# Patient Record
Sex: MALE | Race: BLACK OR AFRICAN AMERICAN | NOT HISPANIC OR LATINO | URBAN - METROPOLITAN AREA
[De-identification: names, ages, dates, MRNs, and addresses within clinical notes are randomized per-mention and may not be internally consistent; named-entity substitution may affect disease eponyms.]

---

## 2020-09-10 ENCOUNTER — INPATIENT (INPATIENT)
Facility: HOSPITAL | Age: 75
LOS: 4 days | Discharge: ANOTHER HOSPITAL | End: 2020-09-15
Attending: HOSPITALIST | Admitting: HOSPITALIST
Payer: MEDICARE

## 2020-09-10 VITALS
HEART RATE: 105 BPM | RESPIRATION RATE: 26 BRPM | OXYGEN SATURATION: 96 % | SYSTOLIC BLOOD PRESSURE: 81 MMHG | DIASTOLIC BLOOD PRESSURE: 56 MMHG

## 2020-09-10 DIAGNOSIS — Z95.5 PRESENCE OF CORONARY ANGIOPLASTY IMPLANT AND GRAFT: Chronic | ICD-10-CM

## 2020-09-10 DIAGNOSIS — I48.91 UNSPECIFIED ATRIAL FIBRILLATION: ICD-10-CM

## 2020-09-10 DIAGNOSIS — E11.9 TYPE 2 DIABETES MELLITUS WITHOUT COMPLICATIONS: ICD-10-CM

## 2020-09-10 DIAGNOSIS — I25.10 ATHEROSCLEROTIC HEART DISEASE OF NATIVE CORONARY ARTERY WITHOUT ANGINA PECTORIS: ICD-10-CM

## 2020-09-10 DIAGNOSIS — D64.9 ANEMIA, UNSPECIFIED: ICD-10-CM

## 2020-09-10 DIAGNOSIS — I35.0 NONRHEUMATIC AORTIC (VALVE) STENOSIS: ICD-10-CM

## 2020-09-10 DIAGNOSIS — E78.5 HYPERLIPIDEMIA, UNSPECIFIED: ICD-10-CM

## 2020-09-10 DIAGNOSIS — K92.1 MELENA: ICD-10-CM

## 2020-09-10 DIAGNOSIS — R89.9 UNSPECIFIED ABNORMAL FINDING IN SPECIMENS FROM OTHER ORGANS, SYSTEMS AND TISSUES: ICD-10-CM

## 2020-09-10 DIAGNOSIS — Z29.9 ENCOUNTER FOR PROPHYLACTIC MEASURES, UNSPECIFIED: ICD-10-CM

## 2020-09-10 DIAGNOSIS — R07.9 CHEST PAIN, UNSPECIFIED: ICD-10-CM

## 2020-09-10 DIAGNOSIS — I10 ESSENTIAL (PRIMARY) HYPERTENSION: ICD-10-CM

## 2020-09-10 DIAGNOSIS — Z98.890 OTHER SPECIFIED POSTPROCEDURAL STATES: Chronic | ICD-10-CM

## 2020-09-10 LAB
ALBUMIN SERPL ELPH-MCNC: 3.7 G/DL — SIGNIFICANT CHANGE UP (ref 3.3–5)
ALBUMIN SERPL ELPH-MCNC: 4.1 G/DL — SIGNIFICANT CHANGE UP (ref 3.3–5)
ALP SERPL-CCNC: 35 U/L — LOW (ref 40–120)
ALP SERPL-CCNC: 39 U/L — LOW (ref 40–120)
ALT FLD-CCNC: 16 U/L — SIGNIFICANT CHANGE UP (ref 4–41)
ALT FLD-CCNC: 17 U/L — SIGNIFICANT CHANGE UP (ref 4–41)
ANION GAP SERPL CALC-SCNC: 14 MMO/L — SIGNIFICANT CHANGE UP (ref 7–14)
ANION GAP SERPL CALC-SCNC: 18 MMO/L — HIGH (ref 7–14)
APPEARANCE UR: CLEAR — SIGNIFICANT CHANGE UP
APTT BLD: 43.4 SEC — HIGH (ref 27–36.3)
APTT BLD: 44.2 SEC — HIGH (ref 27–36.3)
AST SERPL-CCNC: 18 U/L — SIGNIFICANT CHANGE UP (ref 4–40)
AST SERPL-CCNC: 19 U/L — SIGNIFICANT CHANGE UP (ref 4–40)
BASOPHILS # BLD AUTO: 0.04 K/UL — SIGNIFICANT CHANGE UP (ref 0–0.2)
BASOPHILS # BLD AUTO: 0.05 K/UL — SIGNIFICANT CHANGE UP (ref 0–0.2)
BASOPHILS NFR BLD AUTO: 0.3 % — SIGNIFICANT CHANGE UP (ref 0–2)
BASOPHILS NFR BLD AUTO: 0.4 % — SIGNIFICANT CHANGE UP (ref 0–2)
BILIRUB SERPL-MCNC: 0.2 MG/DL — SIGNIFICANT CHANGE UP (ref 0.2–1.2)
BILIRUB SERPL-MCNC: 0.5 MG/DL — SIGNIFICANT CHANGE UP (ref 0.2–1.2)
BILIRUB UR-MCNC: NEGATIVE — SIGNIFICANT CHANGE UP
BLD GP AB SCN SERPL QL: NEGATIVE — SIGNIFICANT CHANGE UP
BLOOD UR QL VISUAL: NEGATIVE — SIGNIFICANT CHANGE UP
BUN SERPL-MCNC: 39 MG/DL — HIGH (ref 7–23)
BUN SERPL-MCNC: 41 MG/DL — HIGH (ref 7–23)
CALCIUM SERPL-MCNC: 9.1 MG/DL — SIGNIFICANT CHANGE UP (ref 8.4–10.5)
CALCIUM SERPL-MCNC: 9.1 MG/DL — SIGNIFICANT CHANGE UP (ref 8.4–10.5)
CHLORIDE SERPL-SCNC: 101 MMOL/L — SIGNIFICANT CHANGE UP (ref 98–107)
CHLORIDE SERPL-SCNC: 99 MMOL/L — SIGNIFICANT CHANGE UP (ref 98–107)
CHOLEST SERPL-MCNC: 100 MG/DL — LOW (ref 120–199)
CO2 SERPL-SCNC: 20 MMOL/L — LOW (ref 22–31)
CO2 SERPL-SCNC: 21 MMOL/L — LOW (ref 22–31)
COLOR SPEC: SIGNIFICANT CHANGE UP
CREAT SERPL-MCNC: 0.99 MG/DL — SIGNIFICANT CHANGE UP (ref 0.5–1.3)
CREAT SERPL-MCNC: 1.03 MG/DL — SIGNIFICANT CHANGE UP (ref 0.5–1.3)
EOSINOPHIL # BLD AUTO: 0.02 K/UL — SIGNIFICANT CHANGE UP (ref 0–0.5)
EOSINOPHIL # BLD AUTO: 0.04 K/UL — SIGNIFICANT CHANGE UP (ref 0–0.5)
EOSINOPHIL NFR BLD AUTO: 0.2 % — SIGNIFICANT CHANGE UP (ref 0–6)
EOSINOPHIL NFR BLD AUTO: 0.3 % — SIGNIFICANT CHANGE UP (ref 0–6)
FERRITIN SERPL-MCNC: 25.4 NG/ML — LOW (ref 30–400)
FOLATE SERPL-MCNC: 13.5 NG/ML — SIGNIFICANT CHANGE UP (ref 4.7–20)
GLUCOSE SERPL-MCNC: 182 MG/DL — HIGH (ref 70–99)
GLUCOSE SERPL-MCNC: 93 MG/DL — SIGNIFICANT CHANGE UP (ref 70–99)
GLUCOSE UR-MCNC: NEGATIVE — SIGNIFICANT CHANGE UP
HBA1C BLD-MCNC: 5.4 % — SIGNIFICANT CHANGE UP (ref 4–5.6)
HCT VFR BLD CALC: 20.7 % — CRITICAL LOW (ref 39–50)
HCT VFR BLD CALC: 28.3 % — LOW (ref 39–50)
HDLC SERPL-MCNC: 36 MG/DL — SIGNIFICANT CHANGE UP (ref 35–55)
HGB BLD-MCNC: 6.4 G/DL — CRITICAL LOW (ref 13–17)
HGB BLD-MCNC: 9.3 G/DL — LOW (ref 13–17)
IMM GRANULOCYTES NFR BLD AUTO: 0.3 % — SIGNIFICANT CHANGE UP (ref 0–1.5)
IMM GRANULOCYTES NFR BLD AUTO: 0.6 % — SIGNIFICANT CHANGE UP (ref 0–1.5)
INR BLD: 2.71 — HIGH (ref 0.88–1.16)
INR BLD: 2.87 — HIGH (ref 0.88–1.16)
IRON SATN MFR SERPL: 303 UG/DL — SIGNIFICANT CHANGE UP (ref 155–535)
IRON SATN MFR SERPL: 64 UG/DL — SIGNIFICANT CHANGE UP (ref 45–165)
KETONES UR-MCNC: NEGATIVE — SIGNIFICANT CHANGE UP
LEUKOCYTE ESTERASE UR-ACNC: NEGATIVE — SIGNIFICANT CHANGE UP
LIPID PNL WITH DIRECT LDL SERPL: 58 MG/DL — SIGNIFICANT CHANGE UP
LYMPHOCYTES # BLD AUTO: 1.24 K/UL — SIGNIFICANT CHANGE UP (ref 1–3.3)
LYMPHOCYTES # BLD AUTO: 2.5 K/UL — SIGNIFICANT CHANGE UP (ref 1–3.3)
LYMPHOCYTES # BLD AUTO: 21.7 % — SIGNIFICANT CHANGE UP (ref 13–44)
LYMPHOCYTES # BLD AUTO: 9.9 % — LOW (ref 13–44)
MAGNESIUM SERPL-MCNC: 1.6 MG/DL — SIGNIFICANT CHANGE UP (ref 1.6–2.6)
MCHC RBC-ENTMCNC: 30.9 % — LOW (ref 32–36)
MCHC RBC-ENTMCNC: 31.5 PG — SIGNIFICANT CHANGE UP (ref 27–34)
MCHC RBC-ENTMCNC: 31.8 PG — SIGNIFICANT CHANGE UP (ref 27–34)
MCHC RBC-ENTMCNC: 32.2 % — SIGNIFICANT CHANGE UP (ref 32–36)
MCV RBC AUTO: 103 FL — HIGH (ref 80–100)
MCV RBC AUTO: 94.6 FL — SIGNIFICANT CHANGE UP (ref 80–100)
MONOCYTES # BLD AUTO: 0.68 K/UL — SIGNIFICANT CHANGE UP (ref 0–0.9)
MONOCYTES # BLD AUTO: 0.9 K/UL — SIGNIFICANT CHANGE UP (ref 0–0.9)
MONOCYTES NFR BLD AUTO: 5.4 % — SIGNIFICANT CHANGE UP (ref 2–14)
MONOCYTES NFR BLD AUTO: 7.8 % — SIGNIFICANT CHANGE UP (ref 2–14)
NEUTROPHILS # BLD AUTO: 10.47 K/UL — HIGH (ref 1.8–7.4)
NEUTROPHILS # BLD AUTO: 8 K/UL — HIGH (ref 1.8–7.4)
NEUTROPHILS NFR BLD AUTO: 69.5 % — SIGNIFICANT CHANGE UP (ref 43–77)
NEUTROPHILS NFR BLD AUTO: 83.6 % — HIGH (ref 43–77)
NITRITE UR-MCNC: NEGATIVE — SIGNIFICANT CHANGE UP
NRBC # FLD: 0 K/UL — SIGNIFICANT CHANGE UP (ref 0–0)
NRBC # FLD: 0 K/UL — SIGNIFICANT CHANGE UP (ref 0–0)
NT-PROBNP SERPL-SCNC: 664.3 PG/ML — SIGNIFICANT CHANGE UP
OB PNL STL: POSITIVE — SIGNIFICANT CHANGE UP
PH UR: 5.5 — SIGNIFICANT CHANGE UP (ref 5–8)
PLATELET # BLD AUTO: 195 K/UL — SIGNIFICANT CHANGE UP (ref 150–400)
PLATELET # BLD AUTO: 210 K/UL — SIGNIFICANT CHANGE UP (ref 150–400)
PMV BLD: 10.3 FL — SIGNIFICANT CHANGE UP (ref 7–13)
PMV BLD: 10.9 FL — SIGNIFICANT CHANGE UP (ref 7–13)
POTASSIUM SERPL-MCNC: 4.7 MMOL/L — SIGNIFICANT CHANGE UP (ref 3.5–5.3)
POTASSIUM SERPL-MCNC: 5.1 MMOL/L — SIGNIFICANT CHANGE UP (ref 3.5–5.3)
POTASSIUM SERPL-SCNC: 4.7 MMOL/L — SIGNIFICANT CHANGE UP (ref 3.5–5.3)
POTASSIUM SERPL-SCNC: 5.1 MMOL/L — SIGNIFICANT CHANGE UP (ref 3.5–5.3)
PROT SERPL-MCNC: 6.6 G/DL — SIGNIFICANT CHANGE UP (ref 6–8.3)
PROT SERPL-MCNC: 7.2 G/DL — SIGNIFICANT CHANGE UP (ref 6–8.3)
PROT UR-MCNC: NEGATIVE — SIGNIFICANT CHANGE UP
PROTHROM AB SERPL-ACNC: 29.7 SEC — HIGH (ref 10.6–13.6)
PROTHROM AB SERPL-ACNC: 31.4 SEC — HIGH (ref 10.6–13.6)
RBC # BLD: 2.01 M/UL — LOW (ref 4.2–5.8)
RBC # BLD: 2.95 M/UL — LOW (ref 4.2–5.8)
RBC # FLD: 13.4 % — SIGNIFICANT CHANGE UP (ref 10.3–14.5)
RBC # FLD: 17.5 % — HIGH (ref 10.3–14.5)
RH IG SCN BLD-IMP: POSITIVE — SIGNIFICANT CHANGE UP
RH IG SCN BLD-IMP: POSITIVE — SIGNIFICANT CHANGE UP
SARS-COV-2 RNA SPEC QL NAA+PROBE: SIGNIFICANT CHANGE UP
SODIUM SERPL-SCNC: 136 MMOL/L — SIGNIFICANT CHANGE UP (ref 135–145)
SODIUM SERPL-SCNC: 137 MMOL/L — SIGNIFICANT CHANGE UP (ref 135–145)
SP GR SPEC: 1.02 — SIGNIFICANT CHANGE UP (ref 1–1.04)
TRIGL SERPL-MCNC: 119 MG/DL — SIGNIFICANT CHANGE UP (ref 10–149)
TROPONIN T, HIGH SENSITIVITY: 13 NG/L — SIGNIFICANT CHANGE UP (ref ?–14)
TROPONIN T, HIGH SENSITIVITY: 13 NG/L — SIGNIFICANT CHANGE UP (ref ?–14)
TSH SERPL-MCNC: 2.07 UIU/ML — SIGNIFICANT CHANGE UP (ref 0.27–4.2)
UIBC SERPL-MCNC: 238.6 UG/DL — SIGNIFICANT CHANGE UP (ref 110–370)
UROBILINOGEN FLD QL: NORMAL — SIGNIFICANT CHANGE UP
VIT B12 SERPL-MCNC: 410 PG/ML — SIGNIFICANT CHANGE UP (ref 200–900)
WBC # BLD: 11.53 K/UL — HIGH (ref 3.8–10.5)
WBC # BLD: 12.52 K/UL — HIGH (ref 3.8–10.5)
WBC # FLD AUTO: 11.53 K/UL — HIGH (ref 3.8–10.5)
WBC # FLD AUTO: 12.52 K/UL — HIGH (ref 3.8–10.5)

## 2020-09-10 PROCEDURE — 99284 EMERGENCY DEPT VISIT MOD MDM: CPT

## 2020-09-10 PROCEDURE — 99223 1ST HOSP IP/OBS HIGH 75: CPT | Mod: GC

## 2020-09-10 PROCEDURE — 71045 X-RAY EXAM CHEST 1 VIEW: CPT | Mod: 26

## 2020-09-10 PROCEDURE — 99223 1ST HOSP IP/OBS HIGH 75: CPT

## 2020-09-10 RX ORDER — DEXTROSE 50 % IN WATER 50 %
12.5 SYRINGE (ML) INTRAVENOUS ONCE
Refills: 0 | Status: DISCONTINUED | OUTPATIENT
Start: 2020-09-10 | End: 2020-09-15

## 2020-09-10 RX ORDER — INFLUENZA VIRUS VACCINE 15; 15; 15; 15 UG/.5ML; UG/.5ML; UG/.5ML; UG/.5ML
0.5 SUSPENSION INTRAMUSCULAR ONCE
Refills: 0 | Status: DISCONTINUED | OUTPATIENT
Start: 2020-09-10 | End: 2020-09-15

## 2020-09-10 RX ORDER — SIMVASTATIN 20 MG/1
10 TABLET, FILM COATED ORAL AT BEDTIME
Refills: 0 | Status: DISCONTINUED | OUTPATIENT
Start: 2020-09-10 | End: 2020-09-11

## 2020-09-10 RX ORDER — PANTOPRAZOLE SODIUM 20 MG/1
80 TABLET, DELAYED RELEASE ORAL ONCE
Refills: 0 | Status: COMPLETED | OUTPATIENT
Start: 2020-09-10 | End: 2020-09-10

## 2020-09-10 RX ORDER — DEXTROSE 50 % IN WATER 50 %
25 SYRINGE (ML) INTRAVENOUS ONCE
Refills: 0 | Status: DISCONTINUED | OUTPATIENT
Start: 2020-09-10 | End: 2020-09-15

## 2020-09-10 RX ORDER — ACETAMINOPHEN 500 MG
650 TABLET ORAL EVERY 6 HOURS
Refills: 0 | Status: DISCONTINUED | OUTPATIENT
Start: 2020-09-10 | End: 2020-09-15

## 2020-09-10 RX ORDER — PANTOPRAZOLE SODIUM 20 MG/1
8 TABLET, DELAYED RELEASE ORAL
Qty: 80 | Refills: 0 | Status: DISCONTINUED | OUTPATIENT
Start: 2020-09-10 | End: 2020-09-14

## 2020-09-10 RX ORDER — INSULIN LISPRO 100/ML
VIAL (ML) SUBCUTANEOUS AT BEDTIME
Refills: 0 | Status: DISCONTINUED | OUTPATIENT
Start: 2020-09-10 | End: 2020-09-15

## 2020-09-10 RX ORDER — SODIUM CHLORIDE 9 MG/ML
1000 INJECTION, SOLUTION INTRAVENOUS
Refills: 0 | Status: DISCONTINUED | OUTPATIENT
Start: 2020-09-10 | End: 2020-09-11

## 2020-09-10 RX ORDER — SODIUM CHLORIDE 9 MG/ML
1000 INJECTION INTRAMUSCULAR; INTRAVENOUS; SUBCUTANEOUS ONCE
Refills: 0 | Status: COMPLETED | OUTPATIENT
Start: 2020-09-10 | End: 2020-09-10

## 2020-09-10 RX ORDER — NICOTINE POLACRILEX 2 MG
1 GUM BUCCAL DAILY
Refills: 0 | Status: DISCONTINUED | OUTPATIENT
Start: 2020-09-10 | End: 2020-09-15

## 2020-09-10 RX ORDER — INSULIN LISPRO 100/ML
VIAL (ML) SUBCUTANEOUS
Refills: 0 | Status: DISCONTINUED | OUTPATIENT
Start: 2020-09-10 | End: 2020-09-15

## 2020-09-10 RX ORDER — DEXTROSE 50 % IN WATER 50 %
15 SYRINGE (ML) INTRAVENOUS ONCE
Refills: 0 | Status: DISCONTINUED | OUTPATIENT
Start: 2020-09-10 | End: 2020-09-15

## 2020-09-10 RX ORDER — GLUCAGON INJECTION, SOLUTION 0.5 MG/.1ML
1 INJECTION, SOLUTION SUBCUTANEOUS ONCE
Refills: 0 | Status: DISCONTINUED | OUTPATIENT
Start: 2020-09-10 | End: 2020-09-15

## 2020-09-10 RX ADMIN — SODIUM CHLORIDE 1000 MILLILITER(S): 9 INJECTION INTRAMUSCULAR; INTRAVENOUS; SUBCUTANEOUS at 01:50

## 2020-09-10 RX ADMIN — Medication 1 PATCH: at 18:05

## 2020-09-10 RX ADMIN — PANTOPRAZOLE SODIUM 10 MG/HR: 20 TABLET, DELAYED RELEASE ORAL at 17:49

## 2020-09-10 RX ADMIN — PANTOPRAZOLE SODIUM 10 MG/HR: 20 TABLET, DELAYED RELEASE ORAL at 21:33

## 2020-09-10 RX ADMIN — SIMVASTATIN 10 MILLIGRAM(S): 20 TABLET, FILM COATED ORAL at 21:34

## 2020-09-10 RX ADMIN — Medication 1 PATCH: at 12:37

## 2020-09-10 RX ADMIN — SODIUM CHLORIDE 1000 MILLILITER(S): 9 INJECTION INTRAMUSCULAR; INTRAVENOUS; SUBCUTANEOUS at 02:45

## 2020-09-10 RX ADMIN — PANTOPRAZOLE SODIUM 80 MILLIGRAM(S): 20 TABLET, DELAYED RELEASE ORAL at 02:58

## 2020-09-10 RX ADMIN — PANTOPRAZOLE SODIUM 10 MG/HR: 20 TABLET, DELAYED RELEASE ORAL at 08:15

## 2020-09-10 NOTE — H&P ADULT - GASTROINTESTINAL DETAILS
no masses palpable/soft/no guarding/nontender/no rebound tenderness/no rigidity/no distention no distention/no masses palpable/bowel sounds normal/no rebound tenderness/no rigidity/nontender/no guarding/soft

## 2020-09-10 NOTE — H&P ADULT - NSICDXPASTSURGICALHX_GEN_ALL_CORE_FT
PAST SURGICAL HISTORY:  History of coronary artery stent placement two coronary stents    Status post angiography of extremity 1 stent in each leg

## 2020-09-10 NOTE — H&P ADULT - NSICDXFAMILYHX_GEN_ALL_CORE_FT
FAMILY HISTORY:  FH: CAD (coronary artery disease), son  FH: diabetes mellitus, mother  FH: seizures, son  FH: type 1 diabetes, son

## 2020-09-10 NOTE — ED ADULT NURSE NOTE - CHIEF COMPLAINT QUOTE
Pt awake, alert arrives by SIM for R/O STEMI, brought directly to room 20. ERICNY paramedic states pt comes in for chest pain, nausea, vomiting, dizziness - 20G L arm placed by paramedic

## 2020-09-10 NOTE — CONSULT NOTE ADULT - SUBJECTIVE AND OBJECTIVE BOX
Patient seen and evaluated at bedside    Chief Complaint: chest pain    HPI:  75 y/o M with h/o HTN, Afib on coumadin, hyperlipidemia, CAD s/p stents x 2, PAD with LLE stent, BIB EMS for chest pain.  Pt reports a few days of worsening lightheadedness and CP and dyspnea on exertion.  He was seen by outpatient cardiologist Dr Armijo yesterday and started on metoprolol.  Pt took first dose of metoprolol tonight, also with worsening of left sided chest pain, non radiating.  Pt received 2 baby asa and nitro x 3 by EMS prior to arrival, became nauseated and have one episode of nbnb vomiting.      In the ED, he was found to have a Hb of 6.4, stool occult blood was positive, INR 2.8, Trop 13, Cr 1.03/ BUN 41.  Received pRBC in the ED with plan for EGD/C-scope    PMHx:   Atrial fibrillation  Hypertension  Diabetes  Hyperlipidemia  Diabetes  Hypertension  Coronary artery disease  Hyperlipemia  Hypertension  Diabetes      PSHx:   Status post angiography of extremity  H/O right coronary artery stent placement  History of coronary artery stent placement  History of coronary artery stent placement      Allergies:  No Known Allergies      Home Meds:    Current Medications:   acetaminophen   Tablet .. 650 milliGRAM(s) Oral every 6 hours PRN  dextrose 40% Gel 15 Gram(s) Oral once PRN  dextrose 5%. 1000 milliLiter(s) IV Continuous <Continuous>  dextrose 50% Injectable 12.5 Gram(s) IV Push once  dextrose 50% Injectable 25 Gram(s) IV Push once  dextrose 50% Injectable 25 Gram(s) IV Push once  glucagon  Injectable 1 milliGRAM(s) IntraMuscular once PRN  insulin lispro (HumaLOG) corrective regimen sliding scale   SubCutaneous three times a day before meals  insulin lispro (HumaLOG) corrective regimen sliding scale   SubCutaneous at bedtime  pantoprazole Infusion 8 mG/Hr IV Continuous <Continuous>  simvastatin 10 milliGRAM(s) Oral at bedtime      FAMILY HISTORY:  FH: CAD (coronary artery disease): son  FH: seizures: son  FH: type 1 diabetes: son  FH: diabetes mellitus: mother      Social History:  Smoking History:  Alcohol Use:  Drug Use:    REVIEW OF SYSTEMS:  Constitutional:     [ ] negative [ ] fevers [ ] chills [ ] weight loss [ ] weight gain  HEENT:                  [ ] negative [ ] dry eyes [ ] eye irritation [ ] postnasal drip [ ] nasal congestion  CV:                         [ ] negative  [ ] chest pain [ ] orthopnea [ ] palpitations [ ] murmur  Resp:                     [ ] negative [ ] cough [ ] shortness of breath [ ] dyspnea [ ] wheezing [ ] sputum [ ]hemoptysis  GI:                          [ ] negative [ ] nausea [ ] vomiting [ ] diarrhea [ ] constipation [ ] abd pain [ ] dysphagia   :                        [ ] negative [ ] dysuria [ ] nocturia [ ] hematuria [ ] increased urinary frequency  Musculoskeletal: [ ] negative [ ] back pain [ ] myalgias [ ] arthralgias [ ] fracture  Skin:                       [ ] negative [ ] rash [ ] itch  Neurological:        [ ] negative [ ] headache [ ] dizziness [ ] syncope [ ] weakness [ ] numbness  Psychiatric:           [ ] negative [ ] anxiety [ ] depression  Endocrine:            [ ] negative [ ] diabetes [ ] thyroid problem  Heme/Lymph:      [ ] negative [ ] anemia [ ] bleeding problem  Allergic/Immune: [ ] negative [ ] itchy eyes [ ] nasal discharge [ ] hives [ ] angioedema    [ ] All other systems negative  [ ] Unable to assess ROS due to      Physical Exam:  T(F): 98.2 (09-10), Max: 98.4 (09-10)  HR: 83 (09-10) (81 - 105)  BP: 92/63 (09-10) (81/56 - 121/64)  RR: 18 (09-10)  SpO2: 100% (09-10)  GENERAL: No acute distress, well-developed  HEAD:  Atraumatic, Normocephalic  ENT: EOMI, PERRLA, conjunctiva and sclera clear, Neck supple, No JVD, moist mucosa  CHEST/LUNG: Clear to auscultation bilaterally; No wheeze, equal breath sounds bilaterally   BACK: No spinal tenderness  HEART: Regular rate and rhythm; No murmurs, rubs, or gallops  ABDOMEN: Soft, Nontender, Nondistended; Bowel sounds present  EXTREMITIES:  No clubbing, cyanosis, or edema  PSYCH: Nl behavior, nl affect  NEUROLOGY: AAOx3, non-focal, cranial nerves intact  SKIN: Normal color, No rashes or lesions  LINES:    Cardiovascular Diagnostic Testing:    ECG: Personally reviewed:    Echo: Personally reviewed:    Stress Testing:    Cath:    Imaging:    CXR: Personally reviewed    Labs: Personally reviewed                        6.4    12.52 )-----------( 210      ( 10 Sep 2020 01:45 )             20.7     09-10    137  |  99  |  41<H>  ----------------------------<  182<H>  4.7   |  20<L>  |  1.03    Ca    9.1      10 Sep 2020 01:45    TPro  6.6  /  Alb  3.7  /  TBili  0.2  /  DBili  x   /  AST  18  /  ALT  16  /  AlkPhos  35<L>  09-10    PT/INR - ( 10 Sep 2020 01:45 )   PT: 31.4 SEC;   INR: 2.87          PTT - ( 10 Sep 2020 01:45 )  PTT:44.2 SEC  Serum Pro-Brain Natriuretic Peptide: 664.3 pg/mL (09-10 @ 01:45) Patient seen and evaluated at bedside    Chief Complaint: chest pain    HPI:  73 y/o M with h/o HTN, Afib on coumadin, severe AS, hyperlipidemia, CAD s/p stents x 2 (last pCI 7 years ago), PAD with LLE stent, BIB EMS for chest pain.  Pt reports a few days of worsening lightheadedness and CP and dyspnea on exertion.  He was seen by outpatient cardiologist Dr Armijo yesterday and started on metoprolol.  Pt took first dose of metoprolol tonight, also with worsening of left sided chest pain, non radiating.  Pt received 2 baby asa and nitro x 3 by EMS prior to arrival, became nauseated and have one episode of nbnb vomiting.      States about 1- 2 weeks ago started noticing worsening SOB on exertion and intermittent chest pain. Previously was able to walk 6 blocks without stopping but now was able to only walk 1/2 block limited by pain in legs and SOB. Around the same time he noticed having black tarry stools which he attributed to a new omega 3 supplement he was taking. He continued to have black stools for the following 4 days and noted worsening chest pain/palpitations and SOB so presented to the ED. At this time, feels well without SOB and chest pain and symptoms resolved after he arrived to the ED. No prior hx of GIB. States his last TTE and stress test was about a year ago but he does not know the results. Yesterday went to his cardiologist Dr Armijo and was started on metoprolol and given a referral to CT surgery for SAVR eval.     In the ED, he was found to have a Hb of 6.4, stool occult blood was positive, INR 2.8, Trop 13, Cr 1.03/ BUN 41.  Received pRBC in the ED with plan for EGD/C-scope      Home Meds  Metformin 500 BID  HCTZ-Lisinopril 25-20  Simvastatin 10mg  Diltiazem 240mg  Warfarin 6mg  Plavix 75      PMHx:   Atrial fibrillation  Hypertension  Diabetes  Hyperlipidemia  Diabetes  Hypertension  Coronary artery disease  Hyperlipemia  Hypertension  Diabetes      PSHx:   Status post angiography of extremity  H/O right coronary artery stent placement  History of coronary artery stent placement  History of coronary artery stent placement      Allergies:  No Known Allergies      Home Meds:    Current Medications:   acetaminophen   Tablet .. 650 milliGRAM(s) Oral every 6 hours PRN  dextrose 40% Gel 15 Gram(s) Oral once PRN  dextrose 5%. 1000 milliLiter(s) IV Continuous <Continuous>  dextrose 50% Injectable 12.5 Gram(s) IV Push once  dextrose 50% Injectable 25 Gram(s) IV Push once  dextrose 50% Injectable 25 Gram(s) IV Push once  glucagon  Injectable 1 milliGRAM(s) IntraMuscular once PRN  insulin lispro (HumaLOG) corrective regimen sliding scale   SubCutaneous three times a day before meals  insulin lispro (HumaLOG) corrective regimen sliding scale   SubCutaneous at bedtime  pantoprazole Infusion 8 mG/Hr IV Continuous <Continuous>  simvastatin 10 milliGRAM(s) Oral at bedtime      FAMILY HISTORY:  FH: CAD (coronary artery disease): son  FH: seizures: son  FH: type 1 diabetes: son  FH: diabetes mellitus: mother  Physical Exam:  T(F): 98.2 (09-10), Max: 98.4 (09-10)  HR: 83 (09-10) (81 - 105)  BP: 92/63 (09-10) (81/56 - 121/64)  RR: 18 (09-10)  SpO2: 100% (09-10)  GENERAL: No acute distress, well-developed, pale conjunctiva  CHEST/LUNG: CTAB  HEART:  3/6 holosystolic murmur radiating to carotids  ABDOMEN: Soft, Nontender, Nondistended; Bowel sounds present  EXTREMITIES:  No clubbing, cyanosis, or edema          ECG: Personally reviewed: Afib with ST depressions in V4-V5    Echo: Personally reviewed:      Imaging:    CXR: Personally reviewed    Labs: Personally reviewed                        6.4    12.52 )-----------( 210      ( 10 Sep 2020 01:45 )             20.7     09-10    137  |  99  |  41<H>  ----------------------------<  182<H>  4.7   |  20<L>  |  1.03    Ca    9.1      10 Sep 2020 01:45    TPro  6.6  /  Alb  3.7  /  TBili  0.2  /  DBili  x   /  AST  18  /  ALT  16  /  AlkPhos  35<L>  09-10    PT/INR - ( 10 Sep 2020 01:45 )   PT: 31.4 SEC;   INR: 2.87          PTT - ( 10 Sep 2020 01:45 )  PTT:44.2 SEC  Serum Pro-Brain Natriuretic Peptide: 664.3 pg/mL (09-10 @ 01:45)

## 2020-09-10 NOTE — H&P ADULT - PROBLEM SELECTOR PLAN 7
Continue simvistatin  as planned holding plavix for now due to the acute bleed, house cardio following

## 2020-09-10 NOTE — H&P ADULT - PROBLEM SELECTOR PLAN 5
Switch from Warfarin to heparin   recheck PTT  continue metoprolol fasting lipid profile, cont statin

## 2020-09-10 NOTE — ED PROVIDER NOTE - OBJECTIVE STATEMENT
74M w/ CAD on ASA/Plavix, chronic afib on warfarin, DM, HTN, HLD, started on metoprolol today presents w/ substernal sharp pain for several days worsened today associated w/ lightheadedness. + vomiting today. Received 162 mg ASA and 3 x .4mg sublingual nitro today w/ improvement of pain. Denies radiation, fevers, sob, cough, abdominal pain, lower extremity edema. Wife states patient had an echo today but has not had a stress test in years. Cardiac stents placed at OSH.

## 2020-09-10 NOTE — ED PROVIDER NOTE - CLINICAL SUMMARY MEDICAL DECISION MAKING FREE TEXT BOX
74M w/ h/o cad, afib, htn, hld, dm presents w/ chest pain, no stemi on ekg, afib w/o RVR, no infectious symptoms, no abdominal pain or back pain, has equal bilateral blood pressures, low suspicion for dissection, no infectious symptoms, tba for cardiac workup

## 2020-09-10 NOTE — H&P ADULT - ASSESSMENT
74 year old male with PMH of DM, HTN, hyperlipidemia, atrial fibrillation and 55 pack year history present to the ED with chest pain for 2-3 days, melena and leg weakness.  Admitted for assessment of anemia. 75 y/o male, with a PmHx of DM, HTN, hyperlipidemia, atrial fibrillation on coumadin, CAD s/p 2 cardiac stents and 2 LE stents (one to each leg), PVD and 55 pack year smoking history, present to the [facility] ED with chest pain for 2-3 days. Admitted to telemetry for symptomatic anemia with a possible GI bleed and r/o acs.

## 2020-09-10 NOTE — H&P ADULT - HISTORY OF PRESENT ILLNESS
74 year old male with PMH of DM, HTN, hyperlipidemia, atrial fibrillation, CAD, and 55 pack year history present to the ED with chest pain for 2-3 days. Pain was 10/10 and constant but worsens with breathing. Pain is currently 1/10.  Chest pain is localized to left side of chest. He has never experienced anything like this before. Patients wife called an ambulance who brought him to the ED.  Patient began to sweat due to pain, patient reports that this is unusual for him as he does not sweat. He vomited in the ambulance after drinking orange juice. Vomit was yellow. Patient also reports dark stool for several weeks. Patient also reports bilateral leg weakness for the last 2-3 weeks; patient is unable to use the bathroom without assistance.  Patient reports dizziness for several weeks. Patient also reports a weight loss of about 20lbs over the past 1-1.5 years due to a loss of appetite. Patient saw his PCP on Tuesday for his symptoms who referred him to Dr. Chavira, cardiologist. Patient started on metoprolol 25mg PO yesterday.  Denies SOB, abdominal pain, fever. No sick contacts, no recent travel. Last colonoscopy 7 years ago. Coumadin checked 3 months ago by PCP. 75 y/o male, with a PmHx of DM, HTN, hyperlipidemia, atrial fibrillation on coumadin, CAD s/p 2 cardiac stents and 2 LE stents (one to each leg), PVD and 55 pack year smoking history, present to the [facility] ED with chest pain for 2-3 days. Pt states over the past 2-3 weeks he has been having intermittent chest pain but over the past 2-3 days it started to get worse. He states he had gone to see his Primary Care Doctor on [place] and was referred to a Cardiologist whom he saw yesterday for the first time. While in the Cardiologist office he was found to have a new Severe As and was given a prescription for a referral for a Cardio Thoracic Surgeon (Dr. Meade (whom he hasn't gone to see yet). He was also started on Metoprolol XL 25mg. Pt states last night was the first time taking the Metoprolol and overnight he was woken up from sleep with a 10/10, left sided, non-radiating, pressure like sensation in his chest that was exacerbated with breathing. He stated he has never experienced anything like this before (even when he had his stents put in so his wife called an ambulance who brought him to the ED.  Pt states during this event last night he began to sweat due to pain (reports that this is unusual for him as he does not sweat). He also had associated symptoms of vomiting in the ambulance after drinking orange juice. Patient also reports dark stool for several weeks and bilateral leg weakness for the last 2-3 weeks (unable to use the bathroom without assistance).  Patient reports dizziness for several weeks and has had a weight loss of about 20lbs over the past 1-1.5 years due to a loss of appetite. Denies SOB, abdominal pain, fever. No sick contacts, no recent travel. Last colonoscopy 7 years ago. Coumadin checked 3 months ago by PCP. Currently, he states the chest pain is about a 1/10. In the [facility] ED, he was found to be anemic with a Hgb of 6.4/20.7 and was given 2 units of PRBC's. He was also found to be occult positive. He appears comfortable at this time and is now being admitted to telemetry for symptomatic anemia with a possible GI bleed and r/o acs.

## 2020-09-10 NOTE — ED PROVIDER NOTE - PROGRESS NOTE DETAILS
Stuart: hypotension noted, patient started on metoprolol today and patient is s/p 3 nitros, will monitor and administer IVF Fito PEACE: Labs revealing anemia with Hb 6.  Will obtain fecal occult, plan to transfuse.  BP improved after small fluid bolus of 250ccs.

## 2020-09-10 NOTE — ED PROVIDER NOTE - PMH
Coronary artery disease    Diabetes    Diabetes  onset age 49  Hyperlipemia    Hyperlipidemia    Hypertension    Hypertension

## 2020-09-10 NOTE — ED ADULT NURSE NOTE - OBJECTIVE STATEMENT
Pt arrives to ED room 20 as STEMI notification from [facility].  Attending and Resident at bedside with RN, ED Tech and PCA.  Pt called EMS c/o N/V and left sided chest pain with dizziness and weakness. Pt arrives with 20g iv placed by EMS to left hand.  Pt received 4mg Zofran, 162mg ASA and 3 nitro sprays from EMS.  Pt reports CP began at 6pm today but he also felt it yesterday.  Pt has hx of 2 stents and was seen at PMD today and had an EKG.  Pt has hx of afib and is on Coumadin and Plavix.  Pt states he took a dose of Metoprolol today for the first time ever at 10pm as per PMD prescription today.  Pt states pain was 10/10 but then reduced to 4/10 following the medication received from EMS.  20g iv placed to RT AC by RN, labs drawn and sent.  Pt receiving NS bolus as per EMAR for hypotension.  XR at bedside completed.  Bedside EKG completed.  Pt spouse at bedside.  Pt normally walks without assistance as per Spouse but today due to weakness he is unable to ambulate.

## 2020-09-10 NOTE — H&P ADULT - PROBLEM SELECTOR PLAN 6
Discontinue HTZ as patient was hypotensive  reassess at later time monitor fs, insulin ssc, [id number]  hold oral meds for now

## 2020-09-10 NOTE — H&P ADULT - PROBLEM SELECTOR PLAN 1
Repeat CBC, determine source of bleeding  Most likely upper GI bleed  prepare for transfusion monitor H & H, s/p 2 units of PRBC's  Occult positive  House GI c/s following - may need a colonoscopy/EGD  On a protonix gtt

## 2020-09-10 NOTE — ED ADULT NURSE REASSESSMENT NOTE - NS ED NURSE REASSESS COMMENT FT1
Pre transfusion vitals. Pt reeducated on signs and symptoms of transfusion reactions. Respirations even and unlabored. Comfort measures provided. Will continue to monitor.

## 2020-09-10 NOTE — H&P ADULT - PROBLEM SELECTOR PLAN 3
Most likely due to upper GI bleed  KEVIN, endoscopy, colonoscopy  Consult GI CHADSVASC 4  hold coumadin for now due to bleeding and will need an EGD/Colonoscopy  when INR < 2 start heparin gtt  monitor INR  - rate control  - holding metoprolol for now due to hypotension

## 2020-09-10 NOTE — ED ADULT TRIAGE NOTE - CHIEF COMPLAINT QUOTE
Pt awake, alert arrives by SIM for R/O STEMI Pt awake, alert arrives by SIM for R/O STEMI, brought directly to room 20 Pt awake, alert arrives by SIM for R/O STEMI, brought directly to room 20. ERICNY paramedic states pt comes in for chest pain, nausea, vomiting, dizziness - 20G L arm placed by paramedic

## 2020-09-10 NOTE — CONSULT NOTE ADULT - ASSESSMENT
73 yo M with h/o Afib on coumadin, severe AS, hyperlipidemia, CAD s/p stents x 2 (last pCI 7 years ago), PAD with LLE stent, BIB EMS for chest pain found to have symptomatic anemia and cardiology c/s for clearance for EGD/C-scope.       #Preop: METS 3 in the acute setting likely related to symptomatic anemia. Prior to 2 weeks ago patient had METS >4 and was able to walk multiple blocks.  No recent stress testing or TTE in our system but patient with known severe AS undergoing valve replacement work up. EKG with AF and non specific ST/T wave abnormalities  Patient remains at high risk for cardiac complications given severe AS however, given urgency of EGD/C-scope in the setting of symptomatic anemia, no further cardiac work up is needed at this time.  -Hold warfarin in setting of recent active GIB  -can continue diltiazem for rate control in setting of AFib  -continue plavix for LLE stent    These recommendations are not final without attending attestation 75 yo M with h/o Afib on coumadin, severe AS, hyperlipidemia, CAD s/p stents x 2 (last pCI 7 years ago), PAD with LLE stent, BIB EMS for chest pain found to have symptomatic anemia and cardiology c/s for clearance for EGD/C-scope.       #Preop: METS 3 in the acute setting likely related to symptomatic anemia. Prior to 2 weeks ago patient had METS >4 and was able to walk multiple blocks.  No recent stress testing or TTE in our system but patient with known severe AS undergoing valve replacement work up. EKG with AF and non specific ST/T wave abnormalities  Patient remains at high risk for cardiac complications given severe AS however, given urgency of EGD/C-scope in the setting of symptomatic anemia, no further cardiac work up is needed at this time.  -Hold warfarin in setting of recent active GIB  -can continue diltiazem for rate control in setting of AFib  -continue plavix for LLE stent

## 2020-09-10 NOTE — H&P ADULT - NSICDXPASTMEDICALHX_GEN_ALL_CORE_FT
PAST MEDICAL HISTORY:  Atrial fibrillation     Coronary artery disease     Diabetes     Hyperlipidemia     Hypertension     Hypertension PAST MEDICAL HISTORY:  Atrial fibrillation     Coronary artery disease     Diabetes     Hyperlipidemia     Hypertension     Peripheral vascular disease

## 2020-09-10 NOTE — CONSULT NOTE ADULT - ASSESSMENT
74M w/ CAD s/p PCI and PAD s/p b/l stents (7 and 5 years ago, respectively) on Plavix, and AFib on coumadin, presenting w/ SOB/chest pain, found to have anemia in setting of black stools for 3 weeks.    Impression:  #Anemia - w/ hx of melena, c/f UGIB, though dark stools could be related to iron; d/dx most c/f UGI source like PUD, erosive gastritis/esophagitis, angioectasia, gastric caner, but could be from lower source like colorectal cancer  #Chest pain  #CAD s/p PCI  #PAD s/p stent on Plavix  #AFib on coumadin    Recommendations:  - trend Hb, transfuse to Hb > 7  - appreciate cardiology recommendations, awaiting attending attestation from note today  - c/w PPI gtt for now  - please hold A/C if no absolute contraindication  - keep NPO  - would ideally perform EGD today if cleared by cardiology, may need products to reverse INR  - GI will follow    Augustin Mckee  Gastroenterology Fellow  NON-URGENT CONSULTS:  Please email [email or web address]  [email or web address]  AT NIGHT AND ON WEEKENDS:  Contact on-call GI fellow via answering service ([phone]) from 5pm-8am and on weekends/holidays  MONDAY-FRIDAY 8AM-5PM:  Available via [employer] Teams  Pager# [phone] ([facility]) or [phone] ([facility])  GI Phone# [phone] ([facility]) 74M w/ CAD s/p PCI and PAD s/p b/l stents (7 and 5 years ago, respectively) on Plavix, and AFib on coumadin, presenting w/ SOB/chest pain, found to have anemia in setting of black stools for 3 weeks.    Impression:  #Anemia - w/ hx of melena, c/f UGIB, though dark stools could be related to iron; d/dx most c/f UGI source like PUD, erosive gastritis/esophagitis, angioectasia, gastric caner, but could be from lower source like colorectal cancer  #Chest pain  #CAD s/p PCI  #PAD s/p stent on Plavix  #AFib on coumadin    Recommendations:  - trend Hb, transfuse to Hb > 7  - please trend CBC and coags  - appreciate cardiology recommendations, awaiting attending attestation from note today  - c/w PPI gtt for now  - please hold A/C if no absolute contraindication  - keep NPO  - would ideally perform EGD today if cleared by cardiology, may need products to reverse INR  - GI will follow    Augustin Mckee  Gastroenterology Fellow  NON-URGENT CONSULTS:  Please email [email or web address]  AT NIGHT AND ON WEEKENDS:  Contact on-call GI fellow via answering service ([phone]) from 5pm-8am and on weekends/holidays  MONDAY-FRIDAY 8AM-5PM:  Available via [employer] Teams  Pager# [phone] ([facility]) or [phone] ([facility])  GI Phone# [phone] ([facility])

## 2020-09-10 NOTE — H&P ADULT - NEGATIVE OPHTHALMOLOGIC SYMPTOMS
no photophobia/no blurred vision R/no loss of vision L/no loss of vision R/no diplopia/no blurred vision L

## 2020-09-10 NOTE — PATIENT PROFILE ADULT - OVER THE PAST TWO WEEKS HAVE YOU FELT DOWN, DEPRESSED OR HOPELESS?
Tacrolimus level 11.8 at 8 hours, on 6/27/19  Goal 8-10.   Current dose 3 mg in AM, 4mg in afternoon, 4 mg in PM     Dose changed to  4 mg in AM, 4mg mg in afternoon, and 3 mg in PM   Recheck level in 14 days.    Left message on home phone to call and confirm.      
no

## 2020-09-10 NOTE — H&P ADULT - NSHPPHYSICALEXAM_GEN_ALL_CORE
Vital Signs Last 24 Hrs  T(C): 36.6 (10 Sep 2020 10:02), Max: 36.9 (10 Sep 2020 04:49)  T(F): 97.8 (10 Sep 2020 10:02), Max: 98.4 (10 Sep 2020 04:49)  HR: 68 (10 Sep 2020 10:02) (68 - 105)  BP: 112/67 (10 Sep 2020 10:02) (81/56 - 121/64)  BP(mean): --  RR: 18 (10 Sep 2020 10:02) (14 - 26)  SpO2: 100% (10 Sep 2020 10:02) (96% - 100%)    EKG: Afib @ 100

## 2020-09-10 NOTE — PROVIDER CONTACT NOTE (OTHER) - ASSESSMENT
Patient A&Ox4. Asymptomatic. Patient denies chest pain, palpitations, SOB, headache. No acute distress noted.

## 2020-09-10 NOTE — CONSULT NOTE ADULT - SUBJECTIVE AND OBJECTIVE BOX
Chief Complaint:  Patient is a 74y old  Male who presents with a chief complaint of Symptomatic Anemia (10 Sep 2020 09:16)      HPI:PAIGE BYRNES is a 74y Male w/ hx of Afib on coumadin, CAD s/p PCI 7 years ago, PAD s/p b/l stent 5 years ago on Plavix, presenting w/ chest pain. Pt his bowel movements were normal 1 month ago, and then he started taking OTC iron supplementation. His stools became black and went from daily to either multiple times per day or once every 2-3 days. He stopped taking iron and noted that his stools remained black for 3-4 days afterwards. He has been having dizziness worsening over this time period. He went to cardiology clinic yesterday, and was given beta blocker for rapid AFib, and immediately felt much worse in terms of pain and dizziness so came to ED.    GI ROS negative for weight loss, f/c, dysphagia, odynophagia, early satiety, poor PO intake, abd pain, nausea, vomiting, diarrhea, hematochezia, change in stool caliber, family history of GI cancers.    No prior EGD, colonoscopy wnl 7 years ago. Denies NSAIDs.    PMHX/PSHX:  Peripheral vascular disease  Atrial fibrillation  Hypertension  Diabetes  Hyperlipidemia  Diabetes  Hypertension  Coronary artery disease  Hyperlipemia  Hypertension  Diabetes  Status post angiography of extremity  H/O right coronary artery stent placement  History of coronary artery stent placement  History of coronary artery stent placement    Allergies:  No Known Allergies      Home Medications: reviewed  Hospital Medications:  acetaminophen   Tablet .. 650 milliGRAM(s) Oral every 6 hours PRN  dextrose 40% Gel 15 Gram(s) Oral once PRN  dextrose 5%. 1000 milliLiter(s) IV Continuous <Continuous>  dextrose 50% Injectable 12.5 Gram(s) IV Push once  dextrose 50% Injectable 25 Gram(s) IV Push once  dextrose 50% Injectable 25 Gram(s) IV Push once  glucagon  Injectable 1 milliGRAM(s) IntraMuscular once PRN  insulin lispro (HumaLOG) corrective regimen sliding scale   SubCutaneous three times a day before meals  insulin lispro (HumaLOG) corrective regimen sliding scale   SubCutaneous at bedtime  nicotine -  14 mG/24Hr(s) Patch 1 patch Transdermal daily  pantoprazole Infusion 8 mG/Hr IV Continuous <Continuous>  simvastatin 10 milliGRAM(s) Oral at bedtime      Social History:   Tob: Denies  EtOH: Denies  Illicit Drugs: Denies    Family history:  FH: CAD (coronary artery disease)  FH: seizures  FH: type 1 diabetes  FH: diabetes mellitus    Denies family history of colon cancer/polyps, stomach cancer/polyps, pancreatic cancer/masses, liver cancer/disease, ovarian cancer and endometrial cancer.    ROS:   General:  No  fevers, chills, night sweats, fatigue  Eyes:  Good vision, no reported pain  ENT:  No sore throat, pain, runny nose  CV:  + pain, palpitations  Pulm:  + dyspnea, no cough  GI:  See HPI, otherwise negative  :  No  incontinence, nocturia  Muscle:  No pain, weakness  Neuro:  No memory problems  Psych:  No insomnia, mood problems, depression  Endocrine:  No polyuria, polydipsia, cold/heat intolerance  Heme:  No petechiae, ecchymosis, easy bruisability  Skin:  No rash    PHYSICAL EXAM:   Vital Signs:  Vital Signs Last 24 Hrs  T(C): 36.6 (10 Sep 2020 10:02), Max: 36.9 (10 Sep 2020 04:49)  T(F): 97.8 (10 Sep 2020 10:02), Max: 98.4 (10 Sep 2020 04:49)  HR: 68 (10 Sep 2020 10:02) (68 - 105)  BP: 112/67 (10 Sep 2020 10:02) (81/56 - 121/64)  BP(mean): --  RR: 18 (10 Sep 2020 10:02) (14 - 26)  SpO2: 100% (10 Sep 2020 10:02) (96% - 100%)  Daily Height in cm: 180.34 (10 Sep 2020 09:16)    Daily     GENERAL: no acute distress  NEURO: alert, no asterixis  HEENT: anicteric sclera, no conjunctival pallor appreciated  CHEST: no respiratory distress, no accessory muscle use  CARDIAC: regular rate, rhythm  ABDOMEN: soft, non-tender, non-distended, no rebound or guarding  EXTREMITIES: warm, well perfused, no edema  SKIN: no lesions noted    LABS: reviewed                        9.3    11.53 )-----------( 195      ( 10 Sep 2020 09:45 )             28.3     09-10    136  |  101  |  39<H>  ----------------------------<  93  5.1   |  21<L>  |  0.99    Ca    9.1      10 Sep 2020 09:45  Mg     1.6     09-10    TPro  7.2  /  Alb  4.1  /  TBili  0.5  /  DBili  x   /  AST  19  /  ALT  17  /  AlkPhos  39<L>  09-10    LIVER FUNCTIONS - ( 10 Sep 2020 09:45 )  Alb: 4.1 g/dL / Pro: 7.2 g/dL / ALK PHOS: 39 u/L / ALT: 17 u/L / AST: 19 u/L / GGT: x               Diagnostic Studies: see [facility] for full report

## 2020-09-10 NOTE — ED ADULT NURSE NOTE - INTERVENTIONS DEFINITIONS
Review medications for side effects contributing to fall risk/Call bell, personal items and telephone within reach/Room bathroom lighting operational/Stretcher in lowest position, wheels locked, appropriate side rails in place/Instruct patient to call for assistance/Monitor for mental status changes and reorient to person, place, and time/Reinforce activity limits and safety measures with patient and family/[place] to call system/Physically safe environment: no spills, clutter or unnecessary equipment/Provide visual cue, wrist band, yellow gown, etc.

## 2020-09-10 NOTE — CHART NOTE - NSCHARTNOTEFT_GEN_A_CORE
Pt seen again. VSS, no bowel movements since this AM.     Cardiology has not yet cleared patient at time of this writing.    Recs:  - clear liquid diet today  - NPO @ MN  - repeat CBC, CMP, INR at 4am  - c/w IV PPI gtt  - EGD tomorrow if cleared by cardiology    Augustin Mckee  Gastroenterology Fellow  NON-URGENT CONSULTS:  Please email [email or web address]  AT NIGHT AND ON WEEKENDS:  Contact on-call GI fellow via answering service ([phone]) from 5pm-8am and on weekends/holidays  MONDAY-FRIDAY 8AM-5PM:  Available via [employer] Teams  Pager# [phone] ([facility]) or [phone] ([facility])  GI Phone# [phone] ([facility])

## 2020-09-10 NOTE — H&P ADULT - ATTENDING COMMENTS
Patient is 74M with PMH CAD and PVD, afib, on Plavix and Coumadin who presented with sudden onset of left sided chest pain that woke him from sleep. associated with diaphoresis. His pain was resolved with aspirin and nitro. reported recently started on metoprolol. In ED, found to have severe macrocytic anemia with +FOBT. his EKG was in afib w/o significant ST elevating or depression. His HsT was negative x2. He also endorsed generalized weakness and dizziness over the past few weeks. Also noted recently discovered severe AS awaiting CTS evaluation.  #Acute blood loss anemia/symptomatic anemia - patient endorsed melena, FOBT+. likely UGIB. placed on PPI, s/p 2U PRBC, clinically improved. GI consult for EGD/colon.   #chest pain - symptoms likely due to his anemia, severe AS and starting BB use. will likely cardiac assessment prior to procedure given severe AS and chest pain. monitor on tele and repeat echo to assess AS.  #Chronic Afib - HR in range now, given recent hypotension, would hold home BP agnets. given active bleed, would hold warfarin. monitor h/h q6hrs, discuss with GI for need to reverse warfarin give patient's high CHADSVASC. if patient continue to have drop in h/h, will give Vit-K for reversal.  #CAD/PVD - on plavix, will hold for now. if bleeding continues, might need platelet transfusion. monitor h/h. c/w statin.

## 2020-09-10 NOTE — ED PROVIDER NOTE - NS ED ROS FT
General: denies fever, chills  HENT: denies nasal congestion, rhinorrhea  CV: + chest pain  Resp: denies difficulty breathing, cough  Abdominal: + vomiting, denies diarrhea, abdominal pain  MSK: denies muscle aches, leg swelling  Neuro: denies headaches, numbness, tingling  Skin: denies rashes, bruises

## 2020-09-10 NOTE — H&P ADULT - RS GEN PE MLT RESP DETAILS PC
respirations non-labored airway patent/respirations non-labored/breath sounds equal/good air movement/clear to auscultation bilaterally/no chest wall tenderness

## 2020-09-10 NOTE — H&P ADULT - PROBLEM SELECTOR PLAN 4
BUN high, Cr normal, BUN:Cr>20 likely prerenal azotemia related to anemia   repeat BMP monitor bp, holding meds for now due to hypotension  restart as needed

## 2020-09-10 NOTE — H&P ADULT - VASCULAR DETAILS
known history of severe PVD  cool bilateral LE's below the ankles and warm above the ankles  - has marked hyperpigmentation skin changes on bilaterally LE's

## 2020-09-10 NOTE — H&P ADULT - NSHPSOCIALHISTORY_GEN_ALL_CORE
for 25 years. Patient has 6 sons. Worked as an .  Born in [place].  Smoker with 55 pack year history. Marital Status:  for 25 years; Pt has 6 children    Occupation: Works as an     Tobacco Use: Current smoker; smokes about 1 pack/day x 55 years    ETOH Use: denies    Flu Vaccine:  ?                                Pneumonia Vaccine: ?

## 2020-09-10 NOTE — H&P ADULT - NSHPOUTPATIENTPROVIDERS_GEN_ALL_CORE
Dr. Tristian Wasserman (PCP) [phone], Dr. Gallardo (cardiologist) Dr. Tristian Wasserman (PCP) [phone]  Dr. Gallardo (cardiologist)

## 2020-09-10 NOTE — ED ADULT NURSE REASSESSMENT NOTE - NS ED NURSE REASSESS COMMENT FT1
Pt blood transfusion started. Pt educated on the signs and symptoms of transfusion reactions. Comfort measures provided. Will continue to monitor.

## 2020-09-10 NOTE — ED PROVIDER NOTE - ATTENDING CONTRIBUTION TO CARE
75 y/o M with h/o HTN, hyperlipidemia, CAD s/p stents x 2, PAD with LLE stent? BIB EMS for chest pain.  Pt reports a few days of worsening lightheadedness and CP and dyspnea on exertion.  He was seen by outpatient cardiologist Dr Farnsworth yesterday and started on metoprolol.  Pt took first dose of metoprolol tonight, also with worsening of left sided chest pain, non radiating.  Pt received 2 baby asa and nitro x 3 by EMS prior to arrival, became nauseated and have one episode of nbnb vomiting.  EKG by EMS concerning for STEMI and pt arrived in ED as notification.  Well appearing, sitting comfortably in stretcher, awake and alert, nontoxic.  Tachy hypotensive, VSS.  Lungs cta bl.  Cards nl S1/S2, irregularly irregular, no MRG.  Abd soft ntnd.  No pedal edema or calf tenderness.  No focal neuro deficits.  Plan for ekg, labs incl trop and coags, xr.  No e/o STEMI on initial EKG in ED, will eval for ACS vs metabolic disturbance vs infectious vs anemia, likely admit.

## 2020-09-10 NOTE — H&P ADULT - PROBLEM SELECTOR PLAN 2
Likely related to anemia  Patient's pain is controlled   monitor for change ekg/telemetry  ce x 2  House Cardio following

## 2020-09-11 DIAGNOSIS — I65.29 OCCLUSION AND STENOSIS OF UNSPECIFIED CAROTID ARTERY: ICD-10-CM

## 2020-09-11 DIAGNOSIS — I63.9 CEREBRAL INFARCTION, UNSPECIFIED: ICD-10-CM

## 2020-09-11 LAB
ALBUMIN SERPL ELPH-MCNC: 3.6 G/DL — SIGNIFICANT CHANGE UP (ref 3.3–5)
ALP SERPL-CCNC: 32 U/L — LOW (ref 40–120)
ALT FLD-CCNC: 16 U/L — SIGNIFICANT CHANGE UP (ref 4–41)
ANION GAP SERPL CALC-SCNC: 10 MMO/L — SIGNIFICANT CHANGE UP (ref 7–14)
APTT BLD: 38.9 SEC — HIGH (ref 27–36.3)
AST SERPL-CCNC: 18 U/L — SIGNIFICANT CHANGE UP (ref 4–40)
BASOPHILS # BLD AUTO: 0.04 K/UL — SIGNIFICANT CHANGE UP (ref 0–0.2)
BASOPHILS # BLD AUTO: 0.07 K/UL — SIGNIFICANT CHANGE UP (ref 0–0.2)
BASOPHILS NFR BLD AUTO: 0.4 % — SIGNIFICANT CHANGE UP (ref 0–2)
BASOPHILS NFR BLD AUTO: 0.6 % — SIGNIFICANT CHANGE UP (ref 0–2)
BILIRUB SERPL-MCNC: 0.6 MG/DL — SIGNIFICANT CHANGE UP (ref 0.2–1.2)
BUN SERPL-MCNC: 32 MG/DL — HIGH (ref 7–23)
CALCIUM SERPL-MCNC: 9 MG/DL — SIGNIFICANT CHANGE UP (ref 8.4–10.5)
CHLORIDE SERPL-SCNC: 104 MMOL/L — SIGNIFICANT CHANGE UP (ref 98–107)
CO2 SERPL-SCNC: 23 MMOL/L — SIGNIFICANT CHANGE UP (ref 22–31)
CREAT SERPL-MCNC: 1.01 MG/DL — SIGNIFICANT CHANGE UP (ref 0.5–1.3)
EOSINOPHIL # BLD AUTO: 0.1 K/UL — SIGNIFICANT CHANGE UP (ref 0–0.5)
EOSINOPHIL # BLD AUTO: 0.15 K/UL — SIGNIFICANT CHANGE UP (ref 0–0.5)
EOSINOPHIL NFR BLD AUTO: 1 % — SIGNIFICANT CHANGE UP (ref 0–6)
EOSINOPHIL NFR BLD AUTO: 1.2 % — SIGNIFICANT CHANGE UP (ref 0–6)
GLUCOSE SERPL-MCNC: 111 MG/DL — HIGH (ref 70–99)
HCT VFR BLD CALC: 24.1 % — LOW (ref 39–50)
HCT VFR BLD CALC: 25.2 % — LOW (ref 39–50)
HGB BLD-MCNC: 7.9 G/DL — LOW (ref 13–17)
HGB BLD-MCNC: 8.1 G/DL — LOW (ref 13–17)
IMM GRANULOCYTES NFR BLD AUTO: 0.4 % — SIGNIFICANT CHANGE UP (ref 0–1.5)
IMM GRANULOCYTES NFR BLD AUTO: 0.4 % — SIGNIFICANT CHANGE UP (ref 0–1.5)
INR BLD: 2.51 — HIGH (ref 0.88–1.16)
LYMPHOCYTES # BLD AUTO: 1.98 K/UL — SIGNIFICANT CHANGE UP (ref 1–3.3)
LYMPHOCYTES # BLD AUTO: 19.2 % — SIGNIFICANT CHANGE UP (ref 13–44)
LYMPHOCYTES # BLD AUTO: 26.9 % — SIGNIFICANT CHANGE UP (ref 13–44)
LYMPHOCYTES # BLD AUTO: 3.28 K/UL — SIGNIFICANT CHANGE UP (ref 1–3.3)
MCHC RBC-ENTMCNC: 30.2 PG — SIGNIFICANT CHANGE UP (ref 27–34)
MCHC RBC-ENTMCNC: 31.6 PG — SIGNIFICANT CHANGE UP (ref 27–34)
MCHC RBC-ENTMCNC: 32.1 % — SIGNIFICANT CHANGE UP (ref 32–36)
MCHC RBC-ENTMCNC: 32.8 % — SIGNIFICANT CHANGE UP (ref 32–36)
MCV RBC AUTO: 94 FL — SIGNIFICANT CHANGE UP (ref 80–100)
MCV RBC AUTO: 96.4 FL — SIGNIFICANT CHANGE UP (ref 80–100)
MONOCYTES # BLD AUTO: 0.84 K/UL — SIGNIFICANT CHANGE UP (ref 0–0.9)
MONOCYTES # BLD AUTO: 1.12 K/UL — HIGH (ref 0–0.9)
MONOCYTES NFR BLD AUTO: 8.2 % — SIGNIFICANT CHANGE UP (ref 2–14)
MONOCYTES NFR BLD AUTO: 9.2 % — SIGNIFICANT CHANGE UP (ref 2–14)
NEUTROPHILS # BLD AUTO: 7.29 K/UL — SIGNIFICANT CHANGE UP (ref 1.8–7.4)
NEUTROPHILS # BLD AUTO: 7.54 K/UL — HIGH (ref 1.8–7.4)
NEUTROPHILS NFR BLD AUTO: 61.7 % — SIGNIFICANT CHANGE UP (ref 43–77)
NEUTROPHILS NFR BLD AUTO: 70.8 % — SIGNIFICANT CHANGE UP (ref 43–77)
NRBC # FLD: 0 K/UL — SIGNIFICANT CHANGE UP (ref 0–0)
NRBC # FLD: 0 K/UL — SIGNIFICANT CHANGE UP (ref 0–0)
PLATELET # BLD AUTO: 193 K/UL — SIGNIFICANT CHANGE UP (ref 150–400)
PLATELET # BLD AUTO: 198 K/UL — SIGNIFICANT CHANGE UP (ref 150–400)
PMV BLD: 10.4 FL — SIGNIFICANT CHANGE UP (ref 7–13)
PMV BLD: 10.6 FL — SIGNIFICANT CHANGE UP (ref 7–13)
POTASSIUM SERPL-MCNC: 4.3 MMOL/L — SIGNIFICANT CHANGE UP (ref 3.5–5.3)
POTASSIUM SERPL-SCNC: 4.3 MMOL/L — SIGNIFICANT CHANGE UP (ref 3.5–5.3)
PROT SERPL-MCNC: 6.6 G/DL — SIGNIFICANT CHANGE UP (ref 6–8.3)
PROTHROM AB SERPL-ACNC: 27.4 SEC — HIGH (ref 10.6–13.6)
RBC # BLD: 2.5 M/UL — LOW (ref 4.2–5.8)
RBC # BLD: 2.68 M/UL — LOW (ref 4.2–5.8)
RBC # FLD: 17 % — HIGH (ref 10.3–14.5)
RBC # FLD: 17.7 % — HIGH (ref 10.3–14.5)
SODIUM SERPL-SCNC: 137 MMOL/L — SIGNIFICANT CHANGE UP (ref 135–145)
WBC # BLD: 10.29 K/UL — SIGNIFICANT CHANGE UP (ref 3.8–10.5)
WBC # BLD: 12.21 K/UL — HIGH (ref 3.8–10.5)
WBC # FLD AUTO: 10.29 K/UL — SIGNIFICANT CHANGE UP (ref 3.8–10.5)
WBC # FLD AUTO: 12.21 K/UL — HIGH (ref 3.8–10.5)

## 2020-09-11 PROCEDURE — 43235 EGD DIAGNOSTIC BRUSH WASH: CPT | Mod: GC

## 2020-09-11 PROCEDURE — 99233 SBSQ HOSP IP/OBS HIGH 50: CPT

## 2020-09-11 PROCEDURE — 70450 CT HEAD/BRAIN W/O DYE: CPT | Mod: 26,59

## 2020-09-11 PROCEDURE — 70498 CT ANGIOGRAPHY NECK: CPT | Mod: 26

## 2020-09-11 PROCEDURE — 99223 1ST HOSP IP/OBS HIGH 75: CPT

## 2020-09-11 PROCEDURE — 93306 TTE W/DOPPLER COMPLETE: CPT | Mod: 26

## 2020-09-11 PROCEDURE — 70496 CT ANGIOGRAPHY HEAD: CPT | Mod: 26

## 2020-09-11 PROCEDURE — 93010 ELECTROCARDIOGRAM REPORT: CPT

## 2020-09-11 RX ORDER — ATORVASTATIN CALCIUM 80 MG/1
80 TABLET, FILM COATED ORAL AT BEDTIME
Refills: 0 | Status: DISCONTINUED | OUTPATIENT
Start: 2020-09-11 | End: 2020-09-15

## 2020-09-11 RX ORDER — FERROUS SULFATE 325(65) MG
325 TABLET ORAL DAILY
Refills: 0 | Status: DISCONTINUED | OUTPATIENT
Start: 2020-09-11 | End: 2020-09-15

## 2020-09-11 RX ORDER — IRON SUCROSE 20 MG/ML
200 INJECTION, SOLUTION INTRAVENOUS ONCE
Refills: 0 | Status: COMPLETED | OUTPATIENT
Start: 2020-09-11 | End: 2020-09-11

## 2020-09-11 RX ORDER — CLOPIDOGREL BISULFATE 75 MG/1
75 TABLET, FILM COATED ORAL DAILY
Refills: 0 | Status: DISCONTINUED | OUTPATIENT
Start: 2020-09-11 | End: 2020-09-12

## 2020-09-11 RX ORDER — SODIUM CHLORIDE 9 MG/ML
1000 INJECTION INTRAMUSCULAR; INTRAVENOUS; SUBCUTANEOUS
Refills: 0 | Status: DISCONTINUED | OUTPATIENT
Start: 2020-09-11 | End: 2020-09-12

## 2020-09-11 RX ORDER — ASPIRIN/CALCIUM CARB/MAGNESIUM 324 MG
81 TABLET ORAL DAILY
Refills: 0 | Status: DISCONTINUED | OUTPATIENT
Start: 2020-09-11 | End: 2020-09-15

## 2020-09-11 RX ADMIN — SODIUM CHLORIDE 50 MILLILITER(S): 9 INJECTION INTRAMUSCULAR; INTRAVENOUS; SUBCUTANEOUS at 17:54

## 2020-09-11 RX ADMIN — SODIUM CHLORIDE 50 MILLILITER(S): 9 INJECTION INTRAMUSCULAR; INTRAVENOUS; SUBCUTANEOUS at 20:03

## 2020-09-11 RX ADMIN — PANTOPRAZOLE SODIUM 10 MG/HR: 20 TABLET, DELAYED RELEASE ORAL at 11:41

## 2020-09-11 RX ADMIN — Medication 325 MILLIGRAM(S): at 13:17

## 2020-09-11 RX ADMIN — SODIUM CHLORIDE 50 MILLILITER(S): 9 INJECTION INTRAMUSCULAR; INTRAVENOUS; SUBCUTANEOUS at 08:31

## 2020-09-11 RX ADMIN — IRON SUCROSE 110 MILLIGRAM(S): 20 INJECTION, SOLUTION INTRAVENOUS at 13:17

## 2020-09-11 RX ADMIN — PANTOPRAZOLE SODIUM 10 MG/HR: 20 TABLET, DELAYED RELEASE ORAL at 20:03

## 2020-09-11 RX ADMIN — Medication 1 PATCH: at 11:35

## 2020-09-11 RX ADMIN — Medication 1 PATCH: at 19:52

## 2020-09-11 RX ADMIN — CLOPIDOGREL BISULFATE 75 MILLIGRAM(S): 75 TABLET, FILM COATED ORAL at 11:42

## 2020-09-11 RX ADMIN — ATORVASTATIN CALCIUM 80 MILLIGRAM(S): 80 TABLET, FILM COATED ORAL at 20:03

## 2020-09-11 RX ADMIN — Medication 81 MILLIGRAM(S): at 11:42

## 2020-09-11 RX ADMIN — PANTOPRAZOLE SODIUM 10 MG/HR: 20 TABLET, DELAYED RELEASE ORAL at 17:54

## 2020-09-11 RX ADMIN — SODIUM CHLORIDE 50 MILLILITER(S): 9 INJECTION INTRAMUSCULAR; INTRAVENOUS; SUBCUTANEOUS at 11:42

## 2020-09-11 RX ADMIN — Medication 1 PATCH: at 11:41

## 2020-09-11 RX ADMIN — Medication 1 PATCH: at 07:35

## 2020-09-11 NOTE — CONSULT NOTE ADULT - ATTENDING COMMENTS
GI consulted for melena. On Plavix and coumadin.   Labs notable for anemia (Hgb 6.4) with INR 2.71.  Seen by cardiology- recommending TTE prior to anesthesia.   Please continue IV PPI and closely monitor CBC and coags.   Hold coumadin, but continue Plavix for now per cardiology.  Once optimized from cardiac standpoint (TTE obtained and reviewed), will plan for EGD.
Personally saw and examined patient  labs and vitals reviewed  agree with above assessment and plan  74M with Afib on coumadin, severe AS, hld, CAD s/p stents x 2 (last pCI 7 years ago), PAD with LLE stent, found to have acute blood loss anemia 2/2 GIB  -pt is not volume overloaded on exam, denies hx of VT or sudden cardiac death, and states prior to this past week when he started having black stools he was able to ambulate without CP or sob.  pt has known hx of severe AS and a murmur consistent with severe AS, states he is currently being w/u for a valve replacement.  There is no report of a TTE in [facility] or [facility], would check tte to establish severity for pre-procedural planning for anesthesia.   -Hold warfarin in setting of recent active GIB  -can continue diltiazem for rate control in setting of AFib
74-year-old right-handed gentleman first evaluated at [facility] on 9/11/2020 with speech difficulty.  History and exam as above.  ROS otherwise negative.  CT head (9/10/2020) to my eye was unremarkable.  CTA neck (9/10/2020) to my eye showed severe right carotid stenosis, officially measured at 80%; moderate or moderate-severe left carotid stenosis, officially measured at 66%.  CTA head (9/10/2020) to my eye was unremarkable.  TTE (9/11/2020) showed severe aortic stenosis.  Impression.  He has a history of atrial fibrillation as well as coronary stents.  He had been on Coumadin and Plavix.  He has had melena for several weeks prior to admission.  He was admitted on 9/10/2020 with chest pain.  Due to the melena, Coumadin and Plavix were stopped.  On 9/11/2020, in the morning, he developed mildly disfluent speech consistent with a possible, mild motor aphasia.  His presentation is consistent with left hemispheric dysfunction, perhaps a minor stroke.  Mechanism is uncertain because there are competing possibilities, including cardioembolism related to atrial fibrillation, or perhaps large artery atherosclerosis related to extracranial left ICA stenosis of about "66%".  He also has severe asymptomatic right carotid stenosis.  The carotid stenoses should be confirmed.  Suggest.  MRI brain; permissive hypertension for at least the first 24 hours after onset of neurologic symptoms; carotid Doppler; if left carotid stenosis is greater than 50% by Doppler, would strongly consider left carotid endarterectomy (versus stenting) within the next 2 weeks, and within the next week if possible, if medically stable); he may benefit from elective, prophylactic right carotid endarterectomy and this can be further considered as an outpatient; when safe from the GI standpoint, resume Coumadin (or perhaps consider changing to a DOAC) and Plavix (but perhaps Plavix can be stopped if his coronary stents are more than 1-year-old); atorvastatin 80 mg daily, target LDL less than 70; from the neurovascular standpoint, cleared for proceeding with upper GI endoscopy but please try to avoid hypotension if possible (keep blood pressure greater than 130/80); PT/OT/speech therapy.

## 2020-09-11 NOTE — CHART NOTE - NSCHARTNOTEFT_GEN_A_CORE
Patient with stroke code earlier this AM. Evaluated by neurology team and thought to have "L hemispheric ischemic infarct vs TIA possibly secondary to symptomatic L ICA stenosis vs cardioembolic from atrial fibrillation... vs less likely seizure... vs metabolic." Given recent melena requiring transfusions, OK per neurology to pursue EGD with permissive hypertension. Also seen by cardiology - OK to proceed with EGD as well with understanding that patient is high risk given his severe AS.   The above information was discussed with anesthesia attending, Dr. Gallardo.   The patient was also informed of increased anesthesia risks and is amenable.   Will plan for EGD this afternoon.

## 2020-09-11 NOTE — CONSULT NOTE ADULT - SUBJECTIVE AND OBJECTIVE BOX
[facility] Vascular Surgical Consultant Evaluation    HPI:  73 y/o male, with a PmHx of DM, HTN, hyperlipidemia, atrial fibrillation on coumadin, CAD s/p 2 cardiac stents and 2 LE stents (one to each leg), PVD and 55 pack year smoking history, present to the [facility] ED with chest pain for 2-3 days. Pt states over the past 2-3 weeks he has been having intermittent chest pain but over the past 2-3 days it started to get worse. He states he had gone to see his Primary Care Doctor on  and was referred to a Cardiologist whom he saw yesterday for the first time. While in the Cardiologist office he was found to have a new Severe As and was given a prescription for a referral for a Cardio Thoracic Surgeon (Dr. Meade (whom he hasn't gone to see yet). He was also started on Metoprolol XL 25mg. Pt states last night was the first time taking the Metoprolol and overnight he was woken up from sleep with a 10/10, left sided, non-radiating, pressure like sensation in his chest that was exacerbated with breathing. He stated he has never experienced anything like this before (even when he had his stents put in so his wife called an ambulance who brought him to the ED.  Pt states during this event last night he began to sweat due to pain (reports that this is unusual for him as he does not sweat). He also had associated symptoms of vomiting in the ambulance after drinking orange juice. Patient also reports dark stool for several weeks and bilateral leg weakness for the last 2-3 weeks (unable to use the bathroom without assistance).  Patient reports dizziness for several weeks and has had a weight loss of about 20lbs over the past 1-1.5 years due to a loss of appetite. Denies SOB, abdominal pain, fever. No sick contacts, no recent travel. Last colonoscopy 7 years ago. Coumadin checked 3 months ago by PCP. Currently, he states the chest pain is about a 1/10. In the [facility] ED, he was found to be anemic with a Hgb of 6.4/20.7 and was given 2 units of PRBC's. He was also found to be occult positive. He appears comfortable at this time and is now being admitted to telemetry for symptomatic anemia with a possible GI bleed and r/o acs. (10 Sep 2020 09:16)    Vascular Surgery consulted today for findings of bilateral carotid artery stenosis on CT Angio of the neck during code stroke.   Code stroke called for acute dysarthria, which had begun resolving on arrival by Stroke Team, and at the time of this writer's exam had completely resolved.   Vascular history notable for bilateral lower extremity stents placed, and finding on exam of fem-fem bypass for which the patient states he does not know where on when the surgery was performed.       PAST MEDICAL & SURGICAL HISTORY:  Peripheral vascular disease  Atrial fibrillation  Hypertension  Diabetes  Hyperlipidemia  Coronary artery disease  Status post angiography of extremity: 1 stent in each leg  History of coronary artery stent placement: two coronary stents      MEDICATIONS  (STANDING):  aspirin  chewable 81 milliGRAM(s) Oral daily  atorvastatin 80 milliGRAM(s) Oral at bedtime  clopidogrel Tablet 75 milliGRAM(s) Oral daily  dextrose 50% Injectable 12.5 Gram(s) IV Push once  dextrose 50% Injectable 25 Gram(s) IV Push once  dextrose 50% Injectable 25 Gram(s) IV Push once  ferrous    sulfate 325 milliGRAM(s) Oral daily  influenza   Vaccine 0.5 milliLiter(s) IntraMuscular once  insulin lispro (HumaLOG) corrective regimen sliding scale   SubCutaneous three times a day before meals  insulin lispro (HumaLOG) corrective regimen sliding scale   SubCutaneous at bedtime  nicotine -  14 mG/24Hr(s) Patch 1 patch Transdermal daily  pantoprazole Infusion 8 mG/Hr (10 mL/Hr) IV Continuous <Continuous>  sodium chloride 0.9%. 1000 milliLiter(s) (50 mL/Hr) IV Continuous <Continuous>      ___________________________________________  REVIEW OF SYSTEMS:  As stated in History of Present Illness, otherwise non-contributory.   ___________________________________________  PHYSICAL EXAM:  Vital Signs Last 24 Hrs  T(C): 36.8 (11 Sep 2020 12:31), Max: 36.8 (11 Sep 2020 05:22)  T(F): 98.2 (11 Sep 2020 12:31), Max: 98.2 (11 Sep 2020 05:22)  HR: 95 (11 Sep 2020 12:31) (67 - 98)  BP: 145/79 (11 Sep 2020 12:31) (108/53 - 145/79)  BP(mean): --  RR: 17 (11 Sep 2020 12:31) (17 - 18)  SpO2: 97% (11 Sep 2020 12:31) (97% - 99%)CAPILLARY BLOOD GLUCOSE      POCT Blood Glucose.: 100 mg/dL (11 Sep 2020 11:30)    I&O's Detail    Neuro: CN II-XII grossly intact, no dysarthria, slurred speech, facial droop, Strength and sensation intact.   General: Alert and Oriented x3, No acute distress.  Respiratory: Breathing non-labored.  CV: Murmur present.   Abdomen: Soft, nontender, nondistended. No rebound, no guarding, No palpable organomegaly or masses.  Extremities: Moves all four.   Vascular: Fem-fem graft patent with dopplerable signal. B/l DP/PT signals present.   ____________________________________________  LABS:  CBC Full  -  ( 11 Sep 2020 06:07 )  WBC Count : 12.21 K/uL  RBC Count : 2.68 M/uL  Hemoglobin : 8.1 g/dL  Hematocrit : 25.2 %  Platelet Count - Automated : 193 K/uL  Mean Cell Volume : 94.0 fL  Mean Cell Hemoglobin : 30.2 pg  Mean Cell Hemoglobin Concentration : 32.1 %  Auto Neutrophil # : 7.54 K/uL  Auto Lymphocyte # : 3.28 K/uL  Auto Monocyte # : 1.12 K/uL  Auto Eosinophil # : 0.15 K/uL  Auto Basophil # : 0.07 K/uL  Auto Neutrophil % : 61.7 %  Auto Lymphocyte % : 26.9 %  Auto Monocyte % : 9.2 %  Auto Eosinophil % : 1.2 %  Auto Basophil % : 0.6 %    -11    137  |  104  |  32<H>  ----------------------------<  111<H>  4.3   |  23  |  1.01    Ca    9.0      11 Sep 2020 06:07  Mg     1.6     09-10    TPro  6.6  /  Alb  3.6  /  TBili  0.6  /  DBili  x   /  AST  18  /  ALT  16  /  AlkPhos  32<L>      LIVER FUNCTIONS - ( 11 Sep 2020 06:07 )  Alb: 3.6 g/dL / Pro: 6.6 g/dL / ALK PHOS: 32 u/L / ALT: 16 u/L / AST: 18 u/L / GGT: x           PT/INR - ( 11 Sep 2020 06:07 )   PT: 27.4 SEC;   INR: 2.51          PTT - ( 11 Sep 2020 06:07 )  PTT:38.9 SEC  Urinalysis Basic - ( 10 Sep 2020 09:30 )    Color: LIGHT YELLOW / Appearance: CLEAR / S.020 / pH: 5.5  Gluc: NEGATIVE / Ketone: NEGATIVE  / Bili: NEGATIVE / Urobili: NORMAL   Blood: NEGATIVE / Protein: NEGATIVE / Nitrite: NEGATIVE   Leuk Esterase: NEGATIVE / RBC: x / WBC x   Sq Epi: x / Non Sq Epi: x / Bacteria: x          ____________________________________________  RADIOLOGY:  < from: CT Angio Neck w/ IV Cont (20 @ 08:08) >  FINDINGS:    There is a bovine aortic arch. There is atherosclerotic calcification of the aortic arch and involving the proximal great vessels with mild stenosis of the left subclavian artery. The common carotid arteries are patent from the origins to the bifurcations. There is moderate atherosclerotic calcification involving the proximal internal carotid arteries with intramural plaque. On the right, there is high-grade stenosis of up to 80% of the proximal right internal carotid artery based on NASCET criteria with reconstitution distal to the carotid bulb. The mid to distal cervical right internal carotid artery isdiminutive in comparison to the left. On the left, there is up to 66% stenosis of the proximal internal carotid artery based on NASCET criteria. The cervical vertebral arteries are patent from the origins to the skull base, however, the origin of theright vertebral artery is poorly evaluated due to artifact from contrast bolus. The right vertebral artery is dominant.    The skull base and intracranial carotid arteries are patent without high-grade stenosis, however, there is mild to moderate luminal narrowing secondary to atherosclerotic calcification of the cavernous and supraclinoid segments (right greater than left). The proximal MCAs and ACAs are patent without significant stenosis. The anterior communicating artery is visualized. DistalACA and MCA branches are grossly symmetric.    The skull base and intradural vertebral arteries and basilar artery are patent without significant stenosis. The proximal PCAs are patent without significant stenosis. The posterior containing indicatingarteries are visualized. The superior cerebellar artery origins, a left AICA/PICA, and a right PICA are identified.    There is no evidence of an intracranial arterial aneurysm or arteriovenous malformation on CTA.    Intracranial superficial and deep venous sinus system are grossly unremarkable.    The regional soft tissues of the neck are otherwise unremarkable. The lung apices demonstrate emphysema. There are multilevel degenerative changes of the spine.        IMPRESSION:    CTA NECK: Moderate atherosclerotic calcification and intramural plaque involving the proximal internal carotid arteries with high-grade stenosis of the proximal right internal carotid artery of up to 80% and moderate stenosis of the proximal left internal carotid artery of up to 66% based on NASCET criteria. Patent cervical vertebral arteries, however, origin of the right vertebral artery is poorly evaluated due to artifact from contrast bolus.    CTA HEAD:  No high-grade stenosis or occlusion of the major proximal branches of the [place].      < end of copied text >

## 2020-09-11 NOTE — PROGRESS NOTE ADULT - SUBJECTIVE AND OBJECTIVE BOX
Patient seen and evaluated at bedside    Interval events:  Patient denies chest pain, SOB, or other complaints. Endorses fatigue but no other complaints. States his speech has been improving since the stroke code called last night          Physical Exam:  T(F): 97.4 (), Max: 98.2 ()  HR: 98 () (67 - 98)  BP: 120/73 () (108/53 - 124/60)  RR: 17 ()  SpO2: 98% ()  Gen - well appearing in no acute distress  CV: 3/6 systolic murmur radiating to carotids  Resp: CTAB  Ext: no edema bilaterally                          8.1    12.21 )-----------( 193      ( 11 Sep 2020 06:07 )             25.2         137  |  104  |  32<H>  ----------------------------<  111<H>  4.3   |  23  |  1.01    Ca    9.0      11 Sep 2020 06:07  Mg     1.6     09-10    TPro  6.6  /  Alb  3.6  /  TBili  0.6  /  DBili  x   /  AST  18  /  ALT  16  /  AlkPhos  32<L>  11    PT/INR - ( 11 Sep 2020 06:07 )   PT: 27.4 SEC;   INR: 2.51          PTT - ( 11 Sep 2020 06:07 )  PTT:38.9 SEC      Urinalysis Basic - ( 10 Sep 2020 09:30 )    Color: LIGHT YELLOW / Appearance: CLEAR / S.020 / pH: 5.5  Gluc: NEGATIVE / Ketone: NEGATIVE  / Bili: NEGATIVE / Urobili: NORMAL   Blood: NEGATIVE / Protein: NEGATIVE / Nitrite: NEGATIVE   Leuk Esterase: NEGATIVE / RBC: x / WBC x   Sq Epi: x / Non Sq Epi: x / Bacteria: x

## 2020-09-11 NOTE — PROGRESS NOTE ADULT - PROBLEM SELECTOR PLAN 1
pt had code stroke for dysarthria this morning, CT head no acute CVA  but CT angio showed b/l ICA stenosis, right up to 80%, left 66%  -will get carotid doppler, consult vascular surgery to evaluate CEA  -add asa, resume plavix, change statin to lipitor 80 mg hs  -MRI brain w/o contrast  -neuro checks  -f/u neurology

## 2020-09-11 NOTE — CONSULT NOTE ADULT - ASSESSMENT
Mr. Crockett is a 74 Year-Old Gentleman with a PMH of CAD status post stents on Plavix, Afib on coumadin, HTN, HLD,  PVD with fem-fem bypass and bilateral LE stents, 55 pack year smoking history, present to the [facility] ED with chest pain, found to have L ICA 66% stenosis and R ICA 80% stenosis in setting of acute dysarthria. .    - Patient requires MRI as ordered to follow up clinical findings of stroke, if L sided stroke found would favor cardiac etiology, however if R sided stroke then would likely benefit from CEA.   - However would need consideration of concurrent AVR repair in setting severe AS.   - F/u Carotid Duplex as ordered.   - Discussed with Attending Surgeon.     C Team Vascular Surgery Pager #[phone]

## 2020-09-11 NOTE — CHART NOTE - NSCHARTNOTEFT_GEN_A_CORE
PA medicine note    Informed by RN approximately in AM that pt had slurred speech.   Pt. denied any fevers, chills, headache, dizziness, blurry vision, chest pain, shortness of breath, abdominal pain.    Examined pt at bedside   General - Pt. in NAD. A&O to Person place and time.   Neuro - EOMI, PEERLA. Pt. able to follow commands grossly. Sensation intact to light touch bilaterally. Full ROM in UE and LE bilaterally. Patient able to smile with symmetry. Mild dysarthria and expressive aphasia present.   Heart - RRR w/o MRG   Lungs - CTA w/o WRR  Abdomen - flat, symmetrical. BS active and NL in all 4 quadrants. Soft, non tender.   MSK - LE symmetrical without redness, swelling, tenderness.    Vital Signs Last 24 Hrs  T(C): 36.5 (11 Sep 2020 18:00), Max: 36.8 (11 Sep 2020 05:22)  T(F): 97.7 (11 Sep 2020 18:00), Max: 98.2 (11 Sep 2020 05:22)  HR: 98 (11 Sep 2020 18:00) (67 - 98)  BP: 150/67 (11 Sep 2020 18:00) (108/53 - 150/67)  BP(mean): --  RR: 17 (11 Sep 2020 18:00) (17 - 22)  SpO2: 96% (11 Sep 2020 18:00) (95% - 99%)    A/P  Slurred Speech  Code stroke called. Examined pt at bedsite with neuro. CTH showed no acute abnormalities. CTA head/neck showed high grade stenosis (up to 80%) of R ICA and moderate stenosis (up to 66%) of the L ICA. MRI ordered and pending (safety sheet filed out, MRI aware), carotid dopplers pending. Lipitor, Plavix and ASA started. Holding coumadin until cleared by GI, will consider 9/12 hgb and touch base with GI. INR therapeutic 9/11. Vascular surgery called and pt may benefit from endarterectomy pending further imaging. Pt's dysarthria has improved throughout the day. Will sign out to night provider to monitor pt clinically and consider stat CTH if change in mental status. Attending aware and in agreement with plan.    Reagan Henriquez PA-C  Pager [phone]

## 2020-09-11 NOTE — PROGRESS NOTE ADULT - ASSESSMENT
75 y/o male, with a PmHx of DM, HTN, hyperlipidemia, atrial fibrillation on coumadin, CAD s/p 2 cardiac stents and 2 LE stents (one to each leg), PVD and 55 pack year smoking history, present to the [facility] ED with chest pain for 2-3 days. Admitted to telemetry for symptomatic anemia with a possible GI bleed and r/o acs.

## 2020-09-11 NOTE — CONSULT NOTE ADULT - SUBJECTIVE AND OBJECTIVE BOX
HPI:    Patient is a 74M with hx of DM, HTN, HLD, Afib on home Coumadin, CAD and PVD s/p stents on Plavix, who presented on 9/10 due to chest pain. Patient also reported that he had been experiencing dark stool for several weeks and lower extremity weakness and dizziness. He has had some weight loss over the past year and has not had a colonscopy in 7 years. In the ED, he was found to have initial hemoglobin of 6.5, requiring 2 U PRBC tranfusion and stool FOBT was found to be positive so he was admitted to medicine for further work-up of possible GIB as well as ACS r/o. He was seen by GI and tentative plan is for EGD today. This morning, patient was speaking normally with his nurse around 6 am, and then when she came back in the room around         MEDICATIONS  (STANDING):  dextrose 5%. 1000 milliLiter(s) (50 mL/Hr) IV Continuous <Continuous>  dextrose 50% Injectable 12.5 Gram(s) IV Push once  dextrose 50% Injectable 25 Gram(s) IV Push once  dextrose 50% Injectable 25 Gram(s) IV Push once  influenza   Vaccine 0.5 milliLiter(s) IntraMuscular once  insulin lispro (HumaLOG) corrective regimen sliding scale   SubCutaneous three times a day before meals  insulin lispro (HumaLOG) corrective regimen sliding scale   SubCutaneous at bedtime  nicotine -  14 mG/24Hr(s) Patch 1 patch Transdermal daily  pantoprazole Infusion 8 mG/Hr (10 mL/Hr) IV Continuous <Continuous>  simvastatin 10 milliGRAM(s) Oral at bedtime    MEDICATIONS  (PRN):  acetaminophen   Tablet .. 650 milliGRAM(s) Oral every 6 hours PRN Temp greater or equal to 38C (100.4F), Mild Pain (1 - 3), Moderate Pain (4 - 6)  dextrose 40% Gel 15 Gram(s) Oral once PRN Blood Glucose LESS THAN 70 milliGRAM(s)/deciliter  glucagon  Injectable 1 milliGRAM(s) IntraMuscular once PRN Glucose LESS THAN 70 milligrams/deciliter    PAST MEDICAL & SURGICAL HISTORY:  Peripheral vascular disease  Atrial fibrillation  Hypertension  Diabetes  Hyperlipidemia  Coronary artery disease  Status post angiography of extremity: 1 stent in each leg  History of coronary artery stent placement: two coronary stents    FAMILY HISTORY:  FH: CAD (coronary artery disease): son  FH: seizures: son  FH: type 1 diabetes: son  FH: diabetes mellitus: mother    Allergies    No Known Allergies      Vital Signs Last 24 Hrs  T(C): 36.3 (11 Sep 2020 07:00), Max: 36.8 (10 Sep 2020 07:52)  T(F): 97.4 (11 Sep 2020 07:00), Max: 98.2 (10 Sep 2020 07:52)  HR: 98 (11 Sep 2020 07:00) (67 - 98)  BP: 120/73 (11 Sep 2020 07:00) (92/63 - 124/60)  BP(mean): --  RR: 17 (11 Sep 2020 07:00) (17 - 19)  SpO2: 98% (11 Sep 2020 07:00) (97% - 100%)    General Exam:   General appearance: No acute distress, occasional upper body twitching           Neurological Exam:  Mental Status: Orientated to self, date and place. Mild-moderate dysarthria. Some loss of speech fluency. Repetition mildly impaired. Naming intact. Comprehension intact.   Cranial Nerves:   PERRL, EOMI, VFF, no nystagmus.    CN V1-3 intact to light touch .  No facial asymmetry.  Motor:   Tone: normal.                  Strength: moves all extremities antigravity and without drift   Pronator drift: none                 Dysmetria: None to finger-nose-finger  Sensation: intact to light touch      09-10    136  |  101  |  39<H>  ----------------------------<  93  5.1   |  21<L>  |  0.99    Ca    9.1      10 Sep 2020 09:45  Mg     1.6     09-10    TPro  7.2  /  Alb  4.1  /  TBili  0.5  /  DBili  x   /  AST  19  /  ALT  17  /  AlkPhos  39<L>  09-10                            8.1    12.21 )-----------( 193      ( 11 Sep 2020 06:07 )             25.2       Radiology HPI:    Patient is a 74 year old right handed male with hx of DM, HTN, HLD, Afib on home Coumadin, CAD and PVD s/p stents on Plavix, who presented on 9/10 due to chest pain. Patient also reported that he had been experiencing dark stool for several weeks and lower extremity weakness and dizziness. He has had some weight loss over the past year and has not had a colonscopy in 7 years. In the ED, he was found to have initial hemoglobin of 6.5, requiring 2 U PRBC tranfusion and stool FOBT was found to be positive so he was admitted to medicine for further work-up of possible GIB as well as ACS r/o. He was seen by GI and tentative plan is for EGD today. His coumadin and plavix have been held due to GID and most recent INR this morning is 2.51. This morning, patient was speaking normally with his nurse around 6 am, and then when she came back in the room around 6:57 AM, she noted him to have slurred speech when she tried speaking to him again, so code stroke was called. Patient was also noted to have some twitching of the upper body and upper extremities bilaterally during this time that he had not had before. On evaluation, patient has mild-moderate dysarthria and mild loss of speech fluency that is already improving upon repeat evaluation. NIHSS 2, MRS 0. LWK around 6 am. He is not a candidate for consideration of tPA due to active GI bleed and therapeutic INR (2.51). CTH showed no acute abnormalities. CTA head/neck showed high grade stenosis (up to 80%) of R ICA and moderate stenosis (up to 66%) of the L ICA.     MEDICATIONS  (STANDING):  dextrose 5%. 1000 milliLiter(s) (50 mL/Hr) IV Continuous <Continuous>  dextrose 50% Injectable 12.5 Gram(s) IV Push once  dextrose 50% Injectable 25 Gram(s) IV Push once  dextrose 50% Injectable 25 Gram(s) IV Push once  influenza   Vaccine 0.5 milliLiter(s) IntraMuscular once  insulin lispro (HumaLOG) corrective regimen sliding scale   SubCutaneous three times a day before meals  insulin lispro (HumaLOG) corrective regimen sliding scale   SubCutaneous at bedtime  nicotine -  14 mG/24Hr(s) Patch 1 patch Transdermal daily  pantoprazole Infusion 8 mG/Hr (10 mL/Hr) IV Continuous <Continuous>  simvastatin 10 milliGRAM(s) Oral at bedtime    MEDICATIONS  (PRN):  acetaminophen   Tablet .. 650 milliGRAM(s) Oral every 6 hours PRN Temp greater or equal to 38C (100.4F), Mild Pain (1 - 3), Moderate Pain (4 - 6)  dextrose 40% Gel 15 Gram(s) Oral once PRN Blood Glucose LESS THAN 70 milliGRAM(s)/deciliter  glucagon  Injectable 1 milliGRAM(s) IntraMuscular once PRN Glucose LESS THAN 70 milligrams/deciliter    PAST MEDICAL & SURGICAL HISTORY:  Peripheral vascular disease  Atrial fibrillation  Hypertension  Diabetes  Hyperlipidemia  Coronary artery disease  Status post angiography of extremity: 1 stent in each leg  History of coronary artery stent placement: two coronary stents    FAMILY HISTORY:  FH: CAD (coronary artery disease): son  FH: seizures: son  FH: type 1 diabetes: son  FH: diabetes mellitus: mother    Allergies    No Known Allergies      Vital Signs Last 24 Hrs  T(C): 36.3 (11 Sep 2020 07:00), Max: 36.8 (10 Sep 2020 07:52)  T(F): 97.4 (11 Sep 2020 07:00), Max: 98.2 (10 Sep 2020 07:52)  HR: 98 (11 Sep 2020 07:00) (67 - 98)  BP: 120/73 (11 Sep 2020 07:00) (92/63 - 124/60)  BP(mean): --  RR: 17 (11 Sep 2020 07:00) (17 - 19)  SpO2: 98% (11 Sep 2020 07:00) (97% - 100%)    General Exam:   General appearance: No acute distress, occasional upper body twitching           Neurological Exam:  Mental Status: Orientated to self, date and place. Mild-moderate dysarthria. Some loss of speech fluency. Repetition mildly impaired. Naming intact. Comprehension intact.   Cranial Nerves:   PERRL, EOMI, VFF, no nystagmus.    CN V1-3 intact to light touch .  No facial asymmetry.  Motor:   Tone: normal.                  Strength: moves all extremities antigravity and without drift   Pronator drift: none                 Dysmetria: None to finger-nose-finger  Sensation: intact to light touch      09-10    136  |  101  |  39<H>  ----------------------------<  93  5.1   |  21<L>  |  0.99    Ca    9.1      10 Sep 2020 09:45  Mg     1.6     09-10    TPro  7.2  /  Alb  4.1  /  TBili  0.5  /  DBili  x   /  AST  19  /  ALT  17  /  AlkPhos  39<L>  09-10                            8.1    12.21 )-----------( 193      ( 11 Sep 2020 06:07 )             25.2       Radiology    < from: CT Angio Neck w/ IV Cont (09.11.20 @ 08:08) >    IMPRESSION:    CTA NECK: Moderate atherosclerotic calcification and intramural plaque involving the proximal internal carotid arteries with high-grade stenosis of the proximal right internal carotid artery of up to 80% and moderate stenosis of the proximal left internal carotid artery of up to 66% based on NASCET criteria. Patent cervical vertebral arteries, however, origin of the right vertebral artery is poorly evaluated due to artifact from contrast bolus.    CTA HEAD:  No high-grade stenosis or occlusion of the major proximal branches of the [place].    < end of copied text > HPI:    Patient is a 74 year old right handed male with hx of DM, HTN, HLD, Afib on home Coumadin, CAD and PVD s/p stents on Plavix, who presented on 9/10 due to chest pain. Patient also reported that he had been experiencing dark stool for several weeks and lower extremity weakness and dizziness. He has had some weight loss over the past year and has not had a colonscopy in 7 years. In the ED, he was found to have initial hemoglobin of 6.5, requiring 2 U PRBC tranfusion and stool FOBT was found to be positive so he was admitted to medicine for further work-up of possible GIB as well as ACS r/o. He was seen by GI and tentative plan is for EGD today. His coumadin and plavix have been held due to GIB and most recent INR this morning is 2.51. This morning, patient was speaking normally with his nurse around 6 am, and then when she came back in the room around 6:57 AM, she noted him to have slurred speech when she tried speaking to him again, so code stroke was called. Patient was also noted to have some twitching of the upper body and upper extremities bilaterally during this time that he had not had before. On evaluation, patient has mild-moderate dysarthria and mild loss of speech fluency that is already improving upon repeat evaluation. NIHSS 2, MRS 0. LWK around 6 am. He is not a candidate for consideration of tPA due to active GI bleed and therapeutic INR (2.51). CTH showed no acute abnormalities. CTA head/neck showed high grade stenosis (up to 80%) of R ICA and moderate stenosis (up to 66%) of the L ICA.     MEDICATIONS  (STANDING):  dextrose 5%. 1000 milliLiter(s) (50 mL/Hr) IV Continuous <Continuous>  dextrose 50% Injectable 12.5 Gram(s) IV Push once  dextrose 50% Injectable 25 Gram(s) IV Push once  dextrose 50% Injectable 25 Gram(s) IV Push once  influenza   Vaccine 0.5 milliLiter(s) IntraMuscular once  insulin lispro (HumaLOG) corrective regimen sliding scale   SubCutaneous three times a day before meals  insulin lispro (HumaLOG) corrective regimen sliding scale   SubCutaneous at bedtime  nicotine -  14 mG/24Hr(s) Patch 1 patch Transdermal daily  pantoprazole Infusion 8 mG/Hr (10 mL/Hr) IV Continuous <Continuous>  simvastatin 10 milliGRAM(s) Oral at bedtime    MEDICATIONS  (PRN):  acetaminophen   Tablet .. 650 milliGRAM(s) Oral every 6 hours PRN Temp greater or equal to 38C (100.4F), Mild Pain (1 - 3), Moderate Pain (4 - 6)  dextrose 40% Gel 15 Gram(s) Oral once PRN Blood Glucose LESS THAN 70 milliGRAM(s)/deciliter  glucagon  Injectable 1 milliGRAM(s) IntraMuscular once PRN Glucose LESS THAN 70 milligrams/deciliter    PAST MEDICAL & SURGICAL HISTORY:  Peripheral vascular disease  Atrial fibrillation  Hypertension  Diabetes  Hyperlipidemia  Coronary artery disease  Status post angiography of extremity: 1 stent in each leg  History of coronary artery stent placement: two coronary stents    FAMILY HISTORY:  FH: CAD (coronary artery disease): son  FH: seizures: son  FH: type 1 diabetes: son  FH: diabetes mellitus: mother    Allergies    No Known Allergies      Vital Signs Last 24 Hrs  T(C): 36.3 (11 Sep 2020 07:00), Max: 36.8 (10 Sep 2020 07:52)  T(F): 97.4 (11 Sep 2020 07:00), Max: 98.2 (10 Sep 2020 07:52)  HR: 98 (11 Sep 2020 07:00) (67 - 98)  BP: 120/73 (11 Sep 2020 07:00) (92/63 - 124/60)  BP(mean): --  RR: 17 (11 Sep 2020 07:00) (17 - 19)  SpO2: 98% (11 Sep 2020 07:00) (97% - 100%)    General Exam:   General appearance: No acute distress, occasional upper body twitching           Neurological Exam:  Mental Status: Orientated to self, date and place. Mild-moderate dysarthria. Some loss of speech fluency. Repetition mildly impaired. Naming intact. Comprehension intact.   Cranial Nerves:   PERRL, EOMI, VFF, no nystagmus.    CN V1-3 intact to light touch .  No facial asymmetry.  Motor:   Tone: normal.                  Strength: moves all extremities antigravity and without drift   Pronator drift: none                 Dysmetria: None to finger-nose-finger  Sensation: intact to light touch      09-10    136  |  101  |  39<H>  ----------------------------<  93  5.1   |  21<L>  |  0.99    Ca    9.1      10 Sep 2020 09:45  Mg     1.6     09-10    TPro  7.2  /  Alb  4.1  /  TBili  0.5  /  DBili  x   /  AST  19  /  ALT  17  /  AlkPhos  39<L>  09-10                            8.1    12.21 )-----------( 193      ( 11 Sep 2020 06:07 )             25.2       Radiology    < from: CT Angio Neck w/ IV Cont (09.11.20 @ 08:08) >    IMPRESSION:    CTA NECK: Moderate atherosclerotic calcification and intramural plaque involving the proximal internal carotid arteries with high-grade stenosis of the proximal right internal carotid artery of up to 80% and moderate stenosis of the proximal left internal carotid artery of up to 66% based on NASCET criteria. Patent cervical vertebral arteries, however, origin of the right vertebral artery is poorly evaluated due to artifact from contrast bolus.    CTA HEAD:  No high-grade stenosis or occlusion of the major proximal branches of the [place].    < end of copied text >

## 2020-09-11 NOTE — CONSULT NOTE ADULT - ASSESSMENT
74 year old R handed male with hx of DM, HTN, HLD, Afib on home Coumadin, CAD and PVD s/p stents on Plavix, who presented on 9/10 due to chest pain and was found to be anemic, requiring transfusion. His coumadin and plavix have been held given GID, pending EGD possibly later today by GI. Code stroke called today for mild-mod dysarthria and mild loss of speech fluency that is rapidly improving. NIHSS 2, MRS 0. LWK 6 am. CTH showed no acute abnormalities. CTA head/neck showed high grade stenosis (up to 80%) of R ICA and moderate stenosis (up to 66%) of the L ICA.     Impression: rapidly resolving mild dysarthria and expressive aphasia possibly secondary to L hemispheric ischemic infarct vs TIA possibly secondary to symptomatic L ICA stenosis vs cardioembolic from atrial fibrillation (though less likely given he is still therapeutic on coumadin with INR of 2.51) vs seizure given twitching noted on evaluation vs metabolic    Recommendations:  -permissive HTN x 24-48 hrs up to 220/110, augment with IVF hydration as needed  -no contraindication to proceeding with EGD or any other needed GI procedures  -avoid hypotension during procedure as much as possible  -MRI brain w/o contrast  -carotid dopplers to better evaluation degree of carotid stenosis  -further stroke work-up including MRI and carotid dopplers should not delay GIB work-up/management but should be done during this admission to determine whether patient has a symptomatic carotid stenosis that would benefit from surgical intervention 74 year old R handed male with hx of DM, HTN, HLD, Afib on home Coumadin, CAD and PVD s/p stents on Plavix, who presented on 9/10 due to chest pain and was found to be anemic, requiring transfusion. His coumadin and plavix have been held given GID, pending EGD possibly later today by GI. Code stroke called today for mild-mod dysarthria and mild loss of speech fluency that is rapidly improving. NIHSS 2, MRS 0. LWK 6 am. CTH showed no acute abnormalities. CTA head/neck showed high grade stenosis (up to 80%) of R ICA and moderate stenosis (up to 66%) of the L ICA.     Impression: rapidly resolving mild dysarthria and expressive aphasia possibly secondary to L hemispheric ischemic infarct vs TIA possibly secondary to symptomatic L ICA stenosis vs cardioembolic from atrial fibrillation (though less likely given he is still therapeutic on coumadin with INR of 2.51) vs seizure given twitching noted on evaluation vs metabolic    Recommendations:  -permissive HTN x 24-48 hrs up to 220/110, augment with IVF hydration as needed  -no contraindication to proceeding with EGD or any other needed GI procedures today   -avoid hypotension during procedure as much as possible  -MRI brain w/o contrast  -carotid dopplers to better evaluation degree of carotid stenosis  -further stroke work-up including MRI and carotid dopplers should not delay GIB work-up/management but should be done during this admission to determine whether patient has a symptomatic carotid stenosis that would benefit from surgical intervention 74 year old R handed male with hx of DM, HTN, HLD, Afib on home Coumadin, CAD and PVD s/p stents on Plavix, who presented on 9/10 due to chest pain and was found to be anemic, requiring transfusion. His coumadin and plavix have been held given GID, pending EGD possibly later today by GI. Code stroke called today for mild-mod dysarthria and mild loss of speech fluency that is rapidly improving. NIHSS 2, MRS 0. LWK 6 am. CTH showed no acute abnormalities. CTA head/neck showed high grade stenosis (up to 80%) of R ICA and moderate stenosis (up to 66%) of the L ICA.     Impression: rapidly resolving mild dysarthria and expressive aphasia possibly secondary to L hemispheric ischemic infarct vs TIA possibly secondary to symptomatic L ICA stenosis vs cardioembolic from atrial fibrillation (though less likely given he is still therapeutic on coumadin with INR of 2.51) vs less likely seizure given twitching noted on evaluation    Recommendations:  -permissive HTN x 24-48 hrs up to 220/110, augment with IVF hydration as needed  -no neurological contraindication to proceeding with EGD or any other needed GI procedures today   -avoid hypotension during procedure as much as possible  -MRI brain w/o contrast  -carotid dopplers to better evaluation degree of carotid stenosis  -further stroke work-up including MRI and carotid dopplers should not delay GIB work-up/management but should be done during this admission to determine whether patient has a symptomatic carotid stenosis that would benefit from surgical intervention  -vascular surgery evaluation 74 year old R handed male with hx of DM, HTN, HLD, Afib on home Coumadin, CAD and PVD s/p stents on Plavix, who presented on 9/10 due to chest pain and was found to be anemic, requiring transfusion. His coumadin and plavix have been held given GID, pending EGD possibly later today by GI. Code stroke called today for mild-mod dysarthria and mild loss of speech fluency that is rapidly improving. NIHSS 2, MRS 0. LWK 6 am. CTH showed no acute abnormalities. CTA head/neck showed high grade stenosis (up to 80%) of R ICA and moderate stenosis (up to 66%) of the L ICA.     Impression: rapidly resolving mild dysarthria and expressive aphasia possibly secondary to L hemispheric ischemic infarct vs TIA possibly secondary to symptomatic L ICA stenosis vs cardioembolic from atrial fibrillation (though less likely given he is still therapeutic on coumadin with INR of 2.51) vs less likely seizure given twitching noted on evaluation vs metabolic    Recommendations:  -permissive HTN x 24-48 hrs up to 220/110, augment with IVF hydration as needed  -no neurological contraindication to proceeding with EGD or any other needed GI procedures today   -avoid hypotension during procedure as much as possible  -MRI brain w/o contrast  -carotid dopplers to better evaluation degree of carotid stenosis  -further stroke work-up including MRI and carotid dopplers should not delay GIB work-up/management but should be done during this admission to determine whether patient has a symptomatic carotid stenosis that would benefit from surgical intervention  -vascular surgery evaluation 74 year old R handed male with hx of DM, HTN, HLD, Afib on home Coumadin, CAD and PVD s/p stents on Plavix, who presented on 9/10 due to chest pain and was found to be anemic, requiring transfusion. His coumadin and plavix have been held given GID, pending EGD possibly later today by GI. Code stroke called today for mild-mod dysarthria and mild loss of speech fluency that is rapidly improving. NIHSS 2, MRS 0. LWK 6 am. CTH showed no acute abnormalities. CTA head/neck showed high grade stenosis (up to 80%) of R ICA and moderate stenosis (up to 66%) of the L ICA.     Impression: rapidly resolving mild dysarthria and expressive aphasia possibly secondary to L hemispheric ischemic infarct vs TIA possibly secondary to symptomatic L ICA stenosis vs cardioembolic from atrial fibrillation (though less likely given he is still therapeutic on coumadin with INR of 2.51) vs less likely seizure given twitching noted on evaluation vs metabolic    Recommendations:  -permissive HTN x 24-48 hrs up to 220/110, augment with IVF hydration as needed  -no neurological contraindication to proceeding with EGD or any other needed GI procedures today   -avoid hypotension during procedure as much as possible  -MRI brain w/o contrast  -carotid dopplers to better evaluation degree of carotid stenosis  -lipid panel, hemoglobin a1c  -lipitor 80 mg qhs, titrate based on LDL for goal < 70  -continue to hold AC until cleared by GI/medicine to resume  -further stroke work-up including MRI and carotid dopplers should not delay GIB work-up/management but should be done during this admission to determine whether patient has a symptomatic carotid stenosis that would benefit from surgical intervention  -vascular surgery evaluation 74 year old R handed male with hx of DM, HTN, HLD, Afib on home Coumadin, CAD and PVD s/p stents on Plavix, who presented on 9/10 due to chest pain and was found to be anemic, requiring transfusion. His coumadin and plavix have been held given GID, pending EGD possibly later today by GI. Code stroke called today for mild-mod dysarthria and mild loss of speech fluency that is rapidly improving. NIHSS 2, MRS 0. LWK 6 am. CTH showed no acute abnormalities. CTA head/neck showed high grade stenosis (up to 80%) of R ICA and moderate stenosis (up to 66%) of the L ICA.     Impression: rapidly resolving mild dysarthria and expressive aphasia possibly secondary to L hemispheric ischemic infarct vs TIA possibly secondary to symptomatic L ICA stenosis vs cardioembolic from atrial fibrillation (though less likely given he is still therapeutic on coumadin with INR of 2.51) vs less likely seizure given twitching noted on evaluation vs metabolic    Recommendations:  -permissive HTN x 24-48 hrs up to 220/110, augment with IVF hydration as needed  -no neurological contraindication to proceeding with EGD or any other needed GI procedures today   -avoid hypotension during procedure as much as possible  -MRI brain w/o contrast  -carotid dopplers to better evaluation degree of carotid stenosis  -lipid panel, hemoglobin a1c  -lipitor 80 mg qhs, titrate based on LDL for goal < 70  -continue to hold coumadin until cleared by GI/medicine to resume  -resume atleast single antiplatelet agent if able  -further stroke work-up including MRI and carotid dopplers should not delay GIB work-up/management but should be done during this admission to determine whether patient has a symptomatic carotid stenosis that would benefit from surgical intervention  -vascular surgery evaluation

## 2020-09-11 NOTE — PROGRESS NOTE ADULT - SUBJECTIVE AND OBJECTIVE BOX
Dr. Arlet Banegas  Pager [phone]    PROGRESS NOTE:     Patient is a 74y old  Male who presents with a chief complaint of Symptomatic Anemia (11 Sep 2020 10:01)      SUBJECTIVE / OVERNIGHT EVENTS: pt had code stroke this morning, developed dysarthria, CT head no acute pathology  ADDITIONAL REVIEW OF SYSTEMS: denies chest pain, also plan for endoscopy today if cleared by neuro/cardiology    MEDICATIONS  (STANDING):  aspirin  chewable 81 milliGRAM(s) Oral daily  atorvastatin 80 milliGRAM(s) Oral at bedtime  clopidogrel Tablet 75 milliGRAM(s) Oral daily  dextrose 50% Injectable 12.5 Gram(s) IV Push once  dextrose 50% Injectable 25 Gram(s) IV Push once  dextrose 50% Injectable 25 Gram(s) IV Push once  influenza   Vaccine 0.5 milliLiter(s) IntraMuscular once  insulin lispro (HumaLOG) corrective regimen sliding scale   SubCutaneous three times a day before meals  insulin lispro (HumaLOG) corrective regimen sliding scale   SubCutaneous at bedtime  nicotine -  14 mG/24Hr(s) Patch 1 patch Transdermal daily  pantoprazole Infusion 8 mG/Hr (10 mL/Hr) IV Continuous <Continuous>  sodium chloride 0.9%. 1000 milliLiter(s) (50 mL/Hr) IV Continuous <Continuous>    MEDICATIONS  (PRN):  acetaminophen   Tablet .. 650 milliGRAM(s) Oral every 6 hours PRN Temp greater or equal to 38C (100.4F), Mild Pain (1 - 3), Moderate Pain (4 - 6)  dextrose 40% Gel 15 Gram(s) Oral once PRN Blood Glucose LESS THAN 70 milliGRAM(s)/deciliter  glucagon  Injectable 1 milliGRAM(s) IntraMuscular once PRN Glucose LESS THAN 70 milligrams/deciliter      CAPILLARY BLOOD GLUCOSE      POCT Blood Glucose.: 100 mg/dL (11 Sep 2020 11:30)  POCT Blood Glucose.: 117 mg/dL (11 Sep 2020 07:12)  POCT Blood Glucose.: 121 mg/dL (11 Sep 2020 06:06)  POCT Blood Glucose.: 130 mg/dL (10 Sep 2020 21:13)  POCT Blood Glucose.: 85 mg/dL (10 Sep 2020 17:14)  POCT Blood Glucose.: 90 mg/dL (10 Sep 2020 12:54)    I&O's Summary      PHYSICAL EXAM:  Vital Signs Last 24 Hrs  T(C): 36.3 (11 Sep 2020 07:00), Max: 36.8 (11 Sep 2020 05:22)  T(F): 97.4 (11 Sep 2020 07:00), Max: 98.2 (11 Sep 2020 05:22)  HR: 98 (11 Sep 2020 07:00) (67 - 98)  BP: 120/73 (11 Sep 2020 07:00) (108/53 - 124/60)  BP(mean): --  RR: 17 (11 Sep 2020 07:00) (17 - 19)  SpO2: 98% (11 Sep 2020 07:00) (97% - 100%)  CONSTITUTIONAL: NAD, well-developed  RESPIRATORY: Normal respiratory effort; mainly clear except for basilar crackle   CARDIOVASCULAR: Regular rate and rhythm, normal S1 and S2, loud 4/6 DOREEN at RSB; No lower extremity edema; Peripheral pulses are 2+ bilaterally  ABDOMEN: Nontender to palpation, normoactive bowel sounds, no rebound/guarding; No hepatosplenomegaly  MUSCULOSKELETAL: no clubbing or cyanosis of digits; no joint swelling or tenderness to palpation  PSYCH: A+O to person, place, and time; affect appropriate    LABS:                        8.1    12.21 )-----------( 193      ( 11 Sep 2020 06:07 )             25.2     -11    137  |  104  |  32<H>  ----------------------------<  111<H>  4.3   |  23  |  1.01    Ca    9.0      11 Sep 2020 06:07  Mg     1.6     09-10    TPro  6.6  /  Alb  3.6  /  TBili  0.6  /  DBili  x   /  AST  18  /  ALT  16  /  AlkPhos  32<L>  -11    PT/INR - ( 11 Sep 2020 06:07 )   PT: 27.4 SEC;   INR: 2.51          PTT - ( 11 Sep 2020 06:07 )  PTT:38.9 SEC      Urinalysis Basic - ( 10 Sep 2020 09:30 )    Color: LIGHT YELLOW / Appearance: CLEAR / S.020 / pH: 5.5  Gluc: NEGATIVE / Ketone: NEGATIVE  / Bili: NEGATIVE / Urobili: NORMAL   Blood: NEGATIVE / Protein: NEGATIVE / Nitrite: NEGATIVE   Leuk Esterase: NEGATIVE / RBC: x / WBC x   Sq Epi: x / Non Sq Epi: x / Bacteria: x    RADIOLOGY & ADDITIONAL TESTS:  Results Reviewed:   Imaging Personally Reviewed:  < from: CT Angio Neck w/ IV Cont (20 @ 08:08) >    IMPRESSION:    CTA NECK: Moderate atherosclerotic calcification and intramural plaque involving the proximal internal carotid arteries with high-grade stenosis of the proximal right internal carotid artery of up to 80% and moderate stenosis of the proximal left internal carotid artery of up to 66% based on NASCET criteria. Patent cervical vertebral arteries, however, origin of the right vertebral artery is poorly evaluated due to artifact from contrast bolus.    CTA HEAD:  No high-grade stenosis or occlusion of the major proximal branches of the [place].      < from: Transthoracic Echocardiogram (20 @ 10:13) >  LVEF 60%  1. Mitral annular calcification, otherwise normal mitral  valve. Mild-moderate mitral regurgitation.  2. Calcified trileaflet aortic valve with decreased opening  Peak transaortic valve gradient equals 98 mm Hg, mean  transaortic valve gradient equals 59 mm Hg, estimated  aortic valve area equals 0.5 sqcm (by continuity equation),  consistent with severe aortic stenosis. Mild-moderate  aortic regurgitation.  3. Moderately dilated left atrium.  LA volume index = 43  cc/m2.  4. Increased relative wall thickness with normal left  ventricular mass index, consistent with concentric left  ventricular remodeling.  5. Normal left ventricular systolic function. No segmental  wall motion abnormalities.  6. Normal right ventricular size and function.  7. Estimated right ventricular systolic pressure equals 54  mm Hg, assuming right atrial pressure equals 10 mm Hg,  consistent with moderate pulmonary hypertension8.  8. A bubble study was performed with the intravenous  injection of agitated saline.  Following contrast  injection, no obvious bubbles were seen in the left heart.    Electrocardiogram Personally Reviewed:    COORDINATION OF CARE:  Care Discussed with Consultants/Other Providers [Y/N]: Dr. Pepper of cardiology, TTE to evaluate AS prior to endoscopy, el Kirk, vascular sx consult for ICA, MRI brain, carotid doppler  Prior or Outpatient Records Reviewed [Y/N]:

## 2020-09-11 NOTE — PROGRESS NOTE ADULT - ATTENDING COMMENTS
Personally saw and examined patient  Labs and vitals reviewed  Agree with above assessment and plan  pt with known hx of severe AS, tte today done for further risk stratification showing severe AS with PILO .5, MG 59  given urgent need for endoscopy in setting of acute blood loss anemia to hgb 6, pt may proceed, remains high risk for any procedure including low risk procedure. given AS, pt is at risk for decompensation, and therefore would consider anesthesia eval prior to procedure for intra-procedural planning.   will cont to follow with you.

## 2020-09-11 NOTE — PROGRESS NOTE ADULT - ASSESSMENT
75 yo M with h/o Afib on coumadin, severe AS, hyperlipidemia, CAD s/p stents x 2 (last pCI 7 years ago), PAD with LLE stent, BIB EMS for chest pain found to have symptomatic anemia and cardiology c/s for clearance for EGD/C-scope.       #Preop: METS 3 in the acute setting likely related to symptomatic anemia. Prior to 2 weeks ago patient had METS >4 and was able to walk multiple blocks.  No recent stress testing or TTE in our system but patient with known severe AS undergoing valve replacement work up. EKG with AF and non specific ST/T wave abnormalities  Patient remains at high risk for cardiac complications given severe AS however, given urgency of EGD/C-scope in the setting of symptomatic anemia, no further cardiac work up is needed at this time.  -Hold warfarin in setting of recent active GIB  -can continue diltiazem for rate control in setting of AFib  -continue plavix for LLE stent 75 yo M with h/o Afib on coumadin, severe AS, hyperlipidemia, CAD s/p stents x 2 (last pCI 7 years ago), PAD with LLE stent, BIB EMS for chest pain found to have symptomatic anemia and cardiology c/s for clearance for EGD/C-scope.     -Given severe AS, would recommend an TTE to evaluate valve (no recent TTE in our system)  -stroke likely in the setting of carotid disease as INR is still therapeutic. Agree with vascular c/s.  -Hold warfarin in setting of recent active GIB  -can continue diltiazem for rate control in setting of AFib

## 2020-09-12 LAB
ANION GAP SERPL CALC-SCNC: 10 MMO/L — SIGNIFICANT CHANGE UP (ref 7–14)
APTT BLD: 35 SEC — SIGNIFICANT CHANGE UP (ref 27–36.3)
BASOPHILS # BLD AUTO: 0.04 K/UL — SIGNIFICANT CHANGE UP (ref 0–0.2)
BASOPHILS # BLD AUTO: 0.06 K/UL — SIGNIFICANT CHANGE UP (ref 0–0.2)
BASOPHILS NFR BLD AUTO: 0.4 % — SIGNIFICANT CHANGE UP (ref 0–2)
BASOPHILS NFR BLD AUTO: 0.6 % — SIGNIFICANT CHANGE UP (ref 0–2)
BUN SERPL-MCNC: 24 MG/DL — HIGH (ref 7–23)
CALCIUM SERPL-MCNC: 8.5 MG/DL — SIGNIFICANT CHANGE UP (ref 8.4–10.5)
CHLORIDE SERPL-SCNC: 105 MMOL/L — SIGNIFICANT CHANGE UP (ref 98–107)
CO2 SERPL-SCNC: 22 MMOL/L — SIGNIFICANT CHANGE UP (ref 22–31)
CREAT SERPL-MCNC: 0.92 MG/DL — SIGNIFICANT CHANGE UP (ref 0.5–1.3)
EOSINOPHIL # BLD AUTO: 0.14 K/UL — SIGNIFICANT CHANGE UP (ref 0–0.5)
EOSINOPHIL # BLD AUTO: 0.15 K/UL — SIGNIFICANT CHANGE UP (ref 0–0.5)
EOSINOPHIL NFR BLD AUTO: 1.3 % — SIGNIFICANT CHANGE UP (ref 0–6)
EOSINOPHIL NFR BLD AUTO: 1.5 % — SIGNIFICANT CHANGE UP (ref 0–6)
GLUCOSE SERPL-MCNC: 132 MG/DL — HIGH (ref 70–99)
HCT VFR BLD CALC: 22.9 % — LOW (ref 39–50)
HCT VFR BLD CALC: 25.3 % — LOW (ref 39–50)
HGB BLD-MCNC: 7.2 G/DL — LOW (ref 13–17)
HGB BLD-MCNC: 8.2 G/DL — LOW (ref 13–17)
IMM GRANULOCYTES NFR BLD AUTO: 0.4 % — SIGNIFICANT CHANGE UP (ref 0–1.5)
IMM GRANULOCYTES NFR BLD AUTO: 0.5 % — SIGNIFICANT CHANGE UP (ref 0–1.5)
INR BLD: 1.89 — HIGH (ref 0.88–1.16)
LYMPHOCYTES # BLD AUTO: 19.8 % — SIGNIFICANT CHANGE UP (ref 13–44)
LYMPHOCYTES # BLD AUTO: 2.05 K/UL — SIGNIFICANT CHANGE UP (ref 1–3.3)
LYMPHOCYTES # BLD AUTO: 2.12 K/UL — SIGNIFICANT CHANGE UP (ref 1–3.3)
LYMPHOCYTES # BLD AUTO: 20 % — SIGNIFICANT CHANGE UP (ref 13–44)
MAGNESIUM SERPL-MCNC: 1.5 MG/DL — LOW (ref 1.6–2.6)
MCHC RBC-ENTMCNC: 30.6 PG — SIGNIFICANT CHANGE UP (ref 27–34)
MCHC RBC-ENTMCNC: 31.2 PG — SIGNIFICANT CHANGE UP (ref 27–34)
MCHC RBC-ENTMCNC: 31.4 % — LOW (ref 32–36)
MCHC RBC-ENTMCNC: 32.4 % — SIGNIFICANT CHANGE UP (ref 32–36)
MCV RBC AUTO: 96.2 FL — SIGNIFICANT CHANGE UP (ref 80–100)
MCV RBC AUTO: 97.4 FL — SIGNIFICANT CHANGE UP (ref 80–100)
MONOCYTES # BLD AUTO: 0.94 K/UL — HIGH (ref 0–0.9)
MONOCYTES # BLD AUTO: 1.13 K/UL — HIGH (ref 0–0.9)
MONOCYTES NFR BLD AUTO: 10.6 % — SIGNIFICANT CHANGE UP (ref 2–14)
MONOCYTES NFR BLD AUTO: 9.2 % — SIGNIFICANT CHANGE UP (ref 2–14)
NEUTROPHILS # BLD AUTO: 7.05 K/UL — SIGNIFICANT CHANGE UP (ref 1.8–7.4)
NEUTROPHILS # BLD AUTO: 7.2 K/UL — SIGNIFICANT CHANGE UP (ref 1.8–7.4)
NEUTROPHILS NFR BLD AUTO: 67.2 % — SIGNIFICANT CHANGE UP (ref 43–77)
NEUTROPHILS NFR BLD AUTO: 68.5 % — SIGNIFICANT CHANGE UP (ref 43–77)
NRBC # FLD: 0.02 K/UL — SIGNIFICANT CHANGE UP (ref 0–0)
NRBC # FLD: 0.06 K/UL — SIGNIFICANT CHANGE UP (ref 0–0)
PHOSPHATE SERPL-MCNC: 2.6 MG/DL — SIGNIFICANT CHANGE UP (ref 2.5–4.5)
PLATELET # BLD AUTO: 172 K/UL — SIGNIFICANT CHANGE UP (ref 150–400)
PLATELET # BLD AUTO: 195 K/UL — SIGNIFICANT CHANGE UP (ref 150–400)
PMV BLD: 10.5 FL — SIGNIFICANT CHANGE UP (ref 7–13)
PMV BLD: 10.6 FL — SIGNIFICANT CHANGE UP (ref 7–13)
POTASSIUM SERPL-MCNC: 4.2 MMOL/L — SIGNIFICANT CHANGE UP (ref 3.5–5.3)
POTASSIUM SERPL-SCNC: 4.2 MMOL/L — SIGNIFICANT CHANGE UP (ref 3.5–5.3)
PROTHROM AB SERPL-ACNC: 21.1 SEC — HIGH (ref 10.6–13.6)
RBC # BLD: 2.35 M/UL — LOW (ref 4.2–5.8)
RBC # BLD: 2.63 M/UL — LOW (ref 4.2–5.8)
RBC # FLD: 16.3 % — HIGH (ref 10.3–14.5)
RBC # FLD: 16.6 % — HIGH (ref 10.3–14.5)
SODIUM SERPL-SCNC: 137 MMOL/L — SIGNIFICANT CHANGE UP (ref 135–145)
WBC # BLD: 10.27 K/UL — SIGNIFICANT CHANGE UP (ref 3.8–10.5)
WBC # BLD: 10.7 K/UL — HIGH (ref 3.8–10.5)
WBC # FLD AUTO: 10.27 K/UL — SIGNIFICANT CHANGE UP (ref 3.8–10.5)
WBC # FLD AUTO: 10.7 K/UL — HIGH (ref 3.8–10.5)

## 2020-09-12 PROCEDURE — 99232 SBSQ HOSP IP/OBS MODERATE 35: CPT | Mod: GC

## 2020-09-12 PROCEDURE — 93880 EXTRACRANIAL BILAT STUDY: CPT | Mod: 26

## 2020-09-12 PROCEDURE — 99233 SBSQ HOSP IP/OBS HIGH 50: CPT

## 2020-09-12 RX ORDER — MAGNESIUM OXIDE 400 MG ORAL TABLET 241.3 MG
400 TABLET ORAL
Refills: 0 | Status: DISCONTINUED | OUTPATIENT
Start: 2020-09-12 | End: 2020-09-15

## 2020-09-12 RX ORDER — MAGNESIUM OXIDE 400 MG ORAL TABLET 241.3 MG
400 TABLET ORAL ONCE
Refills: 0 | Status: COMPLETED | OUTPATIENT
Start: 2020-09-12 | End: 2020-09-12

## 2020-09-12 RX ADMIN — Medication 1 PATCH: at 12:15

## 2020-09-12 RX ADMIN — Medication 1: at 12:16

## 2020-09-12 RX ADMIN — Medication 1 PATCH: at 18:04

## 2020-09-12 RX ADMIN — PANTOPRAZOLE SODIUM 10 MG/HR: 20 TABLET, DELAYED RELEASE ORAL at 21:36

## 2020-09-12 RX ADMIN — Medication 325 MILLIGRAM(S): at 12:17

## 2020-09-12 RX ADMIN — PANTOPRAZOLE SODIUM 10 MG/HR: 20 TABLET, DELAYED RELEASE ORAL at 14:44

## 2020-09-12 RX ADMIN — PANTOPRAZOLE SODIUM 10 MG/HR: 20 TABLET, DELAYED RELEASE ORAL at 10:59

## 2020-09-12 RX ADMIN — Medication 81 MILLIGRAM(S): at 12:16

## 2020-09-12 RX ADMIN — Medication 1 PATCH: at 12:17

## 2020-09-12 RX ADMIN — MAGNESIUM OXIDE 400 MG ORAL TABLET 400 MILLIGRAM(S): 241.3 TABLET ORAL at 18:02

## 2020-09-12 RX ADMIN — Medication 1: at 18:02

## 2020-09-12 RX ADMIN — Medication 1 PATCH: at 07:00

## 2020-09-12 RX ADMIN — ATORVASTATIN CALCIUM 80 MILLIGRAM(S): 80 TABLET, FILM COATED ORAL at 21:36

## 2020-09-12 RX ADMIN — MAGNESIUM OXIDE 400 MG ORAL TABLET 400 MILLIGRAM(S): 241.3 TABLET ORAL at 11:01

## 2020-09-12 NOTE — PROGRESS NOTE ADULT - ASSESSMENT
74M with a PMH of CAD status post stents on Plavix, Afib on coumadin, HTN, HLD,  PVD with fem-fem bypass and bilateral LE stents, 55 pack year smoking history, presented to the [facility] ED with chest pain, found to have L ICA 66% stenosis and R ICA 80% stenosis in setting of acute dysarthria, stable on medical floor.    PLAN:  - f/u MRI  - f/u carotid duplex  - patient also with need for AVR repair in setting severe AS.   - Remainder of care per primary team    d/w vascular surgery team and fellows    Vascular Surgery  [phone]

## 2020-09-12 NOTE — PROGRESS NOTE ADULT - ATTENDING COMMENTS
Probable upper GI bleed-definite source not detected on EGD. For now, continue PPI drip x 72 hours then pantoprazole 40 mg p.o. b.i.d. Monitor serial hemoglobin/hematocrit.

## 2020-09-12 NOTE — PROGRESS NOTE ADULT - PROBLEM SELECTOR PLAN 1
pt had code stroke for dysarthria this morning, CT head no acute CVA  but CT angio showed b/l ICA stenosis, right up to 80%, left 66%  -carotid doppler  -vascular surgery consulted, if right sided stroke on MRI will consider CEA with ?concomitant TAVR  -d/c plavix, c/w ASA, lipitor  -expedite MRI brain w/o contrast  -neuro checks  -f/u neurology

## 2020-09-12 NOTE — PROGRESS NOTE ADULT - SUBJECTIVE AND OBJECTIVE BOX
Chief Complaint:  Patient is a 74y old  Male who presents with a chief complaint of Symptomatic Anemia (11 Sep 2020 12:56)      Interval Events:   Patient had one small hard dark BM last night.  EGD yesterday with blood but no active source.  patient states he feels much better.    Allergies:  No Known Allergies      Hospital Medications:  acetaminophen   Tablet .. 650 milliGRAM(s) Oral every 6 hours PRN  aspirin  chewable 81 milliGRAM(s) Oral daily  atorvastatin 80 milliGRAM(s) Oral at bedtime  dextrose 40% Gel 15 Gram(s) Oral once PRN  dextrose 50% Injectable 12.5 Gram(s) IV Push once  dextrose 50% Injectable 25 Gram(s) IV Push once  dextrose 50% Injectable 25 Gram(s) IV Push once  ferrous    sulfate 325 milliGRAM(s) Oral daily  glucagon  Injectable 1 milliGRAM(s) IntraMuscular once PRN  influenza   Vaccine 0.5 milliLiter(s) IntraMuscular once  insulin lispro (HumaLOG) corrective regimen sliding scale   SubCutaneous three times a day before meals  insulin lispro (HumaLOG) corrective regimen sliding scale   SubCutaneous at bedtime  magnesium oxide 400 milliGRAM(s) Oral two times a day with meals  magnesium oxide 400 milliGRAM(s) Oral once  nicotine -  14 mG/24Hr(s) Patch 1 patch Transdermal daily  pantoprazole Infusion 8 mG/Hr IV Continuous <Continuous>      PMHX/PSHX:  Peripheral vascular disease    Atrial fibrillation    Hypertension    Diabetes    Hyperlipidemia    Diabetes    Hypertension    Coronary artery disease    Hyperlipemia    Hypertension    Diabetes    Status post angiography of extremity    H/O right coronary artery stent placement    History of coronary artery stent placement    History of coronary artery stent placement        Family history:  FH: CAD (coronary artery disease)    FH: seizures    FH: type 1 diabetes    FH: diabetes mellitus        ROS:  General: no night sweats, wt loss  CV: no pain, palpitation  Resp: no cough wheezing  Muscles: no weakness or pain  Endocrine: no polyuria, polydipsia, cold/heat intolerance  Heme: No petechia, ecchymosis    PHYSICAL EXAM:   GENERAL:  NAD  HEENT:  NC/AT,  conjunctivae clear, sclera -anicteric  CHEST:  Full & symmetric excursion, no increased  HEART:  Regular rhythm  ABDOMEN:  Soft, non-tender, non-distended, normoactive bowel sounds,  no masses ,no hepato-splenomegaly,   EXTREMITIES:  no cyanosis,clubbing or edema  SKIN:  No rash/erythema/ecchymoses/petechiae/wounds/abscess/warm/dry  NEURO:  Alert, oriented    Vital Signs:  Vital Signs Last 24 Hrs  T(C): 36.2 (12 Sep 2020 10:44), Max: 36.8 (11 Sep 2020 12:31)  T(F): 97.2 (12 Sep 2020 10:44), Max: 98.2 (11 Sep 2020 12:31)  HR: 81 (12 Sep 2020 10:44) (81 - 98)  BP: 101/59 (12 Sep 2020 10:44) (101/59 - 150/67)  BP(mean): --  RR: 16 (12 Sep 2020 10:44) (16 - 22)  SpO2: 99% (12 Sep 2020 10:44) (95% - 99%)  Daily     Daily     LABS:                        7.2    10.27 )-----------( 195      ( 12 Sep 2020 05:05 )             22.9     09-12    137  |  105  |  24<H>  ----------------------------<  132<H>  4.2   |  22  |  0.92    Ca    8.5      12 Sep 2020 05:05  Phos  2.6     09-12  Mg     1.5     09-12    TPro  6.6  /  Alb  3.6  /  TBili  0.6  /  DBili  x   /  AST  18  /  ALT  16  /  AlkPhos  32<L>  09-11    LIVER FUNCTIONS - ( 11 Sep 2020 06:07 )  Alb: 3.6 g/dL / Pro: 6.6 g/dL / ALK PHOS: 32 u/L / ALT: 16 u/L / AST: 18 u/L / GGT: x           PT/INR - ( 12 Sep 2020 05:05 )   PT: 21.1 SEC;   INR: 1.89          PTT - ( 12 Sep 2020 05:05 )  PTT:35.0 SEC        Imaging:

## 2020-09-12 NOTE — PROGRESS NOTE ADULT - ASSESSMENT
74M w/ CAD s/p PCI and PAD s/p b/l stents (7 and 5 years ago, respectively) on Plavix, and AFib on coumadin, presenting w/ SOB/chest pain, found to have anemia in setting of black stools for 3 weeks.    Impression:  #Anemia - w/ hx of melena, c/f UGIB, though dark stools could be related to iron; d/dx most c/f UGI source like PUD, erosive gastritis/esophagitis, angioectasia, gastric caner, but could be from lower source like colorectal cancer s/p EGD with small amoutn of blood but no source  #Chest pain  #CAD s/p PCI  #PAD s/p stent on Plavix  #AFib on coumadin    Recommendations:  - trend Hb, transfuse to Hb > 7  - serial CBCs  - c/w PPI gtt for now  - GI will follow

## 2020-09-12 NOTE — PROGRESS NOTE ADULT - ASSESSMENT
75 y/o male, with a PmHx of DM, HTN, hyperlipidemia, atrial fibrillation on coumadin, CAD s/p 2 cardiac stents and 2 LE stents (one to each leg), PVD and 55 pack year smoking history, present to the [facility] ED with chest pain for 2-3 days. Admitted to telemetry for symptomatic anemia with a possible GI bleed and r/o acs.

## 2020-09-12 NOTE — PROGRESS NOTE ADULT - SUBJECTIVE AND OBJECTIVE BOX
Dr. Arlet Banegas  Pager [phone]    PROGRESS NOTE:     Patient is a 74y old  Male who presents with a chief complaint of Symptomatic Anemia (12 Sep 2020 10:47)      SUBJECTIVE / OVERNIGHT EVENTS: pt denies chest pain or sob  ADDITIONAL REVIEW OF SYSTEMS: afebrile , reports he had a dark BM last night     MEDICATIONS  (STANDING):  aspirin  chewable 81 milliGRAM(s) Oral daily  atorvastatin 80 milliGRAM(s) Oral at bedtime  dextrose 50% Injectable 12.5 Gram(s) IV Push once  dextrose 50% Injectable 25 Gram(s) IV Push once  dextrose 50% Injectable 25 Gram(s) IV Push once  ferrous    sulfate 325 milliGRAM(s) Oral daily  influenza   Vaccine 0.5 milliLiter(s) IntraMuscular once  insulin lispro (HumaLOG) corrective regimen sliding scale   SubCutaneous three times a day before meals  insulin lispro (HumaLOG) corrective regimen sliding scale   SubCutaneous at bedtime  magnesium oxide 400 milliGRAM(s) Oral two times a day with meals  nicotine -  14 mG/24Hr(s) Patch 1 patch Transdermal daily  pantoprazole Infusion 8 mG/Hr (10 mL/Hr) IV Continuous <Continuous>    MEDICATIONS  (PRN):  acetaminophen   Tablet .. 650 milliGRAM(s) Oral every 6 hours PRN Temp greater or equal to 38C (100.4F), Mild Pain (1 - 3), Moderate Pain (4 - 6)  dextrose 40% Gel 15 Gram(s) Oral once PRN Blood Glucose LESS THAN 70 milliGRAM(s)/deciliter  glucagon  Injectable 1 milliGRAM(s) IntraMuscular once PRN Glucose LESS THAN 70 milligrams/deciliter      CAPILLARY BLOOD GLUCOSE      POCT Blood Glucose.: 184 mg/dL (12 Sep 2020 12:08)  POCT Blood Glucose.: 134 mg/dL (12 Sep 2020 07:34)  POCT Blood Glucose.: 155 mg/dL (11 Sep 2020 20:02)  POCT Blood Glucose.: 117 mg/dL (11 Sep 2020 17:48)    I&O's Summary      PHYSICAL EXAM:  Vital Signs Last 24 Hrs  T(C): 36.2 (12 Sep 2020 10:44), Max: 36.8 (11 Sep 2020 21:50)  T(F): 97.2 (12 Sep 2020 10:44), Max: 98.2 (11 Sep 2020 21:50)  HR: 81 (12 Sep 2020 10:44) (81 - 98)  BP: 101/59 (12 Sep 2020 10:44) (101/59 - 150/67)  BP(mean): --  RR: 16 (12 Sep 2020 10:44) (16 - 22)  SpO2: 99% (12 Sep 2020 10:44) (95% - 99%)  CONSTITUTIONAL: NAD, well-developed  RESPIRATORY: Normal respiratory effort; mainly clear except for basilar crackle   CARDIOVASCULAR: Regular rate and rhythm, normal S1 and S2, loud 4/6 DOREEN at RSB; No lower extremity edema; Peripheral pulses are 2+ bilaterally  ABDOMEN: Nontender to palpation, normoactive bowel sounds, no rebound/guarding; No hepatosplenomegaly  MUSCULOSKELETAL: no clubbing or cyanosis of digits; no joint swelling or tenderness to palpation  PSYCH: A+O to person, place, and time; affect appropriate  LABS:                        7.2    10.27 )-----------( 195      ( 12 Sep 2020 05:05 )             22.9     09-12    137  |  105  |  24<H>  ----------------------------<  132<H>  4.2   |  22  |  0.92    Ca    8.5      12 Sep 2020 05:05  Phos  2.6     09-12  Mg     1.5     09-12    TPro  6.6  /  Alb  3.6  /  TBili  0.6  /  DBili  x   /  AST  18  /  ALT  16  /  AlkPhos  32<L>  09-11    PT/INR - ( 12 Sep 2020 05:05 )   PT: 21.1 SEC;   INR: 1.89          PTT - ( 12 Sep 2020 05:05 )  PTT:35.0 SEC      RADIOLOGY & ADDITIONAL TESTS:  Results Reviewed:   Imaging Personally Reviewed:  d< from: Upper Endoscopy (09.11.20 @ 14:04) >  Impression:          - Diffuse non-bleeding gastritis noted.                       - Small amount of fresh blood in the first part of the                        duodenum. No bleeding lesion was identified. There was                        no active bleeding noted during the examination.                       - No specimens collected.    Electrocardiogram Personally Reviewed:    COORDINATION OF CARE:  Care Discussed with Consultants/Other Providers [Y/N]: el Kirk, transfuse 1 unit prbc, d/c plavix  Prior or Outpatient Records Reviewed [Y/N]:

## 2020-09-12 NOTE — PROGRESS NOTE ADULT - SUBJECTIVE AND OBJECTIVE BOX
Vascular Surgery AM Progress Note      Subjective:  Pt seen and examined at bedside this AM.  No acute events overnight. Patients symptoms have improved since yesterday.   Denies chest pain, shortness of breath, dizziness, nausea, vomiting.    Vital Signs Last 24 Hrs  T(C): 36.2 (12 Sep 2020 10:44), Max: 36.8 (11 Sep 2020 21:50)  T(F): 97.2 (12 Sep 2020 10:44), Max: 98.2 (11 Sep 2020 21:50)  HR: 81 (12 Sep 2020 10:44) (81 - 98)  BP: 101/59 (12 Sep 2020 10:44) (101/59 - 150/67)  BP(mean): --  RR: 16 (12 Sep 2020 10:44) (16 - 22)  SpO2: 99% (12 Sep 2020 10:44) (95% - 99%)    Physical Exam:  General Appearance:  Appears well, NAD, laying comfortably in bed, CN II-XII grossly intact  Chest: no increased work of breathing   Abdomen: Soft, nondistended, nontender.  Extremities: grossly symmetric    I&O's Summary       LABS:                        7.2    10.27 )-----------( 195      ( 12 Sep 2020 05:05 )             22.9     09-12    137  |  105  |  24<H>  ----------------------------<  132<H>  4.2   |  22  |  0.92    Ca    8.5      12 Sep 2020 05:05  Phos  2.6     09-12  Mg     1.5     09-12    TPro  6.6  /  Alb  3.6  /  TBili  0.6  /  DBili  x   /  AST  18  /  ALT  16  /  AlkPhos  32<L>  09-11    PT/INR - ( 12 Sep 2020 05:05 )   PT: 21.1 SEC;   INR: 1.89          PTT - ( 12 Sep 2020 05:05 )  PTT:35.0 SEC      RADIOLOGY & ADDITIONAL STUDIES:

## 2020-09-12 NOTE — CHART NOTE - NSCHARTNOTEFT_GEN_A_CORE
Discussed carotid doppler findings (80-99% stenosis bilateral ICA) with vascular. Pt. currently asymptomatic without dysarthria. Vascular aware and is awaiting MRI (expedited 9/12). Further recs pending MR results.

## 2020-09-13 LAB
ANION GAP SERPL CALC-SCNC: 11 MMO/L — SIGNIFICANT CHANGE UP (ref 7–14)
APTT BLD: 32.3 SEC — SIGNIFICANT CHANGE UP (ref 27–36.3)
BASOPHILS # BLD AUTO: 0.06 K/UL — SIGNIFICANT CHANGE UP (ref 0–0.2)
BASOPHILS # BLD AUTO: 0.06 K/UL — SIGNIFICANT CHANGE UP (ref 0–0.2)
BASOPHILS NFR BLD AUTO: 0.5 % — SIGNIFICANT CHANGE UP (ref 0–2)
BASOPHILS NFR BLD AUTO: 0.6 % — SIGNIFICANT CHANGE UP (ref 0–2)
BUN SERPL-MCNC: 21 MG/DL — SIGNIFICANT CHANGE UP (ref 7–23)
CALCIUM SERPL-MCNC: 8.7 MG/DL — SIGNIFICANT CHANGE UP (ref 8.4–10.5)
CHLORIDE SERPL-SCNC: 106 MMOL/L — SIGNIFICANT CHANGE UP (ref 98–107)
CO2 SERPL-SCNC: 23 MMOL/L — SIGNIFICANT CHANGE UP (ref 22–31)
CREAT SERPL-MCNC: 0.85 MG/DL — SIGNIFICANT CHANGE UP (ref 0.5–1.3)
EOSINOPHIL # BLD AUTO: 0.16 K/UL — SIGNIFICANT CHANGE UP (ref 0–0.5)
EOSINOPHIL # BLD AUTO: 0.16 K/UL — SIGNIFICANT CHANGE UP (ref 0–0.5)
EOSINOPHIL NFR BLD AUTO: 1.4 % — SIGNIFICANT CHANGE UP (ref 0–6)
EOSINOPHIL NFR BLD AUTO: 1.5 % — SIGNIFICANT CHANGE UP (ref 0–6)
GLUCOSE SERPL-MCNC: 126 MG/DL — HIGH (ref 70–99)
HCT VFR BLD CALC: 25.6 % — LOW (ref 39–50)
HCT VFR BLD CALC: 27.5 % — LOW (ref 39–50)
HGB BLD-MCNC: 8.2 G/DL — LOW (ref 13–17)
HGB BLD-MCNC: 8.7 G/DL — LOW (ref 13–17)
IMM GRANULOCYTES NFR BLD AUTO: 0.5 % — SIGNIFICANT CHANGE UP (ref 0–1.5)
IMM GRANULOCYTES NFR BLD AUTO: 0.6 % — SIGNIFICANT CHANGE UP (ref 0–1.5)
INR BLD: 1.51 — HIGH (ref 0.88–1.16)
LYMPHOCYTES # BLD AUTO: 19.4 % — SIGNIFICANT CHANGE UP (ref 13–44)
LYMPHOCYTES # BLD AUTO: 2.11 K/UL — SIGNIFICANT CHANGE UP (ref 1–3.3)
LYMPHOCYTES # BLD AUTO: 2.43 K/UL — SIGNIFICANT CHANGE UP (ref 1–3.3)
LYMPHOCYTES # BLD AUTO: 21.3 % — SIGNIFICANT CHANGE UP (ref 13–44)
MAGNESIUM SERPL-MCNC: 1.5 MG/DL — LOW (ref 1.6–2.6)
MCHC RBC-ENTMCNC: 30.4 PG — SIGNIFICANT CHANGE UP (ref 27–34)
MCHC RBC-ENTMCNC: 31.2 PG — SIGNIFICANT CHANGE UP (ref 27–34)
MCHC RBC-ENTMCNC: 31.6 % — LOW (ref 32–36)
MCHC RBC-ENTMCNC: 32 % — SIGNIFICANT CHANGE UP (ref 32–36)
MCV RBC AUTO: 94.8 FL — SIGNIFICANT CHANGE UP (ref 80–100)
MCV RBC AUTO: 98.6 FL — SIGNIFICANT CHANGE UP (ref 80–100)
MONOCYTES # BLD AUTO: 1.08 K/UL — HIGH (ref 0–0.9)
MONOCYTES # BLD AUTO: 1.15 K/UL — HIGH (ref 0–0.9)
MONOCYTES NFR BLD AUTO: 10.6 % — SIGNIFICANT CHANGE UP (ref 2–14)
MONOCYTES NFR BLD AUTO: 9.5 % — SIGNIFICANT CHANGE UP (ref 2–14)
NEUTROPHILS # BLD AUTO: 7.34 K/UL — SIGNIFICANT CHANGE UP (ref 1.8–7.4)
NEUTROPHILS # BLD AUTO: 7.61 K/UL — HIGH (ref 1.8–7.4)
NEUTROPHILS NFR BLD AUTO: 66.8 % — SIGNIFICANT CHANGE UP (ref 43–77)
NEUTROPHILS NFR BLD AUTO: 67.3 % — SIGNIFICANT CHANGE UP (ref 43–77)
NRBC # FLD: 0.03 K/UL — SIGNIFICANT CHANGE UP (ref 0–0)
NRBC # FLD: 0.06 K/UL — SIGNIFICANT CHANGE UP (ref 0–0)
PHOSPHATE SERPL-MCNC: 2.9 MG/DL — SIGNIFICANT CHANGE UP (ref 2.5–4.5)
PLATELET # BLD AUTO: 174 K/UL — SIGNIFICANT CHANGE UP (ref 150–400)
PLATELET # BLD AUTO: 183 K/UL — SIGNIFICANT CHANGE UP (ref 150–400)
PMV BLD: 10.5 FL — SIGNIFICANT CHANGE UP (ref 7–13)
PMV BLD: 10.5 FL — SIGNIFICANT CHANGE UP (ref 7–13)
POTASSIUM SERPL-MCNC: 3.7 MMOL/L — SIGNIFICANT CHANGE UP (ref 3.5–5.3)
POTASSIUM SERPL-SCNC: 3.7 MMOL/L — SIGNIFICANT CHANGE UP (ref 3.5–5.3)
PROTHROM AB SERPL-ACNC: 16.9 SEC — HIGH (ref 10.6–13.6)
RBC # BLD: 2.7 M/UL — LOW (ref 4.2–5.8)
RBC # BLD: 2.79 M/UL — LOW (ref 4.2–5.8)
RBC # FLD: 16.7 % — HIGH (ref 10.3–14.5)
RBC # FLD: 16.9 % — HIGH (ref 10.3–14.5)
SODIUM SERPL-SCNC: 140 MMOL/L — SIGNIFICANT CHANGE UP (ref 135–145)
WBC # BLD: 10.88 K/UL — HIGH (ref 3.8–10.5)
WBC # BLD: 11.4 K/UL — HIGH (ref 3.8–10.5)
WBC # FLD AUTO: 10.88 K/UL — HIGH (ref 3.8–10.5)
WBC # FLD AUTO: 11.4 K/UL — HIGH (ref 3.8–10.5)

## 2020-09-13 PROCEDURE — 99233 SBSQ HOSP IP/OBS HIGH 50: CPT

## 2020-09-13 PROCEDURE — 70551 MRI BRAIN STEM W/O DYE: CPT | Mod: 26

## 2020-09-13 RX ORDER — MAGNESIUM SULFATE 500 MG/ML
1 VIAL (ML) INJECTION ONCE
Refills: 0 | Status: DISCONTINUED | OUTPATIENT
Start: 2020-09-13 | End: 2020-09-13

## 2020-09-13 RX ORDER — MAGNESIUM SULFATE 500 MG/ML
2 VIAL (ML) INJECTION ONCE
Refills: 0 | Status: COMPLETED | OUTPATIENT
Start: 2020-09-13 | End: 2020-09-13

## 2020-09-13 RX ADMIN — PANTOPRAZOLE SODIUM 10 MG/HR: 20 TABLET, DELAYED RELEASE ORAL at 21:25

## 2020-09-13 RX ADMIN — PANTOPRAZOLE SODIUM 10 MG/HR: 20 TABLET, DELAYED RELEASE ORAL at 08:59

## 2020-09-13 RX ADMIN — MAGNESIUM OXIDE 400 MG ORAL TABLET 400 MILLIGRAM(S): 241.3 TABLET ORAL at 17:34

## 2020-09-13 RX ADMIN — Medication 1 PATCH: at 20:10

## 2020-09-13 RX ADMIN — Medication 325 MILLIGRAM(S): at 11:52

## 2020-09-13 RX ADMIN — Medication 2: at 17:34

## 2020-09-13 RX ADMIN — MAGNESIUM OXIDE 400 MG ORAL TABLET 400 MILLIGRAM(S): 241.3 TABLET ORAL at 08:30

## 2020-09-13 RX ADMIN — Medication 1: at 12:33

## 2020-09-13 RX ADMIN — ATORVASTATIN CALCIUM 80 MILLIGRAM(S): 80 TABLET, FILM COATED ORAL at 20:49

## 2020-09-13 RX ADMIN — Medication 81 MILLIGRAM(S): at 11:52

## 2020-09-13 RX ADMIN — Medication 1 PATCH: at 11:52

## 2020-09-13 RX ADMIN — Medication 1 PATCH: at 08:00

## 2020-09-13 RX ADMIN — Medication 50 GRAM(S): at 08:58

## 2020-09-13 NOTE — PROGRESS NOTE ADULT - PROBLEM SELECTOR PLAN 6
monitor bp, holding meds for now due to hypotension  restart as needed
monitor bp, holding meds for now due to hypotension
monitor bp, holding meds for now due to hypotension  restart as needed

## 2020-09-13 NOTE — PROGRESS NOTE ADULT - PROBLEM SELECTOR PLAN 1
pt had code stroke for dysarthria on 9/11, now resolved  -likely TIA., CT head no acute CVA, MRI brain 9/12 no acute infarct  -however, CT angio showed b/l ICA stenosis, right up to 80%, left 66%  -carotid doppler 9/12 severe b/l carotid stenosis, Severe heterogenous plaque noted within the proximal right and left internal carotid arteries, c/w 80-99%  stenoses in both proximal vessels.  -case d/w vascular surgery resident, f/u recs  -pt may need to be transferred to [facility] for CEA and ?concomitant TAVR  -d/c'd plavix, c/w ASA, lipitor  -neuro checks, F/u neurology pt had code stroke for dysarthria on 9/11, now resolved  -likely TIA., CT head no acute CVA, MRI brain 9/12 no acute infarct  -however, CT angio showed b/l ICA stenosis, right up to 80%, left 66%  -carotid doppler 9/12 severe b/l carotid stenosis, Severe heterogenous plaque noted within the proximal right and left internal carotid arteries, c/w 80-99%  stenoses in both proximal vessels.  -case d/w vascular surgery resident, they would like pt to be transferred to [facility] for TAVR w/ CT surgery backup, followed by carotid endarectomy within the next 7-10 days.   -I notified cardiology fellow about vascular surgery's desire to transfer pt to [facility], f/u rec  -d/c'd plavix, c/w ASA, lipitor  -neuro checks, F/u neurology

## 2020-09-13 NOTE — PROGRESS NOTE ADULT - PROBLEM SELECTOR PLAN 8
resume plavix in setting of possible acute CVA and h/o CAD/stent
d/c plavix as stents are old, c/w ASA in setting of possible acute CVA and h/o CAD/stent
d/c plavix as stents are old, c/w ASA in setting of possible acute CVA and h/o CAD/stent

## 2020-09-13 NOTE — PROGRESS NOTE ADULT - PROBLEM SELECTOR PLAN 7
monitor fs, insulin ssc, hgba1-c 5.4  hold oral meds for now

## 2020-09-13 NOTE — PROGRESS NOTE ADULT - PROBLEM SELECTOR PLAN 4
telemetry  troponin negative x2 (13)  chest pain likely d/t severe AS as echo 9/11 showed severe AS with PILO 0.5 cm2 and mean pressure gradient 59  case d/w cardiology, pt is cleared for urgent endoscopy
telemetry  troponin negative x2 (13)  chest pain likely d/t severe AS as echo 9/11 showed severe AS with PILO 0.5 cm2 and mean pressure gradient 59  Pt may need to be transferred to [facility] if he needs CEA and ?TAVR  F/U cardiology
telemetry  troponin negative x2 (13)  chest pain likely d/t severe AS as echo 9/11 showed severe AS with PILO 0.5 cm2 and mean pressure gradient 59  case d/w cardiology, pt is cleared for urgent endoscopy

## 2020-09-13 NOTE — PROGRESS NOTE ADULT - PROBLEM SELECTOR PROBLEM 1
Acute CVA (cerebrovascular accident)

## 2020-09-13 NOTE — PROGRESS NOTE ADULT - PROBLEM SELECTOR PLAN 5
CHADSVASC 4  hold coumadin for now due to bleeding and will need an EGD/Colonoscopy  when INR < 2 start heparin gtt  monitor INR  - rate control  - holding metoprolol for now due to hypotension
CHADSVASC 4  hold coumadin for now in setting of GIB  resume when hgb stable and cleared by GI  Trend INR  - rate control  - holding metoprolol
CHADSVASC 4  hold coumadin for now in setting of GIB  resume when hgb stable and cleared by GI  Trend INR  - rate control  - holding metoprolol
73 yo M with HLD, prostate ca s/p prostatectomy, basal cell skin ca (resected from leg), recent diagnosis of glioblastoma admitted to evaluate for seizures Plan: monitor on tele, seizure precuations, apprec neuro recs, for sob now resolved if returns conisde cta to r/o pe, neuro checks, monitor clinical course, aspiration and fall precautions

## 2020-09-13 NOTE — PROGRESS NOTE ADULT - PROBLEM SELECTOR PLAN 9
echo showed severe AS with normal LVEF 60%, PILO 0.5 cm2  case d/w cardiology attending Dr. Pepper, needs urgent endoscopy first and then will have him f/u interventional cardiology dr. Elinor Lozoya for TAVR evaluation as outpt.
echo showed severe AS with normal LVEF 60%, PILO 0.5 cm2  case d/w cardiology attending Dr. Pepper, needs urgent endoscopy first and then will have him f/u interventional cardiology dr. Elinor Lozoya for TAVR evaluation as outpt.
echo showed severe AS with normal LVEF 60%, PILO 0.5 cm2  case d/w cardiology attending Dr. Pepper, needs urgent endoscopy first and then will have him f/u interventional cardiology dr. Elinor Lozoya for TAVR evaluation as outpt.  May need to be transferred to [facility] for CEA/?TAVR  F/U Cardiology

## 2020-09-13 NOTE — PROGRESS NOTE ADULT - ASSESSMENT
73 y/o male, with a PmHx of DM, HTN, hyperlipidemia, atrial fibrillation on coumadin, CAD s/p 2 cardiac stents and 2 LE stents (one to each leg), PVD and 55 pack year smoking history, present to the [facility] ED with chest pain for 2-3 days, symptomatic anemia with dark stools, transfused 3 units, EGD no bleeding source, found to have severe AS and b/l carotid stenosis

## 2020-09-13 NOTE — PROGRESS NOTE ADULT - PROBLEM SELECTOR PLAN 2
-b/l ICA, right ICA high grade stenosis 80%, carotid doppler  -vascular surgery consult as above  -asa/plavix/statin
-b/l ICA Stenosis as above  -f/u vascular surgery re: possible CEA  -asa, d/c'd plavix in setting of GIB, Statin
-b/l ICA, right ICA high grade stenosis 80%, carotid doppler  -vascular surgery consult as above  -asa, d/c plavix in setting of GIB, Statin

## 2020-09-13 NOTE — PROGRESS NOTE ADULT - SUBJECTIVE AND OBJECTIVE BOX
Dr. Arlet Banegas  Pager [phone]    PROGRESS NOTE:     Patient is a 74y old  Male who presents with a chief complaint of Symptomatic Anemia (12 Sep 2020 12:55)      SUBJECTIVE / OVERNIGHT EVENTS: denies chest pain or sob  ADDITIONAL REVIEW OF SYSTEMS: denies dark stool overnight    MEDICATIONS  (STANDING):  aspirin  chewable 81 milliGRAM(s) Oral daily  atorvastatin 80 milliGRAM(s) Oral at bedtime  dextrose 50% Injectable 12.5 Gram(s) IV Push once  dextrose 50% Injectable 25 Gram(s) IV Push once  dextrose 50% Injectable 25 Gram(s) IV Push once  ferrous    sulfate 325 milliGRAM(s) Oral daily  influenza   Vaccine 0.5 milliLiter(s) IntraMuscular once  insulin lispro (HumaLOG) corrective regimen sliding scale   SubCutaneous three times a day before meals  insulin lispro (HumaLOG) corrective regimen sliding scale   SubCutaneous at bedtime  magnesium oxide 400 milliGRAM(s) Oral two times a day with meals  nicotine -  14 mG/24Hr(s) Patch 1 patch Transdermal daily  pantoprazole Infusion 8 mG/Hr (10 mL/Hr) IV Continuous <Continuous>    MEDICATIONS  (PRN):  acetaminophen   Tablet .. 650 milliGRAM(s) Oral every 6 hours PRN Temp greater or equal to 38C (100.4F), Mild Pain (1 - 3), Moderate Pain (4 - 6)  dextrose 40% Gel 15 Gram(s) Oral once PRN Blood Glucose LESS THAN 70 milliGRAM(s)/deciliter  glucagon  Injectable 1 milliGRAM(s) IntraMuscular once PRN Glucose LESS THAN 70 milligrams/deciliter      CAPILLARY BLOOD GLUCOSE      POCT Blood Glucose.: 133 mg/dL (13 Sep 2020 08:07)  POCT Blood Glucose.: 140 mg/dL (12 Sep 2020 20:45)  POCT Blood Glucose.: 188 mg/dL (12 Sep 2020 17:58)    I&O's Summary      PHYSICAL EXAM:  Vital Signs Last 24 Hrs  T(C): 36.4 (13 Sep 2020 11:50), Max: 37.1 (12 Sep 2020 14:45)  T(F): 97.5 (13 Sep 2020 11:50), Max: 98.8 (12 Sep 2020 14:45)  HR: 78 (13 Sep 2020 11:50) (61 - 99)  BP: 105/60 (13 Sep 2020 11:50) (105/60 - 134/66)  BP(mean): --  RR: 16 (13 Sep 2020 11:50) (16 - 18)  SpO2: 98% (13 Sep 2020 11:50) (98% - 100%)  CONSTITUTIONAL: NAD, well-developed  RESPIRATORY: Normal respiratory effort; mainly clear except for basilar crackle   CARDIOVASCULAR: Regular rate and rhythm, normal S1 and S2, loud 4/6 DOREEN at RSB; No lower extremity edema; Peripheral pulses are 2+ bilaterally  ABDOMEN: Nontender to palpation, normoactive bowel sounds, no rebound/guarding; No hepatosplenomegaly  MUSCULOSKELETAL: no clubbing or cyanosis of digits; no joint swelling or tenderness to palpation  PSYCH: A+O to person, place, and time; affect appropriate    LABS:                        8.2    10.88 )-----------( 174      ( 13 Sep 2020 07:00 )             25.6     09-13    140  |  106  |  21  ----------------------------<  126<H>  3.7   |  23  |  0.85    Ca    8.7      13 Sep 2020 07:00  Phos  2.9     09-13  Mg     1.5     09-13      PT/INR - ( 13 Sep 2020 07:00 )   PT: 16.9 SEC;   INR: 1.51          PTT - ( 13 Sep 2020 07:00 )  PTT:32.3 SEC      RADIOLOGY & ADDITIONAL TESTS:  Results Reviewed:   Imaging Personally Reviewed:  < from: MR Head No Cont (09.13.20 @ 09:59) >  FINDINGS:No acute hemorrhage or infarct is present. Age-appropriate involutional changes and moderate microvascular ischemic changes are present. A chronic right corona radiata white matter infarct.    No hydrocephalus, midline shift or extra-axial collections.    Major flow voids at the skull base are within normal limits.    The orbits are not remarkable in appearance.    There is a right maxillary sinus polyp versus retention cysts.    The tympanomastoid cavities are free of acute disease.    Impression:    No acute infarct. Age-appropriate involutional changes and moderate microvascular ischemic change.    < from: VA Duplex Carotid Arteries, Bilateral. (09.12.20 @ 17:50) >  Summary/Impressions:  Severe heterogenous plaque noted within the proximal right  and left internal carotid arteries, consistent with 80-99%  stenoses in both proximal vessels.  These likely represent  hemodynamically significant stenoses.          Electrocardiogram Personally Reviewed:    COORDINATION OF CARE:  Care Discussed with Consultants/Other Providers [Y/N]:  vascular surgery resident, tight severe AS, MRI brain no acute infarct, consider transfer to [facility] for further procedure  Prior or Outpatient Records Reviewed [Y/N]:

## 2020-09-13 NOTE — PROGRESS NOTE ADULT - PROBLEM SELECTOR PLAN 10
Was on coumadin, being held due to acute bleeding

## 2020-09-13 NOTE — PROGRESS NOTE ADULT - PROBLEM SELECTOR PLAN 3
monitor H & H, s/p 2 units of PRBC's, pt reports colonoscopy about 7 years ago that was unremarkable  Occult positive, had dark stool c/f UGI bleed  d/w GI fellow, plan for endoscopy when cleared by both neurology and cardiology attending   c/w protonix gtt  Trend CBC, transfuse prn to keep hgb>8  ferritin low 25.4, start po feso4, venofer 200 mg x1
FOBT +, dark stool, s/p 2 units of PRBC's, pt reports colonoscopy about 7 years ago that was unremarkable  Hgb drop to 7.2, transfuse 1 unit pRBC today  transfuse prn to keep hgb>8 with CAD/AS  EGD 9/11 no bleeding source found, Diffuse non-bleeding gastritis noted. Small amount of fresh blood in the first part of the duodenum. No bleeding lesion was identified.   pt could have AVM which can be associated with severe aortic stenosis  c/w protonix gtt  Trend CBC, transfuse prn to keep hgb>8  ferritin low 25.4, start po feso4, given venofer 200 mg x1
FOBT +, dark stool, transfused 3 units prbc thus far  pt reports colonoscopy about 7 years ago was unremarkable  trend CBC, transfuse prn to keep hgb>8 with CAD/AS  EGD 9/11 no bleeding source found, diffuse non-bleeding gastritis noted. Small amount of fresh blood in the first part of the duodenum. No bleeding lesion was identified.   pt could have AVM which can be associated with severe aortic stenosis  c/w protonix gtt x72 h and then protonix 40 mg bid  ferritin low 25.4, start po feso4, given venofer 200 mg x1

## 2020-09-14 ENCOUNTER — TRANSCRIPTION ENCOUNTER (OUTPATIENT)
Age: 75
End: 2020-09-14

## 2020-09-14 LAB
ANION GAP SERPL CALC-SCNC: 11 MMO/L — SIGNIFICANT CHANGE UP (ref 7–14)
APTT BLD: 33.3 SEC — SIGNIFICANT CHANGE UP (ref 27–36.3)
APTT BLD: 79.8 SEC — HIGH (ref 27–36.3)
BASOPHILS # BLD AUTO: 0.06 K/UL — SIGNIFICANT CHANGE UP (ref 0–0.2)
BASOPHILS NFR BLD AUTO: 0.5 % — SIGNIFICANT CHANGE UP (ref 0–2)
BUN SERPL-MCNC: 15 MG/DL — SIGNIFICANT CHANGE UP (ref 7–23)
CALCIUM SERPL-MCNC: 8.9 MG/DL — SIGNIFICANT CHANGE UP (ref 8.4–10.5)
CHLORIDE SERPL-SCNC: 103 MMOL/L — SIGNIFICANT CHANGE UP (ref 98–107)
CO2 SERPL-SCNC: 23 MMOL/L — SIGNIFICANT CHANGE UP (ref 22–31)
CREAT SERPL-MCNC: 0.91 MG/DL — SIGNIFICANT CHANGE UP (ref 0.5–1.3)
EOSINOPHIL # BLD AUTO: 0.15 K/UL — SIGNIFICANT CHANGE UP (ref 0–0.5)
EOSINOPHIL NFR BLD AUTO: 1.1 % — SIGNIFICANT CHANGE UP (ref 0–6)
GLUCOSE SERPL-MCNC: 109 MG/DL — HIGH (ref 70–99)
HCT VFR BLD CALC: 24.3 % — LOW (ref 39–50)
HCT VFR BLD CALC: 25.5 % — LOW (ref 39–50)
HGB BLD-MCNC: 7.8 G/DL — LOW (ref 13–17)
HGB BLD-MCNC: 8.2 G/DL — LOW (ref 13–17)
IMM GRANULOCYTES NFR BLD AUTO: 0.5 % — SIGNIFICANT CHANGE UP (ref 0–1.5)
INR BLD: 1.28 — HIGH (ref 0.88–1.16)
LYMPHOCYTES # BLD AUTO: 16.1 % — SIGNIFICANT CHANGE UP (ref 13–44)
LYMPHOCYTES # BLD AUTO: 2.12 K/UL — SIGNIFICANT CHANGE UP (ref 1–3.3)
MAGNESIUM SERPL-MCNC: 1.7 MG/DL — SIGNIFICANT CHANGE UP (ref 1.6–2.6)
MCHC RBC-ENTMCNC: 31.6 PG — SIGNIFICANT CHANGE UP (ref 27–34)
MCHC RBC-ENTMCNC: 32.1 % — SIGNIFICANT CHANGE UP (ref 32–36)
MCHC RBC-ENTMCNC: 32.2 % — SIGNIFICANT CHANGE UP (ref 32–36)
MCHC RBC-ENTMCNC: 32.2 PG — SIGNIFICANT CHANGE UP (ref 27–34)
MCV RBC AUTO: 100 FL — SIGNIFICANT CHANGE UP (ref 80–100)
MCV RBC AUTO: 98.4 FL — SIGNIFICANT CHANGE UP (ref 80–100)
MONOCYTES # BLD AUTO: 1.41 K/UL — HIGH (ref 0–0.9)
MONOCYTES NFR BLD AUTO: 10.7 % — SIGNIFICANT CHANGE UP (ref 2–14)
NEUTROPHILS # BLD AUTO: 9.37 K/UL — HIGH (ref 1.8–7.4)
NEUTROPHILS NFR BLD AUTO: 71.1 % — SIGNIFICANT CHANGE UP (ref 43–77)
NRBC # FLD: 0.04 K/UL — SIGNIFICANT CHANGE UP (ref 0–0)
NRBC # FLD: 0.04 K/UL — SIGNIFICANT CHANGE UP (ref 0–0)
PHOSPHATE SERPL-MCNC: 3.1 MG/DL — SIGNIFICANT CHANGE UP (ref 2.5–4.5)
PLATELET # BLD AUTO: 190 K/UL — SIGNIFICANT CHANGE UP (ref 150–400)
PLATELET # BLD AUTO: 199 K/UL — SIGNIFICANT CHANGE UP (ref 150–400)
PMV BLD: 10.4 FL — SIGNIFICANT CHANGE UP (ref 7–13)
PMV BLD: 10.7 FL — SIGNIFICANT CHANGE UP (ref 7–13)
POTASSIUM SERPL-MCNC: 4.2 MMOL/L — SIGNIFICANT CHANGE UP (ref 3.5–5.3)
POTASSIUM SERPL-SCNC: 4.2 MMOL/L — SIGNIFICANT CHANGE UP (ref 3.5–5.3)
PROTHROM AB SERPL-ACNC: 14.6 SEC — HIGH (ref 10.6–13.6)
RBC # BLD: 2.47 M/UL — LOW (ref 4.2–5.8)
RBC # BLD: 2.55 M/UL — LOW (ref 4.2–5.8)
RBC # FLD: 17.6 % — HIGH (ref 10.3–14.5)
RBC # FLD: 18.1 % — HIGH (ref 10.3–14.5)
SODIUM SERPL-SCNC: 137 MMOL/L — SIGNIFICANT CHANGE UP (ref 135–145)
WBC # BLD: 12.74 K/UL — HIGH (ref 3.8–10.5)
WBC # BLD: 13.18 K/UL — HIGH (ref 3.8–10.5)
WBC # FLD AUTO: 12.74 K/UL — HIGH (ref 3.8–10.5)
WBC # FLD AUTO: 13.18 K/UL — HIGH (ref 3.8–10.5)

## 2020-09-14 PROCEDURE — 99232 SBSQ HOSP IP/OBS MODERATE 35: CPT | Mod: GC

## 2020-09-14 PROCEDURE — 99233 SBSQ HOSP IP/OBS HIGH 50: CPT

## 2020-09-14 RX ORDER — METOPROLOL TARTRATE 50 MG
1 TABLET ORAL
Qty: 0 | Refills: 0 | DISCHARGE

## 2020-09-14 RX ORDER — METFORMIN HYDROCHLORIDE 850 MG/1
1 TABLET ORAL
Qty: 0 | Refills: 0 | DISCHARGE

## 2020-09-14 RX ORDER — LIDOCAINE 4 G/100G
1 CREAM TOPICAL DAILY
Refills: 0 | Status: DISCONTINUED | OUTPATIENT
Start: 2020-09-14 | End: 2020-09-15

## 2020-09-14 RX ORDER — NICOTINE POLACRILEX 2 MG
0 GUM BUCCAL
Qty: 0 | Refills: 0 | DISCHARGE
Start: 2020-09-14

## 2020-09-14 RX ORDER — MAGNESIUM OXIDE 400 MG ORAL TABLET 241.3 MG
1 TABLET ORAL
Qty: 0 | Refills: 0 | DISCHARGE
Start: 2020-09-14

## 2020-09-14 RX ORDER — LISINOPRIL/HYDROCHLOROTHIAZIDE 10-12.5 MG
1 TABLET ORAL
Qty: 0 | Refills: 0 | DISCHARGE

## 2020-09-14 RX ORDER — FERROUS SULFATE 325(65) MG
1 TABLET ORAL
Qty: 0 | Refills: 0 | DISCHARGE
Start: 2020-09-14

## 2020-09-14 RX ORDER — HEPARIN SODIUM 5000 [USP'U]/ML
5500 INJECTION INTRAVENOUS; SUBCUTANEOUS EVERY 6 HOURS
Refills: 0 | Status: DISCONTINUED | OUTPATIENT
Start: 2020-09-14 | End: 2020-09-15

## 2020-09-14 RX ORDER — HEPARIN SODIUM 5000 [USP'U]/ML
2500 INJECTION INTRAVENOUS; SUBCUTANEOUS EVERY 6 HOURS
Refills: 0 | Status: DISCONTINUED | OUTPATIENT
Start: 2020-09-14 | End: 2020-09-15

## 2020-09-14 RX ORDER — CLOPIDOGREL BISULFATE 75 MG/1
1 TABLET, FILM COATED ORAL
Qty: 0 | Refills: 0 | DISCHARGE

## 2020-09-14 RX ORDER — HEPARIN SODIUM 5000 [USP'U]/ML
INJECTION INTRAVENOUS; SUBCUTANEOUS
Qty: 25000 | Refills: 0 | Status: DISCONTINUED | OUTPATIENT
Start: 2020-09-14 | End: 2020-09-15

## 2020-09-14 RX ORDER — WARFARIN SODIUM 2.5 MG/1
1 TABLET ORAL
Qty: 0 | Refills: 0 | DISCHARGE

## 2020-09-14 RX ORDER — PANTOPRAZOLE SODIUM 20 MG/1
40 TABLET, DELAYED RELEASE ORAL
Refills: 0 | Status: DISCONTINUED | OUTPATIENT
Start: 2020-09-14 | End: 2020-09-15

## 2020-09-14 RX ORDER — MAGNESIUM SULFATE 500 MG/ML
1 VIAL (ML) INJECTION ONCE
Refills: 0 | Status: COMPLETED | OUTPATIENT
Start: 2020-09-14 | End: 2020-09-14

## 2020-09-14 RX ADMIN — Medication 650 MILLIGRAM(S): at 01:11

## 2020-09-14 RX ADMIN — Medication 81 MILLIGRAM(S): at 13:08

## 2020-09-14 RX ADMIN — Medication 650 MILLIGRAM(S): at 17:58

## 2020-09-14 RX ADMIN — Medication 650 MILLIGRAM(S): at 09:05

## 2020-09-14 RX ADMIN — HEPARIN SODIUM 1200 UNIT(S)/HR: 5000 INJECTION INTRAVENOUS; SUBCUTANEOUS at 14:06

## 2020-09-14 RX ADMIN — Medication 325 MILLIGRAM(S): at 13:08

## 2020-09-14 RX ADMIN — PANTOPRAZOLE SODIUM 10 MG/HR: 20 TABLET, DELAYED RELEASE ORAL at 08:07

## 2020-09-14 RX ADMIN — Medication 1 PATCH: at 13:08

## 2020-09-14 RX ADMIN — MAGNESIUM OXIDE 400 MG ORAL TABLET 400 MILLIGRAM(S): 241.3 TABLET ORAL at 08:07

## 2020-09-14 RX ADMIN — PANTOPRAZOLE SODIUM 40 MILLIGRAM(S): 20 TABLET, DELAYED RELEASE ORAL at 17:33

## 2020-09-14 RX ADMIN — Medication 650 MILLIGRAM(S): at 00:34

## 2020-09-14 RX ADMIN — Medication 650 MILLIGRAM(S): at 18:50

## 2020-09-14 RX ADMIN — Medication 2: at 17:33

## 2020-09-14 RX ADMIN — Medication 1 PATCH: at 19:30

## 2020-09-14 RX ADMIN — MAGNESIUM OXIDE 400 MG ORAL TABLET 400 MILLIGRAM(S): 241.3 TABLET ORAL at 17:33

## 2020-09-14 RX ADMIN — Medication 650 MILLIGRAM(S): at 08:08

## 2020-09-14 RX ADMIN — Medication 1 PATCH: at 04:42

## 2020-09-14 RX ADMIN — Medication 1 PATCH: at 13:06

## 2020-09-14 RX ADMIN — Medication 100 GRAM(S): at 13:08

## 2020-09-14 RX ADMIN — ATORVASTATIN CALCIUM 80 MILLIGRAM(S): 80 TABLET, FILM COATED ORAL at 21:14

## 2020-09-14 RX ADMIN — LIDOCAINE 1 PATCH: 4 CREAM TOPICAL at 17:41

## 2020-09-14 RX ADMIN — HEPARIN SODIUM 1200 UNIT(S)/HR: 5000 INJECTION INTRAVENOUS; SUBCUTANEOUS at 20:56

## 2020-09-14 NOTE — DISCHARGE NOTE PROVIDER - CARE PROVIDERS DIRECT ADDRESSES
,[email or web address],DirectAddress_Unknown

## 2020-09-14 NOTE — DISCHARGE NOTE PROVIDER - PROVIDER TOKENS
PROVIDER:[TOKEN:[[id number]],FOLLOWUP:[1-3 days]],PROVIDER:[TOKEN:[[id number]],FOLLOWUP:[1 week]],PROVIDER:[TOKEN:[[id number]],FOLLOWUP:[2 weeks]]

## 2020-09-14 NOTE — DISCHARGE NOTE PROVIDER - INSTRUCTIONS
no salt added, low cholesterol, consistent carbohydrate diet- Refer to [employer] for recipes and information.

## 2020-09-14 NOTE — PROGRESS NOTE ADULT - ASSESSMENT
75 yo M with h/o Afib on coumadin, severe AS, hyperlipidemia, CAD s/p stents x 2 (last pCI 7 years ago), PAD with LLE stent, BIB EMS for chest pain found to have symptomatic anemia and cardiology c/s for clearance for EGD/C-scope.     -TTE with severe AS--> will likely need eval for TAVR  -appreciate vascular surgery re: cea  -AC per GI recs. EGD with no active source of bleeding  -can continue diltiazem for rate control in setting of AFib 75 yo M with h/o Afib on coumadin, severe AS, hyperlipidemia, CAD s/p stents x 2 (last pCI 7 years ago), PAD with LLE stent, BIB EMS for chest pain found to have symptomatic anemia and cardiology c/s for clearance for EGD/C-scope and now for CEA planned for 9/16.    -TTE with severe AS--> will likely need eval for TAVR  -appreciate vascular surgery re: cea  -AC per GI recs. EGD with no active source of bleeding  -can continue diltiazem for rate control in setting of AFib    #Pre-op risk assessment for R CEA  -patient remains high risk for this vascular procedure. given AS, pt is at risk for decompensation, and therefore would consider anesthesia eval prior to procedure for intra-procedural planning.   -RCRI of 3 suggesting 15% risk of 30 day risk of MI or death.  -Recent TTE with severe AS with PILO .5, MG 59

## 2020-09-14 NOTE — PROGRESS NOTE ADULT - SUBJECTIVE AND OBJECTIVE BOX
[facility] Medicine  Pager [phone]    Patient is a 74y old  Male who presents with a chief complaint of Symptomatic Anemia (14 Sep 2020 14:18)      INTERVAL HPI/OVERNIGHT EVENTS:  Feels well overall, is proud that he was "able to use the bathroom myself", reports hip pain (left) improved w/ heating pads and tylenol, likewise repositioning helps. Has some mild throat soreness s/p EGD, but is able to tolerate diet. Denies SOB or palpitations. Aware of findings of aortic stenosis, carotid stenosis and recommendations to possibly transfer to [facility] for TAVR and carotid endarterectomy.     MEDICATIONS  (STANDING):  aspirin  chewable 81 milliGRAM(s) Oral daily  atorvastatin 80 milliGRAM(s) Oral at bedtime  dextrose 50% Injectable 12.5 Gram(s) IV Push once  dextrose 50% Injectable 25 Gram(s) IV Push once  dextrose 50% Injectable 25 Gram(s) IV Push once  ferrous    sulfate 325 milliGRAM(s) Oral daily  heparin  Infusion.  Unit(s)/Hr (12 mL/Hr) IV Continuous <Continuous>  influenza   Vaccine 0.5 milliLiter(s) IntraMuscular once  insulin lispro (HumaLOG) corrective regimen sliding scale   SubCutaneous three times a day before meals  insulin lispro (HumaLOG) corrective regimen sliding scale   SubCutaneous at bedtime  lidocaine   Patch 1 Patch Transdermal daily  magnesium oxide 400 milliGRAM(s) Oral two times a day with meals  nicotine -  14 mG/24Hr(s) Patch 1 patch Transdermal daily  pantoprazole Infusion 8 mG/Hr (10 mL/Hr) IV Continuous <Continuous>    MEDICATIONS  (PRN):  acetaminophen   Tablet .. 650 milliGRAM(s) Oral every 6 hours PRN Temp greater or equal to 38C (100.4F), Mild Pain (1 - 3), Moderate Pain (4 - 6)  dextrose 40% Gel 15 Gram(s) Oral once PRN Blood Glucose LESS THAN 70 milliGRAM(s)/deciliter  glucagon  Injectable 1 milliGRAM(s) IntraMuscular once PRN Glucose LESS THAN 70 milligrams/deciliter  heparin   Injectable 5500 Unit(s) IV Push every 6 hours PRN For aPTT less than 40  heparin   Injectable 2500 Unit(s) IV Push every 6 hours PRN For aPTT between 40 - 57    Allergies  No Known Allergies    Intolerances    REVIEW OF SYSTEMS:  Please see interval HPI:    Vital Signs Last 24 Hrs  T(C): 36.7 (14 Sep 2020 11:55), Max: 37.5 (13 Sep 2020 23:45)  T(F): 98 (14 Sep 2020 11:55), Max: 99.5 (13 Sep 2020 23:45)  HR: 90 (14 Sep 2020 11:55) (82 - 95)  BP: 120/59 (14 Sep 2020 11:55) (120/59 - 135/76)  BP(mean): --  RR: 17 (14 Sep 2020 11:55) (16 - 17)  SpO2: 99% (14 Sep 2020 11:55) (95% - 100%)  I&O's Detail    PHYSICAL EXAM:  GENERAL: NAD, lying in bed  HEAD:  NC/AT  EYES: EOMI, clear sclera/conjunctiva  ENMT: MMM  NECK: supple  NERVOUS SYSTEM: moving extremities, conversant, non-focal    CHEST/LUNG: CTAB, comfortable on RA, speaking in full sentences  HEART: S1S2 RRR +systolic murmur  ABDOMEN: soft, non-tender  EXTREMITIES:  no c/c/e      LABS:                        8.2    13.18 )-----------( 199      ( 14 Sep 2020 06:04 )             25.5     14 Sep 2020 06:04    137    |  103    |  15     ----------------------------<  109    4.2     |  23     |  0.91     Ca    8.9        14 Sep 2020 06:04  Phos  3.1       14 Sep 2020 06:04  Mg     1.7       14 Sep 2020 06:04    PT/INR - ( 14 Sep 2020 06:04 )   PT: 14.6 SEC;   INR: 1.28     PTT - ( 14 Sep 2020 06:04 )  PTT:33.3 SEC    CAPILLARY BLOOD GLUCOSE  POCT Blood Glucose.: 138 mg/dL (14 Sep 2020 11:40)  POCT Blood Glucose.: 101 mg/dL (14 Sep 2020 07:45)  POCT Blood Glucose.: 135 mg/dL (13 Sep 2020 21:14)  POCT Blood Glucose.: 216 mg/dL (13 Sep 2020 17:11)    BLOOD CULTURE    RADIOLOGY & ADDITIONAL TESTS:    Imaging Personally Reviewed:  [ ] YES     Consultant(s) Notes Reviewed:  Neuro, Vasc, Cardiology, GI    Care Discussed with Consultants/Other Providers: ACP Hope re: transfer to [facility], can restart A/C per GI

## 2020-09-14 NOTE — PROGRESS NOTE ADULT - ASSESSMENT
73 yo M smoker w/ DM2, HTN, HLD, Afib on coumadin, CAD s/p stents, PVD s/p stents, admitted w/ symptomatic anemia with dark stools, s/p 3u pRBC and EGD w/o bleeding lesion, s/p code stroke for dysarthria (resolved) found to have severe AS and b/l carotid stenosis.     # Suspected TIA, Severe b/l carotid artery stenosis  - s/p code stroke for dysarthria on 9/11, now resolved  - suspect TIA, CT w/o e/o acute CVA, likewise MRI 9/12 negative for acute infarct  - CTA and carotid doppler shows severe b/l carotid artery stenosis  - appreciate vasc input, plan to transfer patient to [facility] for carotid endarterectomy and TAVR  - RCRI 3 pts as per cards, high risk for vascular procedure, they are recommending anesthesia eval prior to procedure for intra-procedural planning (can be done once patient transferred to [facility])  - c/w asa and statin as per neuro/cards, plavix d/aguilar in setting of GIB    # Anemia likely 2/2 UGIB  - s/p 3u prbc thus far  - monitor hgb and transfuse prn, goal hgb>8 given cardiac history  - s/p EGD without bleeding lesion, but did note small amount of fresh blood in the duodenum and some clots   - could have AVM which can be associated with severe AS  - s/p protonix gtt, OK to switch to PO protonix bid as per GI  - OK to resume A/C (for afib), and monitor stools/hgb for bleeding, if rebleeds to consider repeat EGD vs EGD + colonoscopy as per GI  - on iron supplementation (ferritin low 25.4)    # Severe Aortic stenosis  - severe AS noted on echo 9/11, PILO .5cm2, mean pressure gradient 59  - appreciate cardiology input, plan to transfer to [facility] for TAVR eval  - chest discomfort likely d/t severe AS, component as well of anemia  - trop negative x2    # Atrial fibrillation  - [id number] 4 pts  - HR currently acceptable  - per cardiology can do diltiazem as needed for rate control  - OK to resume a/c as per GI, will put patient on heparin gtt    # CAD, PVD s/p stents  - on asa/statin as above  - plavix on hold given GIB    # Essential HTN  - BP currently in acceptable range  - monitoring off BP meds in setting of anemia/GIB    # DM2 controlled, not on long term insulin therapy  - hold home oral medication  - c/w HISS, monitor FS, currently acceptable  - diabetic diet    # Smoker  - c/w nicotine replacement therapy  - encourage cessation    # DVT ppx: on heparin gtt    Dispo: appreciate cardiology/vasc/GI/neuro recs. Plan to transfer patient to [facility] for CEA, TAVR eval pending bed availability.

## 2020-09-14 NOTE — DISCHARGE NOTE PROVIDER - NSDCMRMEDTOKEN_GEN_ALL_CORE_FT
Aspirin Enteric Coated 81 mg oral delayed release tablet: 1 tab(s) orally once a day  clopidogrel 75 mg oral tablet: 1 tab(s) orally once a day  ferrous sulfate 325 mg (65 mg elemental iron) oral tablet: 1 tab(s) orally once a day  lisinopril-hydrochlorothiazide 20 mg-12.5 mg oral tablet: 1 tab(s) orally once a day  magnesium oxide 400 mg (241.3 mg elemental magnesium) oral tablet: 1 tab(s) orally 2 times a day (with meals) x 2 days  metFORMIN 1000 mg oral tablet: 1 tab(s) orally 2 times a day  Metoprolol Succinate ER 25 mg oral tablet, extended release: 1 tab(s) orally once a day  pantoprazole 40 mg oral delayed release tablet: 1 tab(s) orally once a day  simvastatin 10 mg oral tablet: 1 tab(s) orally once a day (at bedtime)  warfarin 5 mg oral tablet: 1 tab(s) orally once a day   Aspirin Enteric Coated 81 mg oral delayed release tablet: 1 tab(s) orally once a day  ferrous sulfate 325 mg (65 mg elemental iron) oral tablet: 1 tab(s) orally once a day  heparin 100 units/mL-D5% intravenous solution: @12CC/hour  magnesium oxide 400 mg (241.3 mg elemental magnesium) oral tablet: 1 tab(s) orally 2 times a day (with meals) x 2 days  nicotine 14 mg/24 hr transdermal film, extended release: transdermal once a day  pantoprazole 40 mg oral delayed release tablet: 1 tab(s) orally once a day  simvastatin 10 mg oral tablet: 1 tab(s) orally once a day (at bedtime)

## 2020-09-14 NOTE — DISCHARGE NOTE PROVIDER - HOSPITAL COURSE
75 y/o male, with a PmHx of DM, HTN, hyperlipidemia, atrial fibrillation on coumadin, CAD s/p 2 cardiac stents and 2 LE stents (one to each leg), PVD and 55 pack year smoking history, present to the [facility] ED with chest pain for 2-3 days, symptomatic anemia with dark stools, transfused 3 units, EGD no bleeding source, found to have severe AS and b/l carotid stenosis  Acute CVA (cerebrovascular accident).  Plan: pt had code stroke for dysarthria on 9/11, now resolved  -likely TIA., CT head no acute CVA, MRI brain 9/12 no acute infarct  -however, CT angio showed b/l ICA stenosis, right up to 80%, left 66%  -carotid doppler 9/12 severe b/l carotid stenosis, Severe heterogenous plaque noted within the proximal right and left internal carotid arteries, c/w 80-99%  stenoses in both proximal vessels.  -case d/w vascular surgery resident, they would like pt to be transferred to [facility] for TAVR w/ CT surgery backup, followed by carotid endarectomy within the next 7-10 days.   -I notified cardiology fellow about vascular surgery's desire to transfer pt to [facility],   -d/c'd plavixin setting of GI bleed,  c/w ASA, lipitor  -neuro checks, F/u neurology.  Carotid stenosis.  Plan: -b/l ICA Stenosis as above  -f/u vascular surgery re: possible CEA       Severe aortic stenosis.  Plan: echo showed severe AS with normal LVEF 60%, PILO 0.5 cm2  case d/w cardiology attending Dr. Pepper, needs urgent endoscopy first and then will have him f/u interventional cardiology dr. Elinor Lozoya for TAVR evaluation as outpt.  May need to be transferred to [facility] for CEA  F/U Cardiology.    75 y/o male, with a PmHx of DM, HTN, hyperlipidemia, atrial fibrillation on coumadin, CAD s/p 2 cardiac stents and 2 LE stents (one to each leg), PVD and 55 pack year smoking history, present to the [facility] ED with chest pain for 2-3 days, symptomatic anemia with dark stools, transfused 3 units, EGD no bleeding source, found to have severe AS and b/l carotid stenosis  Acute CVA (cerebrovascular accident).  Plan: pt had code stroke for dysarthria on 9/11, now resolved  -likely TIA., CT head no acute CVA, MRI brain 9/12 no acute infarct  -however, CT angio showed b/l ICA stenosis, right up to 80%, left 66%  -carotid doppler 9/12 severe b/l carotid stenosis, Severe heterogenous plaque noted within the proximal right and left internal carotid arteries, c/w 80-99%  stenoses in both proximal vessels.  -case d/w vascular surgery resident, they would like pt to be transferred to [facility] for TAVR w/ CT surgery backup, followed by carotid endarectomy within the next 7-10 days.   -I notified cardiology fellow about vascular surgery's desire to transfer pt to [facility],   -d/c'd plavixin setting of GI bleed,  c/w ASA, lipitor  -neuro checks, F/u neurology.  Carotid stenosis.  Plan: -b/l ICA Stenosis as above  -f/u vascular surgery re: possible CEA       Severe aortic stenosis.  Plan: echo showed severe AS with normal LVEF 60%, PILO 0.5 cm2  case d/w cardiology attending Dr. Pepper, needs urgent endoscopy first and then will have him f/u interventional cardiology dr. Elinor Lozoya for TAVR evaluation as outpt.  May need to be transferred to [facility] for CEA  F/U Cardiology.     Anemia-due in part to GI Bleed, no lesion noted as above. Much higher suspicion for UGI source as opposed to lower given EGD findings  - switch IV PPI gtt to PO PPI BID  - trend Hb, transfuse to Hb > 7  - can re-start A/C and monitor stools/Hb for bleeding, consider bridging w/ heparin to more quickly challenge pt w/ A/C to see if he re-bleeds  - if pt rebleeds would consider repeating EGD vs EGD +  Colonoscopy depending on clinical course  - GI will continue to follow   73 y/o male, with a PmHx of DM, HTN, hyperlipidemia, atrial fibrillation on coumadin, CAD s/p 2 cardiac stents and 2 LE stents (one to each leg), PVD and 55 pack year smoking history, present to the [facility] ED 9/10 with chest pain for 2-3 days, was found to have symptomatic anemia with dark stools. Patient with acute blood loss anemia, H&H 6.4/ 20.7, transfused several units pRBCs. GI consulted with plan for EGD in the AM. On 9/11 patient woke up with slurred speech, code stroke called , CTH No acute intracranial hemorrhage, mass effect, or CT evidence of an acute transcortical infarct. CTA head/neck showed high grade stenosis (up to 80%) of R ICA and moderate stenosis (up to 66%) of the L ICA. with appropriate response. Cleared by Neuro and Cardiology for GI work up. EGD with positive blood seen probable upper GI bleed source could not be located.    Acute CVA (cerebrovascular accident).  Plan: pt had code stroke for dysarthria on 9/11, now resolved  -likely TIA., CT head no acute CVA, MRI brain 9/12 no acute infarct  -however, CT angio showed b/l ICA stenosis, right up to 80%, left 66%  -carotid doppler 9/12 severe b/l carotid stenosis, Severe heterogenous plaque noted within the proximal right and left internal carotid arteries, c/w 80-99%  stenoses in both proximal vessels.  -case d/w vascular surgery resident, they would like pt to be transferred to [facility] for TAVR w/ CT surgery backup, followed by carotid endarectomy within the next 7-10 days.   -I notified cardiology fellow about vascular surgery's desire to transfer pt to [facility],   -d/c'd plavixin setting of GI bleed,  c/w ASA, lipitor  -neuro checks, F/u neurology.  Carotid stenosis.  Plan: -b/l ICA Stenosis as above  -f/u vascular surgery re: possible CEA       Severe aortic stenosis.  Plan: echo showed severe AS with normal LVEF 60%, PILO 0.5 cm2  case d/w cardiology attending Dr. Pepper, needs urgent endoscopy first and then will have him f/u interventional cardiology dr. Elinor Lozoya for TAVR evaluation as outpt.  May need to be transferred to [facility] for CEA  F/U Cardiology.     Anemia-due in part to GI Bleed, no lesion noted as above. Much higher suspicion for UGI source as opposed to lower given EGD findings  - switch IV PPI gtt to PO PPI BID  - trend Hb, transfuse to Hb > 7  - can re-start A/C and monitor stools/Hb for bleeding, consider bridging w/ heparin to more quickly challenge pt w/ A/C to see if he re-bleeds  - if pt rebleeds would consider repeating EGD vs EGD +  Colonoscopy depending on clinical course  - GI will continue to follow   Mr. Crockett is a 75 y/o male, with a PmHx of DM, HTN, hyperlipidemia, atrial fibrillation on coumadin, CAD s/p 2 cardiac stents and 2 LE stents (one to each leg), PVD and 55 pack year smoking history, present to the [facility] ED 9/10 with chest pain for 2-3 days.    Acute CVA (cerebrovascular accident).   Plan: pt had code stroke for dysarthria on 9/11, now resolved  -Neurology consulted and following appreciate recommendations  -likely TIA., CT head no acute CVA, MRI brain 9/12 no acute infarct  -however, CT angio showed b/l ICA stenosis, right up to 80%, left 66%  -carotid doppler 9/12 severe b/l carotid stenosis, Severe heterogenous plaque noted within the proximal right and left internal carotid arteries, c/w 80-99%  stenoses in both proximal vessels.  -case d/w vascular surgery resident, they would like pt to be transferred to [facility] for TAVR w/ CT surgery backup, followed by carotid endarectomy within the next 7-10 days.   -Cardiology fellow about vascular surgery's desire to transfer pt to [facility],   - d/c'd plavix in setting of GI bleed, c/w Heparin Gtt  - c/w ASA, lipitor  - neuro checks    Carotid stenosis.    Plan: -b/l ICA Stenosis as above  -f/u vascular surgery re: possible CEA at [facility]     Severe aortic stenosis.    Plan: echo showed severe AS with normal LVEF 60%, PILO 0.5 cm2  -Cardiology consulted and following  - Endoscopy done positive blood could not locate source  -F/U interventional cardiology dr. Elinor Lozoya for TAVR evaluation   -May need to be transferred to [facility] for CEA    Anemia :d/t acute blood loss   - H&H 6.4/ 20.7, transfused several units pRBCs  -FOBT positive  -GI consulted and following   -Cleared by Neuro and Cardiology for GI work up.   -Upper and Lower endoscopy - EGD with positive blood seen probable upper GI bleed, source could not be located.  - switch IV PPI gtt to PO PPI BID  - trend Hb, transfuse to Hb > 7  - can re-start A/C and monitor stools/Hb for bleeding, consider bridging w/ heparin to more quickly challenge pt w/ A/C to see if he re-bleeds  - if pt rebleeds would consider repeating EGD vs EGD +  Colonoscopy depending on clinical course  - no GI contraindication to transfer to [facility] if indicated for non-GI reasons  - GI will continue to follow    Dispo: Patient seen and examined, discussed with Attending Physician Dr Tapia who agrees patient stable for transfer to [facility] today 9/15, via ambulance for further medical/ surgical management of Aortic Stenosis, and bilateral Carotid Stenosis. Medical team,  Patient , and family aware of the plan.     Mr. Crockett is a 73 y/o male, with DM2, HTN, hyperlipidemia, atrial fibrillation on coumadin, CAD s/p 2 cardiac stents and 2 LE stents (one to each leg), PVD and 55 pack year smoking history, present to the [facility] ED 9/10 with chest pain for 2-3 days, initially admitted w/ symptomatic anemia w/ dark stools, s/p 3U pRBC and EGD w/o bleeding lesion, s/p code stroke for dysarthria (resolved), symptoms ultimatelty felt to be due to TIA, noted to have severe aortic stenosis and b/l carotid artery stenosis on imaging, being transferred to [facility] for L CEA and TAVR evaluation.     TIA (transient ischemic attack)   - pt had code stroke for dysarthria on 9/11, now resolved  - Neurology consulted and following appreciate recommendations  - likely TIA., CT head no acute CVA, MRI brain 9/12 no acute infarct  - however, CT angio showed b/l ICA stenosis, right up to 80%, left 66%  - carotid doppler 9/12 severe b/l carotid stenosis, Severe heterogenous plaque noted within the proximal right and left internal carotid arteries, c/w 80-99%  stenoses in both proximal vessels.  - case d/w vascular surgery and cardiology, they would like pt to be transferred to [facility] for carotid endarterectomy and TAVR evaluation  - d/c'd plavix in setting of GI bleed, c/w Heparin Gtt  - c/w ASA, lipitor  - neuro checks    Severe b/l Carotid artery stenosis  - as above, plan to transfer to [facility] for carotid endarterectomy     Severe aortic stenosis.    - echo showed severe AS with normal LVEF 60%, PILO 0.5 cm2  - Cardiology consulted and following  - Endoscopy done positive blood could not locate source  - F/U interventional cardiology at [facility] for TAVR evaluation   - plan to transfer to [facility] as above    Symptomatic Anemia d/t acute blood loss, suspected UGIB  - H&H 6.4/ 20.7, transfused three units pRBCs  -FOBT positive  -Upper and Lower endoscopy - EGD with positive blood seen probable upper GI bleed, source could not be located.  - switch IV PPI gtt to PO PPI BID  - trend Hb, transfuse to Hb > 7  - can re-start A/C and monitor stools/Hb for bleeding, consider bridging w/ heparin to more quickly challenge pt w/ A/C to see if he re-bleeds  - if pt rebleeds would consider repeating EGD vs EGD +  Colonoscopy depending on clinical course  - no GI contraindication to transfer to [facility] if indicated for non-GI reasons  - GI will continue to follow    Dispo: Patient seen and examined, discussed with Attending Physician Dr Tapia who agrees patient stable for transfer to [facility] today 9/15, via ambulance for further medical/ surgical management of Aortic Stenosis, and bilateral Carotid Stenosis. Medical team,  Patient , and family aware of the plan.

## 2020-09-14 NOTE — DISCHARGE NOTE PROVIDER - CARE PROVIDER_API CALL
Derek Carlos  VASCULAR SURGERY  [address], [address]  [place] [address]  Phone: [phone]  Fax: [phone]  Follow Up Time: 1-3 days    Elinor Lozoya  CARDIOLOGY  [address] [address] [place] [address], [address]  [place], [place] [id number]  Phone: [phone]  Fax: [phone]  Follow Up Time: 1 week    ROXI SOLITARIO  Internal Medicine  [address] [place] [address]  Phone: [phone]  Fax: [phone]  Follow Up Time: 2 weeks

## 2020-09-14 NOTE — PROGRESS NOTE ADULT - ASSESSMENT
-74 year old R handed male with hx of DM, HTN, HLD, Afib on home Coumadin, CAD and PVD s/p stents on Plavix, who presented on with symptomatic anemia, suspected UGIB s/p EGD with no source of bleed found. Code stroke called on 9/11 for dysarthria and expressive aphasia and patient was found to have bilateral carotid stenosis.     MRI brain neg. Carotid dopplers demonstrate high grade stenosis bilaterally.    Impression: Expressive aphasia and dysarthria likely secondary to L hemispheric TIA, making the L ICA the symptomatic side and would therefore benefit from CEA first and the high grade stenosis of the R ICA can be evaluated for intervention electively     Recommendations:  -continue aspirin 81 mg daily  -can reduce lipitor to 20 mg qhs, as LDL is already at goal   -cleared by GI to resume AC for known Afib, on heparin gtt  -vascular surgery following for possible L CEA  -possible transfer to [facility] for TAVR prior to CEA     -74 year old R handed male with hx of DM, HTN, HLD, Afib on home Coumadin, CAD and PVD s/p stents on Plavix, who presented on with symptomatic anemia, suspected UGIB s/p EGD with no source of bleed found. Code stroke called on 9/11 for dysarthria and expressive aphasia and patient was found to have bilateral carotid stenosis.     MRI brain neg. Carotid dopplers demonstrate high grade stenosis bilaterally.    Impression: Expressive aphasia and dysarthria likely secondary to L hemispheric TIA, making the L ICA the symptomatic side and would therefore benefit from CEA first during this admission (preferably within 7 days of stroke/TIA symptoms) and the high grade stenosis of the R ICA can be evaluated for intervention electively     Recommendations:  -continue aspirin 81 mg daily  -can reduce lipitor to 20 mg qhs, as LDL is already at goal   -cleared by GI to resume AC for known Afib, on heparin gtt  -vascular surgery following for possible L CEA  -possible transfer to [facility] for TAVR prior to CEA     -74 year old R handed male with hx of DM, HTN, HLD, Afib on home Coumadin, CAD and PVD s/p stents on Plavix, who presented on with symptomatic anemia, suspected UGIB s/p EGD with no source of bleed found. Code stroke called on 9/11 for dysarthria and expressive aphasia and patient was found to have bilateral carotid stenosis.     MRI brain neg. Carotid dopplers demonstrate high grade stenosis bilaterally.    Impression: Expressive aphasia and dysarthria likely secondary to L hemispheric TIA, making the L ICA the symptomatic side and would therefore benefit from CEA first during this admission (preferably within 7 days of stroke/TIA symptoms) and the high grade stenosis of the R ICA can be evaluated for intervention electively     Recommendations:  -continue aspirin 81 mg daily  -can reduce lipitor to 20 mg qhs, as LDL is already at goal   -cleared by GI to resume AC for known Afib, on heparin gtt  -vascular surgery planning for L CEA  -possible transfer to [facility] for TAVR prior to CEA     -74 year old R handed male with hx of DM, HTN, HLD, Afib on home Coumadin, CAD and PVD s/p stents on Plavix, who presented on with symptomatic anemia, suspected UGIB s/p EGD with no source of bleed found. Code stroke called on 9/11 for dysarthria and expressive aphasia and patient was found to have bilateral carotid stenosis.     MRI brain neg. Carotid dopplers demonstrate high grade stenosis bilaterally.    Impression: Expressive aphasia and dysarthria likely secondary to L hemispheric TIA, making the L ICA the symptomatic side and would therefore benefit from CEA first during this admission (preferably within 7 days of stroke/TIA symptoms) and the high grade stenosis of the R ICA can be evaluated for intervention electively     Recommendations:  -continue aspirin 81 mg daily  -can reduce lipitor to 20 mg qhs, as LDL is already at goal   -cleared by GI to resume AC for known Afib, on heparin gtt  -vascular surgery following, planning for L CEA  -possible transfer to [facility] for TAVR prior to CEA

## 2020-09-14 NOTE — PROGRESS NOTE ADULT - ASSESSMENT
74M w/ CAD s/p PCI and PAD s/p b/l stents (7 and 5 years ago, respectively) on Plavix, and AFib on coumadin, presenting w/ SOB/chest pain, found to have anemia in setting of black stools for 3 weeks. EGD showing small amount of fresh blood in duodenum and small clot in stomach w/o bleeding lesion noted. Now on PPI gtt x 72h w/ stable Hb.    Impression:  #Anemia - due in part to GI losses, no lesion noted as above. Much higher suspicion for UGI source as opposed to lower given EGD findings  #Chest pain  #CAD s/p PCI  #PAD s/p stent on Plavix  #AFib on coumadin    Recommendations:  - switch IV PPI gtt to PO PPI BID  - trend Hb, transfuse to Hb > 7  - can re-start A/C and monitor stools/Hb for bleeding, consider bridging w/ heparin to allow 74M w/ CAD s/p PCI and PAD s/p b/l stents (7 and 5 years ago, respectively) on Plavix, and AFib on coumadin, presenting w/ SOB/chest pain, found to have anemia in setting of black stools for 3 weeks. EGD showing small amount of fresh blood in duodenum and small clot in stomach w/o bleeding lesion noted. Now on PPI gtt x 72h w/ stable Hb.    Impression:  #Anemia - due in part to GI losses, no lesion noted as above. Much higher suspicion for UGI source as opposed to lower given EGD findings  #Chest pain  #CAD s/p PCI  #PAD s/p stent on Plavix  #AFib on coumadin    Recommendations:  - switch IV PPI gtt to PO PPI BID  - trend Hb, transfuse to Hb > 7  - can re-start A/C and monitor stools/Hb for bleeding, consider bridging w/ heparin to more quickly challenge pt w/ A/C to see if he re-bleeds  - if pt rebleeds would consider repeating EGD vs EGD +  Colonoscopy depending on clinical course  - GI will continue to follow    Augustin Mckee  Gastroenterology Fellow  NON-URGENT CONSULTS:  Please email [email or web address]  [email or web address]  AT NIGHT AND ON WEEKENDS:  Contact on-call GI fellow via answering service ([phone]) from 5pm-8am and on weekends/holidays  MONDAY-FRIDAY 8AM-5PM:  Available via [employer] Teams  Pager# [phone] ([facility]) or [phone] ([facility])  GI Phone# [phone] ([facility])   74M w/ CAD s/p PCI and PAD s/p b/l stents (7 and 5 years ago, respectively) on Plavix, and AFib on coumadin, presenting w/ SOB/chest pain, found to have anemia in setting of black stools for 3 weeks. EGD showing small amount of fresh blood in duodenum and small clot in stomach w/o bleeding lesion noted. Now on PPI gtt x 72h w/ stable Hb.    Impression:  #Anemia - due in part to GI losses, no lesion noted as above. Much higher suspicion for UGI source as opposed to lower given EGD findings  #Chest pain  #CAD s/p PCI  #PAD s/p stent on Plavix  #AFib on coumadin    Recommendations:  - switch IV PPI gtt to PO PPI BID  - trend Hb, transfuse to Hb > 7  - can re-start A/C and monitor stools/Hb for bleeding, consider bridging w/ heparin to more quickly challenge pt w/ A/C to see if he re-bleeds  - if pt rebleeds would consider repeating EGD vs EGD +  Colonoscopy depending on clinical course  - no GI contraindication to transfer to [facility] if indicated for non-GI reasons  - GI will continue to follow    Augustin Mckee  Gastroenterology Fellow  NON-URGENT CONSULTS:  Please email [email or web address]  AT NIGHT AND ON WEEKENDS:  Contact on-call GI fellow via answering service ([phone]) from 5pm-8am and on weekends/holidays  MONDAY-FRIDAY 8AM-5PM:  Available via [employer] Teams  Pager# [phone] ([facility]) or [phone] ([facility])  GI Phone# [phone] ([facility])

## 2020-09-14 NOTE — PROGRESS NOTE ADULT - ATTENDING COMMENTS
Symptomatic left ICA stenosis, smoker for > 55 years, currently with no significant neurologic deficit.  Agree with above plan regarding proceeding with left sided carotid endarterectomy.  He also needs to be on anticoagulation, would prefer NOT to bridge him and resume oral anticoagulant .  No objection to T AVR as planned per medical/cardiology team.

## 2020-09-14 NOTE — PROGRESS NOTE ADULT - SUBJECTIVE AND OBJECTIVE BOX
Neurology Follow up note    Patient is a 74y old  Male who presents with a chief complaint of Symptomatic Anemia (14 Sep 2020 12:22)      Subjective:Interval History - Patient seen and examined. No events overnight.    Objective:   Vital Signs Last 24 Hrs  T(C): 36.7 (14 Sep 2020 11:55), Max: 37.5 (13 Sep 2020 23:45)  T(F): 98 (14 Sep 2020 11:55), Max: 99.5 (13 Sep 2020 23:45)  HR: 90 (14 Sep 2020 11:55) (82 - 95)  BP: 120/59 (14 Sep 2020 11:55) (120/59 - 135/76)  BP(mean): --  RR: 17 (14 Sep 2020 11:55) (16 - 17)  SpO2: 99% (14 Sep 2020 11:55) (95% - 100%)    General Exam:   General appearance: No acute distress, occasional upper body twitching           Neurological Exam:  Mental Status: Orientated to self, date and place. No dysarthria. Speech fluent. Repetition mildly impaired. Naming intact. Comprehension intact.   Cranial Nerves:   PERRL, EOMI, VFF, no nystagmus.    CN V1-3 intact to light touch .  No facial asymmetry.  Motor:   Tone: normal.                  Strength: moves all extremities antigravity and without drift   Pronator drift: none                 Dysmetria: None to finger-nose-finger  Sensation: intact to light touch      09-14    137  |  103  |  15  ----------------------------<  109<H>  4.2   |  23  |  0.91    Ca    8.9      14 Sep 2020 06:04  Phos  3.1     09-14  Mg     1.7     09-14      09-14    137  |  103  |  15  ----------------------------<  109<H>  4.2   |  23  |  0.91    Ca    8.9      14 Sep 2020 06:04  Phos  3.1     09-14  Mg     1.7     09-14                              8.2    13.18 )-----------( 199      ( 14 Sep 2020 06:04 )             25.5     Radiology    < from: MR Head No Cont (09.13.20 @ 09:59) >    Impression:    No acute infarct. Age-appropriate involutional changes and moderate microvascular ischemic change.      < end of copied text >      MEDICATIONS  (STANDING):  aspirin  chewable 81 milliGRAM(s) Oral daily  atorvastatin 80 milliGRAM(s) Oral at bedtime  dextrose 50% Injectable 12.5 Gram(s) IV Push once  dextrose 50% Injectable 25 Gram(s) IV Push once  dextrose 50% Injectable 25 Gram(s) IV Push once  ferrous    sulfate 325 milliGRAM(s) Oral daily  heparin  Infusion.  Unit(s)/Hr (12 mL/Hr) IV Continuous <Continuous>  influenza   Vaccine 0.5 milliLiter(s) IntraMuscular once  insulin lispro (HumaLOG) corrective regimen sliding scale   SubCutaneous three times a day before meals  insulin lispro (HumaLOG) corrective regimen sliding scale   SubCutaneous at bedtime  lidocaine   Patch 1 Patch Transdermal daily  magnesium oxide 400 milliGRAM(s) Oral two times a day with meals  nicotine -  14 mG/24Hr(s) Patch 1 patch Transdermal daily  pantoprazole Infusion 8 mG/Hr (10 mL/Hr) IV Continuous <Continuous>    MEDICATIONS  (PRN):  acetaminophen   Tablet .. 650 milliGRAM(s) Oral every 6 hours PRN Temp greater or equal to 38C (100.4F), Mild Pain (1 - 3), Moderate Pain (4 - 6)  dextrose 40% Gel 15 Gram(s) Oral once PRN Blood Glucose LESS THAN 70 milliGRAM(s)/deciliter  glucagon  Injectable 1 milliGRAM(s) IntraMuscular once PRN Glucose LESS THAN 70 milligrams/deciliter  heparin   Injectable 5500 Unit(s) IV Push every 6 hours PRN For aPTT less than 40  heparin   Injectable 2500 Unit(s) IV Push every 6 hours PRN For aPTT between 40 - 57

## 2020-09-14 NOTE — PROGRESS NOTE ADULT - ATTENDING COMMENTS
The patient's care was discussed with the consulting cardiology fellow.  I independently evaluated the patient.    I agree with the assessment and recommendations noted above.  The patient is planned for transfer to [facility] for consideration of TAVR.  Carotid endarterectomy is also planned as noted above.     I agree with the jeancarlos-operative risk assessment noted above, and would also add that these risk tools do not account for this patient's severe valve disease. Currently the patient appears well compensated and there is no evidence of heart failure on examination. Would consider involving cardiac anesthesiology, however, given this patient's severe valve disease.    Reed Soto MD  Cardiology  [phone] The patient's care was discussed with the consulting cardiology fellow.  I independently evaluated the patient.    I agree with the assessment and recommendations noted above.  The patient is planned for transfer to [facility] for consideration of TAVR.  Carotid endarterectomy is also planned as noted above.     I agree with the jeancarlos-operative risk assessment noted above, and would also add that these risk tools do not account for this patient's severe valve disease. Currently the patient appears well compensated and there is no evidence of heart failure on examination. Plan seems for TAVR prior to CEA at [place].     Reed Soto MD  Cardiology  [phone]

## 2020-09-14 NOTE — PROGRESS NOTE ADULT - SUBJECTIVE AND OBJECTIVE BOX
Patient seen and evaluated at bedside    Interval events:  Patient denies chest pain, SOB, or other complaints. Otherwise feels well.            Physical Exam:  T(F): 97.7 (09-14), Max: 99.5 (09-13)  HR: 95 (09-14) (78 - 95)  BP: 127/86 (09-14) (105/60 - 135/76)  RR: 16 (09-14)  SpO2: 97% (09-14)  Gen - well appearing in no acute distress  CV: 3/6 systolic murmur radiating to carotids  Resp: CTAB  Ext: no edema bilaterally    TTE:  1. Mitral annular calcification, otherwise normal mitral  valve. Mild-moderate mitral regurgitation.  2. Calcified trileaflet aortic valve with decreased opening  Peak transaortic valve gradient equals 98 mm Hg, mean  transaortic valve gradient equals 59 mm Hg, estimated  aortic valve area equals 0.5 sqcm (by continuity equation),  consistent with severe aortic stenosis. Mild-moderate  aortic regurgitation.  3. Moderately dilated left atrium.  LA volume index = 43  cc/m2.  4. Increased relative wall thickness with normal left  ventricular mass index, consistent with concentric left  ventricular remodeling.  5. Normal left ventricular systolic function. No segmental  wall motion abnormalities.  6. Normal right ventricular size and function.  7. Estimated right ventricular systolic pressure equals 54  mm Hg, assuming right atrial pressure equals 10 mm Hg,  consistent with moderate pulmonary hypertension8.  8. A bubble study was performed with the intravenous  injection of agitated saline.  Following contrast  injection, no obvious bubbles were seen in the left heart.

## 2020-09-14 NOTE — PROGRESS NOTE ADULT - SUBJECTIVE AND OBJECTIVE BOX
Chief Complaint:  Patient is a 74y old  Male who presents with a chief complaint of Symptomatic Anemia (13 Sep 2020 12:21)    Reason for consult: anemia/GIB    Interval Events: Pt has had 2 dark stools over the weekend. Got 1u on 9/12 w/ good response - stable Hb since then. Remains off A/C.     Hospital Medications:  acetaminophen   Tablet .. 650 milliGRAM(s) Oral every 6 hours PRN  aspirin  chewable 81 milliGRAM(s) Oral daily  atorvastatin 80 milliGRAM(s) Oral at bedtime  dextrose 40% Gel 15 Gram(s) Oral once PRN  dextrose 50% Injectable 12.5 Gram(s) IV Push once  dextrose 50% Injectable 25 Gram(s) IV Push once  dextrose 50% Injectable 25 Gram(s) IV Push once  ferrous    sulfate 325 milliGRAM(s) Oral daily  glucagon  Injectable 1 milliGRAM(s) IntraMuscular once PRN  influenza   Vaccine 0.5 milliLiter(s) IntraMuscular once  insulin lispro (HumaLOG) corrective regimen sliding scale   SubCutaneous three times a day before meals  insulin lispro (HumaLOG) corrective regimen sliding scale   SubCutaneous at bedtime  magnesium oxide 400 milliGRAM(s) Oral two times a day with meals  magnesium sulfate  IVPB 1 Gram(s) IV Intermittent once  nicotine -  14 mG/24Hr(s) Patch 1 patch Transdermal daily  pantoprazole Infusion 8 mG/Hr IV Continuous <Continuous>      ROS:   General:  No  fevers, chills, night sweats, fatigue  Eyes:  Good vision, no reported pain  ENT:  No sore throat, pain, runny nose  CV:  No pain, palpitations  Pulm:  No dyspnea, cough  GI:  See HPI, otherwise negative  :  No  incontinence, nocturia  Muscle:  No pain, weakness  Neuro:  No memory problems  Psych:  No insomnia, mood problems, depression  Endocrine:  No polyuria, polydipsia, cold/heat intolerance  Heme:  No petechiae, ecchymosis, easy bruisability  Skin:  No rash    PHYSICAL EXAM:   Vital Signs:  Vital Signs Last 24 Hrs  T(C): 36.5 (14 Sep 2020 07:45), Max: 37.5 (13 Sep 2020 23:45)  T(F): 97.7 (14 Sep 2020 07:45), Max: 99.5 (13 Sep 2020 23:45)  HR: 95 (14 Sep 2020 07:45) (65 - 95)  BP: 127/86 (14 Sep 2020 07:45) (105/60 - 135/76)  BP(mean): --  RR: 16 (14 Sep 2020 07:45) (16 - 17)  SpO2: 97% (14 Sep 2020 07:45) (95% - 100%)  Daily     Daily     GENERAL: no acute distress  NEURO: alert, no asterixis  HEENT: anicteric sclera, no conjunctival pallor appreciated  CHEST: no respiratory distress, no accessory muscle use  CARDIAC: regular rate, rhythm  ABDOMEN: soft, non-tender, non-distended, no rebound or guarding  EXTREMITIES: warm, well perfused, no edema  SKIN: no lesions noted    LABS: reviewed                        8.2    13.18 )-----------( 199      ( 14 Sep 2020 06:04 )             25.5     09-14    137  |  103  |  15  ----------------------------<  109<H>  4.2   |  23  |  0.91    Ca    8.9      14 Sep 2020 06:04  Phos  3.1     09-14  Mg     1.7     09-14          Interval Diagnostic Studies: see sunrise for full report

## 2020-09-14 NOTE — DISCHARGE NOTE PROVIDER - NSDCCPCAREPLAN_GEN_ALL_CORE_FT
PRINCIPAL DISCHARGE DIAGNOSIS  Diagnosis: Left carotid stenosis  Assessment and Plan of Treatment: carotid doppler 9/12 severe b/l carotid stenosis, Severe heterogenous plaque noted within the proximal right and left internal carotid arteries, c/w 80-99%  stenoses in both proximal vessels.  -case d/w vascular surgery resident, they would like pt to be transferred to [facility] for carotid endarectomy within the next few days .    -d/c'd plavixin setting of GI bleed,  c/w ASA, lipitor  -neuro checks, F/u neurology.        SECONDARY DISCHARGE DIAGNOSES  Diagnosis: Aortic stenosis  Assessment and Plan of Treatment: Severe aortic Valve narrowing.   echo: showed severe Aortic valve narrowing.  Needs to have TAVR done and needs Gastroenterology clearance.  It may be done as outpatient with Dr Elinor Lozoya.          PRINCIPAL DISCHARGE DIAGNOSIS  Diagnosis: Left carotid stenosis  Assessment and Plan of Treatment: carotid doppler 9/12 severe b/l carotid stenosis, Severe heterogenous plaque noted within the proximal right and left internal carotid arteries, c/w 80-99%  stenoses in both proximal vessels.  -case d/w vascular surgery resident, they would like pt to be transferred to [facility] for carotid endarectomy within the next few days .    -d/c'd plavixin setting of GI bleed,  c/w ASA, lipitor  -neuro checks, F/u neurology.        SECONDARY DISCHARGE DIAGNOSES  Diagnosis: Anemia due to gastrointestinal blood loss  Assessment and Plan of Treatment: GI Bleed, no lesion noted as above. Much higher suspicion for UGI source as opposed to lower given EGD findings- with instinalbleed and stomach with small blood clots  - change to ProtonixI twice daily  - trend Hb, transfuse to Hb > 7  - can recurrent or other new symptoms-start A/C and monitor stools/Hb for bleeding. consider bridging w/ heparin to more quickly challenge pt w/ A/C to see if he recurrent or other new symptoms-bleeds  - if pt rebleeds would consider repeating EGD vs EGD +  Colonoscopy depending on clinical course. GI can follow up with patient @ [facility]      Diagnosis: Aortic stenosis  Assessment and Plan of Treatment: Severe aortic Valve narrowing.   echo: showed severe Aortic valve narrowing.  Needs to have TAVR done and needs Gastroenterology clearance.  It may be done as outpatient with Dr Elinro Lozoya.          PRINCIPAL DISCHARGE DIAGNOSIS  Diagnosis: Left carotid stenosis  Assessment and Plan of Treatment: carotid doppler 9/12 severe b/l carotid stenosis, Severe heterogenous plaque noted within the proximal right and left internal carotid arteries, c/w 80-99%  stenoses in both proximal vessels.  -case d/w vascular surgery resident, they would like pt to be transferred to [facility] for carotid endarectomy within the next few days .    -d/c'd plavixin setting of GI bleed,  c/w ASA, lipitor  -neuro checks, F/u neurology.        SECONDARY DISCHARGE DIAGNOSES  Diagnosis: Anemia due to gastrointestinal blood loss  Assessment and Plan of Treatment: GI Bleed, no lesion noted as above. Much higher suspicion for UGI source as opposed to lower given EGD findings- with intestinal luminal bleed and stomach with small blood clots  - change to Protonix twice daily  - trend Hb, transfuse to Hb > 7  - can resume A/C and monitor stools/Hb for bleeding. consider bridging w/ heparin to more quickly challenge pt w/ A/C to see if he recurrent or other new symptoms-bleeds  - if pt rebleeds would consider repeating EGD vs EGD +  Colonoscopy depending on clinical course. GI can follow up with patient @ [facility]      Diagnosis: Aortic stenosis  Assessment and Plan of Treatment: Severe aortic Valve narrowing.   echo: showed severe Aortic valve narrowing.  Needs to have TAVR done and needs Gastroenterology clearance.  It may be done as outpatient with Dr Elinor Lozoya.          PRINCIPAL DISCHARGE DIAGNOSIS  Diagnosis: Left carotid stenosis  Assessment and Plan of Treatment: carotid doppler 9/12 severe b/l carotid stenosis, Severe heterogenous plaque noted within the proximal right and left internal carotid arteries, c/w 80-99%  stenoses in both proximal vessels.  -case d/w vascular surgery resident, they would like pt to be transferred to [facility] for carotid endarectomy within the next few days .    -d/c'd plavixin setting of GI bleed,  c/w ASA, lipitor  -neuro checks, F/u neurology.        SECONDARY DISCHARGE DIAGNOSES  Diagnosis: Anemia due to gastrointestinal blood loss  Assessment and Plan of Treatment: GI Bleed, no lesion noted as above. Much higher suspicion for UGI source as opposed to lower given EGD findings- with intestinal luminal bleed and stomach with small blood clots  - change to Protonix twice daily  - trend Hb, transfuse to Hb > 7  - can resume A/C and monitor stools/Hb for bleeding. consider bridging w/ heparin to more quickly challenge pt w/ A/C to see if he recurrent or other new symptoms-bleeds  - if pt rebleeds would consider repeating EGD vs EGD +  Colonoscopy depending on clinical course. GI can follow up with patient @ [facility]      Diagnosis: Aortic stenosis  Assessment and Plan of Treatment: Severe aortic Valve narrowing.   echo: showed severe Aortic valve narrowing.  Needs to have TAVR done and needs Gastroenterology clearance.  It may be done as outpatient with Dr Elinor Lozoya but you are being transferred to [facility] for further evaluation.          Diagnosis: Transient ischemic attack  Assessment and Plan of Treatment: You had an episode of dysarthria that subsequently resolved. You were evaluated by neurology, CT and MRI of your brain was negative for stroke, your symptoms were suspected to be due to a transient ischemic attack. Please take medications as prescribed and followup with your doctor for further management. Plesae seek medical attention if you develop trouble speaking, facial droop, localized weakness or loss of sensation as these could be symptoms of a stroke.

## 2020-09-14 NOTE — CHART NOTE - NSCHARTNOTEFT_GEN_A_CORE
VASCULAR SURGERY    Carotid duplex and MRI reviewed. Based on findings, patient will require a left carotid endarterectomy prior to TAVR. Please transfer to [facility] for continued care.    [id number]

## 2020-09-15 ENCOUNTER — TRANSCRIPTION ENCOUNTER (OUTPATIENT)
Age: 75
End: 2020-09-15

## 2020-09-15 ENCOUNTER — INPATIENT (INPATIENT)
Facility: HOSPITAL | Age: 75
LOS: 10 days | Discharge: HOME | DRG: 38 | End: 2020-09-26
Attending: INTERNAL MEDICINE | Admitting: SURGERY
Payer: COMMERCIAL

## 2020-09-15 VITALS
OXYGEN SATURATION: 97 % | DIASTOLIC BLOOD PRESSURE: 78 MMHG | HEIGHT: 71 IN | RESPIRATION RATE: 18 BRPM | HEART RATE: 87 BPM | TEMPERATURE: 99 F | SYSTOLIC BLOOD PRESSURE: 126 MMHG | WEIGHT: 148.37 LBS

## 2020-09-15 VITALS
HEART RATE: 64 BPM | RESPIRATION RATE: 15 BRPM | TEMPERATURE: 98 F | OXYGEN SATURATION: 99 % | DIASTOLIC BLOOD PRESSURE: 64 MMHG | SYSTOLIC BLOOD PRESSURE: 115 MMHG

## 2020-09-15 DIAGNOSIS — I35.0 NONRHEUMATIC AORTIC (VALVE) STENOSIS: ICD-10-CM

## 2020-09-15 DIAGNOSIS — I65.23 OCCLUSION AND STENOSIS OF BILATERAL CAROTID ARTERIES: ICD-10-CM

## 2020-09-15 DIAGNOSIS — Z95.5 PRESENCE OF CORONARY ANGIOPLASTY IMPLANT AND GRAFT: Chronic | ICD-10-CM

## 2020-09-15 DIAGNOSIS — I65.29 OCCLUSION AND STENOSIS OF UNSPECIFIED CAROTID ARTERY: ICD-10-CM

## 2020-09-15 DIAGNOSIS — Z98.890 OTHER SPECIFIED POSTPROCEDURAL STATES: Chronic | ICD-10-CM

## 2020-09-15 DIAGNOSIS — G45.1 CAROTID ARTERY SYNDROME (HEMISPHERIC): ICD-10-CM

## 2020-09-15 PROBLEM — Z00.00 ENCOUNTER FOR PREVENTIVE HEALTH EXAMINATION: Status: ACTIVE | Noted: 2020-09-15

## 2020-09-15 LAB
ANION GAP SERPL CALC-SCNC: 12 MMOL/L — SIGNIFICANT CHANGE UP (ref 5–17)
ANION GAP SERPL CALC-SCNC: 9 MMO/L — SIGNIFICANT CHANGE UP (ref 7–14)
APTT BLD: 133.1 SEC — CRITICAL HIGH (ref 27–36.3)
APTT BLD: 67 SEC — HIGH (ref 27.5–35.5)
APTT BLD: 92.5 SEC — HIGH (ref 27–36.3)
BLD GP AB SCN SERPL QL: NEGATIVE — SIGNIFICANT CHANGE UP
BUN SERPL-MCNC: 19 MG/DL — SIGNIFICANT CHANGE UP (ref 7–23)
BUN SERPL-MCNC: 22 MG/DL — SIGNIFICANT CHANGE UP (ref 7–23)
CALCIUM SERPL-MCNC: 8.5 MG/DL — SIGNIFICANT CHANGE UP (ref 8.4–10.5)
CALCIUM SERPL-MCNC: 9.1 MG/DL — SIGNIFICANT CHANGE UP (ref 8.4–10.5)
CHLORIDE SERPL-SCNC: 103 MMOL/L — SIGNIFICANT CHANGE UP (ref 98–107)
CHLORIDE SERPL-SCNC: 99 MMOL/L — SIGNIFICANT CHANGE UP (ref 96–108)
CO2 SERPL-SCNC: 24 MMOL/L — SIGNIFICANT CHANGE UP (ref 22–31)
CO2 SERPL-SCNC: 24 MMOL/L — SIGNIFICANT CHANGE UP (ref 22–31)
CREAT SERPL-MCNC: 0.98 MG/DL — SIGNIFICANT CHANGE UP (ref 0.5–1.3)
CREAT SERPL-MCNC: 1.03 MG/DL — SIGNIFICANT CHANGE UP (ref 0.5–1.3)
GLUCOSE BLDC GLUCOMTR-MCNC: 126 MG/DL — HIGH (ref 70–99)
GLUCOSE BLDC GLUCOMTR-MCNC: 192 MG/DL — HIGH (ref 70–99)
GLUCOSE BLDC GLUCOMTR-MCNC: 219 MG/DL — HIGH (ref 70–99)
GLUCOSE SERPL-MCNC: 126 MG/DL — HIGH (ref 70–99)
GLUCOSE SERPL-MCNC: 194 MG/DL — HIGH (ref 70–99)
HCT VFR BLD CALC: 23.7 % — LOW (ref 39–50)
HCT VFR BLD CALC: 25.7 % — LOW (ref 39–50)
HCT VFR BLD CALC: 27.9 % — LOW (ref 39–50)
HGB BLD-MCNC: 7.4 G/DL — LOW (ref 13–17)
HGB BLD-MCNC: 8 G/DL — LOW (ref 13–17)
HGB BLD-MCNC: 8.7 G/DL — LOW (ref 13–17)
MAGNESIUM SERPL-MCNC: 1.7 MG/DL — SIGNIFICANT CHANGE UP (ref 1.6–2.6)
MAGNESIUM SERPL-MCNC: 1.8 MG/DL — SIGNIFICANT CHANGE UP (ref 1.6–2.6)
MCHC RBC-ENTMCNC: 31.1 % — LOW (ref 32–36)
MCHC RBC-ENTMCNC: 31.2 % — LOW (ref 32–36)
MCHC RBC-ENTMCNC: 31.2 GM/DL — LOW (ref 32–36)
MCHC RBC-ENTMCNC: 31.3 PG — SIGNIFICANT CHANGE UP (ref 27–34)
MCHC RBC-ENTMCNC: 31.5 PG — SIGNIFICANT CHANGE UP (ref 27–34)
MCHC RBC-ENTMCNC: 31.9 PG — SIGNIFICANT CHANGE UP (ref 27–34)
MCV RBC AUTO: 100.4 FL — HIGH (ref 80–100)
MCV RBC AUTO: 100.9 FL — HIGH (ref 80–100)
MCV RBC AUTO: 102.4 FL — HIGH (ref 80–100)
NRBC # BLD: 0 /100 WBCS — SIGNIFICANT CHANGE UP (ref 0–0)
NRBC # FLD: 0 K/UL — SIGNIFICANT CHANGE UP (ref 0–0)
NRBC # FLD: 0.03 K/UL — SIGNIFICANT CHANGE UP (ref 0–0)
PHOSPHATE SERPL-MCNC: 3.2 MG/DL — SIGNIFICANT CHANGE UP (ref 2.5–4.5)
PHOSPHATE SERPL-MCNC: 3.2 MG/DL — SIGNIFICANT CHANGE UP (ref 2.5–4.5)
PLATELET # BLD AUTO: 193 K/UL — SIGNIFICANT CHANGE UP (ref 150–400)
PLATELET # BLD AUTO: 205 K/UL — SIGNIFICANT CHANGE UP (ref 150–400)
PLATELET # BLD AUTO: 205 K/UL — SIGNIFICANT CHANGE UP (ref 150–400)
PMV BLD: 10.7 FL — SIGNIFICANT CHANGE UP (ref 7–13)
PMV BLD: 10.8 FL — SIGNIFICANT CHANGE UP (ref 7–13)
POTASSIUM SERPL-MCNC: 4.2 MMOL/L — SIGNIFICANT CHANGE UP (ref 3.5–5.3)
POTASSIUM SERPL-MCNC: 4.2 MMOL/L — SIGNIFICANT CHANGE UP (ref 3.5–5.3)
POTASSIUM SERPL-SCNC: 4.2 MMOL/L — SIGNIFICANT CHANGE UP (ref 3.5–5.3)
POTASSIUM SERPL-SCNC: 4.2 MMOL/L — SIGNIFICANT CHANGE UP (ref 3.5–5.3)
RBC # BLD: 2.35 M/UL — LOW (ref 4.2–5.8)
RBC # BLD: 2.51 M/UL — LOW (ref 4.2–5.8)
RBC # BLD: 2.78 M/UL — LOW (ref 4.2–5.8)
RBC # FLD: 17.7 % — HIGH (ref 10.3–14.5)
RBC # FLD: 17.8 % — HIGH (ref 10.3–14.5)
RBC # FLD: 18.3 % — HIGH (ref 10.3–14.5)
RH IG SCN BLD-IMP: POSITIVE — SIGNIFICANT CHANGE UP
SODIUM SERPL-SCNC: 135 MMOL/L — SIGNIFICANT CHANGE UP (ref 135–145)
SODIUM SERPL-SCNC: 136 MMOL/L — SIGNIFICANT CHANGE UP (ref 135–145)
WBC # BLD: 12.4 K/UL — HIGH (ref 3.8–10.5)
WBC # BLD: 12.46 K/UL — HIGH (ref 3.8–10.5)
WBC # BLD: 12.77 K/UL — HIGH (ref 3.8–10.5)
WBC # FLD AUTO: 12.4 K/UL — HIGH (ref 3.8–10.5)
WBC # FLD AUTO: 12.46 K/UL — HIGH (ref 3.8–10.5)
WBC # FLD AUTO: 12.77 K/UL — HIGH (ref 3.8–10.5)

## 2020-09-15 PROCEDURE — 99223 1ST HOSP IP/OBS HIGH 75: CPT

## 2020-09-15 PROCEDURE — 99152 MOD SED SAME PHYS/QHP 5/>YRS: CPT

## 2020-09-15 PROCEDURE — 93454 CORONARY ARTERY ANGIO S&I: CPT | Mod: 26

## 2020-09-15 PROCEDURE — 99239 HOSP IP/OBS DSCHRG MGMT >30: CPT

## 2020-09-15 RX ORDER — DEXTROSE 50 % IN WATER 50 %
25 SYRINGE (ML) INTRAVENOUS ONCE
Refills: 0 | Status: DISCONTINUED | OUTPATIENT
Start: 2020-09-15 | End: 2020-09-17

## 2020-09-15 RX ORDER — INSULIN LISPRO 100/ML
VIAL (ML) SUBCUTANEOUS AT BEDTIME
Refills: 0 | Status: DISCONTINUED | OUTPATIENT
Start: 2020-09-15 | End: 2020-09-17

## 2020-09-15 RX ORDER — SODIUM CHLORIDE 9 MG/ML
1000 INJECTION, SOLUTION INTRAVENOUS
Refills: 0 | Status: DISCONTINUED | OUTPATIENT
Start: 2020-09-15 | End: 2020-09-17

## 2020-09-15 RX ORDER — FERROUS SULFATE 325(65) MG
325 TABLET ORAL DAILY
Refills: 0 | Status: DISCONTINUED | OUTPATIENT
Start: 2020-09-15 | End: 2020-09-26

## 2020-09-15 RX ORDER — DEXTROSE 50 % IN WATER 50 %
12.5 SYRINGE (ML) INTRAVENOUS ONCE
Refills: 0 | Status: DISCONTINUED | OUTPATIENT
Start: 2020-09-15 | End: 2020-09-17

## 2020-09-15 RX ORDER — SIMVASTATIN 20 MG/1
10 TABLET, FILM COATED ORAL AT BEDTIME
Refills: 0 | Status: DISCONTINUED | OUTPATIENT
Start: 2020-09-15 | End: 2020-09-19

## 2020-09-15 RX ORDER — DEXTROSE 50 % IN WATER 50 %
15 SYRINGE (ML) INTRAVENOUS ONCE
Refills: 0 | Status: DISCONTINUED | OUTPATIENT
Start: 2020-09-15 | End: 2020-09-17

## 2020-09-15 RX ORDER — HEPARIN SODIUM 5000 [USP'U]/ML
1000 INJECTION INTRAVENOUS; SUBCUTANEOUS
Qty: 25000 | Refills: 0 | Status: DISCONTINUED | OUTPATIENT
Start: 2020-09-15 | End: 2020-09-16

## 2020-09-15 RX ORDER — ENOXAPARIN SODIUM 100 MG/ML
40 INJECTION SUBCUTANEOUS DAILY
Refills: 0 | Status: DISCONTINUED | OUTPATIENT
Start: 2020-09-15 | End: 2020-09-15

## 2020-09-15 RX ORDER — GLUCAGON INJECTION, SOLUTION 0.5 MG/.1ML
1 INJECTION, SOLUTION SUBCUTANEOUS ONCE
Refills: 0 | Status: DISCONTINUED | OUTPATIENT
Start: 2020-09-15 | End: 2020-09-17

## 2020-09-15 RX ORDER — PANTOPRAZOLE SODIUM 20 MG/1
40 TABLET, DELAYED RELEASE ORAL
Refills: 0 | Status: DISCONTINUED | OUTPATIENT
Start: 2020-09-15 | End: 2020-09-26

## 2020-09-15 RX ORDER — MAGNESIUM OXIDE 400 MG ORAL TABLET 241.3 MG
400 TABLET ORAL
Refills: 0 | Status: DISCONTINUED | OUTPATIENT
Start: 2020-09-15 | End: 2020-09-26

## 2020-09-15 RX ORDER — NICOTINE POLACRILEX 2 MG
1 GUM BUCCAL DAILY
Refills: 0 | Status: DISCONTINUED | OUTPATIENT
Start: 2020-09-15 | End: 2020-09-26

## 2020-09-15 RX ORDER — SODIUM CHLORIDE 9 MG/ML
1000 INJECTION, SOLUTION INTRAVENOUS
Refills: 0 | Status: DISCONTINUED | OUTPATIENT
Start: 2020-09-15 | End: 2020-09-15

## 2020-09-15 RX ORDER — INSULIN LISPRO 100/ML
VIAL (ML) SUBCUTANEOUS EVERY 6 HOURS
Refills: 0 | Status: DISCONTINUED | OUTPATIENT
Start: 2020-09-15 | End: 2020-09-17

## 2020-09-15 RX ADMIN — Medication 1 PATCH: at 09:06

## 2020-09-15 RX ADMIN — SIMVASTATIN 10 MILLIGRAM(S): 20 TABLET, FILM COATED ORAL at 22:06

## 2020-09-15 RX ADMIN — HEPARIN SODIUM 0 UNIT(S)/HR: 5000 INJECTION INTRAVENOUS; SUBCUTANEOUS at 04:04

## 2020-09-15 RX ADMIN — Medication 650 MILLIGRAM(S): at 09:10

## 2020-09-15 RX ADMIN — MAGNESIUM OXIDE 400 MG ORAL TABLET 400 MILLIGRAM(S): 241.3 TABLET ORAL at 09:10

## 2020-09-15 RX ADMIN — Medication 650 MILLIGRAM(S): at 10:10

## 2020-09-15 RX ADMIN — HEPARIN SODIUM 1000 UNIT(S)/HR: 5000 INJECTION INTRAVENOUS; SUBCUTANEOUS at 05:06

## 2020-09-15 RX ADMIN — PANTOPRAZOLE SODIUM 40 MILLIGRAM(S): 20 TABLET, DELAYED RELEASE ORAL at 05:17

## 2020-09-15 RX ADMIN — HEPARIN SODIUM 10 UNIT(S)/HR: 5000 INJECTION INTRAVENOUS; SUBCUTANEOUS at 22:11

## 2020-09-15 RX ADMIN — Medication 2: at 23:48

## 2020-09-15 RX ADMIN — LIDOCAINE 1 PATCH: 4 CREAM TOPICAL at 04:08

## 2020-09-15 RX ADMIN — LIDOCAINE 1 PATCH: 4 CREAM TOPICAL at 05:11

## 2020-09-15 NOTE — PROGRESS NOTE ADULT - SUBJECTIVE AND OBJECTIVE BOX
Patient s/p cardiac catheterization, found to have lesion in the circumflex artery. Reviewed with Dr. Villeda. Ok to proceed with planned CEA tomorrow, and cardiac lesion will be dealt with afterwards. Dr. Villeda discussed plan with Dr. Rodriguez.

## 2020-09-15 NOTE — H&P ADULT - NSHPPHYSICALEXAM_GEN_ALL_CORE
Vital Signs Last 24 Hrs  T(C): 36.7 (15 Sep 2020 17:10), Max: 37 (15 Sep 2020 11:53)  T(F): 98.1 (15 Sep 2020 17:10), Max: 98.6 (15 Sep 2020 11:53)  HR: 82 (15 Sep 2020 17:55) (64 - 87)  BP: 152/89 (15 Sep 2020 17:55) (105/55 - 156/91)  BP(mean): --  RR: 16 (15 Sep 2020 17:55) (15 - 18)  SpO2: 97% (15 Sep 2020 17:55) (94% - 100%)    General: NAD, sitting upright in bed, well-nourished, well-developed  Neuro: AOx3, no focal deficits  Chest: rrr, ctab, nonlabored breathing  Abdomen: s/nt/nd  Ext: moves all 4 extremities spontaneously, wwp  Vascular: Right: 2+ radial, brachial, fem, pop, dp, pt; Left: 2+ radial brachial, fem, pop, dp, pt

## 2020-09-15 NOTE — H&P ADULT - HISTORY OF PRESENT ILLNESS
74 Year-Old Gentleman with a PMH of CAD status post stents on Plavix, Afib on coumadin, HTN, HLD,  PVD with fem-fem bypass and bilateral LE stents, 55 pack year smoking history, present to the [facility] ED with chest pain, found to have L ICA 66% stenosis and R ICA 80% stenosis in setting of acute dysarthria. He has been transferred to [facility] for R CEA.

## 2020-09-15 NOTE — DISCHARGE NOTE NURSING/CASE MANAGEMENT/SOCIAL WORK - NSDCPEWEB_GEN_ALL_CORE
[facility] for Tobacco Control website --- [email or web address] --- [email or web address]

## 2020-09-15 NOTE — CONSULT NOTE ADULT - PROBLEM SELECTOR RECOMMENDATION 9
PAtient will be worked up for his severe symptomatic aortic stenosis. Due to the fact that he has been having chest discomfort, he will get his Cardiac Catheterization this afternoon to look at his coronary anatomy. He will also need a cardiac CT, which can be done after his carotid surgery. Since he was to see Dr. Meade for a surgical consult as an outpatient, we will mask him to see the patient while here. Once testing is complete, we will determine the best treatment strategy of TAVR vs. sAVR. With a history of Aorto-iliac disease and a history of bypass and iliac stenting, we will extend the CT to include down to the Popliteal artery. History may make femoral access dificult.

## 2020-09-15 NOTE — CONSULT NOTE ADULT - SUBJECTIVE AND OBJECTIVE BOX
Structural Heart Team    HPI:  75 y/o male, with a PmHx of DM, HTN, hyperlipidemia, atrial fibrillation on coumadin, CAD s/p 2 cardiac stents and 2 LE stents (one to each leg), PVD and 55 pack year smoking history, present to the [facility] ED with chest pain for 2-3 days. Pt states over the past 2-3 weeks he has been having intermittent chest pain but over the past 2-3 days it started to get worse. He states he had gone to see his Primary Care Doctor on [place] and was referred to a Cardiologist whom he saw yesterday for the first time. While in the Cardiologist office he was found to have a new Severe As and was given a prescription for a referral for a Cardio Thoracic Surgeon (Dr. Meade (whom he hasn't gone to see yet). He was also started on Metoprolol XL 25mg. Pt states last night was the first time taking the Metoprolol and overnight he was woken up from sleep with a 10/10, left sided, non-radiating, pressure like sensation in his chest that was exacerbated with breathing. He stated he has never experienced anything like this before (even when he had his stents put in so his wife called an ambulance who brought him to the ED.  Pt states during this event last night he began to sweat due to pain (reports that this is unusual for him as he does not sweat). He also had associated symptoms of vomiting in the ambulance after drinking orange juice. Patient also reports dark stool for several weeks and bilateral leg weakness for the last 2-3 weeks (unable to use the bathroom without assistance).  Patient reports dizziness for several weeks and has had a weight loss of about 20lbs over the past 1-1.5 years due to a loss of appetite. Denies SOB, abdominal pain, fever. No sick contacts, no recent travel. Last colonoscopy 7 years ago. Coumadin checked 3 months ago by PCP. Currently, he states the chest pain is about a 1/10. In the [facility] ED, he was found to be anemic with a Hgb of 6.4/20.7 and was given 2 units of PRBC's. He was also found to be occult positive. He appears comfortable at this time and is now being admitted to telemetry for symptomatic anemia with a possible GI bleed and r/o acs. He was transferred to [facility] today for further evaluation of his AS, and treatment of his Carotid Stenosis.    Today he is resting comfortably in bed offering no complaints. He states that over the past few weeks, he has noted intermittent chest discomfort, which usually goes away with rest. He also has been having some MAX when he exerts himself. He had seen Dr. Chavira (Cardiology) for the first time the other day, and it was determined that he had Severe AS, and was to be sent to see Dr. Meade for a surgical opinion. He presented to [facility] ED after having some chest discomfort, and feeling weak, where it was determined he was anemic, and had + Guaiac. Over the course of his [facility] Hospitalization, he received 3 units of PRBC. He also complains of intermittent claudication, with a known history of PAD, and previous stents.    STS 3.74%            09-15 @ 07:01  -  09-15 @ 15:09  --------------------------------------------------------  IN: 0 mL / OUT: 250 mL / NET: -250 mL        PAST MEDICAL & SURGICAL HISTORY:  Peripheral vascular disease    Atrial fibrillation    Hypertension    Diabetes    Hyperlipidemia    Coronary artery disease    Status post angiography of extremity  1 stent in each leg    History of coronary artery stent placement  two coronary stents        FAMILY HISTORY:  FH: CAD (coronary artery disease)  son    FH: seizures  son    FH: type 1 diabetes  son    FH: diabetes mellitus  mother              No Known Allergies    dextrose 5% + sodium chloride 0.45%. 1000 milliLiter(s) IV Continuous <Continuous>  enoxaparin Injectable 40 milliGRAM(s) SubCutaneous daily      T(C): 36.6 (09-15-20 @ 14:26), Max: 37 (09-15-20 @ 11:53)  HR: 73 (09-15-20 @ 14:26) (64 - 87)  BP: 131/83 (09-15-20 @ 14:26) (105/55 - 131/83)  RR: 17 (09-15-20 @ 14:26) (15 - 18)  SpO2: 98% (09-15-20 @ 14:26) (97% - 100%)  Wt(kg): --      Review of Symptoms:  General: Alert, Follows commands  Respiratory:  + SOB, + MAX, Noough  Cardiac: + CP,Denies Palpitations  Gastrointestinal: Denies Pain, Denies N/V  Extremities: Denies Pedal Edema, No Joint pain  Vascular: + Intermittent Claudication  Neuro: + TIA  Endocrine: negative      Physical Exam:  Gen: A/Ox3, NAD  HEENT: No JVD, Neck Supple, Trachea midline, No masses  Pulmonary: CTAB,  No accessory muscle use for respiration  Cardiac: S1S2, Irregular, IV/IV DOREEN, No Gallops or Rubs  ECG: AFib  Gastrointestinal: Soft, NT/ND, + Bowel Sounds  Extremities:  No Edema, , + Venous stasis changes  Vascular: Trace Pulses B/L, + L sided Bruit  Neurological: Non Focal, = motor and sensory      Laboratory:                        8.0    12.77 )-----------( 205      ( 15 Sep 2020 11:00 )             25.7    09-15    136  |  103  |  22  ----------------------------<  126<H>  4.2   |  24  |  0.98    Ca    8.5      15 Sep 2020 03:00  Phos  3.2     09-15  Mg     1.8     09-15     PT/INR - ( 14 Sep 2020 06:04 )   PT: 14.6 SEC;   INR: 1.28          PTT - ( 15 Sep 2020 11:00 )  PTT:92.5 SEC    Pro-BNP:  Serum Pro-Brain Natriuretic Peptide (09.10.20 @ 01:45)    Serum Pro-Brain Natriuretic Peptide: 664.3: ACUTE CONGESTIVE HEART FAILURE is UNLIKELY if NT-proBNP  is < 300 pg/mL.    CONSIDER ACUTE CONGESTIVE HEART FAILURE if:    AGE                NT-proBNP RESULT  ---                -------------  < 50 YEARS      >  450 pg/mL  50 - 75 YEARS      >  900 pg/mL  > 75 YEARS      > 1800 pg/mL pg/mL    Transthoracic Echo:    < from: Transthoracic Echocardiogram (09.11.20 @ 10:13) >  Ejection Fraction (Ryannetz): 60 %  ------------------------------------------------------------------------  OBSERVATIONS:  Mitral Valve: Mitral annular calcification, otherwise  normal mitral valve. Mild-moderate mitral regurgitation.  Aortic Root: Normal aortic root.  Aortic Valve: Calcified trileaflet aortic valve with  decreased opening Peak transaortic valve gradient equals 98  mm Hg, mean transaortic valve gradient equals 59 mm Hg,  estimated aortic valvearea equals 0.5 sqcm (by continuity  equation), consistent with severe aortic stenosis.  Mild-moderate aortic regurgitation.  Left Atrium: Moderately dilated left atrium.  LA volume  index = 43 cc/m2.  Left Ventricle: Normal left ventricular systolicfunction.  No segmental wall motion abnormalities. Increased relative  wall thickness with normal left ventricular mass index,  consistent with concentric left ventricular remodeling.  (DT:180 ms).  Right Heart: Mild right atrial enlargement. Normal right  ventricular size and function. Normal tricuspid valve.  Mild-moderate tricuspid regurgitation. Normal pulmonic  valve. Mild pulmonic regurgitation.  Pericardium/PleuraNormal pericardium with no pericardial  effusion.  Hemodynamic: Estimated right ventricular systolic pressure  equals 54 mm Hg, assuming right atrial pressure equals 10  mm Hg, consistent with moderate pulmonary hypertension.  ------------------------------------------------------------------------  CONCLUSIONS:  1. Mitral annular calcification, otherwise normal mitral  valve. Mild-moderate mitral regurgitation.  2. Calcified trileaflet aortic valve with decreased opening  Peak transaortic valve gradient equals 98 mm Hg, mean  transaortic valve gradient equals 59 mm Hg, estimated  aortic valve area equals 0.5 sqcm (by continuity equation),  consistent with severe aortic stenosis. Mild-moderate  aortic regurgitation.  3. Moderately dilated left atrium.  LA volume index = 43  cc/m2.  4. Increased relative wall thickness with normal left  ventricular mass index, consistent with concentric left  ventricular remodeling.  5. Normal left ventricular systolic function. No segmental  wall motion abnormalities.  6. Normal right ventricular size and function.  7. Estimated right ventricular systolic pressure equals 54  mm Hg, assuming right atrial pressure equals 10 mm Hg,  consistent with moderate pulmonary hypertension8.  8. A bubble study was performed with the intravenous  injection of agitated saline.  Following contrast  injection, no obvious bubbles were seen in the left heart.    < end of copied text >    Cardiac Cath: Pending   Structural Heart Team    HPI:  75 y/o male, with a PmHx of DM, HTN, hyperlipidemia, atrial fibrillation on coumadin, CAD s/p 2 cardiac stents and 2 LE stents (one to each leg), PVD and 55 pack year smoking history, present to the [facility] ED with chest pain for 2-3 days. Pt states over the past 2-3 weeks he has been having intermittent chest pain but over the past 2-3 days it started to get worse. He states he had gone to see his Primary Care Doctor on [place] and was referred to a Cardiologist whom he saw yesterday for the first time. While in the Cardiologist office he was found to have a new Severe As and was given a prescription for a referral for a Cardio Thoracic Surgeon (Dr. Meade (whom he hasn't gone to see yet). He was also started on Metoprolol XL 25mg. Pt states last night was the first time taking the Metoprolol and overnight he was woken up from sleep with a 10/10, left sided, non-radiating, pressure like sensation in his chest that was exacerbated with breathing. He stated he has never experienced anything like this before (even when he had his stents put in so his wife called an ambulance who brought him to the ED.  Pt states during this event last night he began to sweat due to pain (reports that this is unusual for him as he does not sweat). He also had associated symptoms of vomiting in the ambulance after drinking orange juice. Patient also reports dark stool for several weeks and bilateral leg weakness for the last 2-3 weeks (unable to use the bathroom without assistance).  Patient reports dizziness for several weeks and has had a weight loss of about 20lbs over the past 1-1.5 years due to a loss of appetite. Denies SOB, abdominal pain, fever. No sick contacts, no recent travel. Last colonoscopy 7 years ago. Coumadin checked 3 months ago by PCP. Currently, he states the chest pain is about a 1/10. In the [facility] ED, he was found to be anemic with a Hgb of 6.4/20.7 and was given 2 units of PRBC's. He was also found to be occult positive. He appears comfortable at this time and is now being admitted to telemetry for symptomatic anemia with a possible GI bleed and r/o acs. He was transferred to [facility] today for further evaluation of his AS, and treatment of his Carotid Stenosis.    Today he is resting comfortably in bed offering no complaints. He states that over the past few weeks, he has noted intermittent chest discomfort, which usually goes away with rest. He also has been having some MAX when he exerts himself. He had seen Dr. Chavira (Cardiology) for the first time the other day, and it was determined that he had Severe AS, and was to be sent to see Dr. Meade for a surgical opinion. He presented to [facility] ED after having some chest discomfort, and feeling weak, where it was determined he was anemic, and had + Guaiac. Over the course of his [facility] Hospitalization, he received 3 units of PRBC. A rapid response was called when the patient was at [facility] for neurologic changes.  Negative MRI.  He was found to have severe bilateral carotid stenosisi.  MRI of head unremarkable for acute stroke.  He also complains of intermittent claudication, with a known history of PAD, and previous stents.    STS 3.74%            09-15 @ 07:01  -  09-15 @ 15:09  --------------------------------------------------------  IN: 0 mL / OUT: 250 mL / NET: -250 mL        PAST MEDICAL & SURGICAL HISTORY:  Peripheral vascular disease    Atrial fibrillation    Hypertension    Diabetes    Hyperlipidemia    Coronary artery disease    Status post angiography of extremity  1 stent in each leg    History of coronary artery stent placement  two coronary stents        FAMILY HISTORY:  FH: CAD (coronary artery disease)  son    FH: seizures  son    FH: type 1 diabetes  son    FH: diabetes mellitus  mother    Social History  No history of recent alcohol  use, tobacco use or illicit drug use.          No Known Allergies    dextrose 5% + sodium chloride 0.45%. 1000 milliLiter(s) IV Continuous <Continuous>  enoxaparin Injectable 40 milliGRAM(s) SubCutaneous daily      T(C): 36.6 (09-15-20 @ 14:26), Max: 37 (09-15-20 @ 11:53)  HR: 73 (09-15-20 @ 14:26) (64 - 87)  BP: 131/83 (09-15-20 @ 14:26) (105/55 - 131/83)  RR: 17 (09-15-20 @ 14:26) (15 - 18)  SpO2: 98% (09-15-20 @ 14:26) (97% - 100%)  Wt(kg): --      Review of Symptoms:  General: Alert, Follows commands  Respiratory:  + SOB, + MAX, Noough  Cardiac: + CP,Denies Palpitations  Gastrointestinal: Denies Pain, Denies N/V  Extremities: Denies Pedal Edema, No Joint pain  Vascular: + Intermittent Claudication  Neuro: + TIA  Endocrine: negative      Physical Exam:  Gen: A/Ox3, NAD  HEENT: No JVD, Neck Supple, Trachea midline, No masses  Pulmonary: CTAB,  No accessory muscle use for respiration  Cardiac: S1S2, Irregular, IV/IV DOREEN, No Gallops or Rubs  ECG: AFib  Gastrointestinal: Soft, NT/ND, + Bowel Sounds  Extremities:  No Edema, , + Venous stasis changes  Vascular: Trace Pulses B/L, + L sided Bruit  Neurological: Non Focal, = motor and sensory      Laboratory:                        8.0    12.77 )-----------( 205      ( 15 Sep 2020 11:00 )             25.7    09-15    136  |  103  |  22  ----------------------------<  126<H>  4.2   |  24  |  0.98    Ca    8.5      15 Sep 2020 03:00  Phos  3.2     09-15  Mg     1.8     09-15     PT/INR - ( 14 Sep 2020 06:04 )   PT: 14.6 SEC;   INR: 1.28          PTT - ( 15 Sep 2020 11:00 )  PTT:92.5 SEC    Pro-BNP:  Serum Pro-Brain Natriuretic Peptide (09.10.20 @ 01:45)    Serum Pro-Brain Natriuretic Peptide: 664.3: ACUTE CONGESTIVE HEART FAILURE is UNLIKELY if NT-proBNP  is < 300 pg/mL.    CONSIDER ACUTE CONGESTIVE HEART FAILURE if:    AGE                NT-proBNP RESULT  ---                -------------  < 50 YEARS      >  450 pg/mL  50 - 75 YEARS      >  900 pg/mL  > 75 YEARS      > 1800 pg/mL pg/mL    Transthoracic Echo:    < from: Transthoracic Echocardiogram (09.11.20 @ 10:13) >  Ejection Fraction (Teicholtz): 60 %  ------------------------------------------------------------------------  OBSERVATIONS:  Mitral Valve: Mitral annular calcification, otherwise  normal mitral valve. Mild-moderate mitral regurgitation.  Aortic Root: Normal aortic root.  Aortic Valve: Calcified trileaflet aortic valve with  decreased opening Peak transaortic valve gradient equals 98  mm Hg, mean transaortic valve gradient equals 59 mm Hg,  estimated aortic valvearea equals 0.5 sqcm (by continuity  equation), consistent with severe aortic stenosis.  Mild-moderate aortic regurgitation.  Left Atrium: Moderately dilated left atrium.  LA volume  index = 43 cc/m2.  Left Ventricle: Normal left ventricular systolicfunction.  No segmental wall motion abnormalities. Increased relative  wall thickness with normal left ventricular mass index,  consistent with concentric left ventricular remodeling.  (DT:180 ms).  Right Heart: Mild right atrial enlargement. Normal right  ventricular size and function. Normal tricuspid valve.  Mild-moderate tricuspid regurgitation. Normal pulmonic  valve. Mild pulmonic regurgitation.  Pericardium/PleuraNormal pericardium with no pericardial  effusion.  Hemodynamic: Estimated right ventricular systolic pressure  equals 54 mm Hg, assuming right atrial pressure equals 10  mm Hg, consistent with moderate pulmonary hypertension.  ------------------------------------------------------------------------  CONCLUSIONS:  1. Mitral annular calcification, otherwise normal mitral  valve. Mild-moderate mitral regurgitation.  2. Calcified trileaflet aortic valve with decreased opening  Peak transaortic valve gradient equals 98 mm Hg, mean  transaortic valve gradient equals 59 mm Hg, estimated  aortic valve area equals 0.5 sqcm (by continuity equation),  consistent with severe aortic stenosis. Mild-moderate  aortic regurgitation.  3. Moderately dilated left atrium.  LA volume index = 43  cc/m2.  4. Increased relative wall thickness with normal left  ventricular mass index, consistent with concentric left  ventricular remodeling.  5. Normal left ventricular systolic function. No segmental  wall motion abnormalities.  6. Normal right ventricular size and function.  7. Estimated right ventricular systolic pressure equals 54  mm Hg, assuming right atrial pressure equals 10 mm Hg,  consistent with moderate pulmonary hypertension8.  8. A bubble study was performed with the intravenous  injection of agitated saline.  Following contrast  injection, no obvious bubbles were seen in the left heart.    < end of copied text >    Cardiac Cath: Pending

## 2020-09-15 NOTE — DISCHARGE NOTE NURSING/CASE MANAGEMENT/SOCIAL WORK - PATIENT PORTAL LINK FT
You can access the FollowMyHealth Patient Portal offered by [facility] by registering at the following website: [email or web address]. By joining [employer]’s FollowMyHealth portal, you will also be able to view your health information using other applications (apps) compatible with our system.

## 2020-09-15 NOTE — CONSULT NOTE ADULT - NUTRITIONAL ASSESSMENT
MEDICATIONS  (STANDING):  dextrose 5%. 1000 milliLiter(s) (50 mL/Hr) IV Continuous <Continuous>  dextrose 50% Injectable 12.5 Gram(s) IV Push once  dextrose 50% Injectable 25 Gram(s) IV Push once  dextrose 50% Injectable 25 Gram(s) IV Push once  ferrous    sulfate 325 milliGRAM(s) Oral daily  heparin  Infusion 1000 Unit(s)/Hr (10 mL/Hr) IV Continuous <Continuous>  insulin lispro (HumaLOG) corrective regimen sliding scale   SubCutaneous every 6 hours  insulin lispro (HumaLOG) corrective regimen sliding scale   SubCutaneous at bedtime  lactated ringers. 1000 milliLiter(s) (75 mL/Hr) IV Continuous <Continuous>  magnesium oxide 400 milliGRAM(s) Oral two times a day with meals  nicotine -  14 mG/24Hr(s) Patch 1 patch Transdermal daily  pantoprazole    Tablet 40 milliGRAM(s) Oral before breakfast  simvastatin 10 milliGRAM(s) Oral at bedtime    MEDICATIONS  (PRN):  dextrose 40% Gel 15 Gram(s) Oral once PRN Blood Glucose LESS THAN 70 milliGRAM(s)/deciliter  glucagon  Injectable 1 milliGRAM(s) IntraMuscular once PRN Glucose LESS THAN 70 milligrams/deciliter      Home Medications:  Aspirin Enteric Coated 81 mg oral delayed release tablet: 1 tab(s) orally once a day (14 Sep 2020 12:57)  ferrous sulfate 325 mg (65 mg elemental iron) oral tablet: 1 tab(s) orally once a day (14 Sep 2020 12:57)  heparin 100 units/mL-D5% intravenous solution: @12CC/hour (14 Sep 2020 17:45)  magnesium oxide 400 mg (241.3 mg elemental magnesium) oral tablet: 1 tab(s) orally 2 times a day (with meals) x 2 days (14 Sep 2020 12:57)  nicotine 14 mg/24 hr transdermal film, extended release: transdermal once a day (14 Sep 2020 17:45)  pantoprazole 40 mg oral delayed release tablet: 1 tab(s) orally once a day (14 Sep 2020 12:57)  simvastatin 10 mg oral tablet: 1 tab(s) orally once a day (at bedtime) (10 Sep 2020 09:28)

## 2020-09-15 NOTE — H&P ADULT - ASSESSMENT
74 M with a PMH of CAD status post stents on Plavix, Afib on coumadin, HTN, HLD,  PVD with fem-fem bypass and bilateral LE stents, 55 pack year smoking history; now bilateral carotid stenosis, with right-sided asymptomatic, high-grade stenosis of 80%. Stable.    - Patient has been cleared by cardiology/structural heart service s/p cardiac cath  - preop/consent for R CEA  - consent for transfusion  - transfuse 1 u PRBC  - NPO after midnight, Maintenance IVF  - AM labs: CBC, BMP, Mg, Phos, Type& Screen, aPTT, PT/INR  - heparin gtt  - f/u q6 PTT  - 2 u PRBC on hold for OR      Vascular Surgery [id number]

## 2020-09-15 NOTE — PROVIDER CONTACT NOTE (CRITICAL VALUE NOTIFICATION) - RECOMMENDATIONS
Will continue to follow haparin nomogram as ordered
As per ACP no indication to transfusion at t his time.  will continue to monitor

## 2020-09-15 NOTE — PROGRESS NOTE ADULT - ASSESSMENT
75 yo M smoker w/ DM2, HTN, HLD, Afib on coumadin, CAD s/p stents, PVD s/p stents, admitted w/ symptomatic anemia with dark stools, s/p 3u pRBC and EGD w/o bleeding lesion, s/p code stroke for dysarthria (resolved) found to have severe AS and b/l carotid stenosis.     # Suspected TIA, Severe b/l carotid artery stenosis  - s/p code stroke for dysarthria on 9/11, now resolved  - suspect TIA, CT w/o e/o acute CVA, likewise MRI 9/12 negative for acute infarct  - CTA and carotid doppler shows severe b/l carotid artery stenosis  - appreciate vasc input, plan to transfer patient to [facility] for carotid endarterectomy and TAVR eval  - RCRI 3 pts as per cards, high risk for vascular procedure, they are recommending anesthesia eval prior to procedure for intra-procedural planning (can be done once patient transferred to [facility])  - c/w asa and statin as per neuro/cards, plavix d/aguilar in setting of GIB    # Anemia likely 2/2 UGIB  - s/p 3u prbc thus far  - monitor hgb and transfuse prn, goal hgb>8 given cardiac history  - s/p EGD without bleeding lesion, but did note small amount of fresh blood in the duodenum and some clots   - could have AVM which can be associated with severe AS  - s/p protonix gtt, on PO protonix bid as per GI  - OK to resume A/C (for afib), and monitor stools/hgb for bleeding, if rebleeds to consider repeat EGD vs EGD + colonoscopy as per GI  - on iron supplementation (ferritin low 25.4)    # Severe Aortic stenosis  - severe AS noted on echo 9/11, PILO .5cm2, mean pressure gradient 59  - appreciate cardiology input, plan to transfer to [facility] for TAVR eval  - chest discomfort likely d/t severe AS, component as well of anemia  - trop negative x2    # Atrial fibrillation  - [id number] 4 pts  - HR currently acceptable  - per cardiology can do diltiazem as needed for rate control  - OK to resume a/c as per GI, patient currently on heparin gtt  - f/u surgical plan re: when ok to resume oral anticoagulation    # CAD, PVD s/p stents  - on asa/statin as above  - plavix on hold given GIB    # Essential HTN  - BP currently in acceptable range  - monitoring off BP meds in setting of anemia/GIB    # DM2 controlled, not on long term insulin therapy  - hold home oral medication  - c/w HISS, monitor FS, currently acceptable  - diabetic diet    # Smoker  - c/w nicotine replacement therapy  - encourage cessation    # DVT ppx: on heparin gtt    Dispo: appreciate cardiology/vasc/GI/neuro recs. Plan to transfer patient to [facility] for CEA, TAVR eval. Discussed with patient rationale for procedures and transfer, patient in agreement with transfer plan, will be leaving today. Emotional support provided.     Discharge time 32 minutes

## 2020-09-15 NOTE — DISCHARGE NOTE NURSING/CASE MANAGEMENT/SOCIAL WORK - NSDCPEPTSTRK_GEN_ALL_CORE
Stroke education booklet/Stroke warning signs and symptoms/Signs and symptoms of stroke/Stroke support groups for patients, families, and friends/Need for follow up after discharge/Prescribed medications/Risk factors for stroke/Call 911 for stroke

## 2020-09-15 NOTE — H&P ADULT - NSICDXPASTMEDICALHX_GEN_ALL_CORE_FT
PAST MEDICAL HISTORY:  Atrial fibrillation     Coronary artery disease     Diabetes     Hyperlipidemia     Hypertension     Peripheral vascular disease

## 2020-09-15 NOTE — PROGRESS NOTE ADULT - SUBJECTIVE AND OBJECTIVE BOX
[facility] Medicine  Pager [phone]    Patient is a 74y old  Male who presents with a chief complaint of Symptomatic Anemia (14 Sep 2020 14:18)      INTERVAL HPI/OVERNIGHT EVENTS:    MEDICATIONS  (STANDING):  aspirin  chewable 81 milliGRAM(s) Oral daily  atorvastatin 80 milliGRAM(s) Oral at bedtime  dextrose 50% Injectable 12.5 Gram(s) IV Push once  dextrose 50% Injectable 25 Gram(s) IV Push once  dextrose 50% Injectable 25 Gram(s) IV Push once  ferrous    sulfate 325 milliGRAM(s) Oral daily  heparin  Infusion.  Unit(s)/Hr (12 mL/Hr) IV Continuous <Continuous>  influenza   Vaccine 0.5 milliLiter(s) IntraMuscular once  insulin lispro (HumaLOG) corrective regimen sliding scale   SubCutaneous three times a day before meals  insulin lispro (HumaLOG) corrective regimen sliding scale   SubCutaneous at bedtime  lidocaine   Patch 1 Patch Transdermal daily  magnesium oxide 400 milliGRAM(s) Oral two times a day with meals  nicotine -  14 mG/24Hr(s) Patch 1 patch Transdermal daily  pantoprazole    Tablet 40 milliGRAM(s) Oral two times a day    MEDICATIONS  (PRN):  acetaminophen   Tablet .. 650 milliGRAM(s) Oral every 6 hours PRN Temp greater or equal to 38C (100.4F), Mild Pain (1 - 3), Moderate Pain (4 - 6)  dextrose 40% Gel 15 Gram(s) Oral once PRN Blood Glucose LESS THAN 70 milliGRAM(s)/deciliter  glucagon  Injectable 1 milliGRAM(s) IntraMuscular once PRN Glucose LESS THAN 70 milligrams/deciliter  heparin   Injectable 5500 Unit(s) IV Push every 6 hours PRN For aPTT less than 40  heparin   Injectable 2500 Unit(s) IV Push every 6 hours PRN For aPTT between 40 - 57      Allergies    No Known Allergies    Intolerances        REVIEW OF SYSTEMS:  Please see interval HPI:    Vital Signs Last 24 Hrs  T(C): 36.6 (15 Sep 2020 08:40), Max: 36.9 (14 Sep 2020 15:55)  T(F): 97.9 (15 Sep 2020 08:40), Max: 98.4 (14 Sep 2020 15:55)  HR: 71 (15 Sep 2020 08:40) (71 - 90)  BP: 130/78 (15 Sep 2020 08:40) (105/55 - 130/78)  BP(mean): --  RR: 16 (15 Sep 2020 08:40) (16 - 18)  SpO2: 100% (15 Sep 2020 08:40) (97% - 100%)  I&O's Detail    14 Sep 2020 07:01  -  15 Sep 2020 07:00  --------------------------------------------------------  IN:  Total IN: 0 mL    OUT:    Voided (mL): 100 mL  Total OUT: 100 mL    Total NET: -100 mL            PHYSICAL EXAM:  GENERAL:   HEAD:    EYES:   ENMT:   NECK:   NERVOUS SYSTEM:    CHEST/LUNG:   HEART:   ABDOMEN:   EXTREMITIES:    LYMPH:   SKIN:     LABS:                        7.4    12.40 )-----------( 193      ( 15 Sep 2020 03:00 )             23.7     15 Sep 2020 03:00    136    |  103    |  22     ----------------------------<  126    4.2     |  24     |  0.98     Ca    8.5        15 Sep 2020 03:00  Phos  3.2       15 Sep 2020 03:00  Mg     1.8       15 Sep 2020 03:00      PT/INR - ( 14 Sep 2020 06:04 )   PT: 14.6 SEC;   INR: 1.28          PTT - ( 15 Sep 2020 03:00 )  PTT:133.1 SEC  CAPILLARY BLOOD GLUCOSE      POCT Blood Glucose.: 114 mg/dL (15 Sep 2020 07:41)  POCT Blood Glucose.: 162 mg/dL (14 Sep 2020 20:56)  POCT Blood Glucose.: 239 mg/dL (14 Sep 2020 17:00)  POCT Blood Glucose.: 138 mg/dL (14 Sep 2020 11:40)    BLOOD CULTURE    RADIOLOGY & ADDITIONAL TESTS:    Imaging Personally Reviewed:  [ ] YES     Consultant(s) Notes Reviewed:      Care Discussed with Consultants/Other Providers: [facility] Medicine  Pager [phone]    Patient is a 74y old  Male who presents with a chief complaint of Symptomatic Anemia (14 Sep 2020 14:18)    INTERVAL HPI/OVERNIGHT EVENTS:  Some mild L hip pain but otherwise doing OK. No CP/SOB. Is little apprehensive about upcoming procedures but understands rationale for them. Is being transferred to [facility] for CEA and TAVR eval. Emotional support provided, patient states "It's all in God's hands".     MEDICATIONS  (STANDING):  aspirin  chewable 81 milliGRAM(s) Oral daily  atorvastatin 80 milliGRAM(s) Oral at bedtime  dextrose 50% Injectable 12.5 Gram(s) IV Push once  dextrose 50% Injectable 25 Gram(s) IV Push once  dextrose 50% Injectable 25 Gram(s) IV Push once  ferrous    sulfate 325 milliGRAM(s) Oral daily  heparin  Infusion.  Unit(s)/Hr (12 mL/Hr) IV Continuous <Continuous>  influenza   Vaccine 0.5 milliLiter(s) IntraMuscular once  insulin lispro (HumaLOG) corrective regimen sliding scale   SubCutaneous three times a day before meals  insulin lispro (HumaLOG) corrective regimen sliding scale   SubCutaneous at bedtime  lidocaine   Patch 1 Patch Transdermal daily  magnesium oxide 400 milliGRAM(s) Oral two times a day with meals  nicotine -  14 mG/24Hr(s) Patch 1 patch Transdermal daily  pantoprazole    Tablet 40 milliGRAM(s) Oral two times a day    MEDICATIONS  (PRN):  acetaminophen   Tablet .. 650 milliGRAM(s) Oral every 6 hours PRN Temp greater or equal to 38C (100.4F), Mild Pain (1 - 3), Moderate Pain (4 - 6)  dextrose 40% Gel 15 Gram(s) Oral once PRN Blood Glucose LESS THAN 70 milliGRAM(s)/deciliter  glucagon  Injectable 1 milliGRAM(s) IntraMuscular once PRN Glucose LESS THAN 70 milligrams/deciliter  heparin   Injectable 5500 Unit(s) IV Push every 6 hours PRN For aPTT less than 40  heparin   Injectable 2500 Unit(s) IV Push every 6 hours PRN For aPTT between 40 - 57      Allergies  No Known Allergies    Intolerances    REVIEW OF SYSTEMS:  Please see interval HPI:    Vital Signs Last 24 Hrs  T(C): 36.6 (15 Sep 2020 08:40), Max: 36.9 (14 Sep 2020 15:55)  T(F): 97.9 (15 Sep 2020 08:40), Max: 98.4 (14 Sep 2020 15:55)  HR: 71 (15 Sep 2020 08:40) (71 - 90)  BP: 130/78 (15 Sep 2020 08:40) (105/55 - 130/78)  BP(mean): --  RR: 16 (15 Sep 2020 08:40) (16 - 18)  SpO2: 100% (15 Sep 2020 08:40) (97% - 100%)  I&O's Detail    14 Sep 2020 07:01  -  15 Sep 2020 07:00  --------------------------------------------------------  IN:  Total IN: 0 mL    OUT:    Voided (mL): 100 mL  Total OUT: 100 mL    Total NET: -100 mL    PHYSICAL EXAM:  GENERAL: NAD, sitting in bed  HEAD:  NC/AT  EYES: EOMI, clear sclera/conjunctiva  ENMT: MMM  NECK: supple  NERVOUS SYSTEM: moving extremities, nonfocal, conversant    CHEST/LUNG: CTAB, comfortable on RA, speaking in full sentences  HEART: S1S2 RRR +systolic murmur  ABDOMEN: soft, non-tender  EXTREMITIES:  no c/c/e      LABS:                        7.4    12.40 )-----------( 193      ( 15 Sep 2020 03:00 )             23.7     15 Sep 2020 03:00    136    |  103    |  22     ----------------------------<  126    4.2     |  24     |  0.98     Ca    8.5        15 Sep 2020 03:00  Phos  3.2       15 Sep 2020 03:00  Mg     1.8       15 Sep 2020 03:00      PT/INR - ( 14 Sep 2020 06:04 )   PT: 14.6 SEC;   INR: 1.28     PTT - ( 15 Sep 2020 03:00 )  PTT:133.1 SEC    CAPILLARY BLOOD GLUCOSE  POCT Blood Glucose.: 114 mg/dL (15 Sep 2020 07:41)  POCT Blood Glucose.: 162 mg/dL (14 Sep 2020 20:56)  POCT Blood Glucose.: 239 mg/dL (14 Sep 2020 17:00)  POCT Blood Glucose.: 138 mg/dL (14 Sep 2020 11:40)    BLOOD CULTURE    RADIOLOGY & ADDITIONAL TESTS:    Imaging Personally Reviewed:  [ ] YES     Consultant(s) Notes Reviewed:      Care Discussed with Consultants/Other Providers: ROBERT Caballero re: patient being transferred to [facility] for CEA (left) and TAVR eval

## 2020-09-15 NOTE — H&P ADULT - NSHPSOCIALHISTORY_GEN_ALL_CORE
Marital Status:  for 25 years; Pt has 6 children    Occupation: Works as an     Tobacco Use: Current smoker; smokes about 1 pack/day x 55 years    ETOH Use: denies    Flu Vaccine:  ?                                Pneumonia Vaccine: ?

## 2020-09-15 NOTE — PROVIDER CONTACT NOTE (CRITICAL VALUE NOTIFICATION) - ASSESSMENT
Pt A&Ox4, V/S as charted, no s&s of bleeding observed.
VS remain stable and within normal patient limits.  no complaints of SOB, MAX or headache.

## 2020-09-15 NOTE — DISCHARGE NOTE NURSING/CASE MANAGEMENT/SOCIAL WORK - NSDCPEEMAIL_GEN_ALL_CORE
[facility] for Tobacco Control email [email or web address]

## 2020-09-15 NOTE — PROGRESS NOTE ADULT - PROVIDER SPECIALTY LIST ADULT
Cardiology
Cardiology
Gastroenterology
Hospitalist
Neurology
Vascular Surgery
Gastroenterology
Hospitalist

## 2020-09-15 NOTE — H&P ADULT - NSHPLABSRESULTS_GEN_ALL_CORE
I&O's Detail    15 Sep 2020 07:01  -  15 Sep 2020 18:08  --------------------------------------------------------  IN:  Total IN: 0 mL    OUT:    Voided (mL): 250 mL  Total OUT: 250 mL    Total NET: -250 mL        LABS:                        8.0    12.77 )-----------( 205      ( 15 Sep 2020 11:00 )             25.7     09-15    136  |  103  |  22  ----------------------------<  126<H>  4.2   |  24  |  0.98    Ca    8.5      15 Sep 2020 03:00  Phos  3.2     09-15  Mg     1.8     09-15      PT/INR - ( 14 Sep 2020 06:04 )   PT: 14.6 SEC;   INR: 1.28          PTT - ( 15 Sep 2020 11:00 )  PTT:92.5 SEC

## 2020-09-16 ENCOUNTER — RESULT REVIEW (OUTPATIENT)
Age: 75
End: 2020-09-16

## 2020-09-16 ENCOUNTER — TRANSCRIPTION ENCOUNTER (OUTPATIENT)
Age: 75
End: 2020-09-16

## 2020-09-16 ENCOUNTER — APPOINTMENT (OUTPATIENT)
Dept: VASCULAR SURGERY | Facility: HOSPITAL | Age: 75
End: 2020-09-16

## 2020-09-16 PROBLEM — I73.9 PERIPHERAL VASCULAR DISEASE, UNSPECIFIED: Chronic | Status: ACTIVE | Noted: 2020-09-10

## 2020-09-16 LAB
ANION GAP SERPL CALC-SCNC: 9 MMOL/L — SIGNIFICANT CHANGE UP (ref 5–17)
ANION GAP SERPL CALC-SCNC: 9 MMOL/L — SIGNIFICANT CHANGE UP (ref 5–17)
APTT BLD: 43.6 SEC — HIGH (ref 27.5–35.5)
BUN SERPL-MCNC: 14 MG/DL — SIGNIFICANT CHANGE UP (ref 7–23)
BUN SERPL-MCNC: 18 MG/DL — SIGNIFICANT CHANGE UP (ref 7–23)
CALCIUM SERPL-MCNC: 8.6 MG/DL — SIGNIFICANT CHANGE UP (ref 8.4–10.5)
CALCIUM SERPL-MCNC: 8.8 MG/DL — SIGNIFICANT CHANGE UP (ref 8.4–10.5)
CHLORIDE SERPL-SCNC: 101 MMOL/L — SIGNIFICANT CHANGE UP (ref 96–108)
CHLORIDE SERPL-SCNC: 103 MMOL/L — SIGNIFICANT CHANGE UP (ref 96–108)
CK MB CFR SERPL CALC: 1.1 NG/ML — SIGNIFICANT CHANGE UP (ref 0–6.7)
CK SERPL-CCNC: 30 U/L — SIGNIFICANT CHANGE UP (ref 30–200)
CO2 SERPL-SCNC: 22 MMOL/L — SIGNIFICANT CHANGE UP (ref 22–31)
CO2 SERPL-SCNC: 25 MMOL/L — SIGNIFICANT CHANGE UP (ref 22–31)
CREAT SERPL-MCNC: 0.78 MG/DL — SIGNIFICANT CHANGE UP (ref 0.5–1.3)
CREAT SERPL-MCNC: 1 MG/DL — SIGNIFICANT CHANGE UP (ref 0.5–1.3)
GLUCOSE BLDC GLUCOMTR-MCNC: 117 MG/DL — HIGH (ref 70–99)
GLUCOSE BLDC GLUCOMTR-MCNC: 129 MG/DL — HIGH (ref 70–99)
GLUCOSE BLDC GLUCOMTR-MCNC: 185 MG/DL — HIGH (ref 70–99)
GLUCOSE SERPL-MCNC: 146 MG/DL — HIGH (ref 70–99)
GLUCOSE SERPL-MCNC: 168 MG/DL — HIGH (ref 70–99)
HCT VFR BLD CALC: 25.2 % — LOW (ref 39–50)
HCT VFR BLD CALC: 28.6 % — LOW (ref 39–50)
HGB BLD-MCNC: 8.2 G/DL — LOW (ref 13–17)
HGB BLD-MCNC: 9.1 G/DL — LOW (ref 13–17)
INR BLD: 1.2 RATIO — HIGH (ref 0.88–1.16)
MAGNESIUM SERPL-MCNC: 1.6 MG/DL — SIGNIFICANT CHANGE UP (ref 1.6–2.6)
MCHC RBC-ENTMCNC: 31.4 PG — SIGNIFICANT CHANGE UP (ref 27–34)
MCHC RBC-ENTMCNC: 31.6 PG — SIGNIFICANT CHANGE UP (ref 27–34)
MCHC RBC-ENTMCNC: 31.8 GM/DL — LOW (ref 32–36)
MCHC RBC-ENTMCNC: 32.5 GM/DL — SIGNIFICANT CHANGE UP (ref 32–36)
MCV RBC AUTO: 96.6 FL — SIGNIFICANT CHANGE UP (ref 80–100)
MCV RBC AUTO: 99.3 FL — SIGNIFICANT CHANGE UP (ref 80–100)
NRBC # BLD: 0 /100 WBCS — SIGNIFICANT CHANGE UP (ref 0–0)
NRBC # BLD: 0 /100 WBCS — SIGNIFICANT CHANGE UP (ref 0–0)
PHOSPHATE SERPL-MCNC: 3 MG/DL — SIGNIFICANT CHANGE UP (ref 2.5–4.5)
PLATELET # BLD AUTO: 167 K/UL — SIGNIFICANT CHANGE UP (ref 150–400)
PLATELET # BLD AUTO: 183 K/UL — SIGNIFICANT CHANGE UP (ref 150–400)
POTASSIUM SERPL-MCNC: 4 MMOL/L — SIGNIFICANT CHANGE UP (ref 3.5–5.3)
POTASSIUM SERPL-MCNC: 4.3 MMOL/L — SIGNIFICANT CHANGE UP (ref 3.5–5.3)
POTASSIUM SERPL-SCNC: 4 MMOL/L — SIGNIFICANT CHANGE UP (ref 3.5–5.3)
POTASSIUM SERPL-SCNC: 4.3 MMOL/L — SIGNIFICANT CHANGE UP (ref 3.5–5.3)
PROTHROM AB SERPL-ACNC: 14.2 SEC — HIGH (ref 10.6–13.6)
RBC # BLD: 2.61 M/UL — LOW (ref 4.2–5.8)
RBC # BLD: 2.88 M/UL — LOW (ref 4.2–5.8)
RBC # FLD: 16.7 % — HIGH (ref 10.3–14.5)
RBC # FLD: 16.8 % — HIGH (ref 10.3–14.5)
SARS-COV-2 IGG SERPL QL IA: NEGATIVE — SIGNIFICANT CHANGE UP
SARS-COV-2 IGM SERPL IA-ACNC: 0.08 INDEX — SIGNIFICANT CHANGE UP
SODIUM SERPL-SCNC: 134 MMOL/L — LOW (ref 135–145)
SODIUM SERPL-SCNC: 135 MMOL/L — SIGNIFICANT CHANGE UP (ref 135–145)
TROPONIN T, HIGH SENSITIVITY RESULT: 11 NG/L — SIGNIFICANT CHANGE UP (ref 0–51)
WBC # BLD: 12.17 K/UL — HIGH (ref 3.8–10.5)
WBC # BLD: 12.67 K/UL — HIGH (ref 3.8–10.5)
WBC # FLD AUTO: 12.17 K/UL — HIGH (ref 3.8–10.5)
WBC # FLD AUTO: 12.67 K/UL — HIGH (ref 3.8–10.5)

## 2020-09-16 PROCEDURE — 99232 SBSQ HOSP IP/OBS MODERATE 35: CPT

## 2020-09-16 PROCEDURE — 88311 DECALCIFY TISSUE: CPT | Mod: 26

## 2020-09-16 PROCEDURE — 71045 X-RAY EXAM CHEST 1 VIEW: CPT | Mod: 26

## 2020-09-16 PROCEDURE — 35301 RECHANNELING OF ARTERY: CPT | Mod: LT

## 2020-09-16 PROCEDURE — 88304 TISSUE EXAM BY PATHOLOGIST: CPT | Mod: 26

## 2020-09-16 RX ORDER — ACETAMINOPHEN 500 MG
1000 TABLET ORAL ONCE
Refills: 0 | Status: COMPLETED | OUTPATIENT
Start: 2020-09-16 | End: 2020-09-16

## 2020-09-16 RX ORDER — ONDANSETRON 8 MG/1
4 TABLET, FILM COATED ORAL ONCE
Refills: 0 | Status: DISCONTINUED | OUTPATIENT
Start: 2020-09-16 | End: 2020-09-17

## 2020-09-16 RX ORDER — ACETAMINOPHEN 500 MG
650 TABLET ORAL EVERY 6 HOURS
Refills: 0 | Status: DISCONTINUED | OUTPATIENT
Start: 2020-09-16 | End: 2020-09-16

## 2020-09-16 RX ORDER — MORPHINE SULFATE 50 MG/1
2 CAPSULE, EXTENDED RELEASE ORAL EVERY 4 HOURS
Refills: 0 | Status: DISCONTINUED | OUTPATIENT
Start: 2020-09-16 | End: 2020-09-17

## 2020-09-16 RX ORDER — PHENYLEPHRINE HYDROCHLORIDE 10 MG/ML
0.5 INJECTION INTRAVENOUS
Qty: 40 | Refills: 0 | Status: DISCONTINUED | OUTPATIENT
Start: 2020-09-16 | End: 2020-09-17

## 2020-09-16 RX ORDER — CHLORHEXIDINE GLUCONATE 213 G/1000ML
1 SOLUTION TOPICAL DAILY
Refills: 0 | Status: DISCONTINUED | OUTPATIENT
Start: 2020-09-16 | End: 2020-09-17

## 2020-09-16 RX ORDER — ASPIRIN/CALCIUM CARB/MAGNESIUM 324 MG
81 TABLET ORAL ONCE
Refills: 0 | Status: COMPLETED | OUTPATIENT
Start: 2020-09-16 | End: 2020-09-16

## 2020-09-16 RX ORDER — ASPIRIN/CALCIUM CARB/MAGNESIUM 324 MG
81 TABLET ORAL DAILY
Refills: 0 | Status: DISCONTINUED | OUTPATIENT
Start: 2020-09-16 | End: 2020-09-17

## 2020-09-16 RX ORDER — ACETAMINOPHEN 500 MG
650 TABLET ORAL EVERY 6 HOURS
Refills: 0 | Status: DISCONTINUED | OUTPATIENT
Start: 2020-09-16 | End: 2020-09-18

## 2020-09-16 RX ADMIN — CHLORHEXIDINE GLUCONATE 1 APPLICATION(S): 213 SOLUTION TOPICAL at 11:52

## 2020-09-16 RX ADMIN — Medication 400 MILLIGRAM(S): at 22:48

## 2020-09-16 RX ADMIN — Medication 1000 MILLIGRAM(S): at 12:21

## 2020-09-16 RX ADMIN — PHENYLEPHRINE HYDROCHLORIDE 12.6 MICROGRAM(S)/KG/MIN: 10 INJECTION INTRAVENOUS at 18:27

## 2020-09-16 RX ADMIN — Medication 81 MILLIGRAM(S): at 07:59

## 2020-09-16 RX ADMIN — Medication 1000 MILLIGRAM(S): at 23:15

## 2020-09-16 RX ADMIN — Medication 400 MILLIGRAM(S): at 11:51

## 2020-09-16 NOTE — PROGRESS NOTE ADULT - ASSESSMENT
74 M with a PMH of CAD status post stents on Plavix, Afib on coumadin, HTN, HLD,  PVD with fem-fem bypass and bilateral LE stents, 55 pack year smoking history; now bilateral carotid stenosis, with right-sided asymptomatic, high-grade stenosis of 80%. Stable. Going to OR today for R CEA.  - Received 1 unit PRBC  - NPO since midnight  - Heparin gtt held for OR  - IVF  - OR today    Vascular Surgery [id number] 74 M with a PMH of CAD status post stents on Plavix, Afib on coumadin, HTN, HLD,  PVD with fem-fem bypass and bilateral LE stents, 55 pack year smoking history; now bilateral carotid stenosis, with right-sided asymptomatic, high-grade stenosis of 80%. Stable. Going to OR today for R CEA.  - Received 1 unit PRBC  - NPO since midnight  - Heparin gtt held for OR  - IVF  - Will continue ASA post-operatively  - OR today    Vascular Surgery [id number]

## 2020-09-16 NOTE — DISCHARGE NOTE NURSING/CASE MANAGEMENT/SOCIAL WORK - NSDCPEEMAIL_GEN_ALL_CORE
[facility] for Tobacco Control email [email or web address]

## 2020-09-16 NOTE — DISCHARGE NOTE NURSING/CASE MANAGEMENT/SOCIAL WORK - NSDCPEWEB_GEN_ALL_CORE
[place] website --- [email or web address] for Tobacco Control website --- [email or web address]

## 2020-09-16 NOTE — PRE-ANESTHESIA EVALUATION ADULT - NSANTHOSAYNRD_GEN_A_CORE
No. REX screening performed.  STOP BANG Legend: 0-2 = LOW Risk; 3-4 = INTERMEDIATE Risk; 5-8 = HIGH Risk

## 2020-09-16 NOTE — DISCHARGE NOTE NURSING/CASE MANAGEMENT/SOCIAL WORK - PATIENT PORTAL LINK FT
You can access the FollowMyHealth Patient Portal offered by [facility] by registering at the following website: [email or web address]. By joining [employer]’s FollowMyHealth portal, you will also be able to view your health information using other applications (apps) compatible with our system.

## 2020-09-16 NOTE — CHART NOTE - NSCHARTNOTEFT_GEN_A_CORE
Vascular Surgery Post-Op Check    S: Patient seen and examined at bedside. S/p L carotid endarterectomy. Patient tolerated procedure well. Reports pain at surgical site, but it is well controlled. Denies chest pain, shortness of breath, nausea/vomiting.    Physical Exam:  General: No acute distress  Respiratory: Nonlabored, lungs clear to auscultation  Cardiovascular: RRR, no murmurs  Abdominal: Soft, nondistended, nontender. No rebound or guarding.   Extremities: warm and well perfused. strength and sensation intact in bilateral upper extremities. L neck incision dressing C/D/I. KOLBY drain in place w/ mostly sanguinous output. No evidence of palpable hematoma  Vascular: Palpable carotid pulse, palpable radial and ulnar pulses in bilateral UE    PAST MEDICAL HISTORY:  Peripheral vascular disease    Atrial fibrillation    Hypertension    Diabetes    Hyperlipidemia    Diabetes    Hypertension    Coronary artery disease    Hyperlipemia    Hypertension    Diabetes          MEDICATIONS:  acetaminophen   Tablet .. 650 milliGRAM(s) Oral every 6 hours PRN  aspirin  chewable 81 milliGRAM(s) Oral daily  chlorhexidine 2% Cloths 1 Application(s) Topical daily  dextrose 40% Gel 15 Gram(s) Oral once PRN  dextrose 5%. 1000 milliLiter(s) IV Continuous <Continuous>  dextrose 50% Injectable 12.5 Gram(s) IV Push once  dextrose 50% Injectable 25 Gram(s) IV Push once  dextrose 50% Injectable 25 Gram(s) IV Push once  ferrous    sulfate 325 milliGRAM(s) Oral daily  glucagon  Injectable 1 milliGRAM(s) IntraMuscular once PRN  insulin lispro (HumaLOG) corrective regimen sliding scale   SubCutaneous every 6 hours  insulin lispro (HumaLOG) corrective regimen sliding scale   SubCutaneous at bedtime  lactated ringers. 1000 milliLiter(s) IV Continuous <Continuous>  magnesium oxide 400 milliGRAM(s) Oral two times a day with meals  morphine  - Injectable 2 milliGRAM(s) IV Push every 4 hours PRN  nicotine -  14 mG/24Hr(s) Patch 1 patch Transdermal daily  ondansetron Injectable 4 milliGRAM(s) IV Push once PRN  pantoprazole    Tablet 40 milliGRAM(s) Oral before breakfast  phenylephrine    Infusion 0.5 MICROgram(s)/kG/Min IV Continuous <Continuous>  simvastatin 10 milliGRAM(s) Oral at bedtime      ALLERGIES:  No Known Allergies      VITALS & I/Os:  Vital Signs Last 24 Hrs  T(C): 36.2 (16 Sep 2020 22:00), Max: 38.1 (16 Sep 2020 01:59)  T(F): 97.2 (16 Sep 2020 22:00), Max: 100.5 (16 Sep 2020 01:59)  HR: 81 (16 Sep 2020 22:50) (71 - 90)  BP: 132/73 (16 Sep 2020 22:50) (99/62 - 152/68)  BP(mean): 99 (16 Sep 2020 22:50) (76 - 107)  RR: 17 (16 Sep 2020 22:50) (14 - 18)  SpO2: 100% (16 Sep 2020 22:50) (95% - 100%)    I&O's Summary    15 Sep 2020 07:01  -  16 Sep 2020 07:00  --------------------------------------------------------  IN: 1480 mL / OUT: 1025 mL / NET: 455 mL    16 Sep 2020 07:01  -  16 Sep 2020 22:55  --------------------------------------------------------  IN: 867.6 mL / OUT: 700 mL / NET: 167.6 mL          LABS:                        8.2    12.67 )-----------( 167      ( 16 Sep 2020 18:23 )             25.2     09-16    134<L>  |  103  |  14  ----------------------------<  146<H>  4.0   |  22  |  0.78    Ca    8.6      16 Sep 2020 18:23  Phos  3.0     09-16  Mg     1.6     09-16      Lactate:    PT/INR - ( 16 Sep 2020 07:32 )   PT: 14.2 sec;   INR: 1.20 ratio         PTT - ( 16 Sep 2020 07:32 )  PTT:43.6 sec    CARDIAC MARKERS ( 16 Sep 2020 18:23 )  x     / x     / 30 U/L / x     / 1.1 ng/mL        Urinalysis Basic - ( 16 Sep 2020 03:07 )    Color: Light Yellow / Appearance: Clear / S.028 / pH: x  Gluc: x / Ketone: Negative  / Bili: Negative / Urobili: Negative   Blood: x / Protein: Negative / Nitrite: Negative   Leuk Esterase: Negative / RBC: x / WBC x   Sq Epi: x / Non Sq Epi: x / Bacteria: x      A/P  74 Year-Old Gentleman with a PMH of CAD status post stents on Plavix, Afib on coumadin, HTN, HLD,  PVD with fem-fem bypass and bilateral LE stents, 55 pack year smoking history s/p L CEA  - Will go to SICU for BP goals; 120-160 SBP; on roxann drip currently  - Heparin held until tomorrow  - Restart ASA tomorrow  - Pain control  - ISS for glucose control  - Advance diet from clear liquids tomorrow if tolerating  - Appreciate care per SICU    Vascular Surgery [id number]

## 2020-09-16 NOTE — PROGRESS NOTE ADULT - SUBJECTIVE AND OBJECTIVE BOX
Vascular Surgery Progress Note    S: Patient seen and examined at bedside. No events overnight. Patient was receiving 1 unit of blood while he was seen. Denies chest pain, shortness of breath, nausea/vomiting. Going to the OR today    Physical Exam:  General: No acute distress  Respiratory: Nonlabored, lungs clear to auscultation  Cardiovascular: RRR, no murmurs  Abdominal: Soft, nondistended, nontender. No rebound or guarding.  Extremities: all extremities warm and well perfused  Vascular: 2+ palpable radial, brachial, femoral, popliteal, PT, DP pulses in bilateral LE    PAST MEDICAL HISTORY:  Peripheral vascular disease    Atrial fibrillation    Hypertension    Diabetes    Hyperlipidemia    Diabetes    Hypertension    Coronary artery disease    Hyperlipemia    Hypertension    Diabetes          MEDICATIONS:  dextrose 40% Gel 15 Gram(s) Oral once PRN  dextrose 5%. 1000 milliLiter(s) IV Continuous <Continuous>  dextrose 50% Injectable 12.5 Gram(s) IV Push once  dextrose 50% Injectable 25 Gram(s) IV Push once  dextrose 50% Injectable 25 Gram(s) IV Push once  ferrous    sulfate 325 milliGRAM(s) Oral daily  glucagon  Injectable 1 milliGRAM(s) IntraMuscular once PRN  insulin lispro (HumaLOG) corrective regimen sliding scale   SubCutaneous every 6 hours  insulin lispro (HumaLOG) corrective regimen sliding scale   SubCutaneous at bedtime  lactated ringers. 1000 milliLiter(s) IV Continuous <Continuous>  magnesium oxide 400 milliGRAM(s) Oral two times a day with meals  nicotine -  14 mG/24Hr(s) Patch 1 patch Transdermal daily  pantoprazole    Tablet 40 milliGRAM(s) Oral before breakfast  simvastatin 10 milliGRAM(s) Oral at bedtime      ALLERGIES:  No Known Allergies      VITALS & I/Os:  Vital Signs Last 24 Hrs  T(C): 36.9 (16 Sep 2020 06:30), Max: 38.1 (16 Sep 2020 01:59)  T(F): 98.4 (16 Sep 2020 06:30), Max: 100.5 (16 Sep 2020 01:59)  HR: 77 (16 Sep 2020 06:30) (64 - 89)  BP: 134/84 (16 Sep 2020 06:30) (102/56 - 156/91)  BP(mean): --  RR: 17 (16 Sep 2020 06:30) (15 - 18)  SpO2: 98% (16 Sep 2020 06:30) (94% - 100%)    I&O's Summary    15 Sep 2020 07:01  -  16 Sep 2020 07:00  --------------------------------------------------------  IN: 1480 mL / OUT: 1025 mL / NET: 455 mL          LABS:                        8.7    12.46 )-----------( 205      ( 15 Sep 2020 21:20 )             27.9     09-15    135  |  99  |  19  ----------------------------<  194<H>  4.2   |  24  |  1.03    Ca    9.1      15 Sep 2020 21:20  Phos  3.2     09-15  Mg     1.7     09-15      Lactate:    PTT - ( 15 Sep 2020 21:20 )  PTT:67.0 sec          Urinalysis Basic - ( 16 Sep 2020 03:07 )    Color: Light Yellow / Appearance: Clear / S.028 / pH: x  Gluc: x / Ketone: Negative  / Bili: Negative / Urobili: Negative   Blood: x / Protein: Negative / Nitrite: Negative   Leuk Esterase: Negative / RBC: x / WBC x   Sq Epi: x / Non Sq Epi: x / Bacteria: x

## 2020-09-16 NOTE — PROGRESS NOTE ADULT - SUBJECTIVE AND OBJECTIVE BOX
*****Structural Heart Team*****    Subjective:  No acute events reported overnight.  The patient is complaining of left buttock pain which he experienced at [facility] for which he was evaluated.  It is unchanged in nature.  He denies any cardiopulmonary complaints at rest or upon exertion.  No chest pain/tightness/discomfort, fevers, chills, sweats, light-headed sensation, dizziness or palpitations.    Review of Systems  14 point review of systems is negative except what is described above in the history of present illness    Telemetry  Atrial fibrillation with occasional PVC    Physical Examination  Gen: A/Ox3, NAD, appropriate afffect  HEENT: No JVD, Neck Supple, Trachea midline, No masses  Pulmonary: CTAB,  No accessory muscle use for respiration  Cardiac: S1S2, Irregular, IV/IV DOREEN, No Gallops or Rubs  Gastrointestinal: Soft, NT/ND, + Bowel Sounds  Extremities:  No Edema, , + Venous stasis changes  Vascular: Trace Pulses B/L, + L sided Bruit  Neurological: Non Focal, = motor and sensory  T(C): 36.4 (09-16-20 @ 13:32), Max: 38.1 (09-16-20 @ 01:59)  HR: 88 (09-16-20 @ 13:32) (64 - 89)  BP: 130/63 (09-16-20 @ 13:32) (102/56 - 156/91)  RR: 18 (09-16-20 @ 13:32) (15 - 18)  SpO2: 95% (09-16-20 @ 13:32) (94% - 100%)  Wt(kg): --  09-15 @ 07:01  -  09-16 @ 07:00  --------------------------------------------------------  IN: 1480 mL / OUT: 1025 mL / NET: 455 mL      MEDICATIONS  (STANDING):  chlorhexidine 2% Cloths 1 Application(s) Topical daily  dextrose 5%. 1000 milliLiter(s) (50 mL/Hr) IV Continuous <Continuous>  dextrose 50% Injectable 12.5 Gram(s) IV Push once  dextrose 50% Injectable 25 Gram(s) IV Push once  dextrose 50% Injectable 25 Gram(s) IV Push once  ferrous    sulfate 325 milliGRAM(s) Oral daily  insulin lispro (HumaLOG) corrective regimen sliding scale   SubCutaneous every 6 hours  insulin lispro (HumaLOG) corrective regimen sliding scale   SubCutaneous at bedtime  lactated ringers. 1000 milliLiter(s) (75 mL/Hr) IV Continuous <Continuous>  magnesium oxide 400 milliGRAM(s) Oral two times a day with meals  nicotine -  14 mG/24Hr(s) Patch 1 patch Transdermal daily  pantoprazole    Tablet 40 milliGRAM(s) Oral before breakfast  simvastatin 10 milliGRAM(s) Oral at bedtime    MEDICATIONS  (PRN):  dextrose 40% Gel 15 Gram(s) Oral once PRN Blood Glucose LESS THAN 70 milliGRAM(s)/deciliter  glucagon  Injectable 1 milliGRAM(s) IntraMuscular once PRN Glucose LESS THAN 70 milligrams/deciliter    Home Medications:  Aspirin Enteric Coated 81 mg oral delayed release tablet: 1 tab(s) orally once a day (14 Sep 2020 12:57)  ferrous sulfate 325 mg (65 mg elemental iron) oral tablet: 1 tab(s) orally once a day (14 Sep 2020 12:57)  heparin 100 units/mL-D5% intravenous solution: @12CC/hour (14 Sep 2020 17:45)  magnesium oxide 400 mg (241.3 mg elemental magnesium) oral tablet: 1 tab(s) orally 2 times a day (with meals) x 2 days (14 Sep 2020 12:57)  nicotine 14 mg/24 hr transdermal film, extended release: transdermal once a day (14 Sep 2020 17:45)  pantoprazole 40 mg oral delayed release tablet: 1 tab(s) orally once a day (14 Sep 2020 12:57)  simvastatin 10 mg oral tablet: 1 tab(s) orally once a day (at bedtime) (10 Sep 2020 09:28)                       9.1    12.17 )-----------( 183      ( 16 Sep 2020 07:32 )             28.6   09-16    135  |  101  |  18  ----------------------------<  168<H>  4.3   |  25  |  1.00    Ca    8.8      16 Sep 2020 07:32  Phos  3.0     09-16  Mg     1.6     09-16    PT/INR - ( 16 Sep 2020 07:32 )   PT: 14.2 sec;   INR: 1.20 ratio    PTT - ( 16 Sep 2020 07:32 )  PTT:43.6 sec    Assessment/Plan:  The patient presented to [facility] with worsening symptoms of shortness of breath, dyspnea upon exertion and left sided chest discomfort.  He has not been getting regular cardiology follow-up care.  He was found at the outside hospital to be anemic.  Workup negative for acute GI bleeding/EGD performed.  The patient was given 3U of PRBC.  He has a history of CAD s/p PCI*2 and PAD (stents) but no recent cardiac catheterization or ischemic evaluation.  TTE demonstrated severe aortic valve stenosis.  He was found after stroke code at [facility] to have significant carotid artery stenosis and was transferred to [facility] for further assessment/intervention.   He was sent for a cardiac catheterization following arrival to [facility] and was found to have obstructive coronary artery disease of his left circumflex artery.  He reports that since he was admitted to [facility] and [facility] he has not had any episodes of chest pain/tightness/discomfort or shortness of breath.    --Due to symptomatic carotid artery stenosis he is scheduled for surgery by Michael using a cervical block later today.  Due to his cardiovascular conditions the patient is felt to be a high risk individual.  He is currently asymptomatic and is felt to be at an acceptable risk to proceed.   Discussion had with the patient and his wife concerning the severity of current state and is co-existing severe medical issues including but not limited to carotid artery stenosis, recent TIA, severe aortic valve stenosis, anemia and obstructive coronary artery disease.  Mr. Crockett is aware that he is at increased risk of hemodynamic instability, bleeding, stroke, myocardial infarction and death.  --He is being evaluated by the [facility] valve team for potential CABG/SAVR or PCI/TAVR.  He will be seen by Dr. Meade following his carotid intervention.  --The patient should be placed on aspirin 81mg daily.    --Continue heparin drip.  Resume heparin drip following procedure once cleared by vascular team.  --Transition patient from simvastatin 20mg PO QD to atorvastatin 20mg PO QD.    --He should be started on metoprolol tartrate 12.5mg PO BID (hold if SBP<100mmHg or HR<60bpm).  Uptirate as tolerated.  --Continue telemetry monitoring.  --Aim for K>4 and Mg>2    All questions and concerns of the patient were addressed.    Findings discussed with cardiac surgery/Dr. Meade and the patients nurse.

## 2020-09-17 LAB
ANION GAP SERPL CALC-SCNC: 9 MMOL/L — SIGNIFICANT CHANGE UP (ref 5–17)
APTT BLD: 32.5 SEC — SIGNIFICANT CHANGE UP (ref 27.5–35.5)
APTT BLD: 67.7 SEC — HIGH (ref 27.5–35.5)
APTT BLD: 68.2 SEC — HIGH (ref 27.5–35.5)
BUN SERPL-MCNC: 14 MG/DL — SIGNIFICANT CHANGE UP (ref 7–23)
CALCIUM SERPL-MCNC: 8.6 MG/DL — SIGNIFICANT CHANGE UP (ref 8.4–10.5)
CHLORIDE SERPL-SCNC: 102 MMOL/L — SIGNIFICANT CHANGE UP (ref 96–108)
CO2 SERPL-SCNC: 23 MMOL/L — SIGNIFICANT CHANGE UP (ref 22–31)
CREAT SERPL-MCNC: 0.81 MG/DL — SIGNIFICANT CHANGE UP (ref 0.5–1.3)
GLUCOSE BLDC GLUCOMTR-MCNC: 222 MG/DL — HIGH (ref 70–99)
GLUCOSE BLDC GLUCOMTR-MCNC: 302 MG/DL — HIGH (ref 70–99)
GLUCOSE BLDC GLUCOMTR-MCNC: 69 MG/DL — LOW (ref 70–99)
GLUCOSE BLDC GLUCOMTR-MCNC: 78 MG/DL — SIGNIFICANT CHANGE UP (ref 70–99)
GLUCOSE SERPL-MCNC: 89 MG/DL — SIGNIFICANT CHANGE UP (ref 70–99)
HCT VFR BLD CALC: 24.6 % — LOW (ref 39–50)
HCT VFR BLD CALC: 25.7 % — LOW (ref 39–50)
HGB BLD-MCNC: 7.8 G/DL — LOW (ref 13–17)
HGB BLD-MCNC: 8.4 G/DL — LOW (ref 13–17)
INR BLD: 1.21 RATIO — HIGH (ref 0.88–1.16)
MAGNESIUM SERPL-MCNC: 1.6 MG/DL — SIGNIFICANT CHANGE UP (ref 1.6–2.6)
MCHC RBC-ENTMCNC: 31.1 PG — SIGNIFICANT CHANGE UP (ref 27–34)
MCHC RBC-ENTMCNC: 31.7 GM/DL — LOW (ref 32–36)
MCHC RBC-ENTMCNC: 31.7 PG — SIGNIFICANT CHANGE UP (ref 27–34)
MCHC RBC-ENTMCNC: 32.7 GM/DL — SIGNIFICANT CHANGE UP (ref 32–36)
MCV RBC AUTO: 97 FL — SIGNIFICANT CHANGE UP (ref 80–100)
MCV RBC AUTO: 98 FL — SIGNIFICANT CHANGE UP (ref 80–100)
NRBC # BLD: 0 /100 WBCS — SIGNIFICANT CHANGE UP (ref 0–0)
NRBC # BLD: 0 /100 WBCS — SIGNIFICANT CHANGE UP (ref 0–0)
PHOSPHATE SERPL-MCNC: 3.5 MG/DL — SIGNIFICANT CHANGE UP (ref 2.5–4.5)
PLATELET # BLD AUTO: 180 K/UL — SIGNIFICANT CHANGE UP (ref 150–400)
PLATELET # BLD AUTO: 182 K/UL — SIGNIFICANT CHANGE UP (ref 150–400)
POTASSIUM SERPL-MCNC: 3.9 MMOL/L — SIGNIFICANT CHANGE UP (ref 3.5–5.3)
POTASSIUM SERPL-SCNC: 3.9 MMOL/L — SIGNIFICANT CHANGE UP (ref 3.5–5.3)
PROTHROM AB SERPL-ACNC: 14.3 SEC — HIGH (ref 10.6–13.6)
RBC # BLD: 2.51 M/UL — LOW (ref 4.2–5.8)
RBC # BLD: 2.65 M/UL — LOW (ref 4.2–5.8)
RBC # FLD: 16.4 % — HIGH (ref 10.3–14.5)
RBC # FLD: 16.6 % — HIGH (ref 10.3–14.5)
SODIUM SERPL-SCNC: 134 MMOL/L — LOW (ref 135–145)
WBC # BLD: 12.22 K/UL — HIGH (ref 3.8–10.5)
WBC # BLD: 12.54 K/UL — HIGH (ref 3.8–10.5)
WBC # FLD AUTO: 12.22 K/UL — HIGH (ref 3.8–10.5)
WBC # FLD AUTO: 12.54 K/UL — HIGH (ref 3.8–10.5)

## 2020-09-17 PROCEDURE — 99233 SBSQ HOSP IP/OBS HIGH 50: CPT

## 2020-09-17 RX ORDER — HEPARIN SODIUM 5000 [USP'U]/ML
1000 INJECTION INTRAVENOUS; SUBCUTANEOUS
Qty: 25000 | Refills: 0 | Status: DISCONTINUED | OUTPATIENT
Start: 2020-09-17 | End: 2020-09-19

## 2020-09-17 RX ORDER — HEPARIN SODIUM 5000 [USP'U]/ML
10 INJECTION INTRAVENOUS; SUBCUTANEOUS
Qty: 25000 | Refills: 0 | Status: DISCONTINUED | OUTPATIENT
Start: 2020-09-17 | End: 2020-09-17

## 2020-09-17 RX ORDER — POLYETHYLENE GLYCOL 3350 17 G/17G
17 POWDER, FOR SOLUTION ORAL DAILY
Refills: 0 | Status: DISCONTINUED | OUTPATIENT
Start: 2020-09-17 | End: 2020-09-26

## 2020-09-17 RX ORDER — INSULIN LISPRO 100/ML
VIAL (ML) SUBCUTANEOUS EVERY 6 HOURS
Refills: 0 | Status: DISCONTINUED | OUTPATIENT
Start: 2020-09-17 | End: 2020-09-17

## 2020-09-17 RX ORDER — INSULIN LISPRO 100/ML
VIAL (ML) SUBCUTANEOUS
Refills: 0 | Status: DISCONTINUED | OUTPATIENT
Start: 2020-09-17 | End: 2020-09-25

## 2020-09-17 RX ORDER — PHENYLEPHRINE HYDROCHLORIDE 10 MG/ML
0.5 INJECTION INTRAVENOUS
Qty: 40 | Refills: 0 | Status: DISCONTINUED | OUTPATIENT
Start: 2020-09-17 | End: 2020-09-17

## 2020-09-17 RX ORDER — SENNA PLUS 8.6 MG/1
2 TABLET ORAL AT BEDTIME
Refills: 0 | Status: DISCONTINUED | OUTPATIENT
Start: 2020-09-17 | End: 2020-09-26

## 2020-09-17 RX ORDER — MAGNESIUM SULFATE 500 MG/ML
2 VIAL (ML) INJECTION ONCE
Refills: 0 | Status: COMPLETED | OUTPATIENT
Start: 2020-09-17 | End: 2020-09-17

## 2020-09-17 RX ORDER — INSULIN LISPRO 100/ML
VIAL (ML) SUBCUTANEOUS AT BEDTIME
Refills: 0 | Status: DISCONTINUED | OUTPATIENT
Start: 2020-09-17 | End: 2020-09-25

## 2020-09-17 RX ORDER — HYDROMORPHONE HYDROCHLORIDE 2 MG/ML
0.5 INJECTION INTRAMUSCULAR; INTRAVENOUS; SUBCUTANEOUS EVERY 4 HOURS
Refills: 0 | Status: DISCONTINUED | OUTPATIENT
Start: 2020-09-17 | End: 2020-09-18

## 2020-09-17 RX ADMIN — MAGNESIUM OXIDE 400 MG ORAL TABLET 400 MILLIGRAM(S): 241.3 TABLET ORAL at 17:09

## 2020-09-17 RX ADMIN — Medication 1 PATCH: at 18:00

## 2020-09-17 RX ADMIN — Medication 1 PATCH: at 18:01

## 2020-09-17 RX ADMIN — CHLORHEXIDINE GLUCONATE 1 APPLICATION(S): 213 SOLUTION TOPICAL at 12:10

## 2020-09-17 RX ADMIN — Medication 1: at 12:08

## 2020-09-17 RX ADMIN — Medication 50 GRAM(S): at 05:12

## 2020-09-17 RX ADMIN — Medication 1 PATCH: at 18:47

## 2020-09-17 RX ADMIN — HYDROMORPHONE HYDROCHLORIDE 0.5 MILLIGRAM(S): 2 INJECTION INTRAMUSCULAR; INTRAVENOUS; SUBCUTANEOUS at 07:42

## 2020-09-17 RX ADMIN — POLYETHYLENE GLYCOL 3350 17 GRAM(S): 17 POWDER, FOR SOLUTION ORAL at 12:09

## 2020-09-17 RX ADMIN — SENNA PLUS 2 TABLET(S): 8.6 TABLET ORAL at 21:03

## 2020-09-17 RX ADMIN — SIMVASTATIN 10 MILLIGRAM(S): 20 TABLET, FILM COATED ORAL at 21:04

## 2020-09-17 RX ADMIN — Medication 2: at 17:09

## 2020-09-17 RX ADMIN — Medication 1 PATCH: at 18:46

## 2020-09-17 RX ADMIN — HYDROMORPHONE HYDROCHLORIDE 0.5 MILLIGRAM(S): 2 INJECTION INTRAMUSCULAR; INTRAVENOUS; SUBCUTANEOUS at 08:00

## 2020-09-17 RX ADMIN — Medication 81 MILLIGRAM(S): at 12:09

## 2020-09-17 NOTE — PROGRESS NOTE ADULT - SUBJECTIVE AND OBJECTIVE BOX
POST OPERATIVE DAY #: s/p L CEA POD 1    SUBJECTIVE: Pt seen and examined at bedside. He is laying comfortably in bed. He states his throat is sore and he is still having difficulty swallowing. His hoarseness has improved. He denies any CP, SOB, facial weakness,  numbness/tingling in face and b/l limbs, loss of sensation or motor function, fevers, chills,  n/v/d        Vital Signs Last 24 Hrs  T(C): 36.9 (17 Sep 2020 03:00), Max: 37.1 (16 Sep 2020 11:29)  T(F): 98.4 (17 Sep 2020 03:00), Max: 98.7 (16 Sep 2020 11:29)  HR: 75 (17 Sep 2020 07:) (71 - 90)  BP: 138/71 (17 Sep 2020 07:) (99/62 - 196/76)  BP(mean): 98 (17 Sep 2020 07:) (76 - 113)  RR: 20 (17 Sep 2020 07:) (14 - 37)  SpO2: 94% (17 Sep 2020 07:) (83% - 100%)  I&O's Summary    16 Sep 2020 07:  -  17 Sep 2020 07:00  --------------------------------------------------------  IN: 1969.4 mL / OUT: 1100 mL / NET: 869.4 mL      I&O's Detail    16 Sep 2020 07:  -  17 Sep 2020 07:00  --------------------------------------------------------  IN:    IV PiggyBack: 250 mL    Lactated Ringers: 1425 mL    Oral Fluid: 180 mL    Phenylephrine: 35.2 mL    Phenylephrine: 79.2 mL  Total IN: 1969.4 mL    OUT:    Drain (mL): 50 mL    Voided (mL): 1050 mL  Total OUT: 1100 mL    Total NET: 869.4 mL          Labs:                         7.8    12.54 )-----------( 180      ( 17 Sep 2020 03:25 )             24.6     09-17    134<L>  |  102  |  14  ----------------------------<  89  3.9   |  23  |  0.81    Ca    8.6      17 Sep 2020 03:25  Phos  3.5     -  Mg     1.6           PT/INR - ( 17 Sep 2020 03:25 )   PT: 14.3 sec;   INR: 1.21 ratio         PTT - ( 17 Sep 2020 03:25 )  PTT:32.5 sec  Urinalysis Basic - ( 16 Sep 2020 03:07 )    Color: Light Yellow / Appearance: Clear / S.028 / pH: x  Gluc: x / Ketone: Negative  / Bili: Negative / Urobili: Negative   Blood: x / Protein: Negative / Nitrite: Negative   Leuk Esterase: Negative / RBC: x / WBC x   Sq Epi: x / Non Sq Epi: x / Bacteria: x        Physical Exam:  General: A&Ox3, NAD  Neck: L dressing c/d/i, + L KOLBY drain intact with serosanguinous output, no hematoma, active bleeding or surrounding erythema    Respiratory: Nonlabored, symmetrical chest rise b/l   Cardiovascular: RRR,   Abdominal: Soft, nondistended, nontender. No rebound or guarding.  Extremities: all extremities warm and well perfused  Vascular: 2+ palpable radial, brachial, femoral, popliteal, PT, DP pulses in bilateral LE

## 2020-09-17 NOTE — PHYSICAL THERAPY INITIAL EVALUATION ADULT - GAIT DEVIATIONS NOTED, PT EVAL
increased time in double stance/decreased weight-shifting ability/decreased ambar/decreased step length/decreased stride length

## 2020-09-17 NOTE — CONSULT NOTE ADULT - ATTENDING COMMENTS
s/p left carotid endarterectomy  -serial neuro exams    CAD/PVD stents  -ASA    afib  -heparin infusion    severe aortic stenosis  postoperative hypotension  -phenylephrine infusion  -TAVR planned for this admission
The patient presented to [facility] with worsening symptoms of shortness of breath, dyspnea upon exertion and left sided chest discomfort.  He has not been getting regular cardiology follow-up care.  He was found at the outside hospital to be anemic.  Workup negative for acute GI bleeding/EGD performed.  The patient was given 3U of PRBC.  He has a history of CAD s/p PCI*2 and PAD (stents) but no recent cardiac catheterization or ischemic evaluation.  TTE demonstrated severe aortic valve stenosis.  He was found after stroke code at [facility] to have significant carotid artery stenosis and was transferred to [facility] for further assessment/intervention.    The patient was evaluated with his wife.  He was sent for a cardiac catheterization following arrival to [facility] and was found to have obstructive coronary artery disease of his left circumflex artery.  He reports that since he was admitted to [facility] and [facility] he has not had any episodes of chest pain/tightness/discomfort or shortness of breath.    Due to symptomatic carotid artery stenosis he is scheduled for surgery by iMchael using a cervical block on 9/16/2020.  The patient due to his cardiovascular conditions is felt to be a high risk individual.  He is currently asymptomatic and is felt to be at an acceptable risk to proceed.   Discussion had with the patient and his wife concerning the severity of current state and is co-existing severe medical issues including but not limited to carotid artery stenosis, recent TIA, severe aortic valve stenosis, anemia and obstructive coronary artery disease.  They were informed that he is at increased risk of hemodynamic instability, bleeding, stroke, myocardial infarction and death.    The patient should be placed on aspirin 81mg daily.  Continue heparin drip.  He should be transitioned from simvastatin 20mg PO QD to atorvastatin 20mg PO QD.  If he can tolerate it is recommended that he be started on metoprolol tartrate 12.5mg PO BID (hold if SBP<100mmHg or HR<60bpm).        During the patients hospitalization he will be further assessed by the [facility] valve team for potential SAVR/1V CABG versus TAVR/PCI.  Timing of proceeding with workup and intervention will be based upon recommendations from vascular team.       All questions and concerns of the patient and his wife were addressed.    EKG, laboratory studies and imaging studies were reviewed.

## 2020-09-17 NOTE — CONSULT NOTE ADULT - SUBJECTIVE AND OBJECTIVE BOX
SICU Consultation Note  =====================================================  HPI: 74y Male  HPI:  74 Year-Old Gentleman with a PMH of CAD status post stents on Plavix, Afib on coumadin, HTN, HLD,  PVD with fem-fem bypass and bilateral LE stents, 55 pack year smoking history, present to the [facility] ED with chest pain, found to have L ICA 66% stenosis and R ICA 80% stenosis in setting of acute dysarthria. He has been transferred to [facility] for R CEA. Had cardiac angio that demonstrated an obstructive coronary artery disease of his left circumflex artery. Patient had a carotid duplex that demonstrated Severe heterogenous plaque noted within the proximal right and left internal carotid arteries, consistent with 80-99% stenoses in both proximal vessels.  These likely represent hemodynamically significant stenoses. Patient underwent a left carotid endarterectomy. Transferred to SICU for q 1 hour neuro checks and SBP goals 120-160.         (15 Sep 2020 17:57)    PAST MEDICAL & SURGICAL HISTORY:  Peripheral vascular disease    Atrial fibrillation    Hypertension    Diabetes    Hyperlipidemia    Coronary artery disease    Status post angiography of extremity  1 stent in each leg    History of coronary artery stent placement  two coronary stents      Home Meds: Home Medications:  Aspirin Enteric Coated 81 mg oral delayed release tablet: 1 tab(s) orally once a day (14 Sep 2020 12:57)  ferrous sulfate 325 mg (65 mg elemental iron) oral tablet: 1 tab(s) orally once a day (14 Sep 2020 12:57)  heparin 100 units/mL-D5% intravenous solution: @12CC/hour (14 Sep 2020 17:45)  magnesium oxide 400 mg (241.3 mg elemental magnesium) oral tablet: 1 tab(s) orally 2 times a day (with meals) x 2 days (14 Sep 2020 12:57)  nicotine 14 mg/24 hr transdermal film, extended release: transdermal once a day (14 Sep 2020 17:45)  pantoprazole 40 mg oral delayed release tablet: 1 tab(s) orally once a day (14 Sep 2020 12:57)  simvastatin 10 mg oral tablet: 1 tab(s) orally once a day (at bedtime) (10 Sep 2020 09:28)    Allergies: Allergies    No Known Allergies    Intolerances      Soc:   Advanced Directives: Presumed Full Code     ROS:    REVIEW OF SYSTEMS    [x] A ten-point review of systems was otherwise negative except as noted.  [ ] Due to altered mental status/intubation, subjective information were not able to be obtained from the patient. History was obtained, to the extent possible, from review of the chart and collateral sources of information.      CURRENT MEDICATIONS:   --------------------------------------------------------------------------------------  Neurologic Medications  acetaminophen   Tablet .. 650 milliGRAM(s) Oral every 6 hours PRN Moderate Pain (4 - 6)  HYDROmorphone  Injectable 0.5 milliGRAM(s) IV Push every 4 hours PRN Severe Pain (7 - 10)    Respiratory Medications    Cardiovascular Medications    Gastrointestinal Medications  ferrous    sulfate 325 milliGRAM(s) Oral daily  lactated ringers. 1000 milliLiter(s) IV Continuous <Continuous>  magnesium oxide 400 milliGRAM(s) Oral two times a day with meals  pantoprazole    Tablet 40 milliGRAM(s) Oral before breakfast  polyethylene glycol 3350 17 Gram(s) Oral daily  senna 2 Tablet(s) Oral at bedtime    Genitourinary Medications    Hematologic/Oncologic Medications  aspirin  chewable 81 milliGRAM(s) Oral daily    Antimicrobial/Immunologic Medications    Endocrine/Metabolic Medications  insulin lispro (HumaLOG) corrective regimen sliding scale   SubCutaneous every 6 hours  simvastatin 10 milliGRAM(s) Oral at bedtime    Topical/Other Medications  chlorhexidine 2% Cloths 1 Application(s) Topical daily  nicotine -  14 mG/24Hr(s) Patch 1 patch Transdermal daily    --------------------------------------------------------------------------------------    VITAL SIGNS, INS/OUTS (last 24 hours):  --------------------------------------------------------------------------------------  ICU Vital Signs Last 24 Hrs  T(C): 36.2 (17 Sep 2020 00:00), Max: 37.8 (16 Sep 2020 03:39)  T(F): 97.2 (17 Sep 2020 00:00), Max: 100.1 (16 Sep 2020 03:39)  HR: 85 (17 Sep 2020 00:30) (71 - 90)  BP: 116/77 (17 Sep 2020 00:15) (99/62 - 152/68)  BP(mean): 91 (17 Sep 2020 00:15) (76 - 107)  ABP: 126/66 (17 Sep 2020 00:30) (48/16 - 145/73)  ABP(mean): 88 (17 Sep 2020 00:30) (26 - 102)  RR: 29 (17 Sep 2020 00:30) (14 - 37)  SpO2: 97% (17 Sep 2020 00:30) (95% - 100%)    I&O's Summary    15 Sep 2020 07:  -  16 Sep 2020 07:00  --------------------------------------------------------  IN: 1480 mL / OUT: 1025 mL / NET: 455 mL    16 Sep 2020 07:01  -  17 Sep 2020 02:19  --------------------------------------------------------  IN: 1315.2 mL / OUT: 730 mL / NET: 585.2 mL      --------------------------------------------------------------------------------------    EXAM:  General/Neuro  RASS:   GCS:  15  Exam: Normal, NAD, alert, oriented x 3, no focal deficits. PERRLA    Respiratory  Exam: Lungs clear to auscultation, Normal expansion/effort.      Cardiovascular  Exam: S1, S2.  Regular rate and rhythm.    Cardiac Rhythm: Normal Sinus Rhythm    GI  Exam: Abdomen soft, Non-tende  Current Diet:  Clear liquid    Tubes/Lines/Drains   [x] Peripheral IV  [] Central Venous Line     	[] R	[] L	[] IJ	[] Fem	[] SC        Type:	    Date Placed:   [] Arterial Line		[] R	[] L	[] Fem	[] Rad	[] Ax	Date Placed:   [] PICC:         	[] Midline		[] Mediport           [] Urinary Catheter		Date Placed:     Extremities  Exam: Extremities warm, pink, well-perfused.        Derm:  Exam: Good skin turgor, no skin breakdown.        LABS  --------------------------------------------------------------------------------------  Labs:  CAPILLARY BLOOD GLUCOSE      POCT Blood Glucose.: 117 mg/dL (16 Sep 2020 21:57)  POCT Blood Glucose.: 129 mg/dL (16 Sep 2020 11:50)  POCT Blood Glucose.: 185 mg/dL (16 Sep 2020 06:28)                          8.2    12.67 )-----------( 167      ( 16 Sep 2020 18:23 )             25.2             134<L>  |  103  |  14  ----------------------------<  146<H>  4.0   |  22  |  0.78      Calcium, Total Serum: 8.6 mg/dL (20 @ 18:23)      LFTs:         Coags:     14.2   ----< 1.20    ( 16 Sep 2020 07:32 )     43.6        CARDIAC MARKERS ( 16 Sep 2020 18:23 )  x     / x     / 30 U/L / x     / 1.1 ng/mL      Serum Pro-Brain Natriuretic Peptide: 664.3 pg/mL (09-10-20 @ 01:45)      Urinalysis Basic - ( 16 Sep 2020 03:07 )    Color: Light Yellow / Appearance: Clear / S.028 / pH: x  Gluc: x / Ketone: Negative  / Bili: Negative / Urobili: Negative   Blood: x / Protein: Negative / Nitrite: Negative   Leuk Esterase: Negative / RBC: x / WBC x   Sq Epi: x / Non Sq Epi: x / Bacteria: x

## 2020-09-17 NOTE — CHART NOTE - NSCHARTNOTEFT_GEN_A_CORE
74 Year-Old Gentleman with a PMH of CAD status post stents on Plavix, Afib on coumadin, HTN, HLD,  PVD with fem-fem bypass and bilateral LE stents, 55 pack year smoking history, present to the [facility] ED with chest pain, found to have b/l severe ICA stenosis. He has been transferred to [facility] for R CEA. Had cardiac angio that demonstrated an obstructive coronary artery disease of his left circumflex artery. Patient underwent a left carotid endarterectomy 9/16. Transferred to SICU for q 1 hour neuro checks and SBP goals 120-160    Patient is now started on heparin drip and as per vascular and cardiology, started on plavix as well.  and is now off neosynephrine drip.  diet is advanced tolerating    T(C): 37.1 (17 Sep 2020 15:00), Max: 37.1 (17 Sep 2020 15:00)  T(F): 98.8 (17 Sep 2020 15:00), Max: 98.8 (17 Sep 2020 15:00)  HR: 89 (17 Sep 2020 15:00) (72 - 90)  BP: 119/66 (17 Sep 2020 11:00) (109/59 - 196/76)  BP(mean): 87 (17 Sep 2020 11:00) (80 - 113)  ABP: 150/62 (17 Sep 2020 15:00) (106/51 - 156/74)  ABP(mean): 92 (17 Sep 2020 15:00) (70 - 105)  RR: 26 (17 Sep 2020 15:00) (14 - 37)  SpO2: 99% (17 Sep 2020 15:00) (83% - 100%)    PHYSICAL EXAM:  GENERAL: NAD, well-developed, well nourished  HEAD:  NC/AC  NECK: dressing clean and dry   CHEST/LUNG: CTAB. No cough, wheeze, rales.   HEART: Regular rate and rhythm. No murmurs, rubs, or gallops.   MS: AAOx3  NEUROLOGY: non-focal    A/P patient is a 73 y/o s/p left carotid endarterectomy, POD#0, with severe AS  -serial neuro exam  -continue heparin gtt  -resume plavix  -off phenyephrine gtt   -further work up for TAVR as per cardiothoracic  -accepted to medicine by DR Daly  -awaiting for bed

## 2020-09-17 NOTE — CONSULT NOTE ADULT - ASSESSMENT
75 y/o male with Severe Aortic Stenosis, TIA, Severe Carotid Stenosis
74 m with    S/P L CEA  - postop care    Aortic stenosis severe  - for possible TAVR  - structural heart follow    Atrial fibrillation  - rate control  - AC with Heparin    Hypertension  - control    Diabetes  - ADA diet  - BS control    Hyperlipidemia  - stable    Coronary artery disease  - continue Rx    Monico Daly MD pager [phone]
ASSESSMENT:  74 Year-Old Gentleman with a PMH of CAD status post stents on Plavix, Afib on coumadin, HTN, HLD,  PVD with fem-fem bypass and bilateral LE stents, 55 pack year smoking history, present to the [facility] ED with chest pain, found to have L ICA 66% stenosis and R ICA 80% stenosis in setting of acute dysarthria. He has been transferred to [facility] for R CEA. Had cardiac angio that demonstrated an obstructive coronary artery disease of his left circumflex artery. Patient had a carotid duplex that demonstrated Severe heterogenous plaque noted within the proximal right and left internal carotid arteries, consistent with 80-99% stenoses in both proximal vessels.  These likely represent hemodynamically significant stenoses. Patient underwent a left carotid endarterectomy. Transferred to SICU for q 1 hour neuro checks and SBP goals 120-160.      PLAN:   Neurologic:   -Tylenol/dilaudid for pain     Respiratory:   -CHAN    Cardiovascular:   -SBP goal 120-160  -Phenylephrine     Gastrointestinal/Nutrition:   -Consistent carbohydrate diet. clear liquid  -Advance diet on 9/17.    Renal/Genitourinary:   -CHAN  -Monitor ins and outs    Hematologic:   -Restart ASA and heparin 9/17    Infectious Disease:   -CHAN    Lines/Tubes:  -PIVs    Endocrine:   -DM2.  -FS q 6h and ISS.    Disposition:   -SICU

## 2020-09-17 NOTE — PHYSICAL THERAPY INITIAL EVALUATION ADULT - GENERAL OBSERVATIONS, REHAB EVAL
Pt rec'd semi-supine in bed in NAD, VSS, +ICU  monitoring, +KOLBY drain, +Dressing to L neck C/D/I, agreeable to PT session

## 2020-09-17 NOTE — CONSULT NOTE ADULT - SUBJECTIVE AND OBJECTIVE BOX
HPI:  74 Year-Old Gentleman with a PMH of CAD status post stents on Plavix, Afib on coumadin, HTN, HLD,  PVD with fem-fem bypass and bilateral LE stents, 55 pack year smoking history, present to the [facility] ED with chest pain, found to have L ICA 66% stenosis and R ICA 80% stenosis in setting of acute dysarthria. He has been transferred to [facility] for L CEA. s/p L CEA. Echo with severe AS. Possible TAVR     (15 Sep 2020 17:57)      PAST MEDICAL & SURGICAL HISTORY:  Peripheral vascular disease    Atrial fibrillation    Hypertension    Diabetes    Hyperlipidemia    Coronary artery disease    Status post angiography of extremity  1 stent in each leg    History of coronary artery stent placement  two coronary stents        Review of Systems:   CONSTITUTIONAL: No fever, weight loss, or fatigue  EYES: No eye pain, visual disturbances, or discharge  ENMT:  No difficulty hearing, tinnitus, vertigo; No sinus or throat pain  NECK: No pain or stiffness  BREASTS: No pain, masses, or nipple discharge  RESPIRATORY: No cough, wheezing, chills or hemoptysis; No shortness of breath  CARDIOVASCULAR: No chest pain, palpitations, dizziness, or leg swelling  GASTROINTESTINAL: No abdominal or epigastric pain. No nausea, vomiting, or hematemesis; No diarrhea or constipation. No melena or hematochezia.  GENITOURINARY: No dysuria, frequency, hematuria, or incontinence  NEUROLOGICAL: No headaches, memory loss, loss of strength, numbness, or tremors  SKIN: No itching, burning, rashes, or lesions   LYMPH NODES: No enlarged glands  ENDOCRINE: No heat or cold intolerance; No hair loss  MUSCULOSKELETAL: No joint pain or swelling; No muscle, back, or extremity pain  PSYCHIATRIC: No depression, anxiety, mood swings, or difficulty sleeping  HEME/LYMPH: No easy bruising, or bleeding gums  ALLERY AND IMMUNOLOGIC: No hives or eczema    Allergies    No Known Allergies    Intolerances        Social History:     FAMILY HISTORY:  FH: CAD (coronary artery disease)  son    FH: seizures  son    FH: type 1 diabetes  son    FH: diabetes mellitus  mother        MEDICATIONS  (STANDING):  chlorhexidine 2% Cloths 1 Application(s) Topical daily  clopidogrel Tablet 75 milliGRAM(s) Oral daily  ferrous    sulfate 325 milliGRAM(s) Oral daily  heparin  Infusion 1000 Unit(s)/Hr (10 mL/Hr) IV Continuous <Continuous>  insulin lispro (HumaLOG) corrective regimen sliding scale   SubCutaneous every 6 hours  magnesium oxide 400 milliGRAM(s) Oral two times a day with meals  nicotine -  14 mG/24Hr(s) Patch 1 patch Transdermal daily  pantoprazole    Tablet 40 milliGRAM(s) Oral before breakfast  polyethylene glycol 3350 17 Gram(s) Oral daily  senna 2 Tablet(s) Oral at bedtime  simvastatin 10 milliGRAM(s) Oral at bedtime    MEDICATIONS  (PRN):  acetaminophen   Tablet .. 650 milliGRAM(s) Oral every 6 hours PRN Moderate Pain (4 - 6)  HYDROmorphone  Injectable 0.5 milliGRAM(s) IV Push every 4 hours PRN Severe Pain (7 - 10)        CAPILLARY BLOOD GLUCOSE      POCT Blood Glucose.: 260 mg/dL (17 Sep 2020 12:01)  POCT Blood Glucose.: 135 mg/dL (17 Sep 2020 07:58)  POCT Blood Glucose.: 78 mg/dL (17 Sep 2020 06:15)  POCT Blood Glucose.: 69 mg/dL (17 Sep 2020 06:14)  POCT Blood Glucose.: 117 mg/dL (16 Sep 2020 21:57)    I&O's Summary    16 Sep 2020 07:  -  17 Sep 2020 07:00  --------------------------------------------------------  IN: 1969.4 mL / OUT: 1100 mL / NET: 869.4 mL    17 Sep 2020 07:  -  17 Sep 2020 16:34  --------------------------------------------------------  IN: 760 mL / OUT: 520 mL / NET: 240 mL        PHYSICAL EXAM:  GENERAL: NAD, well-developed  HEAD:  Atraumatic, Normocephalic  EYES: EOMI, PERRLA, conjunctiva and sclera clear  NECK: Supple, No JVD L Dressing clean.  CHEST/LUNG: Clear to auscultation bilaterally; No wheeze  HEART: Regular rate and rhythm; No murmurs, rubs, or gallops  ABDOMEN: Soft, Nontender, Nondistended; Bowel sounds present  EXTREMITIES:  2+ Peripheral Pulses, No clubbing, cyanosis, or edema  PSYCH: AAOx3  NEUROLOGY: non-focal  SKIN: No rashes or lesions    LABS:                        8.4    12.22 )-----------( 182      ( 17 Sep 2020 15:12 )             25.7     09-17    134<L>  |  102  |  14  ----------------------------<  89  3.9   |  23  |  0.81    Ca    8.6      17 Sep 2020 03:25  Phos  3.5       Mg     1.6     -17      PT/INR - ( 17 Sep 2020 03:25 )   PT: 14.3 sec;   INR: 1.21 ratio         PTT - ( 17 Sep 2020 15:12 )  PTT:67.7 sec  CARDIAC MARKERS ( 16 Sep 2020 18:23 )  x     / x     / 30 U/L / x     / 1.1 ng/mL      Urinalysis Basic - ( 16 Sep 2020 03:07 )    Color: Light Yellow / Appearance: Clear / S.028 / pH: x  Gluc: x / Ketone: Negative  / Bili: Negative / Urobili: Negative   Blood: x / Protein: Negative / Nitrite: Negative   Leuk Esterase: Negative / RBC: x / WBC x   Sq Epi: x / Non Sq Epi: x / Bacteria: x        RADIOLOGY & ADDITIONAL TESTS:    Imaging Personally Reviewed:    Consultant(s) Notes Reviewed:      Care Discussed with Consultants/Other Providers:

## 2020-09-17 NOTE — PHYSICAL THERAPY INITIAL EVALUATION ADULT - PERTINENT HX OF CURRENT PROBLEM, REHAB EVAL
74 Year-Old Gentleman with a PMH of CAD status post stents on Plavix, Afib on coumadin, HTN, HLD,  PVD with fem-fem bypass and bilateral LE stents, 55 pack year smoking history, present to the [facility] ED with chest pain, found to have L ICA 66% stenosis and R ICA 80% stenosis in setting of acute dysarthria. Transferred to [facility] for CEA., now s/p L carotid endartectomy on 9/17, transferred to ICU for monitoring

## 2020-09-17 NOTE — PHYSICAL THERAPY INITIAL EVALUATION ADULT - TRANSFER TRAINING, PT EVAL
GOAL: Pt will perform ALL transfers (I) w/use of lesat restrictive assistive device as needed, in 4 weeks.

## 2020-09-17 NOTE — PROGRESS NOTE ADULT - ASSESSMENT
73y/o male with a PMH of CAD status post stents on Plavix, Afib on coumadin, HTN, HLD, PVD with fem-fem bypass and bilateral LE stents, 55 pack year smoking history; now bilateral carotid stenosis, with right-sided asymptomatic, high-grade stenosis of 80%. Stable. Pt is now s/p L CEA POD 1, he is doing well    Plan:  - Restart Heparin and ASA  - Bedside swallow eval  - Diet: Can advance to soft diet once bedside swallow eval is done  - Pain control as needed   - Speak to cardiology about BP goal and TAVR date  - Care as per SICU    Vascular Surgery [id number] 73y/o male with a PMH of CAD status post stents on Plavix, Afib on coumadin, HTN, HLD, PVD with fem-fem bypass and bilateral LE stents, 55 pack year smoking history; now bilateral carotid stenosis, with right-sided asymptomatic, high-grade stenosis of 80%. Stable. Pt is now s/p L CEA POD 1, he is doing well    Plan:  - Restart Heparin and ASA  - Bedside swallow eval  - Diet: Can advance to soft diet once bedside swallow eval is done  - Pain control as needed   - Patient is ok from Vascular Surg standpoint to undergo any cardiothoracic interventions/surgery indicated   - Speak to cardiology about BP goal and TAVR date  - Care as per SICU    Vascular Surgery [id number] 73y/o male with a PMH of CAD status post stents on Plavix, Afib on coumadin, HTN, HLD, PVD with fem-fem bypass and bilateral LE stents, 55 pack year smoking history; now bilateral carotid stenosis, with right-sided asymptomatic, high-grade stenosis of 80%. Stable. Pt is now s/p L CEA POD 1, he is doing well    Plan:  - Restart Heparin and ASA  - Bedside swallow eval  - Diet: Can advance to soft diet once bedside swallow eval is done  - Pain control as needed   - Spoke w/ CT surgery. Patient is ok from Vascular Surg standpoint to undergo any cardiothoracic interventions/surgery indicated   - Care as per SICU    Vascular Surgery [id number]

## 2020-09-17 NOTE — PROGRESS NOTE ADULT - SUBJECTIVE AND OBJECTIVE BOX
Structural Heart Team      Mr Crockett is seen and examined sitting up in bed.  He is POD 1 from [facility] and has dressings and a drain in place.  He has no complaints and says he is "feeling good".  He denies any chest pain or tightness, as well as sob and dizziness/lightheadedness.  There were no acute events overnight.        REVIEW OF SYSTEMS:    CONSTITUTIONAL: No weakness, fevers or chills  EYES/ENT: No visual changes;  No vertigo or throat pain   NECK: No pain or stiffness  RESPIRATORY: No cough, wheezing, hemoptysis; No shortness of breath  CARDIOVASCULAR: No chest pain or palpitations  GASTROINTESTINAL: No abdominal or epigastric pain. No nausea, vomiting, or hematemesis; No diarrhea or constipation. No melena or hematochezia.  GENITOURINARY: No dysuria, frequency or hematuria  NEUROLOGICAL: No numbness or weakness  SKIN: No itching, rashes      Allergies    No Known Allergies    Intolerances      Vital Signs Last 24 Hrs  T(C): 36.7 (17 Sep 2020 07:00), Max: 37.1 (16 Sep 2020 11:29)  T(F): 98.1 (17 Sep 2020 07:00), Max: 98.7 (16 Sep 2020 11:29)  HR: 80 (17 Sep 2020 10:00) (71 - 90)  BP: 114/67 (17 Sep 2020 10:00) (99/62 - 196/76)  BP(mean): 86 (17 Sep 2020 10:00) (76 - 113)  RR: 19 (17 Sep 2020 10:00) (14 - 37)  SpO2: 96% (17 Sep 2020 10:00) (83% - 100%)    MEDICATIONS  (STANDING):  aspirin  chewable 81 milliGRAM(s) Oral daily  chlorhexidine 2% Cloths 1 Application(s) Topical daily  ferrous    sulfate 325 milliGRAM(s) Oral daily  heparin  Infusion 1000 Unit(s)/Hr (10 mL/Hr) IV Continuous <Continuous>  insulin lispro (HumaLOG) corrective regimen sliding scale   SubCutaneous every 6 hours  lactated ringers. 1000 milliLiter(s) (75 mL/Hr) IV Continuous <Continuous>  magnesium oxide 400 milliGRAM(s) Oral two times a day with meals  nicotine -  14 mG/24Hr(s) Patch 1 patch Transdermal daily  pantoprazole    Tablet 40 milliGRAM(s) Oral before breakfast  polyethylene glycol 3350 17 Gram(s) Oral daily  senna 2 Tablet(s) Oral at bedtime  simvastatin 10 milliGRAM(s) Oral at bedtime      Exam-  General: NAD  Cor: s1s2, III/VI systolic murmur  Tele: 70-80's Afib  Pulm: CTA anteriorly, no w/r/r  Gastointestinal: soft, nontender, nondistended, +bowel sounds  Extremities: no edema, 1+ DP pulses  Neuro: A&Ox3, nonfocal                          7.8    12.54 )-----------( 180      ( 17 Sep 2020 03:25 )             24.6   09-17    134<L>  |  102  |  14  ----------------------------<  89  3.9   |  23  |  0.81    Ca    8.6      17 Sep 2020 03:25  Phos  3.5     09-17  Mg     1.6     09-17    PT/INR - ( 17 Sep 2020 03:25 )   PT: 14.3 sec;   INR: 1.21 ratio         PTT - ( 17 Sep 2020 03:25 )  PTT:32.5 sec  I&O's Summary    16 Sep 2020 07:01  -  17 Sep 2020 07:00  --------------------------------------------------------  IN: 1969.4 mL / OUT: 1100 mL / NET: 869.4 mL    17 Sep 2020 07:01  -  17 Sep 2020 10:40  --------------------------------------------------------  IN: 655 mL / OUT: 200 mL / NET: 455 mL              Assessment/Plan:  73 y/o male with Severe Aortic Stenosis, TIA, Severe Carotid Stenosis   - recovering from L CEA, dressing is CDI, drain in place  - wound management as per vascular/general surgery  - will request cardiac CTA to evaluate anatomic suitability for TAVR    -- will also begin to plan cardiac cath/PCI  - if appropriate, we will arrange TAVR as outpatient    DAPHNE Mcdermott  [phone]

## 2020-09-17 NOTE — PHYSICAL THERAPY INITIAL EVALUATION ADULT - PLANNED THERAPY INTERVENTIONS, PT EVAL
gait training/GOAL: Pt will ascend/descend (3) steps (I) with U HR and step over step pattern in 4 weeks./strengthening/bed mobility training/transfer training

## 2020-09-18 LAB
ANION GAP SERPL CALC-SCNC: 14 MMOL/L — SIGNIFICANT CHANGE UP (ref 5–17)
BUN SERPL-MCNC: 14 MG/DL — SIGNIFICANT CHANGE UP (ref 7–23)
CALCIUM SERPL-MCNC: 8.7 MG/DL — SIGNIFICANT CHANGE UP (ref 8.4–10.5)
CHLORIDE SERPL-SCNC: 97 MMOL/L — SIGNIFICANT CHANGE UP (ref 96–108)
CO2 SERPL-SCNC: 20 MMOL/L — LOW (ref 22–31)
CREAT SERPL-MCNC: 0.87 MG/DL — SIGNIFICANT CHANGE UP (ref 0.5–1.3)
GLUCOSE BLDC GLUCOMTR-MCNC: 147 MG/DL — HIGH (ref 70–99)
GLUCOSE BLDC GLUCOMTR-MCNC: 160 MG/DL — HIGH (ref 70–99)
GLUCOSE BLDC GLUCOMTR-MCNC: 172 MG/DL — HIGH (ref 70–99)
GLUCOSE BLDC GLUCOMTR-MCNC: 196 MG/DL — HIGH (ref 70–99)
GLUCOSE SERPL-MCNC: 180 MG/DL — HIGH (ref 70–99)
MAGNESIUM SERPL-MCNC: 1.8 MG/DL — SIGNIFICANT CHANGE UP (ref 1.6–2.6)
PHOSPHATE SERPL-MCNC: 2 MG/DL — LOW (ref 2.5–4.5)
POTASSIUM SERPL-MCNC: 4.1 MMOL/L — SIGNIFICANT CHANGE UP (ref 3.5–5.3)
POTASSIUM SERPL-SCNC: 4.1 MMOL/L — SIGNIFICANT CHANGE UP (ref 3.5–5.3)
SODIUM SERPL-SCNC: 131 MMOL/L — LOW (ref 135–145)

## 2020-09-18 PROCEDURE — 99233 SBSQ HOSP IP/OBS HIGH 50: CPT

## 2020-09-18 RX ORDER — INSULIN GLARGINE 100 [IU]/ML
15 INJECTION, SOLUTION SUBCUTANEOUS AT BEDTIME
Refills: 0 | Status: DISCONTINUED | OUTPATIENT
Start: 2020-09-18 | End: 2020-09-21

## 2020-09-18 RX ORDER — OXYCODONE HYDROCHLORIDE 5 MG/1
5 TABLET ORAL EVERY 6 HOURS
Refills: 0 | Status: DISCONTINUED | OUTPATIENT
Start: 2020-09-18 | End: 2020-09-25

## 2020-09-18 RX ORDER — MAGNESIUM SULFATE 500 MG/ML
2 VIAL (ML) INJECTION ONCE
Refills: 0 | Status: COMPLETED | OUTPATIENT
Start: 2020-09-18 | End: 2020-09-18

## 2020-09-18 RX ORDER — SODIUM CHLORIDE 9 MG/ML
1 INJECTION INTRAMUSCULAR; INTRAVENOUS; SUBCUTANEOUS EVERY 8 HOURS
Refills: 0 | Status: DISCONTINUED | OUTPATIENT
Start: 2020-09-18 | End: 2020-09-26

## 2020-09-18 RX ORDER — ACETAMINOPHEN 500 MG
650 TABLET ORAL EVERY 6 HOURS
Refills: 0 | Status: DISCONTINUED | OUTPATIENT
Start: 2020-09-18 | End: 2020-09-26

## 2020-09-18 RX ADMIN — Medication 650 MILLIGRAM(S): at 04:30

## 2020-09-18 RX ADMIN — Medication 650 MILLIGRAM(S): at 05:00

## 2020-09-18 RX ADMIN — Medication 1 PATCH: at 21:34

## 2020-09-18 RX ADMIN — Medication 2: at 17:55

## 2020-09-18 RX ADMIN — HEPARIN SODIUM 10 UNIT(S)/HR: 5000 INJECTION INTRAVENOUS; SUBCUTANEOUS at 04:20

## 2020-09-18 RX ADMIN — MAGNESIUM OXIDE 400 MG ORAL TABLET 400 MILLIGRAM(S): 241.3 TABLET ORAL at 18:14

## 2020-09-18 RX ADMIN — Medication 325 MILLIGRAM(S): at 12:00

## 2020-09-18 RX ADMIN — PANTOPRAZOLE SODIUM 40 MILLIGRAM(S): 20 TABLET, DELAYED RELEASE ORAL at 08:36

## 2020-09-18 RX ADMIN — SODIUM CHLORIDE 1 GRAM(S): 9 INJECTION INTRAMUSCULAR; INTRAVENOUS; SUBCUTANEOUS at 21:33

## 2020-09-18 RX ADMIN — Medication 2: at 12:56

## 2020-09-18 RX ADMIN — Medication 50 GRAM(S): at 02:16

## 2020-09-18 RX ADMIN — Medication 2: at 08:36

## 2020-09-18 RX ADMIN — HYDROMORPHONE HYDROCHLORIDE 0.5 MILLIGRAM(S): 2 INJECTION INTRAMUSCULAR; INTRAVENOUS; SUBCUTANEOUS at 08:56

## 2020-09-18 RX ADMIN — Medication 1 PATCH: at 21:20

## 2020-09-18 RX ADMIN — OXYCODONE HYDROCHLORIDE 5 MILLIGRAM(S): 5 TABLET ORAL at 18:18

## 2020-09-18 RX ADMIN — POLYETHYLENE GLYCOL 3350 17 GRAM(S): 17 POWDER, FOR SOLUTION ORAL at 12:00

## 2020-09-18 RX ADMIN — SODIUM CHLORIDE 1 GRAM(S): 9 INJECTION INTRAMUSCULAR; INTRAVENOUS; SUBCUTANEOUS at 12:00

## 2020-09-18 RX ADMIN — SENNA PLUS 2 TABLET(S): 8.6 TABLET ORAL at 21:33

## 2020-09-18 RX ADMIN — MAGNESIUM OXIDE 400 MG ORAL TABLET 400 MILLIGRAM(S): 241.3 TABLET ORAL at 08:36

## 2020-09-18 RX ADMIN — OXYCODONE HYDROCHLORIDE 5 MILLIGRAM(S): 5 TABLET ORAL at 19:04

## 2020-09-18 RX ADMIN — SIMVASTATIN 10 MILLIGRAM(S): 20 TABLET, FILM COATED ORAL at 21:34

## 2020-09-18 RX ADMIN — Medication 62.5 MILLIMOLE(S): at 04:20

## 2020-09-18 RX ADMIN — INSULIN GLARGINE 15 UNIT(S): 100 INJECTION, SOLUTION SUBCUTANEOUS at 22:25

## 2020-09-18 NOTE — PROGRESS NOTE ADULT - SUBJECTIVE AND OBJECTIVE BOX
HISTORY  74 Year-Old Gentleman with a PMH of CAD status post stents on Plavix, Afib on coumadin, HTN, HLD,  PVD with fem-fem bypass and bilateral LE stents, 55 pack year smoking history, present to the [facility] ED with chest pain, found to have b/l ICA stenosis in setting of acute dysarthria. He has been transferred to [facility] for R CEA. Had cardiac angio that demonstrated an obstructive coronary artery disease of his left circumflex artery. Patient had a carotid duplex that demonstrated Severe heterogenous plaque noted within the proximal right and left internal carotid arteries, consistent with 80-99% stenoses in both proximal vessels.  These likely represent hemodynamically significant stenoses. Patient underwent a left carotid endarterectomy. Transferred to SICU for q 1 hour neuro checks and SBP goals 120-160.     24 HOUR EVENTS:  -Off pressors  -Diet advanced, IV locked  -A-line d/c'd  -Restarted heparin and plavix per vascular and cards recs    SUBJECTIVE/ROS:  [X ] A ten-point review of systems was otherwise negative except as noted.  [ ] Due to altered mental status/intubation, subjective information were not able to be obtained from the patient. History was obtained, to the extent possible, from review of the chart and collateral sources of information.      NEURO  Exam: A&Ox3. PERRLA, no facial asymmetry, sensation intact.   Meds: acetaminophen   Tablet .. 650 milliGRAM(s) Oral every 6 hours PRN Moderate Pain (4 - 6)  HYDROmorphone  Injectable 0.5 milliGRAM(s) IV Push every 4 hours PRN Severe Pain (7 - 10)    [x] Adequacy of sedation and pain control has been assessed and adjusted      RESPIRATORY  RR: 21 (09-17-20 @ 23:00) (14 - 37)  SpO2: 100% (09-17-20 @ 23:00) (83% - 100%)  Wt(kg): --  Exam: unlabored, clear to auscultation bilaterally      CARDIOVASCULAR  HR: 84 (09-17-20 @ 23:00) (72 - 90)  BP: 119/66 (09-17-20 @ 11:00) (114/65 - 196/76)  BP(mean): 87 (09-17-20 @ 11:00) (84 - 113)  ABP: 157/69 (09-17-20 @ 23:00) (106/51 - 157/69)  ABP(mean): 100 (09-17-20 @ 23:00) (70 - 105)  Wt(kg): --  CVP(cm H2O): --    Exam: RRR  Cardiac Rhythm: sinus  Perfusion     [X ]Adequate   [ ]Inadequate  Mentation   [X ]Normal       [ ]Reduced  Extremities  [X ]Warm         [ ]Cool  Volume Status [ ]Hypervolemic [X ]Euvolemic [ ]Hypovolemic  Meds:       GI/NUTRITION  Exam: soft, nontender, nondistended  Diet: regular  Meds: pantoprazole    Tablet 40 milliGRAM(s) Oral before breakfast  polyethylene glycol 3350 17 Gram(s) Oral daily  senna 2 Tablet(s) Oral at bedtime      GENITOURINARY  I&O's Detail    09-16 @ 07:01  -  09-17 @ 07:00  --------------------------------------------------------  IN:    IV PiggyBack: 250 mL    Lactated Ringers: 1425 mL    Oral Fluid: 180 mL    Phenylephrine: 79.2 mL    Phenylephrine: 35.2 mL  Total IN: 1969.4 mL    OUT:    Drain (mL): 50 mL    Voided (mL): 1050 mL  Total OUT: 1100 mL    Total NET: 869.4 mL      09-17 @ 07:01  -  09-18 @ 00:14  --------------------------------------------------------  IN:    Heparin: 150 mL    Lactated Ringers: 300 mL    Oral Fluid: 700 mL    Phenylephrine: 10 mL  Total IN: 1160 mL    OUT:    Drain (mL): 30 mL    Voided (mL): 850 mL  Total OUT: 880 mL    Total NET: 280 mL          09-17    134<L>  |  102  |  14  ----------------------------<  89  3.9   |  23  |  0.81    Ca    8.6      17 Sep 2020 03:25  Phos  3.5     09-17  Mg     1.6     09-17      [ ] Farfan catheter, indication: N/A  Meds: ferrous    sulfate 325 milliGRAM(s) Oral daily  magnesium oxide 400 milliGRAM(s) Oral two times a day with meals        HEMATOLOGIC  Meds: clopidogrel Tablet 75 milliGRAM(s) Oral daily  heparin  Infusion 1000 Unit(s)/Hr IV Continuous <Continuous>    [x] VTE Prophylaxis                        8.4    12.22 )-----------( 182      ( 17 Sep 2020 15:12 )             25.7     PT/INR - ( 17 Sep 2020 03:25 )   PT: 14.3 sec;   INR: 1.21 ratio         PTT - ( 17 Sep 2020 21:06 )  PTT:68.2 sec  Transfusion     [ ] PRBC   [ ] Platelets   [ ] FFP   [ ] Cryoprecipitate      INFECTIOUS DISEASES  T(C): 37.8 (09-17-20 @ 23:00), Max: 37.8 (09-17-20 @ 23:00)  Wt(kg): --  WBC Count: 12.22 K/uL (09-17 @ 15:12)  WBC Count: 12.54 K/uL (09-17 @ 03:25)    Recent Cultures:  Specimen Source: .Blood Blood-Peripheral, 09-16 @ 09:34; Results   No growth to date.; Gram Stain: --; Organism: --    Meds:       ENDOCRINE  Capillary Blood Glucose    Meds: insulin lispro (HumaLOG) corrective regimen sliding scale   SubCutaneous three times a day before meals  insulin lispro (HumaLOG) corrective regimen sliding scale   SubCutaneous at bedtime  simvastatin 10 milliGRAM(s) Oral at bedtime        ACCESS DEVICES:  [X ] Peripheral IV  [ ] Central Venous Line	[ ] R	[ ] L	[ ] IJ	[ ] Fem	[ ] SC	Placed:   [ ] Arterial Line		[ ] R	[ ] L	[ ] Fem	[ ] Rad	[ ] Ax	Placed:   [ ] PICC:					[ ] Mediport  [ ] Urinary Catheter, Date Placed:   [ ] Necessity of urinary, arterial, and venous catheters discussed    OTHER MEDICATIONS:  nicotine -  14 mG/24Hr(s) Patch 1 patch Transdermal daily      CODE STATUS: Full code    IMAGING: HISTORY  74 Year-Old Gentleman with a PMH of CAD status post stents on Plavix, Afib on coumadin, HTN, HLD,  PVD with fem-fem bypass and bilateral LE stents, 55 pack year smoking history, present to the [facility] ED with chest pain, found to have b/l ICA stenosis in setting of acute dysarthria. He has been transferred to [facility] for R CEA. Had cardiac angio that demonstrated an obstructive coronary artery disease of his left circumflex artery. Patient had a carotid duplex that demonstrated Severe heterogenous plaque noted within the proximal right and left internal carotid arteries, consistent with 80-99% stenoses in both proximal vessels.  These likely represent hemodynamically significant stenoses. Patient underwent a left carotid endarterectomy. Transferred to SICU for q 1 hour neuro checks and SBP goals 120-160.     24 HOUR EVENTS:  -Off pressors  -Diet advanced, IV locked  -A-line d/c'd  -Restarted heparin gtt and plavix per vascular and cards recs  -Transferred to medicine service (Dr Arguelles) and listed for medicine bed in preparation for TAVR workup    SUBJECTIVE/ROS:  [X ] A ten-point review of systems was otherwise negative except as noted.  [ ] Due to altered mental status/intubation, subjective information were not able to be obtained from the patient. History was obtained, to the extent possible, from review of the chart and collateral sources of information.      NEURO  Exam: A&Ox3. PERRLA, no facial asymmetry, sensation intact.   Meds: acetaminophen   Tablet .. 650 milliGRAM(s) Oral every 6 hours PRN Moderate Pain (4 - 6)  HYDROmorphone  Injectable 0.5 milliGRAM(s) IV Push every 4 hours PRN Severe Pain (7 - 10)    [x] Adequacy of sedation and pain control has been assessed and adjusted      RESPIRATORY  RR: 21 (09-17-20 @ 23:00) (14 - 37)  SpO2: 100% (09-17-20 @ 23:00) (83% - 100%)  Wt(kg): --  Exam: unlabored, clear to auscultation bilaterally      CARDIOVASCULAR  HR: 84 (09-17-20 @ 23:00) (72 - 90)  BP: 119/66 (09-17-20 @ 11:00) (114/65 - 196/76)  BP(mean): 87 (09-17-20 @ 11:00) (84 - 113)  ABP: 157/69 (09-17-20 @ 23:00) (106/51 - 157/69)  ABP(mean): 100 (09-17-20 @ 23:00) (70 - 105)  Wt(kg): --  CVP(cm H2O): --    Exam: RRR  Cardiac Rhythm: sinus  Perfusion     [X ]Adequate   [ ]Inadequate  Mentation   [X ]Normal       [ ]Reduced  Extremities  [X ]Warm         [ ]Cool  Volume Status [ ]Hypervolemic [X ]Euvolemic [ ]Hypovolemic  Meds:       GI/NUTRITION  Exam: soft, nontender, nondistended  Diet: regular  Meds: pantoprazole    Tablet 40 milliGRAM(s) Oral before breakfast  polyethylene glycol 3350 17 Gram(s) Oral daily  senna 2 Tablet(s) Oral at bedtime      GENITOURINARY  I&O's Detail    09-16 @ 07:01  -  09-17 @ 07:00  --------------------------------------------------------  IN:    IV PiggyBack: 250 mL    Lactated Ringers: 1425 mL    Oral Fluid: 180 mL    Phenylephrine: 79.2 mL    Phenylephrine: 35.2 mL  Total IN: 1969.4 mL    OUT:    Drain (mL): 50 mL    Voided (mL): 1050 mL  Total OUT: 1100 mL    Total NET: 869.4 mL      09-17 @ 07:01  -  09-18 @ 00:14  --------------------------------------------------------  IN:    Heparin: 150 mL    Lactated Ringers: 300 mL    Oral Fluid: 700 mL    Phenylephrine: 10 mL  Total IN: 1160 mL    OUT:    Drain (mL): 30 mL    Voided (mL): 850 mL  Total OUT: 880 mL    Total NET: 280 mL          09-17    134<L>  |  102  |  14  ----------------------------<  89  3.9   |  23  |  0.81    Ca    8.6      17 Sep 2020 03:25  Phos  3.5     09-17  Mg     1.6     09-17      [ ] Farfan catheter, indication: N/A  Meds: ferrous    sulfate 325 milliGRAM(s) Oral daily  magnesium oxide 400 milliGRAM(s) Oral two times a day with meals        HEMATOLOGIC  Meds: clopidogrel Tablet 75 milliGRAM(s) Oral daily  heparin  Infusion 1000 Unit(s)/Hr IV Continuous <Continuous>    [x] VTE Prophylaxis                        8.4    12.22 )-----------( 182      ( 17 Sep 2020 15:12 )             25.7     PT/INR - ( 17 Sep 2020 03:25 )   PT: 14.3 sec;   INR: 1.21 ratio         PTT - ( 17 Sep 2020 21:06 )  PTT:68.2 sec  Transfusion     [ ] PRBC   [ ] Platelets   [ ] FFP   [ ] Cryoprecipitate      INFECTIOUS DISEASES  T(C): 37.8 (09-17-20 @ 23:00), Max: 37.8 (09-17-20 @ 23:00)  Wt(kg): --  WBC Count: 12.22 K/uL (09-17 @ 15:12)  WBC Count: 12.54 K/uL (09-17 @ 03:25)    Recent Cultures:  Specimen Source: .Blood Blood-Peripheral, 09-16 @ 09:34; Results   No growth to date.; Gram Stain: --; Organism: --    Meds:       ENDOCRINE  Capillary Blood Glucose    Meds: insulin lispro (HumaLOG) corrective regimen sliding scale   SubCutaneous three times a day before meals  insulin lispro (HumaLOG) corrective regimen sliding scale   SubCutaneous at bedtime  simvastatin 10 milliGRAM(s) Oral at bedtime        ACCESS DEVICES:  [X ] Peripheral IV  [ ] Central Venous Line	[ ] R	[ ] L	[ ] IJ	[ ] Fem	[ ] SC	Placed:   [ ] Arterial Line		[ ] R	[ ] L	[ ] Fem	[ ] Rad	[ ] Ax	Placed:   [ ] PICC:					[ ] Mediport  [ ] Urinary Catheter, Date Placed:   [x] Necessity of urinary, arterial, and venous catheters discussed    OTHER MEDICATIONS:  nicotine -  14 mG/24Hr(s) Patch 1 patch Transdermal daily      CODE STATUS: Full code    IMAGING:

## 2020-09-18 NOTE — PROGRESS NOTE ADULT - ASSESSMENT
74 Year-Old Gentleman with a PMH of CAD status post stents on Plavix, Afib on coumadin, HTN, HLD,  PVD with fem-fem bypass and bilateral LE stents, 55 pack year smoking history, present to the [facility] ED with chest pain, found to have b/l ICA stenosis in setting of acute dysarthria. He is now s/p L CEA on 9/16, transferred to SICU for q1h neuro checks. Patient is scheduled to get TAVR, will go to medicine service for medical workup.     PLAN:   Neurologic:   -Tylenol/dilaudid for pain   - Nicotine patch    Respiratory: h/o smoking  -CHAN    Cardiovascular:   -Off pressors    Gastrointestinal/Nutrition:   -Diet: reg CC    Renal/Genitourinary:   -IV locked  -Monitor ins and outs    Hematologic:   -Restarted heparin and plavix per vascular and cards recs    Infectious Disease:   -CHAN    Endocrine:   -DM2.  -FS q 6h and ISS.    Lines/Tubes:  -PIVs    Disposition:   -SICU -> medicine floor   74 Year-Old Gentleman with a PMH of CAD status post stents on Plavix, Afib on coumadin, HTN, HLD,  PVD with fem-fem bypass and bilateral LE stents, 55 pack year smoking history, present to the [facility] ED with chest pain, found to have b/l ICA stenosis in setting of acute dysarthria. He is now s/p L CEA on 9/16, transferred to SICU for q1h neuro checks. Patient is scheduled to get TAVR, will go to medicine service for medical workup.     PLAN:   Neurologic:   -Tylenol/dilaudid for pain   - Nicotine patch    Respiratory: h/o smoking  -IS/ OOBTC/ ambulation as tolerated    Cardiovascular:   -Off pressors  -Hemodynamic monitoring  -Home statin    Gastrointestinal/Nutrition:   -Diet: reg CC  -Home protonic  -Bowel regimen with senna and miralax    Renal/Genitourinary:   -IV locked  -Monitor ins and outs    Hematologic:   -Restarted heparin infusion and plavix per vascular and cards recs  -Monitor H/H, coags    Infectious Disease: no acute issues  -Monitor WBC, temp    Endocrine: hx DM2  -FS q 6h and ISS.    Lines/Tubes:  -PIVs    Disposition:   -SICU -> medicine floor

## 2020-09-18 NOTE — OCCUPATIONAL THERAPY INITIAL EVALUATION ADULT - LIVES WITH, PROFILE
Pt lives in a  With his spouse, has 3 steps to enter with HR. All needs met on first floor. Pt was ambulating (I) without an AD and was independent with all ADLS PTA

## 2020-09-18 NOTE — OCCUPATIONAL THERAPY INITIAL EVALUATION ADULT - PERTINENT HX OF CURRENT PROBLEM, REHAB EVAL
74 Year-Old Gentleman with a PMH of CAD status post stents on Plavix, Afib on coumadin, HTN, HLD,  PVD with fem-fem bypass and bilateral LE stents, 55 pack year smoking history, present to the [facility] ED with chest pain, found to have L ICA 66% stenosis and R ICA 80% stenosis in setting of acute dysarthria. xray Chest (-)

## 2020-09-18 NOTE — OCCUPATIONAL THERAPY INITIAL EVALUATION ADULT - ANTICIPATED DISCHARGE DISPOSITION, OT EVAL
Home with home OT for home safety evaluation, balance, strength and ADLs. Home with supervision/assist for all functional mobility and ADLs.

## 2020-09-18 NOTE — PROGRESS NOTE ADULT - SUBJECTIVE AND OBJECTIVE BOX
*****Structural Heart Team*****    Subjective:    The patient was resting comfortably in a chair, offering no complaints eating lunch. There were no events overnight.      PAST MEDICAL & SURGICAL HISTORY:  Peripheral vascular disease    Atrial fibrillation    Hypertension    Diabetes    Hyperlipidemia    Coronary artery disease    Status post angiography of extremity  1 stent in each leg    History of coronary artery stent placement  two coronary stents          T(C): 36.8 (09-18-20 @ 07:00), Max: 37.8 (09-17-20 @ 23:00)  HR: 87 (09-18-20 @ 10:00) (75 - 95)  BP: 138/76 (09-18-20 @ 10:00) (117/72 - 152/72)  RR: 21 (09-18-20 @ 10:00) (18 - 27)  SpO2: 95% (09-18-20 @ 10:00) (93% - 100%)  Wt(kg): --  09-17 @ 07:01  -  09-18 @ 07:00  --------------------------------------------------------  IN: 1540 mL / OUT: 1190 mL / NET: 350 mL    09-18 @ 07:01  -  09-18 @ 13:50  --------------------------------------------------------  IN: 300 mL / OUT: 850 mL / NET: -550 mL      MEDICATIONS  (STANDING):  clopidogrel Tablet 75 milliGRAM(s) Oral daily  ferrous    sulfate 325 milliGRAM(s) Oral daily  heparin  Infusion 1000 Unit(s)/Hr (10 mL/Hr) IV Continuous <Continuous>  insulin glargine Injectable (LANTUS) 15 Unit(s) SubCutaneous at bedtime  insulin lispro (HumaLOG) corrective regimen sliding scale   SubCutaneous three times a day before meals  insulin lispro (HumaLOG) corrective regimen sliding scale   SubCutaneous at bedtime  magnesium oxide 400 milliGRAM(s) Oral two times a day with meals  nicotine -  14 mG/24Hr(s) Patch 1 patch Transdermal daily  pantoprazole    Tablet 40 milliGRAM(s) Oral before breakfast  polyethylene glycol 3350 17 Gram(s) Oral daily  senna 2 Tablet(s) Oral at bedtime  simvastatin 10 milliGRAM(s) Oral at bedtime  sodium chloride 1 Gram(s) Oral every 8 hours    MEDICATIONS  (PRN):  acetaminophen   Tablet .. 650 milliGRAM(s) Oral every 6 hours PRN Mild Pain (1 - 3)  oxyCODONE    IR 5 milliGRAM(s) Oral every 6 hours PRN Moderate Pain (4 - 6)      Review of Symptoms:  Constitutional: Awake, Alert, Follows commands  Respiratory: Mild SOB  Cardiac: Denies CP, Denies Palpitations  Gastrointestinal: Denies Pain, Denies N/V, tolerating po intake  Vascular: Negative  Extremities: No Edema, No joint pain or swelling  Neurological: Negative  Endocrine: No heat or cold intolerance, No excessive thirst  Heme/Onc: Negative    Exam:  General: A/Ox3, NAD, Follows commands  HEENT: Supple, No JVD, Trachea midline, no masses  Pulmonary: CTAB, = Chest Excursion, no accessory muscle use  Cor: S1S2, RRR, III/VI DOREEN  ECG: SR  Wound: L neck, bandage CDI, Wound not observed  Gastrointestinal: Soft, NT/ND, + Bowel Sounds  Neuro: = motor and sensory B/L, No focal deficits  Vascular: 1+ Pulses B/L, No edema  Extremities: No joint pain or swelling.  Skin: Warm/Dry/Normal color, Normal turgor, no rashes                          8.7    11.59 )-----------( 186      ( 18 Sep 2020 00:34 )             27.0   09-18    131<L>  |  97  |  14  ----------------------------<  180<H>  4.1   |  20<L>  |  0.87    Ca    8.7      18 Sep 2020 00:34  Phos  2.0     09-18  Mg     1.8     09-18    PT/INR - ( 17 Sep 2020 03:25 )   PT: 14.3 sec;   INR: 1.21 ratio         PTT - ( 17 Sep 2020 21:06 )  PTT:68.2 sec    Imaging Reviewed:      Assesment/Plan:    74 M S/P Left CEA for Carotid Stenosis, Severe Aortic Stenosis, Coronary Artery Disease    1.) Aortic Stenosis: The patient will undergo PCI and TAVR. We will shoot to do the TAVR CT this admission, then bring him back as an outpatient for his TAVR.  2.) CAD: Patient had a cardiac cath, which revealed a Cx lesion. This lesion should be stented this admission. Patient should have a cardiology consult to follow him while in house.  3.) CEA: Patient had drain removed today. Discussed with the Vascular team, who stated we are good to go with proceeding with TAVR and PCI.

## 2020-09-18 NOTE — PROGRESS NOTE ADULT - ASSESSMENT
74 m with    S/P L CEA  - postop care    Aortic stenosis severe  - for possible TAVR  - structural heart follow  - cath    Atrial fibrillation  - rate control  - AC with Heparin    Hypertension  - control    Diabetes  - ADA diet  - BS control    Hyperlipidemia  - stable    Coronary artery disease  - continue Rx    Monico Daly MD pager [phone]

## 2020-09-18 NOTE — PROGRESS NOTE ADULT - SUBJECTIVE AND OBJECTIVE BOX
Patient is a 74y old  Male who presents with a chief complaint of Asymptomatic, high-grade carotid stenosis (18 Sep 2020 13:45)      SUBJECTIVE / OVERNIGHT EVENTS: c/o neck discomfort.  Review of Systems  chest pain no  palpitations no  sob no  nausea no  headache no    MEDICATIONS  (STANDING):  clopidogrel Tablet 75 milliGRAM(s) Oral daily  ferrous    sulfate 325 milliGRAM(s) Oral daily  heparin  Infusion 1000 Unit(s)/Hr (10 mL/Hr) IV Continuous <Continuous>  insulin glargine Injectable (LANTUS) 15 Unit(s) SubCutaneous at bedtime  insulin lispro (HumaLOG) corrective regimen sliding scale   SubCutaneous three times a day before meals  insulin lispro (HumaLOG) corrective regimen sliding scale   SubCutaneous at bedtime  magnesium oxide 400 milliGRAM(s) Oral two times a day with meals  nicotine -  14 mG/24Hr(s) Patch 1 patch Transdermal daily  pantoprazole    Tablet 40 milliGRAM(s) Oral before breakfast  polyethylene glycol 3350 17 Gram(s) Oral daily  senna 2 Tablet(s) Oral at bedtime  simvastatin 10 milliGRAM(s) Oral at bedtime  sodium chloride 1 Gram(s) Oral every 8 hours    MEDICATIONS  (PRN):  acetaminophen   Tablet .. 650 milliGRAM(s) Oral every 6 hours PRN Mild Pain (1 - 3)  oxyCODONE    IR 5 milliGRAM(s) Oral every 6 hours PRN Moderate Pain (4 - 6)      Vital Signs Last 24 Hrs  T(C): 36.7 (18 Sep 2020 16:29), Max: 37.8 (17 Sep 2020 23:00)  T(F): 98 (18 Sep 2020 16:29), Max: 100.1 (17 Sep 2020 23:00)  HR: 86 (18 Sep 2020 16:29) (75 - 95)  BP: 159/78 (18 Sep 2020 16:29) (117/72 - 159/78)  BP(mean): 101 (18 Sep 2020 10:00) (85 - 108)  RR: 18 (18 Sep 2020 16:29) (18 - 27)  SpO2: 99% (18 Sep 2020 16:29) (93% - 100%)    PHYSICAL EXAM:  GENERAL: NAD, well-developed  HEAD:  Atraumatic, Normocephalic  EYES: EOMI, PERRLA, conjunctiva and sclera clear  NECK: Supple, No JVD KOLBY   CHEST/LUNG: Clear to auscultation bilaterally; No wheeze  HEART: Regular rate and rhythm; No murmurs, rubs, or gallops  ABDOMEN: Soft, Nontender, Nondistended; Bowel sounds present  EXTREMITIES:  2+ Peripheral Pulses, No clubbing, cyanosis, or edema  PSYCH: AAOx3  NEUROLOGY: non-focal  SKIN: No rashes or lesions    LABS:                        8.7    11.59 )-----------( 186      ( 18 Sep 2020 00:34 )             27.0     09-18    131<L>  |  97  |  14  ----------------------------<  180<H>  4.1   |  20<L>  |  0.87    Ca    8.7      18 Sep 2020 00:34  Phos  2.0     09-18  Mg     1.8     09-18      PT/INR - ( 17 Sep 2020 03:25 )   PT: 14.3 sec;   INR: 1.21 ratio         PTT - ( 17 Sep 2020 21:06 )  PTT:68.2 sec  CARDIAC MARKERS ( 16 Sep 2020 18:23 )  x     / x     / 30 U/L / x     / 1.1 ng/mL          Culture - Blood (collected 16 Sep 2020 09:34)  Source: .Blood Blood-Peripheral  Preliminary Report (17 Sep 2020 10:00):    No growth to date.    Culture - Blood (collected 16 Sep 2020 09:34)  Source: .Blood Blood-Peripheral  Preliminary Report (17 Sep 2020 10:00):    No growth to date.        RADIOLOGY & ADDITIONAL TESTS:    Imaging Personally Reviewed:    Consultant(s) Notes Reviewed:      Care Discussed with Consultants/Other Providers:

## 2020-09-19 LAB
ANION GAP SERPL CALC-SCNC: 9 MMOL/L — SIGNIFICANT CHANGE UP (ref 5–17)
APTT BLD: 36.4 SEC — HIGH (ref 27.5–35.5)
APTT BLD: 98.3 SEC — HIGH (ref 27.5–35.5)
BUN SERPL-MCNC: 11 MG/DL — SIGNIFICANT CHANGE UP (ref 7–23)
CALCIUM SERPL-MCNC: 9.4 MG/DL — SIGNIFICANT CHANGE UP (ref 8.4–10.5)
CHLORIDE SERPL-SCNC: 100 MMOL/L — SIGNIFICANT CHANGE UP (ref 96–108)
CO2 SERPL-SCNC: 27 MMOL/L — SIGNIFICANT CHANGE UP (ref 22–31)
CREAT SERPL-MCNC: 1.01 MG/DL — SIGNIFICANT CHANGE UP (ref 0.5–1.3)
GLUCOSE BLDC GLUCOMTR-MCNC: 140 MG/DL — HIGH (ref 70–99)
GLUCOSE BLDC GLUCOMTR-MCNC: 165 MG/DL — HIGH (ref 70–99)
GLUCOSE BLDC GLUCOMTR-MCNC: 194 MG/DL — HIGH (ref 70–99)
GLUCOSE BLDC GLUCOMTR-MCNC: 240 MG/DL — HIGH (ref 70–99)
GLUCOSE SERPL-MCNC: 145 MG/DL — HIGH (ref 70–99)
HCT VFR BLD CALC: 27.7 % — LOW (ref 39–50)
HGB BLD-MCNC: 8.6 G/DL — LOW (ref 13–17)
MAGNESIUM SERPL-MCNC: 1.8 MG/DL — SIGNIFICANT CHANGE UP (ref 1.6–2.6)
MCHC RBC-ENTMCNC: 30.4 PG — SIGNIFICANT CHANGE UP (ref 27–34)
MCHC RBC-ENTMCNC: 31 GM/DL — LOW (ref 32–36)
MCV RBC AUTO: 97.9 FL — SIGNIFICANT CHANGE UP (ref 80–100)
NRBC # BLD: 0 /100 WBCS — SIGNIFICANT CHANGE UP (ref 0–0)
PHOSPHATE SERPL-MCNC: 3 MG/DL — SIGNIFICANT CHANGE UP (ref 2.5–4.5)
PLATELET # BLD AUTO: 211 K/UL — SIGNIFICANT CHANGE UP (ref 150–400)
POTASSIUM SERPL-MCNC: 4 MMOL/L — SIGNIFICANT CHANGE UP (ref 3.5–5.3)
POTASSIUM SERPL-SCNC: 4 MMOL/L — SIGNIFICANT CHANGE UP (ref 3.5–5.3)
RBC # BLD: 2.83 M/UL — LOW (ref 4.2–5.8)
RBC # FLD: 15.9 % — HIGH (ref 10.3–14.5)
SODIUM SERPL-SCNC: 136 MMOL/L — SIGNIFICANT CHANGE UP (ref 135–145)
WBC # BLD: 10.7 K/UL — HIGH (ref 3.8–10.5)
WBC # FLD AUTO: 10.7 K/UL — HIGH (ref 3.8–10.5)

## 2020-09-19 RX ORDER — HEPARIN SODIUM 5000 [USP'U]/ML
1200 INJECTION INTRAVENOUS; SUBCUTANEOUS
Qty: 25000 | Refills: 0 | Status: DISCONTINUED | OUTPATIENT
Start: 2020-09-19 | End: 2020-09-21

## 2020-09-19 RX ORDER — METOPROLOL TARTRATE 50 MG
12.5 TABLET ORAL
Refills: 0 | Status: DISCONTINUED | OUTPATIENT
Start: 2020-09-19 | End: 2020-09-26

## 2020-09-19 RX ORDER — ATORVASTATIN CALCIUM 80 MG/1
20 TABLET, FILM COATED ORAL AT BEDTIME
Refills: 0 | Status: DISCONTINUED | OUTPATIENT
Start: 2020-09-19 | End: 2020-09-26

## 2020-09-19 RX ORDER — ASPIRIN/CALCIUM CARB/MAGNESIUM 324 MG
81 TABLET ORAL DAILY
Refills: 0 | Status: DISCONTINUED | OUTPATIENT
Start: 2020-09-19 | End: 2020-09-26

## 2020-09-19 RX ORDER — HEPARIN SODIUM 5000 [USP'U]/ML
1200 INJECTION INTRAVENOUS; SUBCUTANEOUS
Qty: 25000 | Refills: 0 | Status: DISCONTINUED | OUTPATIENT
Start: 2020-09-19 | End: 2020-09-19

## 2020-09-19 RX ADMIN — Medication 1 PATCH: at 20:17

## 2020-09-19 RX ADMIN — INSULIN GLARGINE 15 UNIT(S): 100 INJECTION, SOLUTION SUBCUTANEOUS at 22:09

## 2020-09-19 RX ADMIN — Medication 1 PATCH: at 17:26

## 2020-09-19 RX ADMIN — Medication 325 MILLIGRAM(S): at 11:38

## 2020-09-19 RX ADMIN — OXYCODONE HYDROCHLORIDE 5 MILLIGRAM(S): 5 TABLET ORAL at 22:09

## 2020-09-19 RX ADMIN — MAGNESIUM OXIDE 400 MG ORAL TABLET 400 MILLIGRAM(S): 241.3 TABLET ORAL at 17:10

## 2020-09-19 RX ADMIN — SODIUM CHLORIDE 1 GRAM(S): 9 INJECTION INTRAMUSCULAR; INTRAVENOUS; SUBCUTANEOUS at 23:16

## 2020-09-19 RX ADMIN — Medication 81 MILLIGRAM(S): at 12:44

## 2020-09-19 RX ADMIN — Medication 12.5 MILLIGRAM(S): at 12:41

## 2020-09-19 RX ADMIN — SENNA PLUS 2 TABLET(S): 8.6 TABLET ORAL at 22:09

## 2020-09-19 RX ADMIN — HEPARIN SODIUM 10 UNIT(S)/HR: 5000 INJECTION INTRAVENOUS; SUBCUTANEOUS at 08:22

## 2020-09-19 RX ADMIN — Medication 1 PATCH: at 20:18

## 2020-09-19 RX ADMIN — MAGNESIUM OXIDE 400 MG ORAL TABLET 400 MILLIGRAM(S): 241.3 TABLET ORAL at 09:05

## 2020-09-19 RX ADMIN — HEPARIN SODIUM 12 UNIT(S)/HR: 5000 INJECTION INTRAVENOUS; SUBCUTANEOUS at 18:03

## 2020-09-19 RX ADMIN — Medication 2: at 17:11

## 2020-09-19 RX ADMIN — SODIUM CHLORIDE 1 GRAM(S): 9 INJECTION INTRAMUSCULAR; INTRAVENOUS; SUBCUTANEOUS at 13:14

## 2020-09-19 RX ADMIN — PANTOPRAZOLE SODIUM 40 MILLIGRAM(S): 20 TABLET, DELAYED RELEASE ORAL at 05:24

## 2020-09-19 RX ADMIN — Medication 1 PATCH: at 05:22

## 2020-09-19 RX ADMIN — Medication 2: at 12:41

## 2020-09-19 RX ADMIN — ATORVASTATIN CALCIUM 20 MILLIGRAM(S): 80 TABLET, FILM COATED ORAL at 22:09

## 2020-09-19 RX ADMIN — OXYCODONE HYDROCHLORIDE 5 MILLIGRAM(S): 5 TABLET ORAL at 23:00

## 2020-09-19 RX ADMIN — SODIUM CHLORIDE 1 GRAM(S): 9 INJECTION INTRAMUSCULAR; INTRAVENOUS; SUBCUTANEOUS at 05:23

## 2020-09-19 RX ADMIN — Medication 2: at 09:05

## 2020-09-19 NOTE — CHART NOTE - NSCHARTNOTEFT_GEN_A_CORE
Pt followed by Structural heart Team with recommendation to continue Telemetry Monitoring. Pt s/p Lt CEA on 9/16.  Being worked up for TAVR. On Heparin drip for AF.   Was transferred to 3 [facility] from 8 ICU.   Discussed with Med Attending Dr Daly who recommended that pt be transferred to Telemetry Unit   as requested by Structural Heart Team for monitoring.   Pt notified of transfer. Pt stable. Vital Signs stable. In NAD.  Report given to PA covering 2 [facility].

## 2020-09-19 NOTE — PROGRESS NOTE ADULT - ASSESSMENT
74 m with    S/P L CEA  - postop care    Aortic stenosis severe  - for possible TAVR  - structural heart follow  - staged PCI    Atrial fibrillation  - rate control  - AC with Heparin  - ASA   - Metoprolol 12.5 bid     Hypertension  - control    Diabetes  - ADA diet  - BS control    Hyperlipidemia  - stable    Coronary artery disease  - continue Rx    Monico Daly MD pager [phone]

## 2020-09-19 NOTE — PROGRESS NOTE ADULT - SUBJECTIVE AND OBJECTIVE BOX
Patient is a 74y old  Male who presents with a chief complaint of Asymptomatic, high-grade carotid stenosis (19 Sep 2020 11:39)      SUBJECTIVE / OVERNIGHT EVENTS: feels better  Review of Systems  chest pain no  palpitations no  sob no  nausea no  headache no    MEDICATIONS  (STANDING):  aspirin enteric coated 81 milliGRAM(s) Oral daily  atorvastatin 20 milliGRAM(s) Oral at bedtime  clopidogrel Tablet 75 milliGRAM(s) Oral daily  ferrous    sulfate 325 milliGRAM(s) Oral daily  heparin  Infusion 1200 Unit(s)/Hr (12 mL/Hr) IV Continuous <Continuous>  insulin glargine Injectable (LANTUS) 15 Unit(s) SubCutaneous at bedtime  insulin lispro (HumaLOG) corrective regimen sliding scale   SubCutaneous three times a day before meals  insulin lispro (HumaLOG) corrective regimen sliding scale   SubCutaneous at bedtime  magnesium oxide 400 milliGRAM(s) Oral two times a day with meals  metoprolol tartrate 12.5 milliGRAM(s) Oral two times a day  nicotine -  14 mG/24Hr(s) Patch 1 patch Transdermal daily  pantoprazole    Tablet 40 milliGRAM(s) Oral before breakfast  polyethylene glycol 3350 17 Gram(s) Oral daily  senna 2 Tablet(s) Oral at bedtime  sodium chloride 1 Gram(s) Oral every 8 hours    MEDICATIONS  (PRN):  acetaminophen   Tablet .. 650 milliGRAM(s) Oral every 6 hours PRN Mild Pain (1 - 3)  oxyCODONE    IR 5 milliGRAM(s) Oral every 6 hours PRN Moderate Pain (4 - 6)      Vital Signs Last 24 Hrs  T(C): 36.5 (19 Sep 2020 16:42), Max: 37.2 (19 Sep 2020 00:35)  T(F): 97.7 (19 Sep 2020 16:42), Max: 98.9 (19 Sep 2020 00:35)  HR: 85 (19 Sep 2020 16:42) (72 - 97)  BP: 158/89 (19 Sep 2020 16:42) (138/68 - 158/89)  BP(mean): --  RR: 18 (19 Sep 2020 16:42) (18 - 18)  SpO2: 98% (19 Sep 2020 16:42) (96% - 99%)    PHYSICAL EXAM:  GENERAL: NAD, well-developed  HEAD:  Atraumatic, Normocephalic  EYES: EOMI, PERRLA, conjunctiva and sclera clear  NECK: Supple, No JVD L incision healing  CHEST/LUNG: Clear to auscultation bilaterally; No wheeze  HEART: Regular rate and rhythm; No murmurs, rubs, or gallops  ABDOMEN: Soft, Nontender, Nondistended; Bowel sounds present  EXTREMITIES:  2+ Peripheral Pulses, No clubbing, cyanosis, or edema  PSYCH: AAOx3  NEUROLOGY: non-focal  SKIN: No rashes or lesions    LABS:                        8.6    10.70 )-----------( 211      ( 19 Sep 2020 07:11 )             27.7     09-19    136  |  100  |  11  ----------------------------<  145<H>  4.0   |  27  |  1.01    Ca    9.4      19 Sep 2020 07:11  Phos  3.0     09-19  Mg     1.8     09-19      PTT - ( 19 Sep 2020 07:12 )  PTT:36.4 sec            RADIOLOGY & ADDITIONAL TESTS:    Imaging Personally Reviewed:    Consultant(s) Notes Reviewed:      Care Discussed with Consultants/Other Providers:

## 2020-09-19 NOTE — PROGRESS NOTE ADULT - ASSESSMENT
75y/o male with a PMH of CAD status post stents on Plavix, Afib on coumadin, HTN, HLD, PVD with fem-fem bypass and bilateral LE stents, 55 pack year smoking history; now bilateral carotid stenosis, with right-sided asymptomatic, high-grade stenosis of 80%. Stable. Pt is now s/p L CEA POD 3, he is doing well    Plan:  - Anticoagulation restarted  - Continue soft diet  - Pain control as needed   - Patient is ok from Vascular Surg standpoint to undergo any cardiothoracic interventions/surgery indicated   - Patient should follow up with Dr. Rodriguez in the office 2 weeks post-op  - Appreciate care per primary team    Vascular Surgery [phone]

## 2020-09-19 NOTE — PROGRESS NOTE ADULT - SUBJECTIVE AND OBJECTIVE BOX
Vascular Surgery Progress Note    S: Patient seen and examined at bedside. No events overnight. Pain well controlled. Denies chest pain, shortness of breath, nausea/vomiting. Reports that he is having some difficulty swallowing solid foods still    Physical Exam:  General: No acute distress  Respiratory: Nonlabored, lungs clear to auscultation  Cardiovascular: RRR, no murmurs  Abdominal: Soft, nondistended, nontender. No rebound or guarding.   Neuro: cranial nerves grossly intact, able to stick out tongue symmetrically  Extremities: Neck incision C/D/I, mildly TTP about incision. Sensation and motor function intact in bilateral upper and lower extremities  Vascular: palpable pulses in upper and lower extremities    PAST MEDICAL HISTORY:  Peripheral vascular disease    Atrial fibrillation    Hypertension    Diabetes    Hyperlipidemia    Diabetes    Hypertension    Coronary artery disease    Hyperlipemia    Hypertension    Diabetes          MEDICATIONS:  acetaminophen   Tablet .. 650 milliGRAM(s) Oral every 6 hours PRN  clopidogrel Tablet 75 milliGRAM(s) Oral daily  ferrous    sulfate 325 milliGRAM(s) Oral daily  heparin  Infusion 1200 Unit(s)/Hr IV Continuous <Continuous>  insulin glargine Injectable (LANTUS) 15 Unit(s) SubCutaneous at bedtime  insulin lispro (HumaLOG) corrective regimen sliding scale   SubCutaneous three times a day before meals  insulin lispro (HumaLOG) corrective regimen sliding scale   SubCutaneous at bedtime  magnesium oxide 400 milliGRAM(s) Oral two times a day with meals  nicotine -  14 mG/24Hr(s) Patch 1 patch Transdermal daily  oxyCODONE    IR 5 milliGRAM(s) Oral every 6 hours PRN  pantoprazole    Tablet 40 milliGRAM(s) Oral before breakfast  polyethylene glycol 3350 17 Gram(s) Oral daily  senna 2 Tablet(s) Oral at bedtime  simvastatin 10 milliGRAM(s) Oral at bedtime  sodium chloride 1 Gram(s) Oral every 8 hours      ALLERGIES:  No Known Allergies      VITALS & I/Os:  Vital Signs Last 24 Hrs  T(C): 36.7 (19 Sep 2020 09:00), Max: 37.2 (19 Sep 2020 00:35)  T(F): 98 (19 Sep 2020 09:00), Max: 98.9 (19 Sep 2020 00:35)  HR: 72 (19 Sep 2020 09:00) (72 - 97)  BP: 156/82 (19 Sep 2020 09:00) (132/65 - 159/78)  BP(mean): --  RR: 18 (19 Sep 2020 09:00) (18 - 20)  SpO2: 98% (19 Sep 2020 09:00) (96% - 99%)    I&O's Summary    18 Sep 2020 07:01  -  19 Sep 2020 07:00  --------------------------------------------------------  IN: 700 mL / OUT: 2100 mL / NET: -1400 mL    19 Sep 2020 07:01  -  19 Sep 2020 11:40  --------------------------------------------------------  IN: 240 mL / OUT: 0 mL / NET: 240 mL          LABS:                        8.6    10.70 )-----------( 211      ( 19 Sep 2020 07:11 )             27.7     09-19    136  |  100  |  11  ----------------------------<  145<H>  4.0   |  27  |  1.01    Ca    9.4      19 Sep 2020 07:11  Phos  3.0     09-19  Mg     1.8     09-19      Lactate:    PTT - ( 19 Sep 2020 07:12 )  PTT:36.4 sec

## 2020-09-20 LAB
ANION GAP SERPL CALC-SCNC: 7 MMOL/L — SIGNIFICANT CHANGE UP (ref 5–17)
APTT BLD: 81 SEC — HIGH (ref 27.5–35.5)
APTT BLD: 94.5 SEC — HIGH (ref 27.5–35.5)
BUN SERPL-MCNC: 9 MG/DL — SIGNIFICANT CHANGE UP (ref 7–23)
CALCIUM SERPL-MCNC: 9.5 MG/DL — SIGNIFICANT CHANGE UP (ref 8.4–10.5)
CHLORIDE SERPL-SCNC: 101 MMOL/L — SIGNIFICANT CHANGE UP (ref 96–108)
CO2 SERPL-SCNC: 27 MMOL/L — SIGNIFICANT CHANGE UP (ref 22–31)
CREAT SERPL-MCNC: 0.99 MG/DL — SIGNIFICANT CHANGE UP (ref 0.5–1.3)
GLUCOSE BLDC GLUCOMTR-MCNC: 125 MG/DL — HIGH (ref 70–99)
GLUCOSE BLDC GLUCOMTR-MCNC: 146 MG/DL — HIGH (ref 70–99)
GLUCOSE BLDC GLUCOMTR-MCNC: 172 MG/DL — HIGH (ref 70–99)
GLUCOSE BLDC GLUCOMTR-MCNC: 88 MG/DL — SIGNIFICANT CHANGE UP (ref 70–99)
GLUCOSE SERPL-MCNC: 78 MG/DL — SIGNIFICANT CHANGE UP (ref 70–99)
HCT VFR BLD CALC: 26.6 % — LOW (ref 39–50)
HGB BLD-MCNC: 8.3 G/DL — LOW (ref 13–17)
MCHC RBC-ENTMCNC: 30.7 PG — SIGNIFICANT CHANGE UP (ref 27–34)
MCHC RBC-ENTMCNC: 31.2 GM/DL — LOW (ref 32–36)
MCV RBC AUTO: 98.5 FL — SIGNIFICANT CHANGE UP (ref 80–100)
NRBC # BLD: 0 /100 WBCS — SIGNIFICANT CHANGE UP (ref 0–0)
PLATELET # BLD AUTO: 217 K/UL — SIGNIFICANT CHANGE UP (ref 150–400)
POTASSIUM SERPL-MCNC: 4.1 MMOL/L — SIGNIFICANT CHANGE UP (ref 3.5–5.3)
POTASSIUM SERPL-SCNC: 4.1 MMOL/L — SIGNIFICANT CHANGE UP (ref 3.5–5.3)
RBC # BLD: 2.7 M/UL — LOW (ref 4.2–5.8)
RBC # FLD: 15.9 % — HIGH (ref 10.3–14.5)
SODIUM SERPL-SCNC: 135 MMOL/L — SIGNIFICANT CHANGE UP (ref 135–145)
WBC # BLD: 10.34 K/UL — SIGNIFICANT CHANGE UP (ref 3.8–10.5)
WBC # FLD AUTO: 10.34 K/UL — SIGNIFICANT CHANGE UP (ref 3.8–10.5)

## 2020-09-20 PROCEDURE — 99233 SBSQ HOSP IP/OBS HIGH 50: CPT

## 2020-09-20 RX ADMIN — ATORVASTATIN CALCIUM 20 MILLIGRAM(S): 80 TABLET, FILM COATED ORAL at 22:14

## 2020-09-20 RX ADMIN — SENNA PLUS 2 TABLET(S): 8.6 TABLET ORAL at 22:14

## 2020-09-20 RX ADMIN — Medication 1 PATCH: at 19:21

## 2020-09-20 RX ADMIN — Medication 81 MILLIGRAM(S): at 12:17

## 2020-09-20 RX ADMIN — MAGNESIUM OXIDE 400 MG ORAL TABLET 400 MILLIGRAM(S): 241.3 TABLET ORAL at 08:08

## 2020-09-20 RX ADMIN — SODIUM CHLORIDE 1 GRAM(S): 9 INJECTION INTRAMUSCULAR; INTRAVENOUS; SUBCUTANEOUS at 06:24

## 2020-09-20 RX ADMIN — PANTOPRAZOLE SODIUM 40 MILLIGRAM(S): 20 TABLET, DELAYED RELEASE ORAL at 06:23

## 2020-09-20 RX ADMIN — SODIUM CHLORIDE 1 GRAM(S): 9 INJECTION INTRAMUSCULAR; INTRAVENOUS; SUBCUTANEOUS at 14:21

## 2020-09-20 RX ADMIN — Medication 2: at 12:15

## 2020-09-20 RX ADMIN — OXYCODONE HYDROCHLORIDE 5 MILLIGRAM(S): 5 TABLET ORAL at 06:40

## 2020-09-20 RX ADMIN — MAGNESIUM OXIDE 400 MG ORAL TABLET 400 MILLIGRAM(S): 241.3 TABLET ORAL at 17:17

## 2020-09-20 RX ADMIN — INSULIN GLARGINE 15 UNIT(S): 100 INJECTION, SOLUTION SUBCUTANEOUS at 22:14

## 2020-09-20 RX ADMIN — Medication 1 PATCH: at 17:17

## 2020-09-20 RX ADMIN — POLYETHYLENE GLYCOL 3350 17 GRAM(S): 17 POWDER, FOR SOLUTION ORAL at 12:17

## 2020-09-20 RX ADMIN — Medication 12.5 MILLIGRAM(S): at 06:23

## 2020-09-20 RX ADMIN — SODIUM CHLORIDE 1 GRAM(S): 9 INJECTION INTRAMUSCULAR; INTRAVENOUS; SUBCUTANEOUS at 22:15

## 2020-09-20 RX ADMIN — Medication 12.5 MILLIGRAM(S): at 17:17

## 2020-09-20 RX ADMIN — OXYCODONE HYDROCHLORIDE 5 MILLIGRAM(S): 5 TABLET ORAL at 06:24

## 2020-09-20 RX ADMIN — Medication 325 MILLIGRAM(S): at 12:18

## 2020-09-20 NOTE — PROGRESS NOTE ADULT - SUBJECTIVE AND OBJECTIVE BOX
Patient seen and examined at bedside.    Overnight Events:     ROS:    Current Meds:  acetaminophen   Tablet .. 650 milliGRAM(s) Oral every 6 hours PRN  aspirin enteric coated 81 milliGRAM(s) Oral daily  atorvastatin 20 milliGRAM(s) Oral at bedtime  clopidogrel Tablet 75 milliGRAM(s) Oral daily  ferrous    sulfate 325 milliGRAM(s) Oral daily  heparin  Infusion 1200 Unit(s)/Hr IV Continuous <Continuous>  insulin glargine Injectable (LANTUS) 15 Unit(s) SubCutaneous at bedtime  insulin lispro (HumaLOG) corrective regimen sliding scale   SubCutaneous three times a day before meals  insulin lispro (HumaLOG) corrective regimen sliding scale   SubCutaneous at bedtime  magnesium oxide 400 milliGRAM(s) Oral two times a day with meals  metoprolol tartrate 12.5 milliGRAM(s) Oral two times a day  nicotine -  14 mG/24Hr(s) Patch 1 patch Transdermal daily  oxyCODONE    IR 5 milliGRAM(s) Oral every 6 hours PRN  pantoprazole    Tablet 40 milliGRAM(s) Oral before breakfast  polyethylene glycol 3350 17 Gram(s) Oral daily  senna 2 Tablet(s) Oral at bedtime  sodium chloride 1 Gram(s) Oral every 8 hours      Vitals:  T(F): 99.2 (09-20), Max: 99.2 (09-20)  HR: 84 (09-20) (78 - 85)  BP: 134/82 (09-20) (134/82 - 158/89)  RR: 18 (09-20)  SpO2: 98% (09-20)  I&O's Summary    19 Sep 2020 07:01  -  20 Sep 2020 07:00  --------------------------------------------------------  IN: 1160 mL / OUT: 1700 mL / NET: -540 mL    20 Sep 2020 07:01  -  20 Sep 2020 10:37  --------------------------------------------------------  IN: 360 mL / OUT: 0 mL / NET: 360 mL        Physical Exam:  Appearance: No acute distress; well appearing  Eyes:  EOMI, pink conjunctiva  HENT: Normal oral mucosa  Cardiovascular: RRR, S1, S2, no murmurs, rubs, or gallops; no edema; no JVD  Respiratory: Clear to auscultation bilaterally  Gastrointestinal: soft, non-tender, non-distended with normal bowel sounds  Musculoskeletal: No clubbing; no joint deformity   Neurologic: Non-focal  Lymphatic: No lymphadenopathy  Psychiatry: AAOx3, mood & affect appropriate  Skin: No rashes                          8.3    10.34 )-----------( 217      ( 20 Sep 2020 06:37 )             26.6     09-20    135  |  101  |  9   ----------------------------<  78  4.1   |  27  |  0.99    Ca    9.5      20 Sep 2020 06:37  Phos  3.0     09-19  Mg     1.8     09-19      PTT - ( 20 Sep 2020 06:37 )  PTT:81.0 sec  CARDIAC MARKERS ( 16 Sep 2020 18:23 )  11 ng/L / x     / x     / 30 U/L / x     / 1.1 ng/mL              New ECG(s): Personally reviewed    Echo:    Stress Testing:     Cath:    Imaging:    Interpretation of Telemetry:    Assessment and Plan:    Mavis Whitt MD  Cardiology Fellow   [phone]  For all other Cardiology service contact information, go to [email or web address] and use [email or web address]" to login.     Patient seen and examined at bedside.    Overnight Events:  no events overnight    ROS:  chest pressure improved, no dyspnea or palpitations    Current Meds:  acetaminophen   Tablet .. 650 milliGRAM(s) Oral every 6 hours PRN  aspirin enteric coated 81 milliGRAM(s) Oral daily  atorvastatin 20 milliGRAM(s) Oral at bedtime  clopidogrel Tablet 75 milliGRAM(s) Oral daily  ferrous    sulfate 325 milliGRAM(s) Oral daily  heparin  Infusion 1200 Unit(s)/Hr IV Continuous <Continuous>  insulin glargine Injectable (LANTUS) 15 Unit(s) SubCutaneous at bedtime  insulin lispro (HumaLOG) corrective regimen sliding scale   SubCutaneous three times a day before meals  insulin lispro (HumaLOG) corrective regimen sliding scale   SubCutaneous at bedtime  magnesium oxide 400 milliGRAM(s) Oral two times a day with meals  metoprolol tartrate 12.5 milliGRAM(s) Oral two times a day  nicotine -  14 mG/24Hr(s) Patch 1 patch Transdermal daily  oxyCODONE    IR 5 milliGRAM(s) Oral every 6 hours PRN  pantoprazole    Tablet 40 milliGRAM(s) Oral before breakfast  polyethylene glycol 3350 17 Gram(s) Oral daily  senna 2 Tablet(s) Oral at bedtime  sodium chloride 1 Gram(s) Oral every 8 hours    Vitals:  T(F): 99.2 (09-20), Max: 99.2 (09-20)  HR: 84 (09-20) (78 - 85)  BP: 134/82 (09-20) (134/82 - 158/89)  RR: 18 (09-20)  SpO2: 98% (09-20)  I&O's Summary    19 Sep 2020 07:01  -  20 Sep 2020 07:00  --------------------------------------------------------  IN: 1160 mL / OUT: 1700 mL / NET: -540 mL    20 Sep 2020 07:01  -  20 Sep 2020 10:37  --------------------------------------------------------  IN: 360 mL / OUT: 0 mL / NET: 360 mL      Physical Exam:  Appearance: No acute distress; frail appearing  Eyes:  EOMI, pink conjunctiva  HENT: Normal oral mucosa  Cardiovascular: RRR, S1, S2, late peaking systolic murmur with squawk like quality; no edema; no JVD  Respiratory: Clear to auscultation bilaterally  Gastrointestinal: soft, non-tender, non-distended with normal bowel sounds  Musculoskeletal: No clubbing; no joint deformity   Neurologic: Non-focal  Lymphatic: No lymphadenopathy  Psychiatry: AAOx3, mood & affect appropriate  Skin: No rashes                          8.3    10.34 )-----------( 217      ( 20 Sep 2020 06:37 )             26.6     09-20    135  |  101  |  9   ----------------------------<  78  4.1   |  27  |  0.99    Ca    9.5      20 Sep 2020 06:37  Phos  3.0     09-19  Mg     1.8     09-19    PTT - ( 20 Sep 2020 06:37 )  PTT:81.0 sec  CARDIAC MARKERS ( 16 Sep 2020 18:23 )  11 ng/L / x     / x     / 30 U/L / x     / 1.1 ng/mL    Echo:  < from: Transthoracic Echocardiogram (09.11.20 @ 10:13) >  CONCLUSIONS:  1. Mitral annular calcification, otherwise normal mitral  valve. Mild-moderate mitral regurgitation.  2. Calcified trileaflet aortic valve with decreased opening  Peak transaortic valve gradient equals 98 mm Hg, mean  transaortic valve gradient equals 59 mm Hg, estimated  aortic valve area equals 0.5 sqcm (by continuity equation),  consistent with severe aortic stenosis. Mild-moderate  aortic regurgitation.  3. Moderately dilated left atrium.  LA volume index = 43  cc/m2.  4. Increased relative wall thickness with normal left  ventricular mass index, consistent with concentric left  ventricular remodeling.  5. Normal left ventricular systolic function. No segmental  wall motion abnormalities.  6. Normal right ventricular size and function.  7. Estimated right ventricular systolic pressure equals 54  mm Hg, assuming right atrial pressure equals 10 mm Hg,  consistent with moderate pulmonary hypertension8.  8. A bubble study was performed with the intravenous  injection of agitated saline.  Following contrast  injection, no obvious bubbles were seen in the left heart.    < end of copied text >    Interpretation of Telemetry: afib 70-90s, no events    Assessment and Plan:    Mr Crockett is a 74 M with Carotid Stenosis, Severe Symptomatic Aortic Stenosis, and Coronary Artery Disease who is here for a left CEA, now recovering well from procedure.  Structural Cardiology consulted for his symptomatic aortic stenosis.    1.) Aortic Stenosis: The patient will undergo PCI and TAVR. We will shoot to do the TAVR CT this admission, then bring him back as an outpatient for his TAVR.  Continue ASA    2.) CAD: Patient had a cardiac cath, which revealed a Cx lesion. This lesion should be stented this admission.  Plan for cath tomorrow    3.) CEA: Patient had drain removed. Per vascular cleared for TAVR.  On ASA/Plavix per Vascular, defer DAPT management to them.    4.) AFib: on heparin gtt for now, will go onto coumadin when ready for discharge    For all other Cardiology service contact information, go to [email or web address] and use [email or web address]" to login.

## 2020-09-20 NOTE — PROGRESS NOTE ADULT - ASSESSMENT
74 m with    S/P L CEA  - postop care    Aortic stenosis severe  - for possible TAVR  - structural heart follow  - staged PCI     Atrial fibrillation  - rate control  - AC with Heparin  - ASA   - Metoprolol 12.5 bid     Hypertension  - control    Diabetes  - ADA diet  - BS control    Hyperlipidemia  - stable    Coronary artery disease  - continue Rx    Monico Daly MD pager [phone]

## 2020-09-20 NOTE — PROGRESS NOTE ADULT - SUBJECTIVE AND OBJECTIVE BOX
Patient is a 74y old  Male who presents with a chief complaint of Asymptomatic, high-grade carotid stenosis (20 Sep 2020 10:37)      SUBJECTIVE / OVERNIGHT EVENTS: Comfortable without new complaints.   Review of Systems  chest pain no  palpitations no  sob no  nausea no  headache no    MEDICATIONS  (STANDING):  aspirin enteric coated 81 milliGRAM(s) Oral daily  atorvastatin 20 milliGRAM(s) Oral at bedtime  clopidogrel Tablet 75 milliGRAM(s) Oral daily  ferrous    sulfate 325 milliGRAM(s) Oral daily  heparin  Infusion 1200 Unit(s)/Hr (12 mL/Hr) IV Continuous <Continuous>  insulin glargine Injectable (LANTUS) 15 Unit(s) SubCutaneous at bedtime  insulin lispro (HumaLOG) corrective regimen sliding scale   SubCutaneous three times a day before meals  insulin lispro (HumaLOG) corrective regimen sliding scale   SubCutaneous at bedtime  magnesium oxide 400 milliGRAM(s) Oral two times a day with meals  metoprolol tartrate 12.5 milliGRAM(s) Oral two times a day  nicotine -  14 mG/24Hr(s) Patch 1 patch Transdermal daily  pantoprazole    Tablet 40 milliGRAM(s) Oral before breakfast  polyethylene glycol 3350 17 Gram(s) Oral daily  senna 2 Tablet(s) Oral at bedtime  sodium chloride 1 Gram(s) Oral every 8 hours    MEDICATIONS  (PRN):  acetaminophen   Tablet .. 650 milliGRAM(s) Oral every 6 hours PRN Mild Pain (1 - 3)  oxyCODONE    IR 5 milliGRAM(s) Oral every 6 hours PRN Moderate Pain (4 - 6)      Vital Signs Last 24 Hrs  T(C): 36.6 (20 Sep 2020 13:38), Max: 37.3 (19 Sep 2020 19:44)  T(F): 97.9 (20 Sep 2020 13:38), Max: 99.2 (20 Sep 2020 05:41)  HR: 79 (20 Sep 2020 13:38) (78 - 85)  BP: 130/86 (20 Sep 2020 13:38) (130/86 - 158/89)  BP(mean): --  RR: 18 (20 Sep 2020 13:38) (18 - 18)  SpO2: 94% (20 Sep 2020 13:38) (93% - 98%)    PHYSICAL EXAM:  GENERAL: NAD, well-developed  HEAD:  Atraumatic, Normocephalic  EYES: EOMI, PERRLA, conjunctiva and sclera clear  NECK: Supple, No JVD  CHEST/LUNG: Clear to auscultation bilaterally; No wheeze  HEART: Regular rate and rhythm; No murmurs, rubs, or gallops  ABDOMEN: Soft, Nontender, Nondistended; Bowel sounds present  EXTREMITIES:  2+ Peripheral Pulses, No clubbing, cyanosis, or edema  PSYCH: AAOx3  NEUROLOGY: non-focal  SKIN: No rashes or lesions    LABS:                        8.3    10.34 )-----------( 217      ( 20 Sep 2020 06:37 )             26.6     09-20    135  |  101  |  9   ----------------------------<  78  4.1   |  27  |  0.99    Ca    9.5      20 Sep 2020 06:37  Phos  3.0     09-19  Mg     1.8     09-19      PTT - ( 20 Sep 2020 06:37 )  PTT:81.0 sec            RADIOLOGY & ADDITIONAL TESTS:    Imaging Personally Reviewed:    Consultant(s) Notes Reviewed:      Care Discussed with Consultants/Other Providers:

## 2020-09-21 LAB
ANION GAP SERPL CALC-SCNC: 8 MMOL/L — SIGNIFICANT CHANGE UP (ref 5–17)
APTT BLD: 85.5 SEC — HIGH (ref 27.5–35.5)
BUN SERPL-MCNC: 12 MG/DL — SIGNIFICANT CHANGE UP (ref 7–23)
CALCIUM SERPL-MCNC: 9.6 MG/DL — SIGNIFICANT CHANGE UP (ref 8.4–10.5)
CHLORIDE SERPL-SCNC: 101 MMOL/L — SIGNIFICANT CHANGE UP (ref 96–108)
CO2 SERPL-SCNC: 26 MMOL/L — SIGNIFICANT CHANGE UP (ref 22–31)
CREAT SERPL-MCNC: 1.01 MG/DL — SIGNIFICANT CHANGE UP (ref 0.5–1.3)
CULTURE RESULTS: SIGNIFICANT CHANGE UP
CULTURE RESULTS: SIGNIFICANT CHANGE UP
GLUCOSE BLDC GLUCOMTR-MCNC: 152 MG/DL — HIGH (ref 70–99)
GLUCOSE BLDC GLUCOMTR-MCNC: 152 MG/DL — HIGH (ref 70–99)
GLUCOSE BLDC GLUCOMTR-MCNC: 70 MG/DL — SIGNIFICANT CHANGE UP (ref 70–99)
GLUCOSE BLDC GLUCOMTR-MCNC: 97 MG/DL — SIGNIFICANT CHANGE UP (ref 70–99)
GLUCOSE SERPL-MCNC: 63 MG/DL — LOW (ref 70–99)
HCT VFR BLD CALC: 27.1 % — LOW (ref 39–50)
HGB BLD-MCNC: 8.2 G/DL — LOW (ref 13–17)
MCHC RBC-ENTMCNC: 29.8 PG — SIGNIFICANT CHANGE UP (ref 27–34)
MCHC RBC-ENTMCNC: 30.3 GM/DL — LOW (ref 32–36)
MCV RBC AUTO: 98.5 FL — SIGNIFICANT CHANGE UP (ref 80–100)
NRBC # BLD: 0 /100 WBCS — SIGNIFICANT CHANGE UP (ref 0–0)
PLATELET # BLD AUTO: 240 K/UL — SIGNIFICANT CHANGE UP (ref 150–400)
POTASSIUM SERPL-MCNC: 3.8 MMOL/L — SIGNIFICANT CHANGE UP (ref 3.5–5.3)
POTASSIUM SERPL-SCNC: 3.8 MMOL/L — SIGNIFICANT CHANGE UP (ref 3.5–5.3)
RBC # BLD: 2.75 M/UL — LOW (ref 4.2–5.8)
RBC # FLD: 15.8 % — HIGH (ref 10.3–14.5)
SODIUM SERPL-SCNC: 135 MMOL/L — SIGNIFICANT CHANGE UP (ref 135–145)
SPECIMEN SOURCE: SIGNIFICANT CHANGE UP
SPECIMEN SOURCE: SIGNIFICANT CHANGE UP
WBC # BLD: 9.5 K/UL — SIGNIFICANT CHANGE UP (ref 3.8–10.5)
WBC # FLD AUTO: 9.5 K/UL — SIGNIFICANT CHANGE UP (ref 3.8–10.5)

## 2020-09-21 PROCEDURE — 99152 MOD SED SAME PHYS/QHP 5/>YRS: CPT

## 2020-09-21 PROCEDURE — 92928 PRQ TCAT PLMT NTRAC ST 1 LES: CPT | Mod: LC

## 2020-09-21 PROCEDURE — 99232 SBSQ HOSP IP/OBS MODERATE 35: CPT

## 2020-09-21 PROCEDURE — 93010 ELECTROCARDIOGRAM REPORT: CPT | Mod: 76

## 2020-09-21 RX ORDER — HEPARIN SODIUM 5000 [USP'U]/ML
1200 INJECTION INTRAVENOUS; SUBCUTANEOUS
Qty: 25000 | Refills: 0 | Status: DISCONTINUED | OUTPATIENT
Start: 2020-09-21 | End: 2020-09-26

## 2020-09-21 RX ORDER — SIMETHICONE 80 MG/1
80 TABLET, CHEWABLE ORAL ONCE
Refills: 0 | Status: COMPLETED | OUTPATIENT
Start: 2020-09-21 | End: 2020-09-21

## 2020-09-21 RX ORDER — INSULIN GLARGINE 100 [IU]/ML
10 INJECTION, SOLUTION SUBCUTANEOUS AT BEDTIME
Refills: 0 | Status: DISCONTINUED | OUTPATIENT
Start: 2020-09-21 | End: 2020-09-26

## 2020-09-21 RX ADMIN — Medication 12.5 MILLIGRAM(S): at 17:22

## 2020-09-21 RX ADMIN — Medication 12.5 MILLIGRAM(S): at 05:34

## 2020-09-21 RX ADMIN — Medication 1 PATCH: at 06:55

## 2020-09-21 RX ADMIN — SIMETHICONE 80 MILLIGRAM(S): 80 TABLET, CHEWABLE ORAL at 15:34

## 2020-09-21 RX ADMIN — ATORVASTATIN CALCIUM 20 MILLIGRAM(S): 80 TABLET, FILM COATED ORAL at 21:24

## 2020-09-21 RX ADMIN — INSULIN GLARGINE 10 UNIT(S): 100 INJECTION, SOLUTION SUBCUTANEOUS at 22:25

## 2020-09-21 RX ADMIN — MAGNESIUM OXIDE 400 MG ORAL TABLET 400 MILLIGRAM(S): 241.3 TABLET ORAL at 17:22

## 2020-09-21 RX ADMIN — MAGNESIUM OXIDE 400 MG ORAL TABLET 400 MILLIGRAM(S): 241.3 TABLET ORAL at 07:51

## 2020-09-21 RX ADMIN — Medication 1 PATCH: at 17:23

## 2020-09-21 RX ADMIN — Medication 325 MILLIGRAM(S): at 12:02

## 2020-09-21 RX ADMIN — Medication 650 MILLIGRAM(S): at 15:00

## 2020-09-21 RX ADMIN — SODIUM CHLORIDE 1 GRAM(S): 9 INJECTION INTRAMUSCULAR; INTRAVENOUS; SUBCUTANEOUS at 21:24

## 2020-09-21 RX ADMIN — Medication 81 MILLIGRAM(S): at 12:02

## 2020-09-21 RX ADMIN — SENNA PLUS 2 TABLET(S): 8.6 TABLET ORAL at 21:24

## 2020-09-21 RX ADMIN — Medication 650 MILLIGRAM(S): at 14:09

## 2020-09-21 RX ADMIN — PANTOPRAZOLE SODIUM 40 MILLIGRAM(S): 20 TABLET, DELAYED RELEASE ORAL at 05:34

## 2020-09-21 RX ADMIN — Medication 1 PATCH: at 17:00

## 2020-09-21 RX ADMIN — POLYETHYLENE GLYCOL 3350 17 GRAM(S): 17 POWDER, FOR SOLUTION ORAL at 17:23

## 2020-09-21 RX ADMIN — SODIUM CHLORIDE 1 GRAM(S): 9 INJECTION INTRAMUSCULAR; INTRAVENOUS; SUBCUTANEOUS at 05:34

## 2020-09-21 RX ADMIN — Medication 1 PATCH: at 19:10

## 2020-09-21 RX ADMIN — HEPARIN SODIUM 12 UNIT(S)/HR: 5000 INJECTION INTRAVENOUS; SUBCUTANEOUS at 21:24

## 2020-09-21 NOTE — DIETITIAN INITIAL EVALUATION ADULT. - OTHER INFO
Pt seen in bed in good spirits, of note he is somewhat difficult to understand 2/2 dysarthria. Pt reports good po intake/appetite at this time, denies GI distress no N/V/diarrhea or constipation. Endorses some difficulty chewing 2/2 neck soreness following Left carotid  endarectomy, however is amenable to try a soft diet and gravy to moisten food at lunch and dinner to address. Pt denies recent weight change, states weight has been stable, at around 160 pounds, fairly consistent with current weight of 157 pounds. He denies food allergies or intolerance, denies use of vitamin/mineral/herbal supplements PTA. In terms of DM management PTA, not on insulin regimen prior only take Metformin at home, rarely checks his FS at home as his numbers have always been WDL. Typical diet intake is cereal for breakfast, vegetables and chicken for lunch and dinner. Meals often prepared by pt's spouse. He endorses occasional EtOH use.

## 2020-09-21 NOTE — DIETITIAN INITIAL EVALUATION ADULT. - WEIGHT IN LBS
[de-identified] : Chief complaint:     Left knee pain\par \par Diagnosis:              Left knee osteoarthritis\par \par \par Injection:\par Synvisc one\par [id number] expiration July of 2021\par \par \par Procedure Note:\par \par Risks and benefits of an arthrocentesis and intraarticular hyaluronic injection of the LEFT knee were discussed with the patient. Potential adverse effects were discussed including but not limited to bleeding, skin/ joint infection, local skin reactions including bleaching, bruising, stiffness, soreness, vasovagal episodes, transient hyperglycemia, avascular necrosis, pseudo-septic type reactions, post injection joint pain, allergic reaction to product or anesthetic and other rare but potential adverse effects along with benefits including decreased pain and improved stability prior to obtaining verbal informed consent. It was also discussed that for some patients the treatment is ineffective and there are no guarantees that the patient will experience improvement as the result of the injection. In rare occasions the injection can cause worsening of pain.\par \par The use of a Sonosite 15-6 MHz linear transducer with live ultrasound guidance of the knee was necessary given the patient's BMI and local body habitus overlying and obscuring the accurate identification of normal body bony anatomy used to identify the injection site and the depth of soft tissue envelope necessitating a longer than normal needle to reach the joint space, and to confirm the location of the needle tip and intra-articular delivery of the medication. Without the use of live ultrasound guidance the injection would have been more difficult and place the patient's neurovascular structures at risk from the longer needle needed to traverse the soft tissue envelope.\par \par A 6 inch bolster was placed underneath the knee to place the LEFT knee in a slightly flexed position. The superolateral injection site was identified using the ultrasound probe to identify the suprapatellar space and superior boarder of the patella first longitudinally and then transversely. The injection site was marked and prepped with a ChloraPrep swab and anesthetized with ethylchloride skin anesthesia. Under sterile technique a 20g 3 1/2 in spinal needle with a preloaded viscosupplementation syringe was passed through the injection site towards the suprapatellar space under live ultrasound guidance and noted to penetrate the joint capsule. The medication was injected without resistance under live ultrasound visualization and noted to flow into the suprapatellar joint space. The injection site was sterilely dressed, there was minimal blood loss.\par \par The patient tolerated this procedure without any complications done by myself. Images were recorded and saved.\par \par The patient has been advised that if they notice any worsening of symptoms or any problems to contact me and seek care from a qualified medical professional. The patient was instructed to ice the knee and take NSAID medication on an as needed basis if the patient feels discomfort.\par \par Followup injection [6 months] \par \par Patient unhappy about the duration of time since the order for the gel injection and the patient is very upset at myself.\par I apologized that unfortunately this injection did take longer than usual however her insurance did request a change in the gel injection type increasing time. I discussed that if she is unhappy with my service and delay of care but I'm sorry that delays do occasionally happen however the order was placed on June 30th on the day of her visit and we usually try our best to have the authorization department work on them as soon as possible but with the medication change it delays the authorization. The change in medication was approved by myself on same date as change request by insurance.  \par \par If she's unhappy with our best efforts, placing order and change of medicine on day of request, she is welcome to seek care with other people to continue with this injection in the future such as her rheumatologist which gave her right knee an injection which she reports hurts less than the injection that I gave her.\par \par Patient is also complaining of discoloration of the skin on her second toe on her right foot after a insect bite. 10 minutes were spent explaining to the patient that this is a dermatologic issue due to a insect bites on her skin and discoloration of the skin is not an orthopedic issue I recommend that she see a dermatologist to discussing issues he has pertaining to this discoloration of her skin on her great toe. [id number]

## 2020-09-21 NOTE — DIETITIAN INITIAL EVALUATION ADULT. - PERTINENT LABORATORY DATA
9/21: Glucose: 63. 9/10: HbA1c: 5.4%.  CAPILLARY BLOOD GLUCOSE  POCT Blood Glucose.: 152 mg/dL (21 Sep 2020 11:05)  POCT Blood Glucose.: 70 mg/dL (21 Sep 2020 07:41)  POCT Blood Glucose.: 146 mg/dL (20 Sep 2020 21:25)  POCT Blood Glucose.: 125 mg/dL (20 Sep 2020 16:48)  POCT Blood Glucose.: 172 mg/dL (20 Sep 2020 12:10)

## 2020-09-21 NOTE — PROGRESS NOTE ADULT - ASSESSMENT
74 m with    S/P L CEA  - postop care    Aortic stenosis severe  - for possible TAVR  - structural heart follow  - staged PCI   - CT heart pending     Atrial fibrillation  - rate control  - AC with Heparin  - ASA   - Metoprolol 12.5 bid     Hypertension  - control    Diabetes  - ADA diet  - BS control    Hyperlipidemia  - stable    Coronary artery disease  - continue Rx    Monico Daly MD pager [phone]

## 2020-09-21 NOTE — DIETITIAN INITIAL EVALUATION ADULT. - ADD RECOMMEND
Recommend add soft to diet order to promote ease of chewing following s/p Left carotid endarterectomy. Would also add low sodium to consistent CHO diet order to promote cardiovascular health. Will add gravy to lunch and dinner meals to help moisten foods. Discussed with medicine (Selina Spectra [id number]) hypoglycemia episode and low trending fast FS, discussed to consider lowering Lantus dose to address.

## 2020-09-21 NOTE — DIETITIAN INITIAL EVALUATION ADULT. - ENERGY NEEDS
ht: 71 inches. wt: 156.9 pounds (current, standing, no edema noted). BMI: 21.9 kG/m2. UBW: 160 pounds. IBW: 172 pounds +/- 10%. %IBW: 91%.  Other pertinent objective information: Pt is a 74 year old male with PMH of CAD status post stents on Plavix, Afib, HTN, HLD, PVD with fem-fem bypass and bilateral LE stents, 55 pack year smoking history, present to the [facility] ED with chest pain, found to have L ICA 66% stenosis and R ICA 80% stenosis in setting of acute dysarthria. Is now s/p Left carotid  endarterectomy. Pending PCI and TAVR for symptomatic aortic stenosis and CAD. Of note, pt with hypoglycemia this am, fasting FS trending <100. No pressure injuries noted.

## 2020-09-21 NOTE — PROGRESS NOTE ADULT - SUBJECTIVE AND OBJECTIVE BOX
Patient is a 74y old  Male who presents with a chief complaint of Asymptomatic, high-grade carotid stenosis (21 Sep 2020 10:09)      SUBJECTIVE / OVERNIGHT EVENTS: No new complaints.   Review of Systems  chest pain no  palpitations no  sob no  nausea no  headache no    MEDICATIONS  (STANDING):  aspirin enteric coated 81 milliGRAM(s) Oral daily  atorvastatin 20 milliGRAM(s) Oral at bedtime  clopidogrel Tablet 75 milliGRAM(s) Oral daily  ferrous    sulfate 325 milliGRAM(s) Oral daily  heparin  Infusion 1200 Unit(s)/Hr (12 mL/Hr) IV Continuous <Continuous>  insulin glargine Injectable (LANTUS) 10 Unit(s) SubCutaneous at bedtime  insulin lispro (HumaLOG) corrective regimen sliding scale   SubCutaneous three times a day before meals  insulin lispro (HumaLOG) corrective regimen sliding scale   SubCutaneous at bedtime  magnesium oxide 400 milliGRAM(s) Oral two times a day with meals  metoprolol tartrate 12.5 milliGRAM(s) Oral two times a day  nicotine -  14 mG/24Hr(s) Patch 1 patch Transdermal daily  pantoprazole    Tablet 40 milliGRAM(s) Oral before breakfast  polyethylene glycol 3350 17 Gram(s) Oral daily  senna 2 Tablet(s) Oral at bedtime  sodium chloride 1 Gram(s) Oral every 8 hours    MEDICATIONS  (PRN):  acetaminophen   Tablet .. 650 milliGRAM(s) Oral every 6 hours PRN Mild Pain (1 - 3)  oxyCODONE    IR 5 milliGRAM(s) Oral every 6 hours PRN Moderate Pain (4 - 6)      Vital Signs Last 24 Hrs  T(C): 36.1 (21 Sep 2020 13:00), Max: 37.1 (21 Sep 2020 11:18)  T(F): 97 (21 Sep 2020 13:00), Max: 98.7 (21 Sep 2020 11:18)  HR: 86 (21 Sep 2020 16:00) (72 - 88)  BP: 152/80 (21 Sep 2020 16:00) (128/74 - 167/71)  BP(mean): --  RR: 15 (21 Sep 2020 16:00) (15 - 18)  SpO2: 99% (21 Sep 2020 16:00) (94% - 100%)    PHYSICAL EXAM:  GENERAL: NAD, well-developed  HEAD:  Atraumatic, Normocephalic  EYES: EOMI, PERRLA, conjunctiva and sclera clear  NECK: Supple, No JVD Incision healing  CHEST/LUNG: Clear to auscultation bilaterally; No wheeze  HEART: Regular rate and rhythm; No murmurs, rubs, or gallops  ABDOMEN: Soft, Nontender, Nondistended; Bowel sounds present  EXTREMITIES:  2+ Peripheral Pulses, No clubbing, cyanosis, or edema  PSYCH: AAOx3  NEUROLOGY: non-focal  SKIN: No rashes or lesions    LABS:                        8.2    9.50  )-----------( 240      ( 21 Sep 2020 06:06 )             27.1     09-21    135  |  101  |  12  ----------------------------<  63<L>  3.8   |  26  |  1.01    Ca    9.6      21 Sep 2020 06:06      PTT - ( 21 Sep 2020 06:06 )  PTT:85.5 sec            RADIOLOGY & ADDITIONAL TESTS:    Imaging Personally Reviewed:    Consultant(s) Notes Reviewed:      Care Discussed with Consultants/Other Providers:

## 2020-09-21 NOTE — PROGRESS NOTE ADULT - SUBJECTIVE AND OBJECTIVE BOX
*****Structural Heart Team*****    Subjective:    The patient was found resting comfortably in bed, offering no complaints. He is due to hav a PCI today for his Circumflex lesion.      PAST MEDICAL & SURGICAL HISTORY:  Peripheral vascular disease    Atrial fibrillation    Hypertension    Diabetes    Hyperlipidemia    Coronary artery disease    Status post angiography of extremity  1 stent in each leg    History of coronary artery stent placement  two coronary stents          T(C): 37.1 (09-21-20 @ 11:18), Max: 37.1 (09-21-20 @ 11:18)  HR: 76 (09-21-20 @ 13:00) (72 - 87)  BP: 128/74 (09-21-20 @ 13:00) (128/74 - 158/88)  RR: 15 (09-21-20 @ 13:00) (15 - 18)  SpO2: 95% (09-21-20 @ 13:00) (94% - 100%)  Wt(kg): --  09-20 @ 07:01  -  09-21 @ 07:00  --------------------------------------------------------  IN: 1064 mL / OUT: 1350 mL / NET: -286 mL    09-21 @ 07:01  -  09-21 @ 13:10  --------------------------------------------------------  IN: 240 mL / OUT: 0 mL / NET: 240 mL      MEDICATIONS  (STANDING):  aspirin enteric coated 81 milliGRAM(s) Oral daily  atorvastatin 20 milliGRAM(s) Oral at bedtime  clopidogrel Tablet 75 milliGRAM(s) Oral daily  ferrous    sulfate 325 milliGRAM(s) Oral daily  heparin  Infusion 1200 Unit(s)/Hr (12 mL/Hr) IV Continuous <Continuous>  insulin glargine Injectable (LANTUS) 10 Unit(s) SubCutaneous at bedtime  insulin lispro (HumaLOG) corrective regimen sliding scale   SubCutaneous three times a day before meals  insulin lispro (HumaLOG) corrective regimen sliding scale   SubCutaneous at bedtime  magnesium oxide 400 milliGRAM(s) Oral two times a day with meals  metoprolol tartrate 12.5 milliGRAM(s) Oral two times a day  nicotine -  14 mG/24Hr(s) Patch 1 patch Transdermal daily  pantoprazole    Tablet 40 milliGRAM(s) Oral before breakfast  polyethylene glycol 3350 17 Gram(s) Oral daily  senna 2 Tablet(s) Oral at bedtime  sodium chloride 1 Gram(s) Oral every 8 hours    MEDICATIONS  (PRN):  acetaminophen   Tablet .. 650 milliGRAM(s) Oral every 6 hours PRN Mild Pain (1 - 3)  oxyCODONE    IR 5 milliGRAM(s) Oral every 6 hours PRN Moderate Pain (4 - 6)      Review of Symptoms:  Constitutional: Awake, Alert, Follows commands  Respiratory: + SOB, + MAX  Cardiac: Denies CP, Denies Palpitations  Gastrointestinal: Denies Pain, Denies N/V, tolerating po intake  Vascular: Negative  Extremities: + Edema, No joint pain or swelling  Neurological: Negative  Endocrine: No heat or cold intolerance, No excessive thirst  Heme/Onc: Negative    Exam:  General: A/O x3, GUILLAUME, Follows commands  HEENT: Supple, No JVD, Trachea midline, no masses  Pulmonary: CTAB, = Chest Excursion, no accessory muscle use  Cor: S1S2, Irregular, III/VI DOREEN, No Rub or gallops  ECG: AFib  Groin/Wound: Soft, No hematoma. L Neck Wound C/D/I, no drainage  Gastrointestinal: Soft, NT/ND, + Bowel Sounds  Neuro: = motor and sensory B/L, No focal deficits  Vascular: 1+ Pulses B/L, Trace edema  Extremities: No joint pain or swelling  Skin: Warm/Dry/Normal color, Normal turgor, no rashes                          8.2    9.50  )-----------( 240      ( 21 Sep 2020 06:06 )             27.1   09-21    135  |  101  |  12  ----------------------------<  63<L>  3.8   |  26  |  1.01    Ca    9.6      21 Sep 2020 06:06    PTT - ( 21 Sep 2020 06:06 )  PTT:85.5 sec    Imaging Reviewed:      Assesment/Plan:    74 Male with CAD, Severe Aortic Stenosis and Left Carotid Stenosis    1.) CAD: Continue with metoprolol. He is scheduled for PCI this afternoon with Dr. Kennedy.  2.) Severe Aortic Stenosis: The patient is set for a Cardiac CT tomorrow. Once the CT is complete, will review for anatomic suitability and access.  3.) CEA: Patient is S/p L CEA. Management by Vascular.

## 2020-09-22 ENCOUNTER — TRANSCRIPTION ENCOUNTER (OUTPATIENT)
Age: 75
End: 2020-09-22

## 2020-09-22 LAB
ANION GAP SERPL CALC-SCNC: 13 MMOL/L — SIGNIFICANT CHANGE UP (ref 5–17)
APTT BLD: 59.2 SEC — HIGH (ref 27.5–35.5)
APTT BLD: 70.4 SEC — HIGH (ref 27.5–35.5)
APTT BLD: 98.3 SEC — HIGH (ref 27.5–35.5)
BUN SERPL-MCNC: 11 MG/DL — SIGNIFICANT CHANGE UP (ref 7–23)
CALCIUM SERPL-MCNC: 9.4 MG/DL — SIGNIFICANT CHANGE UP (ref 8.4–10.5)
CHLORIDE SERPL-SCNC: 100 MMOL/L — SIGNIFICANT CHANGE UP (ref 96–108)
CO2 SERPL-SCNC: 22 MMOL/L — SIGNIFICANT CHANGE UP (ref 22–31)
CREAT SERPL-MCNC: 0.91 MG/DL — SIGNIFICANT CHANGE UP (ref 0.5–1.3)
GLUCOSE BLDC GLUCOMTR-MCNC: 110 MG/DL — HIGH (ref 70–99)
GLUCOSE BLDC GLUCOMTR-MCNC: 130 MG/DL — HIGH (ref 70–99)
GLUCOSE BLDC GLUCOMTR-MCNC: 214 MG/DL — HIGH (ref 70–99)
GLUCOSE BLDC GLUCOMTR-MCNC: 218 MG/DL — HIGH (ref 70–99)
GLUCOSE SERPL-MCNC: 124 MG/DL — HIGH (ref 70–99)
HCT VFR BLD CALC: 26.3 % — LOW (ref 39–50)
HGB BLD-MCNC: 8.1 G/DL — LOW (ref 13–17)
INR BLD: 1.19 RATIO — HIGH (ref 0.88–1.16)
MAGNESIUM SERPL-MCNC: 1.7 MG/DL — SIGNIFICANT CHANGE UP (ref 1.6–2.6)
MCHC RBC-ENTMCNC: 30.1 PG — SIGNIFICANT CHANGE UP (ref 27–34)
MCHC RBC-ENTMCNC: 30.8 GM/DL — LOW (ref 32–36)
MCV RBC AUTO: 97.8 FL — SIGNIFICANT CHANGE UP (ref 80–100)
NRBC # BLD: 0 /100 WBCS — SIGNIFICANT CHANGE UP (ref 0–0)
PLATELET # BLD AUTO: 254 K/UL — SIGNIFICANT CHANGE UP (ref 150–400)
POTASSIUM SERPL-MCNC: 3.6 MMOL/L — SIGNIFICANT CHANGE UP (ref 3.5–5.3)
POTASSIUM SERPL-SCNC: 3.6 MMOL/L — SIGNIFICANT CHANGE UP (ref 3.5–5.3)
PROTHROM AB SERPL-ACNC: 14.1 SEC — HIGH (ref 10.6–13.6)
RBC # BLD: 2.69 M/UL — LOW (ref 4.2–5.8)
RBC # FLD: 15.9 % — HIGH (ref 10.3–14.5)
SODIUM SERPL-SCNC: 135 MMOL/L — SIGNIFICANT CHANGE UP (ref 135–145)
WBC # BLD: 11.61 K/UL — HIGH (ref 3.8–10.5)
WBC # FLD AUTO: 11.61 K/UL — HIGH (ref 3.8–10.5)

## 2020-09-22 PROCEDURE — 75572 CT HRT W/3D IMAGE: CPT | Mod: 26

## 2020-09-22 PROCEDURE — 99233 SBSQ HOSP IP/OBS HIGH 50: CPT

## 2020-09-22 RX ORDER — APIXABAN 2.5 MG/1
1 TABLET, FILM COATED ORAL
Qty: 60 | Refills: 0
Start: 2020-09-22 | End: 2020-10-21

## 2020-09-22 RX ORDER — WARFARIN SODIUM 2.5 MG/1
7.5 TABLET ORAL ONCE
Refills: 0 | Status: COMPLETED | OUTPATIENT
Start: 2020-09-22 | End: 2020-09-22

## 2020-09-22 RX ADMIN — OXYCODONE HYDROCHLORIDE 5 MILLIGRAM(S): 5 TABLET ORAL at 13:46

## 2020-09-22 RX ADMIN — OXYCODONE HYDROCHLORIDE 5 MILLIGRAM(S): 5 TABLET ORAL at 14:30

## 2020-09-22 RX ADMIN — Medication 12.5 MILLIGRAM(S): at 05:36

## 2020-09-22 RX ADMIN — SODIUM CHLORIDE 1 GRAM(S): 9 INJECTION INTRAMUSCULAR; INTRAVENOUS; SUBCUTANEOUS at 05:36

## 2020-09-22 RX ADMIN — Medication 1 PATCH: at 17:15

## 2020-09-22 RX ADMIN — OXYCODONE HYDROCHLORIDE 5 MILLIGRAM(S): 5 TABLET ORAL at 08:00

## 2020-09-22 RX ADMIN — OXYCODONE HYDROCHLORIDE 5 MILLIGRAM(S): 5 TABLET ORAL at 22:20

## 2020-09-22 RX ADMIN — OXYCODONE HYDROCHLORIDE 5 MILLIGRAM(S): 5 TABLET ORAL at 07:21

## 2020-09-22 RX ADMIN — Medication 1 PATCH: at 08:01

## 2020-09-22 RX ADMIN — HEPARIN SODIUM 12 UNIT(S)/HR: 5000 INJECTION INTRAVENOUS; SUBCUTANEOUS at 05:36

## 2020-09-22 RX ADMIN — Medication 325 MILLIGRAM(S): at 11:40

## 2020-09-22 RX ADMIN — Medication 1 PATCH: at 19:10

## 2020-09-22 RX ADMIN — Medication 12.5 MILLIGRAM(S): at 17:14

## 2020-09-22 RX ADMIN — INSULIN GLARGINE 10 UNIT(S): 100 INJECTION, SOLUTION SUBCUTANEOUS at 21:41

## 2020-09-22 RX ADMIN — WARFARIN SODIUM 7.5 MILLIGRAM(S): 2.5 TABLET ORAL at 21:42

## 2020-09-22 RX ADMIN — Medication 1 PATCH: at 17:16

## 2020-09-22 RX ADMIN — OXYCODONE HYDROCHLORIDE 5 MILLIGRAM(S): 5 TABLET ORAL at 21:40

## 2020-09-22 RX ADMIN — SODIUM CHLORIDE 1 GRAM(S): 9 INJECTION INTRAMUSCULAR; INTRAVENOUS; SUBCUTANEOUS at 13:46

## 2020-09-22 RX ADMIN — MAGNESIUM OXIDE 400 MG ORAL TABLET 400 MILLIGRAM(S): 241.3 TABLET ORAL at 17:14

## 2020-09-22 RX ADMIN — PANTOPRAZOLE SODIUM 40 MILLIGRAM(S): 20 TABLET, DELAYED RELEASE ORAL at 05:36

## 2020-09-22 RX ADMIN — ATORVASTATIN CALCIUM 20 MILLIGRAM(S): 80 TABLET, FILM COATED ORAL at 21:42

## 2020-09-22 RX ADMIN — SODIUM CHLORIDE 1 GRAM(S): 9 INJECTION INTRAMUSCULAR; INTRAVENOUS; SUBCUTANEOUS at 21:42

## 2020-09-22 RX ADMIN — Medication 81 MILLIGRAM(S): at 11:40

## 2020-09-22 RX ADMIN — MAGNESIUM OXIDE 400 MG ORAL TABLET 400 MILLIGRAM(S): 241.3 TABLET ORAL at 08:03

## 2020-09-22 RX ADMIN — Medication 2: at 12:25

## 2020-09-22 RX ADMIN — SENNA PLUS 2 TABLET(S): 8.6 TABLET ORAL at 21:42

## 2020-09-22 RX ADMIN — POLYETHYLENE GLYCOL 3350 17 GRAM(S): 17 POWDER, FOR SOLUTION ORAL at 11:42

## 2020-09-22 RX ADMIN — HEPARIN SODIUM 12 UNIT(S)/HR: 5000 INJECTION INTRAVENOUS; SUBCUTANEOUS at 21:46

## 2020-09-22 NOTE — PROGRESS NOTE ADULT - NSHPATTENDINGPLANDISCUSS_GEN_ALL_CORE
SICU team and vascular surgery team/Dr. Rodriguez
cardiac surgery/Dr. Meade and structural heart team.
structural heart team.
cardiac surgery and structural heart team.

## 2020-09-22 NOTE — DISCHARGE NOTE PROVIDER - NSDCMRMEDTOKEN_GEN_ALL_CORE_FT
Aspirin Enteric Coated 81 mg oral delayed release tablet: 1 tab(s) orally once a day  Eliquis 5 mg oral tablet: 1 tab(s) orally 2 times a day   ferrous sulfate 325 mg (65 mg elemental iron) oral tablet: 1 tab(s) orally once a day  heparin 100 units/mL-D5% intravenous solution: @12CC/hour  magnesium oxide 400 mg (241.3 mg elemental magnesium) oral tablet: 1 tab(s) orally 2 times a day (with meals) x 2 days  nicotine 14 mg/24 hr transdermal film, extended release: transdermal once a day  pantoprazole 40 mg oral delayed release tablet: 1 tab(s) orally once a day  simvastatin 10 mg oral tablet: 1 tab(s) orally once a day (at bedtime)   Aspirin Enteric Coated 81 mg oral delayed release tablet: 1 tab(s) orally once a day  atorvastatin 20 mg oral tablet: 1 tab(s) orally once a day (at bedtime)  clopidogrel 75 mg oral tablet: 1 tab(s) orally once a day  Eliquis 5 mg oral tablet: 1 tab(s) orally 2 times a day   ferrous sulfate 325 mg (65 mg elemental iron) oral tablet: 1 tab(s) orally once a day  magnesium oxide 400 mg (241.3 mg elemental magnesium) oral tablet: 1 tab(s) orally 2 times a day (with meals) x 2 days  Metoprolol Tartrate 25 mg oral tablet: 0.5 tab(s) orally 2 times a day   nicotine 14 mg/24 hr transdermal film, extended release: transdermal once a day  pantoprazole 40 mg oral delayed release tablet: 1 tab(s) orally once a day  Rolling walker:   senna oral tablet: 2 tab(s) orally once a day (at bedtime)   Aspirin Enteric Coated 81 mg oral delayed release tablet: 1 tab(s) orally once a day  atorvastatin 80 mg oral tablet: 1 tab(s) orally once a day   clopidogrel 75 mg oral tablet: 1 tab(s) orally once a day  Coumadin 5 mg oral tablet: 1 tab(s) orally once a day   ferrous sulfate 325 mg (65 mg elemental iron) oral tablet: 1 tab(s) orally once a day  magnesium oxide 400 mg (241.3 mg elemental magnesium) oral tablet: 1 tab(s) orally 2 times a day (with meals) x 2 days  Metoprolol Tartrate 25 mg oral tablet: 0.5 tab(s) orally 2 times a day   nicotine 14 mg/24 hr transdermal film, extended release: transdermal once a day  pantoprazole 40 mg oral delayed release tablet: 1 tab(s) orally once a day  Rolling walker:   senna oral tablet: 2 tab(s) orally once a day (at bedtime)   Aspirin Enteric Coated 81 mg oral delayed release tablet: 1 tab(s) orally once a day  atorvastatin 80 mg oral tablet: 1 tab(s) orally once a day   clopidogrel 75 mg oral tablet: 1 tab(s) orally once a day  Coumadin 7.5 mg oral tablet: 1 tab(s) orally once a day (at bedtime)   ferrous sulfate 325 mg (65 mg elemental iron) oral tablet: 1 tab(s) orally once a day  magnesium oxide 400 mg (241.3 mg elemental magnesium) oral tablet: 1 tab(s) orally 2 times a day (with meals) x 2 days  Metoprolol Tartrate 25 mg oral tablet: 0.5 tab(s) orally 2 times a day   nicotine 14 mg/24 hr transdermal film, extended release: transdermal once a day  pantoprazole 40 mg oral delayed release tablet: 1 tab(s) orally once a day  Rolling walker:   senna oral tablet: 2 tab(s) orally once a day (at bedtime)

## 2020-09-22 NOTE — DISCHARGE NOTE PROVIDER - PROVIDER TOKENS
PROVIDER:[TOKEN:[[id number]],FOLLOWUP:[2 weeks]],PROVIDER:[TOKEN:[[id number]],FOLLOWUP:[2 weeks]]

## 2020-09-22 NOTE — DISCHARGE NOTE PROVIDER - HOSPITAL COURSE
74 m with pmhx of cad, htn, hld: S/P L CEA, postop care  Aortic stenosis severe: for possible TAVR structural heart follow, s/p staged PCI   Atrial fibrillation: rate control, AC with Heparin/Continue Coumadin. Goal of INR 2-3.  Hypertension, control  Diabetes, ADA diet, BS control  Hyperlipidemia, stable  Coronary artery disease

## 2020-09-22 NOTE — PROGRESS NOTE ADULT - SUBJECTIVE AND OBJECTIVE BOX
Patient is a 74y old  Male who presents with a chief complaint of Asymptomatic, high-grade carotid stenosis (21 Sep 2020 10:09)      SUBJECTIVE / OVERNIGHT EVENTS:  Review of Systems  chest pain no  palpitations no  sob no  nausea no  headache no    MEDICATIONS  (STANDING):  aspirin enteric coated 81 milliGRAM(s) Oral daily  atorvastatin 20 milliGRAM(s) Oral at bedtime  clopidogrel Tablet 75 milliGRAM(s) Oral daily  ferrous    sulfate 325 milliGRAM(s) Oral daily  heparin  Infusion 1200 Unit(s)/Hr (12 mL/Hr) IV Continuous <Continuous>  insulin glargine Injectable (LANTUS) 10 Unit(s) SubCutaneous at bedtime  insulin lispro (HumaLOG) corrective regimen sliding scale   SubCutaneous three times a day before meals  insulin lispro (HumaLOG) corrective regimen sliding scale   SubCutaneous at bedtime  magnesium oxide 400 milliGRAM(s) Oral two times a day with meals  metoprolol tartrate 12.5 milliGRAM(s) Oral two times a day  nicotine -  14 mG/24Hr(s) Patch 1 patch Transdermal daily  pantoprazole    Tablet 40 milliGRAM(s) Oral before breakfast  polyethylene glycol 3350 17 Gram(s) Oral daily  senna 2 Tablet(s) Oral at bedtime  sodium chloride 1 Gram(s) Oral every 8 hours    MEDICATIONS  (PRN):  acetaminophen   Tablet .. 650 milliGRAM(s) Oral every 6 hours PRN Mild Pain (1 - 3)  oxyCODONE    IR 5 milliGRAM(s) Oral every 6 hours PRN Moderate Pain (4 - 6)      Vital Signs Last 24 Hrs  T(C): 36.8 (22 Sep 2020 12:11), Max: 36.8 (22 Sep 2020 12:11)  T(F): 98.2 (22 Sep 2020 12:11), Max: 98.2 (22 Sep 2020 12:11)  HR: 72 (22 Sep 2020 12:11) (62 - 90)  BP: 118/69 (22 Sep 2020 12:11) (118/69 - 168/68)  BP(mean): --  RR: 18 (22 Sep 2020 12:11) (15 - 18)  SpO2: 96% (22 Sep 2020 12:11) (95% - 99%)    PHYSICAL EXAM:  GENERAL: NAD, well-developed  HEAD:  Atraumatic, Normocephalic  EYES: EOMI, PERRLA, conjunctiva and sclera clear  NECK: Supple, No JVD Incision healing   CHEST/LUNG: Clear to auscultation bilaterally; No wheeze  HEART: Regular rate and rhythm; No murmurs, rubs, or gallops  ABDOMEN: Soft, Nontender, Nondistended; Bowel sounds present  EXTREMITIES:  2+ Peripheral Pulses, No clubbing, cyanosis, or edema  PSYCH: AAOx3  NEUROLOGY: non-focal  SKIN: No rashes or lesions    LABS:                        8.1    11.61 )-----------( 254      ( 22 Sep 2020 04:31 )             26.3     09-22    135  |  100  |  11  ----------------------------<  124<H>  3.6   |  22  |  0.91    Ca    9.4      22 Sep 2020 04:31  Mg     1.7     09-22      PTT - ( 22 Sep 2020 10:39 )  PTT:98.3 sec            RADIOLOGY & ADDITIONAL TESTS:    Imaging Personally Reviewed:    Consultant(s) Notes Reviewed:      Care Discussed with Consultants/Other Providers:

## 2020-09-22 NOTE — PROGRESS NOTE ADULT - ASSESSMENT
74 m with    S/P L CEA  - postop care    Aortic stenosis severe  - for possible TAVR  - structural heart follow  - staged PCI   - CT heart pending     Atrial fibrillation  - rate control  - AC with Heparin/ start oral AC if no further procedures planned.  - ASA   - Metoprolol 12.5 bid     Hypertension  - control    Diabetes  - ADA diet  - BS control    Hyperlipidemia  - stable    Coronary artery disease  - continue Rx    Monico Daly MD pager [phone]

## 2020-09-22 NOTE — CHART NOTE - NSCHARTNOTEFT_GEN_A_CORE
Patient previously on Coumadin 5mg daily for Afib. (dose confirmed with patient and his wife)  Eliquis for anticoagulation suggested - prescription ran for pricing - discussed  with patient - patient will be unable to afford Eliquis and prefers to continue coumadin  Remains on Heparin gtt (full anticoagulation) until INR > 2.  Will dose 7.5mg tonight - anticipate resuming home dose of 5mg once INR closer to therapeutic  Above discussed and confirmed with Dr Daly

## 2020-09-22 NOTE — DISCHARGE NOTE PROVIDER - CARE PROVIDER_API CALL
Salvador Villeda  CARDIOLOGY  [address] [place] [id number]  Phone: [phone]  Fax: [phone]  Follow Up Time: 2 weeks    Monico Daly  INTERNAL MEDICINE  [address] [place], [place] [address]  Phone: [phone]  Fax: [phone]  Follow Up Time: 2 weeks

## 2020-09-22 NOTE — PROGRESS NOTE ADULT - SUBJECTIVE AND OBJECTIVE BOX
*****Structural Heart Team*****    Subjective:    Patient resting comfortably in a chair, offering no complaints.      PAST MEDICAL & SURGICAL HISTORY:  Peripheral vascular disease    Atrial fibrillation    Hypertension    Diabetes    Hyperlipidemia    Coronary artery disease    Status post angiography of extremity  1 stent in each leg    History of coronary artery stent placement  two coronary stents          T(C): 36.8 (09-22-20 @ 12:11), Max: 36.8 (09-22-20 @ 12:11)  HR: 72 (09-22-20 @ 12:11) (62 - 90)  BP: 118/69 (09-22-20 @ 12:11) (118/69 - 168/68)  RR: 18 (09-22-20 @ 12:11) (15 - 18)  SpO2: 96% (09-22-20 @ 12:11) (95% - 99%)  Wt(kg): --  09-21 @ 07:01  -  09-22 @ 07:00  --------------------------------------------------------  IN: 948 mL / OUT: 901 mL / NET: 47 mL    09-22 @ 07:01  -  09-22 @ 15:32  --------------------------------------------------------  IN: 240 mL / OUT: 0 mL / NET: 240 mL      MEDICATIONS  (STANDING):  aspirin enteric coated 81 milliGRAM(s) Oral daily  atorvastatin 20 milliGRAM(s) Oral at bedtime  clopidogrel Tablet 75 milliGRAM(s) Oral daily  ferrous    sulfate 325 milliGRAM(s) Oral daily  heparin  Infusion 1200 Unit(s)/Hr (12 mL/Hr) IV Continuous <Continuous>  insulin glargine Injectable (LANTUS) 10 Unit(s) SubCutaneous at bedtime  insulin lispro (HumaLOG) corrective regimen sliding scale   SubCutaneous three times a day before meals  insulin lispro (HumaLOG) corrective regimen sliding scale   SubCutaneous at bedtime  magnesium oxide 400 milliGRAM(s) Oral two times a day with meals  metoprolol tartrate 12.5 milliGRAM(s) Oral two times a day  nicotine -  14 mG/24Hr(s) Patch 1 patch Transdermal daily  pantoprazole    Tablet 40 milliGRAM(s) Oral before breakfast  polyethylene glycol 3350 17 Gram(s) Oral daily  senna 2 Tablet(s) Oral at bedtime  sodium chloride 1 Gram(s) Oral every 8 hours    MEDICATIONS  (PRN):  acetaminophen   Tablet .. 650 milliGRAM(s) Oral every 6 hours PRN Mild Pain (1 - 3)  oxyCODONE    IR 5 milliGRAM(s) Oral every 6 hours PRN Moderate Pain (4 - 6)      Review of Symptoms:  Constitutional: Awake, Alert, Follows commands  Respiratory: Denies SOB, Denies MAX  Cardiac: Denies CP, Denies Palpitations  Gastrointestinal: Denies Pain, Denies N/V, tolerating po intake  Vascular: Negative  Extremities: No Edema, No joint pain or swelling  Neurological: Negative  Endocrine: No heat or cold intolerance, No excessive thirst  Heme/Onc: Negative    Exam:  General: A/O x3, GUILLAUME, NAD  HEENT: Supple, No JVD, Trachea midline, no masses  Pulmonary: CTAB, = Chest Excursion, no accessory muscle use  Cor: S1S2, Irregular, III/VI DOREEN  ECG:AFib  Wound: Left neck Healing well  Gastrointestinal: Soft, NT/ND, + Bowel Sounds  Neuro: = motor and sensory B/L, No focal deficits  Vascular: 1+ Pulses B/L, No edema  Extremities: No joint pain or swelling  Skin: Warm/Dry/Normal color, Normal turgor, no rashes                          8.1    11.61 )-----------( 254      ( 22 Sep 2020 04:31 )             26.3   09-22    135  |  100  |  11  ----------------------------<  124<H>  3.6   |  22  |  0.91    Ca    9.4      22 Sep 2020 04:31  Mg     1.7     09-22    PTT - ( 22 Sep 2020 10:39 )  PTT:98.3 sec    Imaging Reviewed:    Assesment/Plan:    74 Male With Severe Aortic Stenosis, CAD    1.) Aortic Stenosis: Patient is due for his Cardiac CT today. Once complete he will be good for discharge per his primary team. We will bring him back in 2-3 weeks for his TAVR.    2.) CAD: S/P PCI, continue Plavix, Follow up with cardiology.    3.) Discussed with patient and his wife, who was present. Answered all questions.

## 2020-09-22 NOTE — DISCHARGE NOTE PROVIDER - NSDCCPCAREPLAN_GEN_ALL_CORE_FT
PRINCIPAL DISCHARGE DIAGNOSIS  Diagnosis: Bilateral carotid artery stenosis  Assessment and Plan of Treatment: Bilateral carotid artery stenosis, s/p Left Cea on 09/16      SECONDARY DISCHARGE DIAGNOSES  Diagnosis: Afib  Assessment and Plan of Treatment: Continue Eliquis    Diagnosis: Severe aortic stenosis  Assessment and Plan of Treatment: Severe aortic stenosis: Tavr w/u ongoing     PRINCIPAL DISCHARGE DIAGNOSIS  Diagnosis: Bilateral carotid artery stenosis  Assessment and Plan of Treatment: Bilateral carotid artery stenosis, s/p Left Cea on 09/16      SECONDARY DISCHARGE DIAGNOSES  Diagnosis: Afib  Assessment and Plan of Treatment: Continue Coumadin, check inr in 2 days    Diagnosis: Severe aortic stenosis  Assessment and Plan of Treatment: Severe aortic stenosis: Tavr w/u ongoing

## 2020-09-23 LAB
ANION GAP SERPL CALC-SCNC: 10 MMOL/L — SIGNIFICANT CHANGE UP (ref 5–17)
APTT BLD: 78.8 SEC — HIGH (ref 27.5–35.5)
BUN SERPL-MCNC: 13 MG/DL — SIGNIFICANT CHANGE UP (ref 7–23)
CALCIUM SERPL-MCNC: 9.1 MG/DL — SIGNIFICANT CHANGE UP (ref 8.4–10.5)
CHLORIDE SERPL-SCNC: 101 MMOL/L — SIGNIFICANT CHANGE UP (ref 96–108)
CO2 SERPL-SCNC: 26 MMOL/L — SIGNIFICANT CHANGE UP (ref 22–31)
CREAT SERPL-MCNC: 1.02 MG/DL — SIGNIFICANT CHANGE UP (ref 0.5–1.3)
GLUCOSE BLDC GLUCOMTR-MCNC: 135 MG/DL — HIGH (ref 70–99)
GLUCOSE BLDC GLUCOMTR-MCNC: 157 MG/DL — HIGH (ref 70–99)
GLUCOSE BLDC GLUCOMTR-MCNC: 222 MG/DL — HIGH (ref 70–99)
GLUCOSE BLDC GLUCOMTR-MCNC: 84 MG/DL — SIGNIFICANT CHANGE UP (ref 70–99)
GLUCOSE SERPL-MCNC: 70 MG/DL — SIGNIFICANT CHANGE UP (ref 70–99)
HCT VFR BLD CALC: 26 % — LOW (ref 39–50)
HGB BLD-MCNC: 8 G/DL — LOW (ref 13–17)
INR BLD: 1.26 RATIO — HIGH (ref 0.88–1.16)
MAGNESIUM SERPL-MCNC: 1.7 MG/DL — SIGNIFICANT CHANGE UP (ref 1.6–2.6)
MCHC RBC-ENTMCNC: 30.2 PG — SIGNIFICANT CHANGE UP (ref 27–34)
MCHC RBC-ENTMCNC: 30.8 GM/DL — LOW (ref 32–36)
MCV RBC AUTO: 98.1 FL — SIGNIFICANT CHANGE UP (ref 80–100)
NRBC # BLD: 0 /100 WBCS — SIGNIFICANT CHANGE UP (ref 0–0)
PLATELET # BLD AUTO: 262 K/UL — SIGNIFICANT CHANGE UP (ref 150–400)
POTASSIUM SERPL-MCNC: 3.9 MMOL/L — SIGNIFICANT CHANGE UP (ref 3.5–5.3)
POTASSIUM SERPL-SCNC: 3.9 MMOL/L — SIGNIFICANT CHANGE UP (ref 3.5–5.3)
PROTHROM AB SERPL-ACNC: 14.8 SEC — HIGH (ref 10.6–13.6)
RBC # BLD: 2.65 M/UL — LOW (ref 4.2–5.8)
RBC # FLD: 15.9 % — HIGH (ref 10.3–14.5)
SODIUM SERPL-SCNC: 137 MMOL/L — SIGNIFICANT CHANGE UP (ref 135–145)
WBC # BLD: 9.52 K/UL — SIGNIFICANT CHANGE UP (ref 3.8–10.5)
WBC # FLD AUTO: 9.52 K/UL — SIGNIFICANT CHANGE UP (ref 3.8–10.5)

## 2020-09-23 PROCEDURE — 99232 SBSQ HOSP IP/OBS MODERATE 35: CPT

## 2020-09-23 PROCEDURE — 99233 SBSQ HOSP IP/OBS HIGH 50: CPT

## 2020-09-23 RX ORDER — WARFARIN SODIUM 2.5 MG/1
7.5 TABLET ORAL ONCE
Refills: 0 | Status: COMPLETED | OUTPATIENT
Start: 2020-09-23 | End: 2020-09-23

## 2020-09-23 RX ADMIN — OXYCODONE HYDROCHLORIDE 5 MILLIGRAM(S): 5 TABLET ORAL at 07:10

## 2020-09-23 RX ADMIN — POLYETHYLENE GLYCOL 3350 17 GRAM(S): 17 POWDER, FOR SOLUTION ORAL at 11:31

## 2020-09-23 RX ADMIN — Medication 1 PATCH: at 19:00

## 2020-09-23 RX ADMIN — Medication 2: at 17:11

## 2020-09-23 RX ADMIN — Medication 1 PATCH: at 17:11

## 2020-09-23 RX ADMIN — SODIUM CHLORIDE 1 GRAM(S): 9 INJECTION INTRAMUSCULAR; INTRAVENOUS; SUBCUTANEOUS at 14:51

## 2020-09-23 RX ADMIN — PANTOPRAZOLE SODIUM 40 MILLIGRAM(S): 20 TABLET, DELAYED RELEASE ORAL at 06:15

## 2020-09-23 RX ADMIN — WARFARIN SODIUM 7.5 MILLIGRAM(S): 2.5 TABLET ORAL at 21:40

## 2020-09-23 RX ADMIN — INSULIN GLARGINE 10 UNIT(S): 100 INJECTION, SOLUTION SUBCUTANEOUS at 22:11

## 2020-09-23 RX ADMIN — Medication 1 PATCH: at 17:02

## 2020-09-23 RX ADMIN — Medication 325 MILLIGRAM(S): at 11:31

## 2020-09-23 RX ADMIN — OXYCODONE HYDROCHLORIDE 5 MILLIGRAM(S): 5 TABLET ORAL at 06:17

## 2020-09-23 RX ADMIN — SODIUM CHLORIDE 1 GRAM(S): 9 INJECTION INTRAMUSCULAR; INTRAVENOUS; SUBCUTANEOUS at 21:39

## 2020-09-23 RX ADMIN — ATORVASTATIN CALCIUM 20 MILLIGRAM(S): 80 TABLET, FILM COATED ORAL at 21:39

## 2020-09-23 RX ADMIN — MAGNESIUM OXIDE 400 MG ORAL TABLET 400 MILLIGRAM(S): 241.3 TABLET ORAL at 08:39

## 2020-09-23 RX ADMIN — Medication 12.5 MILLIGRAM(S): at 17:02

## 2020-09-23 RX ADMIN — OXYCODONE HYDROCHLORIDE 5 MILLIGRAM(S): 5 TABLET ORAL at 18:09

## 2020-09-23 RX ADMIN — Medication 81 MILLIGRAM(S): at 11:31

## 2020-09-23 RX ADMIN — Medication 12.5 MILLIGRAM(S): at 06:14

## 2020-09-23 RX ADMIN — OXYCODONE HYDROCHLORIDE 5 MILLIGRAM(S): 5 TABLET ORAL at 17:02

## 2020-09-23 RX ADMIN — HEPARIN SODIUM 12 UNIT(S)/HR: 5000 INJECTION INTRAVENOUS; SUBCUTANEOUS at 14:52

## 2020-09-23 RX ADMIN — Medication 650 MILLIGRAM(S): at 22:10

## 2020-09-23 RX ADMIN — Medication 2: at 11:43

## 2020-09-23 RX ADMIN — SENNA PLUS 2 TABLET(S): 8.6 TABLET ORAL at 21:39

## 2020-09-23 RX ADMIN — Medication 650 MILLIGRAM(S): at 21:40

## 2020-09-23 RX ADMIN — SODIUM CHLORIDE 1 GRAM(S): 9 INJECTION INTRAMUSCULAR; INTRAVENOUS; SUBCUTANEOUS at 06:14

## 2020-09-23 RX ADMIN — MAGNESIUM OXIDE 400 MG ORAL TABLET 400 MILLIGRAM(S): 241.3 TABLET ORAL at 17:02

## 2020-09-23 NOTE — PROGRESS NOTE ADULT - SUBJECTIVE AND OBJECTIVE BOX
INTERVAL EVENTS: Pt reporting upper back/neck pain. Denies CP, dyspnea, palpitations, presyncope, syncope, f/c/n/v.     PAST MEDICAL & SURGICAL HISTORY:  Peripheral vascular disease    Atrial fibrillation    Hypertension    Diabetes    Hyperlipidemia    Diabetes    Hypertension    Coronary artery disease    Hyperlipemia    Hypertension    Diabetes  onset age 49    Status post angiography of extremity  1 stent in each leg    H/O right coronary artery stent placement    History of coronary artery stent placement  two coronary stents    History of coronary artery stent placement        MEDICATIONS  (STANDING):  aspirin enteric coated 81 milliGRAM(s) Oral daily  atorvastatin 20 milliGRAM(s) Oral at bedtime  clopidogrel Tablet 75 milliGRAM(s) Oral daily  ferrous    sulfate 325 milliGRAM(s) Oral daily  heparin  Infusion 1200 Unit(s)/Hr (12 mL/Hr) IV Continuous <Continuous>  insulin glargine Injectable (LANTUS) 10 Unit(s) SubCutaneous at bedtime  insulin lispro (HumaLOG) corrective regimen sliding scale   SubCutaneous three times a day before meals  insulin lispro (HumaLOG) corrective regimen sliding scale   SubCutaneous at bedtime  magnesium oxide 400 milliGRAM(s) Oral two times a day with meals  metoprolol tartrate 12.5 milliGRAM(s) Oral two times a day  nicotine -  14 mG/24Hr(s) Patch 1 patch Transdermal daily  pantoprazole    Tablet 40 milliGRAM(s) Oral before breakfast  polyethylene glycol 3350 17 Gram(s) Oral daily  senna 2 Tablet(s) Oral at bedtime  sodium chloride 1 Gram(s) Oral every 8 hours  warfarin 7.5 milliGRAM(s) Oral once    MEDICATIONS  (PRN):  acetaminophen   Tablet .. 650 milliGRAM(s) Oral every 6 hours PRN Mild Pain (1 - 3)  oxyCODONE    IR 5 milliGRAM(s) Oral every 6 hours PRN Moderate Pain (4 - 6)      ICU Vital Signs Last 24 Hrs  T(C): 36.6 (23 Sep 2020 05:35), Max: 36.8 (22 Sep 2020 19:45)  T(F): 97.9 (23 Sep 2020 05:35), Max: 98.2 (22 Sep 2020 19:45)  HR: 87 (23 Sep 2020 05:35) (87 - 92)  BP: 159/66 (23 Sep 2020 05:35) (138/85 - 159/66)  BP(mean): --  ABP: --  ABP(mean): --  RR: 18 (23 Sep 2020 05:35) (18 - 18)  SpO2: 96% (23 Sep 2020 05:35) (94% - 96%)      Daily     Daily Weight in k.4 (23 Sep 2020 05:35)     PHYSICAL EXAM:  GEN: Awake, alert. NAD.   HEENT: NCAT,EOMI. Mucosa moist. No JVD.  RESP: CTA b/l  CV: regular rate, irregular rhythm, Normal S1/S2. systolic murmur 3/6 radiating to carotids and axilla, no r/g.  EXT: Warm. No edema, clubbing, or cyanosis. Mild R wrist tenderness to palpation; No R wrist induration, edema. R radial pulse palpable. No bruit or thrill.  NEURO: AAOx3. No focal deficits.       LABS:                        8.0    9.52  )-----------( 262      ( 23 Sep 2020 06:16 )             26.0     09-    137  |  101  |  13  ----------------------------<  70  3.9   |  26  |  1.02    Ca    9.1      23 Sep 2020 06:16  Mg     1.7     0923          PT/INR - ( 23 Sep 2020 06:16 )   PT: 14.8 sec;   INR: 1.26 ratio         PTT - ( 23 Sep 2020 06:16 )  PTT:78.8 sec    I&O's Summary    22 Sep 2020 07:  -  23 Sep 2020 07:00  --------------------------------------------------------  IN: 628 mL / OUT: 702 mL / NET: -74 mL    23 Sep 2020 07:01  -  23 Sep 2020 13:21  --------------------------------------------------------  IN: 240 mL / OUT: 0 mL / NET: 240 mL      BNP    RADIOLOGY & ADDITIONAL STUDIES:    TELEMETRY: reviewed    EKG: reviewed    < from: Cardiac Cath Lab - Adult (20 @ 12:22) >  CORONARY VESSELS:  CX:   --  OM2: There was a 80 % stenosis.  COMPLICATIONS: There were no complications.  DIAGNOSTIC RECOMMENDATIONS: ASA and Plavix for 1 year.  INTERVENTIONAL RECOMMENDATIONS: ASA and Plavix for 1 year.  Prepared and signed by  Jose Guadalupe Barr M.D.    < end of copied text >    < from: Transthoracic Echocardiogram (20 @ 10:13) >  CONCLUSIONS:  1. Mitral annular calcification, otherwise normal mitral  valve. Mild-moderate mitral regurgitation.  2. Calcified trileaflet aortic valve with decreased opening  Peak transaortic valve gradient equals 98 mm Hg, mean  transaortic valve gradient equals 59 mm Hg, estimated  aortic valve area equals 0.5 sqcm (by continuity equation),  consistent with severe aortic stenosis. Mild-moderate  aortic regurgitation.  3. Moderately dilated left atrium.  LA volume index = 43  cc/m2.  4. Increased relative wall thickness with normal left  ventricular mass index, consistent with concentric left  ventricular remodeling.  5. Normal left ventricular systolic function. No segmental  wall motion abnormalities.  6. Normal right ventricular size and function.  7. Estimated right ventricular systolic pressure equals 54  mm Hg, assuming right atrial pressure equals 10 mm Hg,  consistent with moderate pulmonary hypertension8.  8. A bubble study was performed with the intravenous  injection of agitated saline.  Following contrast  injection, no obvious bubbles were seen in the left heart.    < end of copied text >

## 2020-09-23 NOTE — PROGRESS NOTE ADULT - SUBJECTIVE AND OBJECTIVE BOX
Patient is a 74y old  Male who presents with a chief complaint of Asymptomatic, high-grade carotid stenosis (22 Sep 2020 15:25)      SUBJECTIVE / OVERNIGHT EVENTS: No new complaints.   Review of Systems  chest pain no  palpitations no  sob no  nausea no  headache no    MEDICATIONS  (STANDING):  aspirin enteric coated 81 milliGRAM(s) Oral daily  atorvastatin 20 milliGRAM(s) Oral at bedtime  clopidogrel Tablet 75 milliGRAM(s) Oral daily  ferrous    sulfate 325 milliGRAM(s) Oral daily  heparin  Infusion 1200 Unit(s)/Hr (12 mL/Hr) IV Continuous <Continuous>  insulin glargine Injectable (LANTUS) 10 Unit(s) SubCutaneous at bedtime  insulin lispro (HumaLOG) corrective regimen sliding scale   SubCutaneous three times a day before meals  insulin lispro (HumaLOG) corrective regimen sliding scale   SubCutaneous at bedtime  magnesium oxide 400 milliGRAM(s) Oral two times a day with meals  metoprolol tartrate 12.5 milliGRAM(s) Oral two times a day  nicotine -  14 mG/24Hr(s) Patch 1 patch Transdermal daily  pantoprazole    Tablet 40 milliGRAM(s) Oral before breakfast  polyethylene glycol 3350 17 Gram(s) Oral daily  senna 2 Tablet(s) Oral at bedtime  sodium chloride 1 Gram(s) Oral every 8 hours  warfarin 7.5 milliGRAM(s) Oral once    MEDICATIONS  (PRN):  acetaminophen   Tablet .. 650 milliGRAM(s) Oral every 6 hours PRN Mild Pain (1 - 3)  oxyCODONE    IR 5 milliGRAM(s) Oral every 6 hours PRN Moderate Pain (4 - 6)      Vital Signs Last 24 Hrs  T(C): 36.6 (23 Sep 2020 05:35), Max: 36.8 (22 Sep 2020 19:45)  T(F): 97.9 (23 Sep 2020 05:35), Max: 98.2 (22 Sep 2020 19:45)  HR: 87 (23 Sep 2020 05:35) (87 - 92)  BP: 159/66 (23 Sep 2020 05:35) (138/85 - 159/66)  BP(mean): --  RR: 18 (23 Sep 2020 05:35) (18 - 18)  SpO2: 96% (23 Sep 2020 05:35) (94% - 96%)    PHYSICAL EXAM:  GENERAL: NAD, well-developed  HEAD:  Atraumatic, Normocephalic  EYES: EOMI, PERRLA, conjunctiva and sclera clear  NECK: Supple, No JVD Incision healing  CHEST/LUNG: Clear to auscultation bilaterally; No wheeze  HEART: Regular rate and rhythm; No murmurs, rubs, or gallops  ABDOMEN: Soft, Nontender, Nondistended; Bowel sounds present  EXTREMITIES:  2+ Peripheral Pulses, No clubbing, cyanosis, or edema  PSYCH: AAOx3  NEUROLOGY: non-focal  SKIN: No rashes or lesions    LABS:                        8.0    9.52  )-----------( 262      ( 23 Sep 2020 06:16 )             26.0     09-23    137  |  101  |  13  ----------------------------<  70  3.9   |  26  |  1.02    Ca    9.1      23 Sep 2020 06:16  Mg     1.7     09-23      PT/INR - ( 23 Sep 2020 06:16 )   PT: 14.8 sec;   INR: 1.26 ratio         PTT - ( 23 Sep 2020 06:16 )  PTT:78.8 sec            RADIOLOGY & ADDITIONAL TESTS:    Imaging Personally Reviewed:    Consultant(s) Notes Reviewed:      Care Discussed with Consultants/Other Providers:

## 2020-09-23 NOTE — PROGRESS NOTE ADULT - SUBJECTIVE AND OBJECTIVE BOX
Subjective  No acute events reported overnight.  The patient is complaining of soreness at left neck incision site.  When it is touched has discomfort radiating to his scalp.  Also notes some discomfort along right posterior aspect of neck.  Otherwise he denies any chest pain/tightness/discomfort, shortness of breath, fevers, chills, sweats, light-headed sensation, dizziness or palpitations.      Review of Systems  14 point review of systems is negative except what is described above in the history of present illness    Telemetry  Atrial fibrillation with rates in the 70s to 100s with occasional PVCs    Physical Examination  General: A/O x3, GUILLAUME, NAD  HEENT: Supple, No JVD, Trachea midline, no masses  Pulmonary: CTAB, = Chest Excursion, no accessory muscle use  Cor: S1S2, Irregular, III/VI DOREEN  Wound: Left neck Healing well  Gastrointestinal: Soft, NT/ND, + Bowel Sounds  Neuro: = motor and sensory B/L, No focal deficits  Vascular: 1+ Pulses B/L, No edema  Extremities: No joint pain or swelling  Skin: Warm/Dry/Normal color, Normal turgor, no rashes  T(C): 36.8 (09-23-20 @ 13:34), Max: 36.8 (09-22-20 @ 19:45)  HR: 77 (09-23-20 @ 13:34) (77 - 92)  BP: 118/65 (09-23-20 @ 13:34) (118/65 - 159/66)  RR: 18 (09-23-20 @ 13:34) (18 - 18)  SpO2: 98% (09-23-20 @ 13:34) (94% - 98%)  Wt(kg): --  09-22 @ 07:01  -  09-23 @ 07:00  --------------------------------------------------------  IN: 628 mL / OUT: 702 mL / NET: -74 mL  09-23 @ 07:01  -  09-23 @ 16:01  --------------------------------------------------------  IN: 480 mL / OUT: 300 mL / NET: 180 mL      MEDICATIONS  (STANDING):  aspirin enteric coated 81 milliGRAM(s) Oral daily  atorvastatin 20 milliGRAM(s) Oral at bedtime  clopidogrel Tablet 75 milliGRAM(s) Oral daily  ferrous    sulfate 325 milliGRAM(s) Oral daily  heparin  Infusion 1200 Unit(s)/Hr (12 mL/Hr) IV Continuous <Continuous>  insulin glargine Injectable (LANTUS) 10 Unit(s) SubCutaneous at bedtime  insulin lispro (HumaLOG) corrective regimen sliding scale   SubCutaneous three times a day before meals  insulin lispro (HumaLOG) corrective regimen sliding scale   SubCutaneous at bedtime  magnesium oxide 400 milliGRAM(s) Oral two times a day with meals  metoprolol tartrate 12.5 milliGRAM(s) Oral two times a day  nicotine -  14 mG/24Hr(s) Patch 1 patch Transdermal daily  pantoprazole    Tablet 40 milliGRAM(s) Oral before breakfast  polyethylene glycol 3350 17 Gram(s) Oral daily  senna 2 Tablet(s) Oral at bedtime  sodium chloride 1 Gram(s) Oral every 8 hours  warfarin 7.5 milliGRAM(s) Oral once    MEDICATIONS  (PRN):  acetaminophen   Tablet .. 650 milliGRAM(s) Oral every 6 hours PRN Mild Pain (1 - 3)  oxyCODONE    IR 5 milliGRAM(s) Oral every 6 hours PRN Moderate Pain (4 - 6)    Home Medications:  Aspirin Enteric Coated 81 mg oral delayed release tablet: 1 tab(s) orally once a day (14 Sep 2020 12:57)  ferrous sulfate 325 mg (65 mg elemental iron) oral tablet: 1 tab(s) orally once a day (14 Sep 2020 12:57)  heparin 100 units/mL-D5% intravenous solution: @12CC/hour (14 Sep 2020 17:45)  magnesium oxide 400 mg (241.3 mg elemental magnesium) oral tablet: 1 tab(s) orally 2 times a day (with meals) x 2 days (14 Sep 2020 12:57)  nicotine 14 mg/24 hr transdermal film, extended release: transdermal once a day (14 Sep 2020 17:45)  pantoprazole 40 mg oral delayed release tablet: 1 tab(s) orally once a day (14 Sep 2020 12:57)  simvastatin 10 mg oral tablet: 1 tab(s) orally once a day (at bedtime) (10 Sep 2020 09:28)               8.0    9.52  )-----------( 262      ( 23 Sep 2020 06:16 )             26.0   09-23    137  |  101  |  13  ----------------------------<  70  3.9   |  26  |  1.02    Ca    9.1      23 Sep 2020 06:16  Mg     1.7     09-23    PT/INR - ( 23 Sep 2020 06:16 )   PT: 14.8 sec;   INR: 1.26 ratio    PTT - ( 23 Sep 2020 06:16 )  PTT:78.8 sec    Assessment/Plan  --The patient is being evaluated by the [facility] valve team and is felt to be an appropriate patient to undergo PCI/TAVR.  He underwent staged PCI on 9/21/2020 and tolerated the procedure.  --TAVR CTA was performed and personally reviewed.  Plan for patient to be discharged and will come back in the next few weeks to undergo valve replacement.  This was explained to the patient who is agreeable with the plan.   --Continue aspirin 81mg daily and clopidogrel 75mg daily.  Discontinue aspirin in 2 weeks due to recent anemia/GI bleeding.  Monitor for signs/symptoms of bleeding.  --The patient has been started back on coumadin with goal INR 2-3.  He is on heparin drip.  Closely monitor for signs/symptoms of bleeding.  --Continue atorvastatin 20mg PO QD.    --Continue metoprolol tartrate 12.5mg PO BID (hold if SBP<100mmHg or HR<60bpm).  Uptirate as tolerated.  --Continue telemetry monitoring.  --Aim for K>4 and Mg>2.    All questions and concerns of the patient were addressed.      30 minutes spent on total encounter; more than 50% of the visit was spent counseling and/or coordinating care by the attending physician.     Plan discussed with cardiac surgery and structural heart team.

## 2020-09-23 NOTE — PROGRESS NOTE ADULT - ASSESSMENT
74 m with    S/P L CEA  - postop care    Aortic stenosis severe  - for possible TAVR  - structural heart follow  - s/p staged PCI   - CT heart pending     Atrial fibrillation  - rate control  - AC with Heparin/ start Coumadin.  - ASA   - Metoprolol 12.5 bid     Hypertension  - control    Diabetes  - ADA diet  - BS control    Hyperlipidemia  - stable    Coronary artery disease  - continue Rx    Monico Daly MD pager [phone]

## 2020-09-23 NOTE — PROGRESS NOTE ADULT - ATTENDING COMMENTS
No acute events reported overnight.  The patient is clinically doing well and is in good spirits.  No cardiopulmonary complaints at rest or upon exertion.  Denies any chest pain/tightness/discomfort, fevers, chills, sweats, light-headed sensation, dizziness or palpitations.  No pain at puncture site.  He is currently sitting in a chair.  No difference following stent implantation.  No symptoms upon minimal exertion.  Denies any change in the color/caliber of his stool.      --The patient is being evaluated by the [facility] valve team and is felt to be an appropriate patient to undergo PCI/TAVR.  He underwent staged PCI on 9/21/2020.  --Plan for TAVR CTA to be performed later today.  He will then be discharged and will come back in the next few weeks to undergo valve replacement.  This was explained to the patient who is agreeable with the plan.   --The patient and his wife had multiple questions about his medical conditions and future plan including details of the TAVR procedure.  All questions were addressed.  --Continue aspirin 81mg daily and clopidogrel 75mg daily.  Discontinue aspirin in 2 weeks due to recent anemia/GI bleeding.  Monitor for signs/symptoms of bleeding.  --Would recommend patient be discharged on Eliquis (it patient can afford, otherwise would start patient on coumadin which he was taking previously).  He is on heparin drip.  Closely monitor for signs/symptoms of bleeding.  --Continue atorvastatin 20mg PO QD.    --Continue metoprolol tartrate 12.5mg PO BID (hold if SBP<100mmHg or HR<60bpm).  Uptirate as tolerated.  --Continue telemetry monitoring.  --Aim for K>4 and Mg>2.    All questions and concerns of the patient and his wife were addressed.
No acute events reported overnight.  The patient is clinically doing well.  Denies any swallowing difficulty.  No cardiopulmonary complaints at rest or upon exertion.  Denies any chest pain/tighthness/discomfort, fevers, chills, sweats, light-headed sensation, dizziness or palpitations.  No symptoms upon minimal exertion.  Denies any change in the color/caliber of his stool.      --The patient is being evaluated by the [facility] valve team and is felt to be an appropriate patient to undergo PCI/TAVR.  Plan is for staged PCI to be performed later today.  Indications for the PCI were reviewed.  Details of the procedure were explained.  Benefits and risks were gone over.  Risks include but are not limited to infection, bleeding, arrhythmia, TIA/stroke, vascular injury, worsening renal insufficiency and death.    --Plan for TAVR CTA top be performed tomorrow.   He will then be discharged and will come back in the next few weeks to undergo valve replacement.  This was explained to the patient who is agreeable with the plan.   --Continue aspirin 81mg daily and clopidogrel 75mg daily.  Discontinue aspirin in 2 weeks due to recent anemia/GI bleeding.  Monitor for signs/symptoms of bleeding.  --Would recommend patient be discharged on Eliquis.  Continue heparin drip.  Closely monitor for signs/symptoms of bleeding.  --Continue atorvastatin 20mg PO QD.    --Continue metoprolol tartrate 12.5mg PO BID (hold if SBP<100mmHg or HR<60bpm).  Uptirate as tolerated.  --Continue telemetry monitoring.  --Aim for K>4 and Mg>2.    All questions and concerns of the patient were addressed.
No acute events reported overnight.  The patient reports that he is clinically doing better.  He feels that following his CEA his breathing his breathing has improved.  Denies any chest pain/tighthness/discomfort, fevers, chills, sweats, light-headed sensation, dizziness or palpitations.  No symptoms upon minimal exertion.  Denies any change in the color/caliber of his stool.      --The patient is being evaluated by the [facility] valve team and is felt to be an appropriate patient to undergo PCI/TAVR.  Plan is for staged PCI to be performed on 9/21/2020.  Indications for the PCI were reviewed.  Details of the procedure were explained.  Benefits and risks were gone over.  Risks include but are not limited to infection, bleeding, arrhythmia, TIA/stroke, vascular injury, worsening renal insufficiency and death.  Due to CT machine breaking down today the TAVR CTA will be performed following the staged PCI.   He will then be discharged and will come back in the next few weeks to undergo valve replacement.  This was explained to the patient who is agreeable with the plan.   --The patient should be placed on aspirin 81mg daily.    --Continue heparin drip.  Closely monitor for signs/symptoms of bleeding.  --Would transition patient from simvastatin 20mg PO QD to atorvastatin 20mg PO QD.    --He should be started on metoprolol tartrate 12.5mg PO BID (hold if SBP<100mmHg or HR<60bpm).  Uptirate as tolerated.  --Continue telemetry monitoring.  --Aim for K>4 and Mg>2.    All questions and concerns of the patient were addressed.
The patient is clinical doing very well following his carotid endarterectomy.  No acute events reported overnight.  The patient notes some discomfort in his neck but otherwise denies any chest pain/tightness/discomfort, shortness of breath, fevers, chills or sweats.  He is swallowing fine without any issues.  No abdominal pain.  Mild discomfort in left buttock (improved compared to yesterday).  He is on a phenylephrine drip.      --At this time plan to wean off phenylephrine drip as tolerated.   Management of wound site/drain as per vascular surgery team.  He has been cleared by vascular surgery to proceed with aortic valve replacement.  --The patient is being evaluated by the [facility] valve team for CABG/SAVR vs PCI/TAVR.  A TAVR CTA will be performed.  If plan for TAVR the patient will undergo PCI prior to discharge.   --Plan is for patient to finish workup for aortic valve surgery while he is an inpatient.  He will then be discharged and will come back in the next few weeks to undergo valve replacement.  --The patient should be placed on aspirin 81mg daily.    --Continue heparin drip.  Resume heparin drip following procedure once cleared by vascular team.  --Transition patient from simvastatin 20mg PO QD to atorvastatin 20mg PO QD.    --He should be started on metoprolol tartrate 12.5mg PO BID (hold if SBP<100mmHg or HR<60bpm).  Uptirate as tolerated.  --Continue telemetry monitoring.  --Aim for K>4 and Mg>2.    All questions and concerns of the patient were addressed.
Plan of care per structural  Plan for PCI tomorrow    Mita
No critical care issues.  Transfer to floor.
Pt is s/p PCI with subtherapeutic ONR.  Coumadin 7.5 tonight and re assess for discharge tomorrow

## 2020-09-23 NOTE — PROGRESS NOTE ADULT - ASSESSMENT
74y M with a PMH of CAD status post stents, AF on coumadin, HTN, HLD, PVD with fem-fem bypass and bilateral LE stents presenting w/ chest pain s/p OM2 REJI x 1.     Plan  -c/w ASA 81mg qd, plavix 75mg qd  -increase lipitor to 40mg qd  -c/w home meds  -patient reporting mild R wrist tenderness to palpation; pulse palpable; avoid NSAIDs for pain   -patient counselled re: appropriate wrist care measures   -patient counselled to avoid strenuous activities pending follow-up with outpatient cardiologist  -patient directed to f/u w/ outpatient cardiologist w/i 1wk discharge.    Jaswinder Wellington MD  Cardiology fellow, F1  [phone]   74y M with a PMH of CAD status post stents, AF on coumadin, HTN, HLD, PVD with fem-fem bypass and bilateral LE stents presenting w/ chest pain s/p OM2 REJI x 1.     Plan  -c/w ASA 81mg qd, plavix 75mg qd  -increase lipitor to 40mg qd  -c/w home meds  -patient reporting mild R wrist tenderness to palpation; pulse palpable; avoid NSAIDs for pain   -patient counselled re: appropriate wrist care measures   -patient counselled to avoid strenuous activities pending follow-up with outpatient cardiologist  -patient directed to f/u w/ outpatient cardiologist w/i 1wk discharge.  -back/neck pain mgmt as per primary    Jaswinder Wellington MD  Cardiology fellow, F1  [phone]   74y M with a PMH of CAD status post stents, AF on coumadin, HTN, HLD, PVD with fem-fem bypass and bilateral LE stents presenting w/ chest pain s/p OM2 REJI x 1.     Plan  -c/w ASA 81mg qd, plavix 75mg qd  -increase lipitor to 40mg qd  -c/w home meds  -patient reporting mild R wrist tenderness to palpation; pulse palpable; avoid NSAIDs for pain   -patient counselled re: appropriate wrist care measures   -patient counselled to avoid strenuous activities pending follow-up with outpatient cardiologist  -patient directed to f/u w/ outpatient cardiologist w/i 1wk discharge.  -back/neck pain mgmt and INR mgmt as per primary    Jaswinder Wellington MD  Cardiology fellow, F1  [phone]   74y M with a PMH of CAD status post stents, AF on coumadin, HTN, HLD, PVD with fem-fem bypass and bilateral LE stents presenting w/ chest pain s/p OM2 REJI x 1.     Plan  -c/w ASA 81mg qd, plavix 75mg qd  -increase lipitor to 80mg qd  -c/w home meds  -patient reporting mild R wrist tenderness to palpation; pulse palpable; avoid NSAIDs for pain   -patient counselled re: appropriate wrist care measures   -patient counselled to avoid strenuous activities pending follow-up with outpatient cardiologist  -patient directed to f/u w/ outpatient cardiologist w/i 1wk discharge.  -back/neck pain mgmt and INR mgmt as per primary    Jaswinder Wellington MD  Cardiology fellow, F1  [phone]

## 2020-09-24 LAB
ANION GAP SERPL CALC-SCNC: 9 MMOL/L — SIGNIFICANT CHANGE UP (ref 5–17)
APTT BLD: 67.4 SEC — HIGH (ref 27.5–35.5)
BUN SERPL-MCNC: 13 MG/DL — SIGNIFICANT CHANGE UP (ref 7–23)
CALCIUM SERPL-MCNC: 9.4 MG/DL — SIGNIFICANT CHANGE UP (ref 8.4–10.5)
CHLORIDE SERPL-SCNC: 101 MMOL/L — SIGNIFICANT CHANGE UP (ref 96–108)
CO2 SERPL-SCNC: 25 MMOL/L — SIGNIFICANT CHANGE UP (ref 22–31)
CREAT SERPL-MCNC: 1.09 MG/DL — SIGNIFICANT CHANGE UP (ref 0.5–1.3)
GLUCOSE BLDC GLUCOMTR-MCNC: 111 MG/DL — HIGH (ref 70–99)
GLUCOSE BLDC GLUCOMTR-MCNC: 122 MG/DL — HIGH (ref 70–99)
GLUCOSE BLDC GLUCOMTR-MCNC: 153 MG/DL — HIGH (ref 70–99)
GLUCOSE BLDC GLUCOMTR-MCNC: 82 MG/DL — SIGNIFICANT CHANGE UP (ref 70–99)
GLUCOSE SERPL-MCNC: 85 MG/DL — SIGNIFICANT CHANGE UP (ref 70–99)
HCT VFR BLD CALC: 26.7 % — LOW (ref 39–50)
HGB BLD-MCNC: 8.2 G/DL — LOW (ref 13–17)
INR BLD: 1.33 RATIO — HIGH (ref 0.88–1.16)
MCHC RBC-ENTMCNC: 30 PG — SIGNIFICANT CHANGE UP (ref 27–34)
MCHC RBC-ENTMCNC: 30.7 GM/DL — LOW (ref 32–36)
MCV RBC AUTO: 97.8 FL — SIGNIFICANT CHANGE UP (ref 80–100)
NRBC # BLD: 0 /100 WBCS — SIGNIFICANT CHANGE UP (ref 0–0)
PLATELET # BLD AUTO: 265 K/UL — SIGNIFICANT CHANGE UP (ref 150–400)
POTASSIUM SERPL-MCNC: 4 MMOL/L — SIGNIFICANT CHANGE UP (ref 3.5–5.3)
POTASSIUM SERPL-SCNC: 4 MMOL/L — SIGNIFICANT CHANGE UP (ref 3.5–5.3)
PROTHROM AB SERPL-ACNC: 15.6 SEC — HIGH (ref 10.6–13.6)
RBC # BLD: 2.73 M/UL — LOW (ref 4.2–5.8)
RBC # FLD: 15.9 % — HIGH (ref 10.3–14.5)
SODIUM SERPL-SCNC: 135 MMOL/L — SIGNIFICANT CHANGE UP (ref 135–145)
WBC # BLD: 8.26 K/UL — SIGNIFICANT CHANGE UP (ref 3.8–10.5)
WBC # FLD AUTO: 8.26 K/UL — SIGNIFICANT CHANGE UP (ref 3.8–10.5)

## 2020-09-24 PROCEDURE — 99232 SBSQ HOSP IP/OBS MODERATE 35: CPT

## 2020-09-24 RX ORDER — WARFARIN SODIUM 2.5 MG/1
7.5 TABLET ORAL ONCE
Refills: 0 | Status: COMPLETED | OUTPATIENT
Start: 2020-09-24 | End: 2020-09-24

## 2020-09-24 RX ADMIN — Medication 650 MILLIGRAM(S): at 18:04

## 2020-09-24 RX ADMIN — Medication 12.5 MILLIGRAM(S): at 06:11

## 2020-09-24 RX ADMIN — Medication 650 MILLIGRAM(S): at 07:49

## 2020-09-24 RX ADMIN — HEPARIN SODIUM 12 UNIT(S)/HR: 5000 INJECTION INTRAVENOUS; SUBCUTANEOUS at 17:33

## 2020-09-24 RX ADMIN — Medication 1 PATCH: at 17:31

## 2020-09-24 RX ADMIN — Medication 81 MILLIGRAM(S): at 12:12

## 2020-09-24 RX ADMIN — Medication 2: at 17:32

## 2020-09-24 RX ADMIN — MAGNESIUM OXIDE 400 MG ORAL TABLET 400 MILLIGRAM(S): 241.3 TABLET ORAL at 17:32

## 2020-09-24 RX ADMIN — SENNA PLUS 2 TABLET(S): 8.6 TABLET ORAL at 22:18

## 2020-09-24 RX ADMIN — POLYETHYLENE GLYCOL 3350 17 GRAM(S): 17 POWDER, FOR SOLUTION ORAL at 15:20

## 2020-09-24 RX ADMIN — Medication 325 MILLIGRAM(S): at 12:12

## 2020-09-24 RX ADMIN — SODIUM CHLORIDE 1 GRAM(S): 9 INJECTION INTRAMUSCULAR; INTRAVENOUS; SUBCUTANEOUS at 15:19

## 2020-09-24 RX ADMIN — Medication 1 PATCH: at 06:10

## 2020-09-24 RX ADMIN — INSULIN GLARGINE 10 UNIT(S): 100 INJECTION, SOLUTION SUBCUTANEOUS at 22:19

## 2020-09-24 RX ADMIN — ATORVASTATIN CALCIUM 20 MILLIGRAM(S): 80 TABLET, FILM COATED ORAL at 22:18

## 2020-09-24 RX ADMIN — OXYCODONE HYDROCHLORIDE 5 MILLIGRAM(S): 5 TABLET ORAL at 06:43

## 2020-09-24 RX ADMIN — Medication 650 MILLIGRAM(S): at 08:19

## 2020-09-24 RX ADMIN — OXYCODONE HYDROCHLORIDE 5 MILLIGRAM(S): 5 TABLET ORAL at 06:13

## 2020-09-24 RX ADMIN — WARFARIN SODIUM 7.5 MILLIGRAM(S): 2.5 TABLET ORAL at 22:18

## 2020-09-24 RX ADMIN — Medication 2: at 07:47

## 2020-09-24 RX ADMIN — SODIUM CHLORIDE 1 GRAM(S): 9 INJECTION INTRAMUSCULAR; INTRAVENOUS; SUBCUTANEOUS at 22:18

## 2020-09-24 RX ADMIN — MAGNESIUM OXIDE 400 MG ORAL TABLET 400 MILLIGRAM(S): 241.3 TABLET ORAL at 07:48

## 2020-09-24 RX ADMIN — SODIUM CHLORIDE 1 GRAM(S): 9 INJECTION INTRAMUSCULAR; INTRAVENOUS; SUBCUTANEOUS at 06:11

## 2020-09-24 RX ADMIN — Medication 12.5 MILLIGRAM(S): at 17:32

## 2020-09-24 RX ADMIN — Medication 1 PATCH: at 17:02

## 2020-09-24 RX ADMIN — Medication 650 MILLIGRAM(S): at 17:34

## 2020-09-24 RX ADMIN — Medication 2: at 12:09

## 2020-09-24 RX ADMIN — PANTOPRAZOLE SODIUM 40 MILLIGRAM(S): 20 TABLET, DELAYED RELEASE ORAL at 06:11

## 2020-09-24 NOTE — PROGRESS NOTE ADULT - ASSESSMENT
74 m with    S/P L CEA  - postop care    Aortic stenosis severe  - for possible TAVR  - structural heart follow  - s/p staged PCI     Atrial fibrillation  - rate control  - AC with Heparin/Continue Coumadin. Goal of INR 2-3.  - ASA   - Metoprolol 12.5 bid     Hypertension  - control    Diabetes  - ADA diet  - BS control    Hyperlipidemia  - stable    Coronary artery disease  - continue Rx    Monico Daly MD pager [phone]

## 2020-09-24 NOTE — PROGRESS NOTE ADULT - SUBJECTIVE AND OBJECTIVE BOX
Patient is a 74y old  Male who presents with a chief complaint of Asymptomatic, high-grade carotid stenosis (23 Sep 2020 13:20)      SUBJECTIVE / OVERNIGHT EVENTS: Comfortable without new complaints.   Review of Systems  chest pain no  palpitations no  sob no  nausea no  headache no    MEDICATIONS  (STANDING):  aspirin enteric coated 81 milliGRAM(s) Oral daily  atorvastatin 20 milliGRAM(s) Oral at bedtime  clopidogrel Tablet 75 milliGRAM(s) Oral daily  ferrous    sulfate 325 milliGRAM(s) Oral daily  heparin  Infusion 1200 Unit(s)/Hr (12 mL/Hr) IV Continuous <Continuous>  insulin glargine Injectable (LANTUS) 10 Unit(s) SubCutaneous at bedtime  insulin lispro (HumaLOG) corrective regimen sliding scale   SubCutaneous three times a day before meals  insulin lispro (HumaLOG) corrective regimen sliding scale   SubCutaneous at bedtime  magnesium oxide 400 milliGRAM(s) Oral two times a day with meals  metoprolol tartrate 12.5 milliGRAM(s) Oral two times a day  nicotine -  14 mG/24Hr(s) Patch 1 patch Transdermal daily  pantoprazole    Tablet 40 milliGRAM(s) Oral before breakfast  polyethylene glycol 3350 17 Gram(s) Oral daily  senna 2 Tablet(s) Oral at bedtime  sodium chloride 1 Gram(s) Oral every 8 hours  warfarin 7.5 milliGRAM(s) Oral once    MEDICATIONS  (PRN):  acetaminophen   Tablet .. 650 milliGRAM(s) Oral every 6 hours PRN Mild Pain (1 - 3)  oxyCODONE    IR 5 milliGRAM(s) Oral every 6 hours PRN Moderate Pain (4 - 6)      Vital Signs Last 24 Hrs  T(C): 36.7 (24 Sep 2020 13:16), Max: 36.9 (23 Sep 2020 19:38)  T(F): 98 (24 Sep 2020 13:16), Max: 98.4 (23 Sep 2020 19:38)  HR: 77 (24 Sep 2020 13:16) (65 - 77)  BP: 123/81 (24 Sep 2020 13:16) (105/64 - 152/57)  BP(mean): --  RR: 18 (24 Sep 2020 13:16) (16 - 18)  SpO2: 99% (24 Sep 2020 13:16) (96% - 99%)    PHYSICAL EXAM:  GENERAL: NAD, well-developed  HEAD:  Atraumatic, Normocephalic  EYES: EOMI, PERRLA, conjunctiva and sclera clear  NECK: Supple, No JVD L side incision healing   CHEST/LUNG: Clear to auscultation bilaterally; No wheeze  HEART: Regular rate and rhythm; No murmurs, rubs, or gallops  ABDOMEN: Soft, Nontender, Nondistended; Bowel sounds present  EXTREMITIES:  2+ Peripheral Pulses, No clubbing, cyanosis, or edema  PSYCH: AAOx3  NEUROLOGY: non-focal  SKIN: No rashes or lesions    LABS:                        8.2    8.26  )-----------( 265      ( 24 Sep 2020 06:16 )             26.7     09-24    135  |  101  |  13  ----------------------------<  85  4.0   |  25  |  1.09    Ca    9.4      24 Sep 2020 06:16  Mg     1.7     09-23      PT/INR - ( 24 Sep 2020 10:04 )   PT: 15.6 sec;   INR: 1.33 ratio         PTT - ( 24 Sep 2020 10:04 )  PTT:67.4 sec            RADIOLOGY & ADDITIONAL TESTS:    Imaging Personally Reviewed:  rad  Consultant(s) Notes Reviewed:      Care Discussed with Consultants/Other Providers:

## 2020-09-24 NOTE — PROGRESS NOTE ADULT - SUBJECTIVE AND OBJECTIVE BOX
Subjective  No acute events reported overnight.  The patient reports that the bed is very uncomfortable.  He is now sleeping in the chair with subsequent improvement in his right neck discomfort.  He is not having any cardiopulmonary complaints.  No fevers, chills or sweats.  Continues to be on a heparin drip    Telemetry  Atrial fibrillation with rates int he 60s to 90s    Review of Systems  14 point review of systems is negative except what is described above    Physical Examination  General: A/O x3, GUILLAUME, NAD  HEENT: Supple, No JVD, Trachea midline, no masses  Pulmonary: CTAB, = Chest Excursion, no accessory muscle use  Cor: S1S2, Irregular, III/VI DOREEN  Wound: Left neck Healing well  Gastrointestinal: Soft, NT/ND, + Bowel Sounds  Neuro: = motor and sensory B/L, No focal deficits  Vascular: 1+ Pulses B/L, No edema  Extremities: No joint pain or swelling  Skin: Warm/Dry/Normal color, Normal turgor, no rashes    Assessment/Plan  --The patient is being evaluated by the [facility] valve team and is felt to be an appropriate patient to undergo TAVR. Indications and details of the TAVR procedure were reviewed.  Patient had questions pertaining to the procedure and post-operative course that was gone over.  He underwent staged PCI on 9/21/2020 and tolerated the procedure.  He tolerated the procedure well.  N  --TAVR CTA was performed and personally reviewed.  Plan for patient to be discharged and will come back in the next few weeks to undergo valve replacement.  This was explained to the patient who is agreeable with the plan.   --Continue aspirin 81mg daily and clopidogrel 75mg daily.  Discontinue aspirin in 2 weeks due to recent anemia/GI bleeding.  Monitor for signs/symptoms of bleeding.  --The patient has been started back on coumadin with goal INR 2-3.  He is on heparin drip.  Closely monitor for signs/symptoms of bleeding.  --Continue atorvastatin 20mg PO QD.    --Continue metoprolol tartrate 12.5mg PO BID (hold if SBP<100mmHg or HR<60bpm).  Uptirate as tolerated.  --Continue telemetry monitoring.  --Aim for K>4 and Mg>2.    All questions and concerns of the patient were addressed.      Plan discussed with cardiac surgery and structural heart team.

## 2020-09-25 LAB
ANION GAP SERPL CALC-SCNC: 9 MMOL/L — SIGNIFICANT CHANGE UP (ref 5–17)
APTT BLD: 87.1 SEC — HIGH (ref 27.5–35.5)
BUN SERPL-MCNC: 15 MG/DL — SIGNIFICANT CHANGE UP (ref 7–23)
CALCIUM SERPL-MCNC: 9.3 MG/DL — SIGNIFICANT CHANGE UP (ref 8.4–10.5)
CHLORIDE SERPL-SCNC: 101 MMOL/L — SIGNIFICANT CHANGE UP (ref 96–108)
CO2 SERPL-SCNC: 28 MMOL/L — SIGNIFICANT CHANGE UP (ref 22–31)
CREAT SERPL-MCNC: 1.12 MG/DL — SIGNIFICANT CHANGE UP (ref 0.5–1.3)
GLUCOSE BLDC GLUCOMTR-MCNC: 101 MG/DL — HIGH (ref 70–99)
GLUCOSE BLDC GLUCOMTR-MCNC: 104 MG/DL — HIGH (ref 70–99)
GLUCOSE BLDC GLUCOMTR-MCNC: 135 MG/DL — HIGH (ref 70–99)
GLUCOSE BLDC GLUCOMTR-MCNC: 94 MG/DL — SIGNIFICANT CHANGE UP (ref 70–99)
GLUCOSE SERPL-MCNC: 110 MG/DL — HIGH (ref 70–99)
HCT VFR BLD CALC: 26.4 % — LOW (ref 39–50)
HGB BLD-MCNC: 7.8 G/DL — LOW (ref 13–17)
INR BLD: 1.75 RATIO — HIGH (ref 0.88–1.16)
MCHC RBC-ENTMCNC: 29.3 PG — SIGNIFICANT CHANGE UP (ref 27–34)
MCHC RBC-ENTMCNC: 29.5 GM/DL — LOW (ref 32–36)
MCV RBC AUTO: 99.2 FL — SIGNIFICANT CHANGE UP (ref 80–100)
NRBC # BLD: 0 /100 WBCS — SIGNIFICANT CHANGE UP (ref 0–0)
PLATELET # BLD AUTO: 303 K/UL — SIGNIFICANT CHANGE UP (ref 150–400)
POTASSIUM SERPL-MCNC: 4 MMOL/L — SIGNIFICANT CHANGE UP (ref 3.5–5.3)
POTASSIUM SERPL-SCNC: 4 MMOL/L — SIGNIFICANT CHANGE UP (ref 3.5–5.3)
PROTHROM AB SERPL-ACNC: 20.1 SEC — HIGH (ref 10.6–13.6)
RBC # BLD: 2.66 M/UL — LOW (ref 4.2–5.8)
RBC # FLD: 15.9 % — HIGH (ref 10.3–14.5)
SODIUM SERPL-SCNC: 138 MMOL/L — SIGNIFICANT CHANGE UP (ref 135–145)
WBC # BLD: 7.17 K/UL — SIGNIFICANT CHANGE UP (ref 3.8–10.5)
WBC # FLD AUTO: 7.17 K/UL — SIGNIFICANT CHANGE UP (ref 3.8–10.5)

## 2020-09-25 RX ORDER — WARFARIN SODIUM 2.5 MG/1
7.5 TABLET ORAL ONCE
Refills: 0 | Status: COMPLETED | OUTPATIENT
Start: 2020-09-25 | End: 2020-09-25

## 2020-09-25 RX ORDER — INSULIN LISPRO 100/ML
VIAL (ML) SUBCUTANEOUS
Refills: 0 | Status: DISCONTINUED | OUTPATIENT
Start: 2020-09-25 | End: 2020-09-26

## 2020-09-25 RX ORDER — INSULIN LISPRO 100/ML
VIAL (ML) SUBCUTANEOUS AT BEDTIME
Refills: 0 | Status: DISCONTINUED | OUTPATIENT
Start: 2020-09-25 | End: 2020-09-26

## 2020-09-25 RX ADMIN — MAGNESIUM OXIDE 400 MG ORAL TABLET 400 MILLIGRAM(S): 241.3 TABLET ORAL at 08:14

## 2020-09-25 RX ADMIN — Medication 12.5 MILLIGRAM(S): at 10:52

## 2020-09-25 RX ADMIN — Medication 650 MILLIGRAM(S): at 23:25

## 2020-09-25 RX ADMIN — Medication 1 PATCH: at 19:04

## 2020-09-25 RX ADMIN — HEPARIN SODIUM 12 UNIT(S)/HR: 5000 INJECTION INTRAVENOUS; SUBCUTANEOUS at 10:53

## 2020-09-25 RX ADMIN — Medication 325 MILLIGRAM(S): at 10:52

## 2020-09-25 RX ADMIN — SODIUM CHLORIDE 1 GRAM(S): 9 INJECTION INTRAMUSCULAR; INTRAVENOUS; SUBCUTANEOUS at 15:00

## 2020-09-25 RX ADMIN — HEPARIN SODIUM 12 UNIT(S)/HR: 5000 INJECTION INTRAVENOUS; SUBCUTANEOUS at 23:04

## 2020-09-25 RX ADMIN — SODIUM CHLORIDE 1 GRAM(S): 9 INJECTION INTRAMUSCULAR; INTRAVENOUS; SUBCUTANEOUS at 22:31

## 2020-09-25 RX ADMIN — SODIUM CHLORIDE 1 GRAM(S): 9 INJECTION INTRAMUSCULAR; INTRAVENOUS; SUBCUTANEOUS at 05:34

## 2020-09-25 RX ADMIN — OXYCODONE HYDROCHLORIDE 5 MILLIGRAM(S): 5 TABLET ORAL at 04:12

## 2020-09-25 RX ADMIN — Medication 1 PATCH: at 17:56

## 2020-09-25 RX ADMIN — MAGNESIUM OXIDE 400 MG ORAL TABLET 400 MILLIGRAM(S): 241.3 TABLET ORAL at 17:56

## 2020-09-25 RX ADMIN — Medication 12.5 MILLIGRAM(S): at 22:32

## 2020-09-25 RX ADMIN — Medication 650 MILLIGRAM(S): at 22:55

## 2020-09-25 RX ADMIN — Medication 2: at 08:14

## 2020-09-25 RX ADMIN — Medication 81 MILLIGRAM(S): at 10:52

## 2020-09-25 RX ADMIN — ATORVASTATIN CALCIUM 20 MILLIGRAM(S): 80 TABLET, FILM COATED ORAL at 22:32

## 2020-09-25 RX ADMIN — OXYCODONE HYDROCHLORIDE 5 MILLIGRAM(S): 5 TABLET ORAL at 04:32

## 2020-09-25 RX ADMIN — PANTOPRAZOLE SODIUM 40 MILLIGRAM(S): 20 TABLET, DELAYED RELEASE ORAL at 05:34

## 2020-09-25 RX ADMIN — WARFARIN SODIUM 7.5 MILLIGRAM(S): 2.5 TABLET ORAL at 22:31

## 2020-09-25 RX ADMIN — POLYETHYLENE GLYCOL 3350 17 GRAM(S): 17 POWDER, FOR SOLUTION ORAL at 10:52

## 2020-09-25 RX ADMIN — SENNA PLUS 2 TABLET(S): 8.6 TABLET ORAL at 22:31

## 2020-09-25 RX ADMIN — INSULIN GLARGINE 10 UNIT(S): 100 INJECTION, SOLUTION SUBCUTANEOUS at 22:31

## 2020-09-25 NOTE — PROGRESS NOTE ADULT - ASSESSMENT
74 m with    S/P L CEA  - postop care    Aortic stenosis severe  - for possible TAVR  - structural heart follow  - s/p staged PCI     Atrial fibrillation  - rate control  - AC with Heparin/Continue Coumadin. Goal of INR 2-3.  - ASA   - Metoprolol 12.5 bid     Hypertension  - control    Diabetes  - ADA diet  - BS control    Hyperlipidemia  - stable    Coronary artery disease  - continue Rx    Monico Daly MD pager [phone]

## 2020-09-25 NOTE — PROGRESS NOTE ADULT - SUBJECTIVE AND OBJECTIVE BOX
Patient is a 74y old  Male who presents with a chief complaint of Asymptomatic, high-grade carotid stenosis (24 Sep 2020 16:30)      SUBJECTIVE / OVERNIGHT EVENTS: Comfortable without new complaints.   Review of Systems  chest pain no  palpitations no  sob no  nausea no  headache no    MEDICATIONS  (STANDING):  aspirin enteric coated 81 milliGRAM(s) Oral daily  atorvastatin 20 milliGRAM(s) Oral at bedtime  clopidogrel Tablet 75 milliGRAM(s) Oral daily  ferrous    sulfate 325 milliGRAM(s) Oral daily  heparin  Infusion 1200 Unit(s)/Hr (12 mL/Hr) IV Continuous <Continuous>  insulin glargine Injectable (LANTUS) 10 Unit(s) SubCutaneous at bedtime  insulin lispro (HumaLOG) corrective regimen sliding scale   SubCutaneous three times a day before meals  insulin lispro (HumaLOG) corrective regimen sliding scale   SubCutaneous at bedtime  magnesium oxide 400 milliGRAM(s) Oral two times a day with meals  metoprolol tartrate 12.5 milliGRAM(s) Oral two times a day  nicotine -  14 mG/24Hr(s) Patch 1 patch Transdermal daily  pantoprazole    Tablet 40 milliGRAM(s) Oral before breakfast  polyethylene glycol 3350 17 Gram(s) Oral daily  senna 2 Tablet(s) Oral at bedtime  sodium chloride 1 Gram(s) Oral every 8 hours  warfarin 7.5 milliGRAM(s) Oral once    MEDICATIONS  (PRN):  acetaminophen   Tablet .. 650 milliGRAM(s) Oral every 6 hours PRN Mild Pain (1 - 3)  oxyCODONE    IR 5 milliGRAM(s) Oral every 6 hours PRN Moderate Pain (4 - 6)      Vital Signs Last 24 Hrs  T(C): 36.8 (25 Sep 2020 14:14), Max: 37 (24 Sep 2020 19:08)  T(F): 98.2 (25 Sep 2020 14:14), Max: 98.6 (24 Sep 2020 19:08)  HR: 82 (25 Sep 2020 14:14) (62 - 82)  BP: 136/78 (25 Sep 2020 14:14) (105/67 - 136/78)  BP(mean): --  RR: 18 (25 Sep 2020 14:14) (18 - 18)  SpO2: 96% (25 Sep 2020 14:14) (93% - 96%)    PHYSICAL EXAM:  GENERAL: NAD, well-developed  HEAD:  Atraumatic, Normocephalic  EYES: EOMI, PERRLA, conjunctiva and sclera clear  NECK: Supple, No JVD Incision healing.  CHEST/LUNG: Clear to auscultation bilaterally; No wheeze  HEART: Regular rate and rhythm; No murmurs, rubs, or gallops  ABDOMEN: Soft, Nontender, Nondistended; Bowel sounds present  EXTREMITIES:  2+ Peripheral Pulses, No clubbing, cyanosis, or edema  PSYCH: AAOx3  NEUROLOGY: non-focal  SKIN: No rashes or lesions    LABS:                        7.8    7.17  )-----------( 303      ( 25 Sep 2020 07:27 )             26.4     09-25    138  |  101  |  15  ----------------------------<  110<H>  4.0   |  28  |  1.12    Ca    9.3      25 Sep 2020 07:25      PT/INR - ( 25 Sep 2020 08:01 )   PT: 20.1 sec;   INR: 1.75 ratio         PTT - ( 25 Sep 2020 08:01 )  PTT:87.1 sec            RADIOLOGY & ADDITIONAL TESTS:    Imaging Personally Reviewed:    Consultant(s) Notes Reviewed:      Care Discussed with Consultants/Other Providers:

## 2020-09-26 VITALS
DIASTOLIC BLOOD PRESSURE: 67 MMHG | SYSTOLIC BLOOD PRESSURE: 123 MMHG | OXYGEN SATURATION: 96 % | HEART RATE: 82 BPM | RESPIRATION RATE: 20 BRPM | TEMPERATURE: 98 F

## 2020-09-26 LAB
ANION GAP SERPL CALC-SCNC: 9 MMOL/L — SIGNIFICANT CHANGE UP (ref 5–17)
APTT BLD: 63.9 SEC — HIGH (ref 27.5–35.5)
BUN SERPL-MCNC: 15 MG/DL — SIGNIFICANT CHANGE UP (ref 7–23)
CALCIUM SERPL-MCNC: 9 MG/DL — SIGNIFICANT CHANGE UP (ref 8.4–10.5)
CHLORIDE SERPL-SCNC: 103 MMOL/L — SIGNIFICANT CHANGE UP (ref 96–108)
CO2 SERPL-SCNC: 22 MMOL/L — SIGNIFICANT CHANGE UP (ref 22–31)
CREAT SERPL-MCNC: 0.93 MG/DL — SIGNIFICANT CHANGE UP (ref 0.5–1.3)
GLUCOSE BLDC GLUCOMTR-MCNC: 104 MG/DL — HIGH (ref 70–99)
GLUCOSE BLDC GLUCOMTR-MCNC: 143 MG/DL — HIGH (ref 70–99)
GLUCOSE SERPL-MCNC: 156 MG/DL — HIGH (ref 70–99)
HCT VFR BLD CALC: 26.2 % — LOW (ref 39–50)
HGB BLD-MCNC: 7.7 G/DL — LOW (ref 13–17)
INR BLD: 2.24 RATIO — HIGH (ref 0.88–1.16)
MAGNESIUM SERPL-MCNC: 1.8 MG/DL — SIGNIFICANT CHANGE UP (ref 1.6–2.6)
MCHC RBC-ENTMCNC: 29.1 PG — SIGNIFICANT CHANGE UP (ref 27–34)
MCHC RBC-ENTMCNC: 29.4 GM/DL — LOW (ref 32–36)
MCV RBC AUTO: 98.9 FL — SIGNIFICANT CHANGE UP (ref 80–100)
NRBC # BLD: 0 /100 WBCS — SIGNIFICANT CHANGE UP (ref 0–0)
PHOSPHATE SERPL-MCNC: 3.5 MG/DL — SIGNIFICANT CHANGE UP (ref 2.5–4.5)
PLATELET # BLD AUTO: 305 K/UL — SIGNIFICANT CHANGE UP (ref 150–400)
POTASSIUM SERPL-MCNC: 4 MMOL/L — SIGNIFICANT CHANGE UP (ref 3.5–5.3)
POTASSIUM SERPL-SCNC: 4 MMOL/L — SIGNIFICANT CHANGE UP (ref 3.5–5.3)
PROTHROM AB SERPL-ACNC: 25.6 SEC — HIGH (ref 10.6–13.6)
RBC # BLD: 2.65 M/UL — LOW (ref 4.2–5.8)
RBC # FLD: 16.1 % — HIGH (ref 10.3–14.5)
SODIUM SERPL-SCNC: 134 MMOL/L — LOW (ref 135–145)
WBC # BLD: 8.61 K/UL — SIGNIFICANT CHANGE UP (ref 3.8–10.5)
WBC # FLD AUTO: 8.61 K/UL — SIGNIFICANT CHANGE UP (ref 3.8–10.5)

## 2020-09-26 PROCEDURE — 71045 X-RAY EXAM CHEST 1 VIEW: CPT

## 2020-09-26 PROCEDURE — C9600: CPT | Mod: LC

## 2020-09-26 PROCEDURE — 99152 MOD SED SAME PHYS/QHP 5/>YRS: CPT

## 2020-09-26 PROCEDURE — 87040 BLOOD CULTURE FOR BACTERIA: CPT

## 2020-09-26 PROCEDURE — 86901 BLOOD TYPING SEROLOGIC RH(D): CPT

## 2020-09-26 PROCEDURE — 84100 ASSAY OF PHOSPHORUS: CPT

## 2020-09-26 PROCEDURE — 85027 COMPLETE CBC AUTOMATED: CPT

## 2020-09-26 PROCEDURE — 82553 CREATINE MB FRACTION: CPT

## 2020-09-26 PROCEDURE — 88311 DECALCIFY TISSUE: CPT

## 2020-09-26 PROCEDURE — 80048 BASIC METABOLIC PNL TOTAL CA: CPT

## 2020-09-26 PROCEDURE — 84484 ASSAY OF TROPONIN QUANT: CPT

## 2020-09-26 PROCEDURE — 36430 TRANSFUSION BLD/BLD COMPNT: CPT

## 2020-09-26 PROCEDURE — 97166 OT EVAL MOD COMPLEX 45 MIN: CPT

## 2020-09-26 PROCEDURE — P9016: CPT

## 2020-09-26 PROCEDURE — C1889: CPT

## 2020-09-26 PROCEDURE — 82962 GLUCOSE BLOOD TEST: CPT

## 2020-09-26 PROCEDURE — 81003 URINALYSIS AUTO W/O SCOPE: CPT

## 2020-09-26 PROCEDURE — 85730 THROMBOPLASTIN TIME PARTIAL: CPT

## 2020-09-26 PROCEDURE — C1874: CPT

## 2020-09-26 PROCEDURE — 82550 ASSAY OF CK (CPK): CPT

## 2020-09-26 PROCEDURE — U0003: CPT

## 2020-09-26 PROCEDURE — 97162 PT EVAL MOD COMPLEX 30 MIN: CPT

## 2020-09-26 PROCEDURE — 86900 BLOOD TYPING SEROLOGIC ABO: CPT

## 2020-09-26 PROCEDURE — 88304 TISSUE EXAM BY PATHOLOGIST: CPT

## 2020-09-26 PROCEDURE — 97535 SELF CARE MNGMENT TRAINING: CPT

## 2020-09-26 PROCEDURE — C1894: CPT

## 2020-09-26 PROCEDURE — 86923 COMPATIBILITY TEST ELECTRIC: CPT

## 2020-09-26 PROCEDURE — 85610 PROTHROMBIN TIME: CPT

## 2020-09-26 PROCEDURE — C1769: CPT

## 2020-09-26 PROCEDURE — C1887: CPT

## 2020-09-26 PROCEDURE — C1768: CPT

## 2020-09-26 PROCEDURE — 83735 ASSAY OF MAGNESIUM: CPT

## 2020-09-26 PROCEDURE — 86769 SARS-COV-2 COVID-19 ANTIBODY: CPT

## 2020-09-26 PROCEDURE — 93005 ELECTROCARDIOGRAM TRACING: CPT

## 2020-09-26 PROCEDURE — 93454 CORONARY ARTERY ANGIO S&I: CPT

## 2020-09-26 PROCEDURE — 97530 THERAPEUTIC ACTIVITIES: CPT

## 2020-09-26 PROCEDURE — 97116 GAIT TRAINING THERAPY: CPT

## 2020-09-26 PROCEDURE — 75572 CT HRT W/3D IMAGE: CPT

## 2020-09-26 PROCEDURE — 86850 RBC ANTIBODY SCREEN: CPT

## 2020-09-26 RX ORDER — SIMVASTATIN 20 MG/1
1 TABLET, FILM COATED ORAL
Qty: 0 | Refills: 0 | DISCHARGE

## 2020-09-26 RX ORDER — WARFARIN SODIUM 2.5 MG/1
1 TABLET ORAL
Qty: 30 | Refills: 0
Start: 2020-09-26 | End: 2020-10-25

## 2020-09-26 RX ORDER — ATORVASTATIN CALCIUM 80 MG/1
1 TABLET, FILM COATED ORAL
Qty: 30 | Refills: 0
Start: 2020-09-26 | End: 2020-10-25

## 2020-09-26 RX ORDER — HEPARIN SODIUM 5000 [USP'U]/ML
0 INJECTION INTRAVENOUS; SUBCUTANEOUS
Qty: 0 | Refills: 0 | DISCHARGE

## 2020-09-26 RX ORDER — METOPROLOL TARTRATE 50 MG
0.5 TABLET ORAL
Qty: 30 | Refills: 0
Start: 2020-09-26 | End: 2020-10-25

## 2020-09-26 RX ORDER — CLOPIDOGREL BISULFATE 75 MG/1
1 TABLET, FILM COATED ORAL
Qty: 30 | Refills: 0
Start: 2020-09-26 | End: 2020-10-25

## 2020-09-26 RX ORDER — SENNA PLUS 8.6 MG/1
2 TABLET ORAL
Qty: 0 | Refills: 0 | DISCHARGE
Start: 2020-09-26

## 2020-09-26 RX ADMIN — Medication 81 MILLIGRAM(S): at 08:22

## 2020-09-26 RX ADMIN — Medication 12.5 MILLIGRAM(S): at 08:22

## 2020-09-26 RX ADMIN — SODIUM CHLORIDE 1 GRAM(S): 9 INJECTION INTRAMUSCULAR; INTRAVENOUS; SUBCUTANEOUS at 05:20

## 2020-09-26 RX ADMIN — SODIUM CHLORIDE 1 GRAM(S): 9 INJECTION INTRAMUSCULAR; INTRAVENOUS; SUBCUTANEOUS at 13:12

## 2020-09-26 RX ADMIN — Medication 325 MILLIGRAM(S): at 08:22

## 2020-09-26 RX ADMIN — MAGNESIUM OXIDE 400 MG ORAL TABLET 400 MILLIGRAM(S): 241.3 TABLET ORAL at 08:22

## 2020-09-26 RX ADMIN — Medication 1 PATCH: at 07:04

## 2020-09-26 RX ADMIN — PANTOPRAZOLE SODIUM 40 MILLIGRAM(S): 20 TABLET, DELAYED RELEASE ORAL at 05:20

## 2020-09-26 NOTE — PROGRESS NOTE ADULT - PROVIDER SPECIALTY LIST ADULT
Cardiology
Internal Medicine
Intervent Cardiology
SICU
Structural Heart
Vascular Surgery
Vascular Surgery
Structural Heart
Internal Medicine
Surgery

## 2020-09-26 NOTE — PROGRESS NOTE ADULT - ASSESSMENT
74 m with    S/P L CEA  - postop care    Aortic stenosis severe  - for possible TAVR  - structural heart follow  - s/p staged PCI     Atrial fibrillation  - rate control  - AC with Heparin/Continue Coumadin. Goal of INR 2-3.  - ASA   - Metoprolol 12.5 bid     Hypertension  - control    Diabetes  - ADA diet  - BS control    Hyperlipidemia  - stable    Coronary artery disease  - continue Rx    DC home. Follow with PMD/ Vascular surgery/ Cardiology and Structural Heart re TAVR. QA    Monico Daly MD pager [phone]

## 2020-09-26 NOTE — PROGRESS NOTE ADULT - SUBJECTIVE AND OBJECTIVE BOX
Patient is a 74y old  Male who presents with a chief complaint of Asymptomatic, high-grade carotid stenosis (25 Sep 2020 12:54)      SUBJECTIVE / OVERNIGHT EVENTS: Comfortable without new complaints.   Review of Systems  chest pain no  palpitations no  sob no  nausea no  headache no    MEDICATIONS  (STANDING):  aspirin enteric coated 81 milliGRAM(s) Oral daily  atorvastatin 20 milliGRAM(s) Oral at bedtime  clopidogrel Tablet 75 milliGRAM(s) Oral daily  ferrous    sulfate 325 milliGRAM(s) Oral daily  insulin glargine Injectable (LANTUS) 10 Unit(s) SubCutaneous at bedtime  insulin lispro (HumaLOG) corrective regimen sliding scale   SubCutaneous three times a day before meals  insulin lispro (HumaLOG) corrective regimen sliding scale   SubCutaneous at bedtime  magnesium oxide 400 milliGRAM(s) Oral two times a day with meals  metoprolol tartrate 12.5 milliGRAM(s) Oral two times a day  nicotine -  14 mG/24Hr(s) Patch 1 patch Transdermal daily  pantoprazole    Tablet 40 milliGRAM(s) Oral before breakfast  polyethylene glycol 3350 17 Gram(s) Oral daily  senna 2 Tablet(s) Oral at bedtime  sodium chloride 1 Gram(s) Oral every 8 hours    MEDICATIONS  (PRN):  acetaminophen   Tablet .. 650 milliGRAM(s) Oral every 6 hours PRN Mild Pain (1 - 3)      Vital Signs Last 24 Hrs  T(C): 36.6 (26 Sep 2020 12:03), Max: 36.8 (25 Sep 2020 19:33)  T(F): 97.9 (26 Sep 2020 12:03), Max: 98.2 (25 Sep 2020 19:33)  HR: 82 (26 Sep 2020 12:03) (70 - 85)  BP: 123/67 (26 Sep 2020 12:03) (123/67 - 140/72)  BP(mean): 91 (26 Sep 2020 04:57) (91 - 91)  RR: 20 (26 Sep 2020 12:03) (18 - 20)  SpO2: 96% (26 Sep 2020 12:03) (94% - 96%)    PHYSICAL EXAM:  GENERAL: NAD, well-developed  HEAD:  Atraumatic, Normocephalic  EYES: EOMI, PERRLA, conjunctiva and sclera clear  NECK: Supple, No JVD L side incision healing  CHEST/LUNG: Clear to auscultation bilaterally; No wheeze  HEART: Regular rate and rhythm; No murmurs, rubs, or gallops  ABDOMEN: Soft, Nontender, Nondistended; Bowel sounds present  EXTREMITIES:  2+ Peripheral Pulses, No clubbing, cyanosis, or edema  PSYCH: AAOx3  NEUROLOGY: non-focal  SKIN: No rashes or lesions    LABS:                        7.7    8.61  )-----------( 305      ( 26 Sep 2020 05:39 )             26.2     09-26    134<L>  |  103  |  15  ----------------------------<  156<H>  4.0   |  22  |  0.93    Ca    9.0      26 Sep 2020 05:39  Phos  3.5     09-26  Mg     1.8     09-26      PT/INR - ( 26 Sep 2020 05:44 )   PT: 25.6 sec;   INR: 2.24 ratio         PTT - ( 26 Sep 2020 05:44 )  PTT:63.9 sec            RADIOLOGY & ADDITIONAL TESTS:    Imaging Personally Reviewed:    Consultant(s) Notes Reviewed:      Care Discussed with Consultants/Other Providers:

## 2020-09-26 NOTE — PROGRESS NOTE ADULT - REASON FOR ADMISSION
Asymptomatic, high-grade carotid stenosis

## 2020-10-05 LAB — SURGICAL PATHOLOGY STUDY: SIGNIFICANT CHANGE UP

## 2020-10-05 NOTE — PRE-OP CHECKLIST - RESPIRATORY RATE (BREATHS/MIN)
Initial visit with family member of patient.  Took time to assess for any spiritual or emotional needs and they were currently doing okay.  Spiritual care remains available as needed.    Chaplain Fabricio Rhodes M.Div., [facility]   17

## 2020-10-09 PROBLEM — Z83.3 FAMILY HISTORY OF TYPE 2 DIABETES MELLITUS: Status: ACTIVE | Noted: 2020-10-09

## 2020-10-09 PROBLEM — Z82.49 FAMILY HISTORY OF CORONARY ARTERY DISEASE: Status: ACTIVE | Noted: 2020-10-09

## 2020-10-09 PROBLEM — F17.200 CURRENT EVERY DAY SMOKER: Status: ACTIVE | Noted: 2020-10-09

## 2020-10-09 PROBLEM — Z78.9 SOCIAL ALCOHOL USE: Status: ACTIVE | Noted: 2020-10-09

## 2020-10-14 ENCOUNTER — RESULT REVIEW (OUTPATIENT)
Age: 75
End: 2020-10-14

## 2020-10-14 ENCOUNTER — APPOINTMENT (OUTPATIENT)
Dept: CARDIOTHORACIC SURGERY | Facility: CLINIC | Age: 75
End: 2020-10-14
Payer: MEDICARE

## 2020-10-14 ENCOUNTER — NON-APPOINTMENT (OUTPATIENT)
Age: 75
End: 2020-10-14

## 2020-10-14 ENCOUNTER — OUTPATIENT (OUTPATIENT)
Dept: OUTPATIENT SERVICES | Facility: HOSPITAL | Age: 75
LOS: 1 days | End: 2020-10-14
Payer: COMMERCIAL

## 2020-10-14 VITALS
RESPIRATION RATE: 16 BRPM | OXYGEN SATURATION: 95 % | TEMPERATURE: 98.6 F | HEART RATE: 88 BPM | DIASTOLIC BLOOD PRESSURE: 85 MMHG | SYSTOLIC BLOOD PRESSURE: 139 MMHG

## 2020-10-14 VITALS — WEIGHT: 154.1 LBS | HEIGHT: 70 IN

## 2020-10-14 DIAGNOSIS — Z01.818 ENCOUNTER FOR OTHER PREPROCEDURAL EXAMINATION: ICD-10-CM

## 2020-10-14 DIAGNOSIS — E11.9 TYPE 2 DIABETES MELLITUS W/OUT COMPLICATIONS: ICD-10-CM

## 2020-10-14 DIAGNOSIS — Z98.890 OTHER SPECIFIED POSTPROCEDURAL STATES: Chronic | ICD-10-CM

## 2020-10-14 DIAGNOSIS — Z29.9 ENCOUNTER FOR PROPHYLACTIC MEASURES, UNSPECIFIED: ICD-10-CM

## 2020-10-14 DIAGNOSIS — I35.0 NONRHEUMATIC AORTIC (VALVE) STENOSIS: ICD-10-CM

## 2020-10-14 DIAGNOSIS — I73.9 PERIPHERAL VASCULAR DISEASE, UNSPECIFIED: ICD-10-CM

## 2020-10-14 DIAGNOSIS — I65.23 OCCLUSION AND STENOSIS OF BILATERAL CAROTID ARTERIES: ICD-10-CM

## 2020-10-14 DIAGNOSIS — E78.5 HYPERLIPIDEMIA, UNSPECIFIED: ICD-10-CM

## 2020-10-14 DIAGNOSIS — E11.9 TYPE 2 DIABETES MELLITUS WITHOUT COMPLICATIONS: ICD-10-CM

## 2020-10-14 DIAGNOSIS — I25.10 ATHEROSCLEROTIC HEART DISEASE OF NATIVE CORONARY ARTERY W/OUT ANGINA PECTORIS: ICD-10-CM

## 2020-10-14 DIAGNOSIS — Z95.5 PRESENCE OF CORONARY ANGIOPLASTY IMPLANT AND GRAFT: Chronic | ICD-10-CM

## 2020-10-14 DIAGNOSIS — Z83.3 FAMILY HISTORY OF DIABETES MELLITUS: ICD-10-CM

## 2020-10-14 DIAGNOSIS — Z82.49 FAMILY HISTORY OF ISCHEMIC HEART DISEASE AND OTHER DISEASES OF THE CIRCULATORY SYSTEM: ICD-10-CM

## 2020-10-14 DIAGNOSIS — Z78.9 OTHER SPECIFIED HEALTH STATUS: ICD-10-CM

## 2020-10-14 DIAGNOSIS — F17.200 NICOTINE DEPENDENCE, UNSPECIFIED, UNCOMPLICATED: ICD-10-CM

## 2020-10-14 DIAGNOSIS — I10 ESSENTIAL (PRIMARY) HYPERTENSION: ICD-10-CM

## 2020-10-14 LAB
ALBUMIN SERPL ELPH-MCNC: 3.6 G/DL — SIGNIFICANT CHANGE UP (ref 3.3–5)
ALP SERPL-CCNC: 68 U/L — SIGNIFICANT CHANGE UP (ref 40–120)
ALT FLD-CCNC: 12 U/L — SIGNIFICANT CHANGE UP (ref 10–45)
ANION GAP SERPL CALC-SCNC: 12 MMOL/L — SIGNIFICANT CHANGE UP (ref 5–17)
AST SERPL-CCNC: 18 U/L — SIGNIFICANT CHANGE UP (ref 10–40)
BILIRUB SERPL-MCNC: 0.5 MG/DL — SIGNIFICANT CHANGE UP (ref 0.2–1.2)
BLD GP AB SCN SERPL QL: NEGATIVE — SIGNIFICANT CHANGE UP
BUN SERPL-MCNC: 13 MG/DL — SIGNIFICANT CHANGE UP (ref 7–23)
CALCIUM SERPL-MCNC: 9.7 MG/DL — SIGNIFICANT CHANGE UP (ref 8.4–10.5)
CHLORIDE SERPL-SCNC: 101 MMOL/L — SIGNIFICANT CHANGE UP (ref 96–108)
CO2 SERPL-SCNC: 22 MMOL/L — SIGNIFICANT CHANGE UP (ref 22–31)
CREAT SERPL-MCNC: 0.85 MG/DL — SIGNIFICANT CHANGE UP (ref 0.5–1.3)
GLUCOSE SERPL-MCNC: 100 MG/DL — HIGH (ref 70–99)
HCT VFR BLD CALC: 34.9 % — LOW (ref 39–50)
HGB BLD-MCNC: 10.5 G/DL — LOW (ref 13–17)
MCHC RBC-ENTMCNC: 27.4 PG — SIGNIFICANT CHANGE UP (ref 27–34)
MCHC RBC-ENTMCNC: 30.1 GM/DL — LOW (ref 32–36)
MCV RBC AUTO: 91.1 FL — SIGNIFICANT CHANGE UP (ref 80–100)
NRBC # BLD: 0 /100 WBCS — SIGNIFICANT CHANGE UP (ref 0–0)
NT-PROBNP SERPL-SCNC: 1253 PG/ML — HIGH (ref 0–300)
PLATELET # BLD AUTO: 259 K/UL — SIGNIFICANT CHANGE UP (ref 150–400)
POTASSIUM SERPL-MCNC: 4.3 MMOL/L — SIGNIFICANT CHANGE UP (ref 3.5–5.3)
POTASSIUM SERPL-SCNC: 4.3 MMOL/L — SIGNIFICANT CHANGE UP (ref 3.5–5.3)
PROT SERPL-MCNC: 8.2 G/DL — SIGNIFICANT CHANGE UP (ref 6–8.3)
RBC # BLD: 3.83 M/UL — LOW (ref 4.2–5.8)
RBC # FLD: 16.3 % — HIGH (ref 10.3–14.5)
RH IG SCN BLD-IMP: POSITIVE — SIGNIFICANT CHANGE UP
SARS-COV-2 RNA SPEC QL NAA+PROBE: SIGNIFICANT CHANGE UP
SODIUM SERPL-SCNC: 135 MMOL/L — SIGNIFICANT CHANGE UP (ref 135–145)
WBC # BLD: 8.11 K/UL — SIGNIFICANT CHANGE UP (ref 3.8–10.5)
WBC # FLD AUTO: 8.11 K/UL — SIGNIFICANT CHANGE UP (ref 3.8–10.5)

## 2020-10-14 PROCEDURE — 71046 X-RAY EXAM CHEST 2 VIEWS: CPT | Mod: 26

## 2020-10-14 PROCEDURE — 99204 OFFICE O/P NEW MOD 45 MIN: CPT

## 2020-10-14 RX ORDER — PANTOPRAZOLE SODIUM 20 MG/1
1 TABLET, DELAYED RELEASE ORAL
Qty: 0 | Refills: 0 | DISCHARGE

## 2020-10-14 RX ORDER — PANTOPRAZOLE 40 MG/1
40 TABLET, DELAYED RELEASE ORAL DAILY
Qty: 30 | Refills: 0 | Status: COMPLETED | COMMUNITY
Start: 2020-10-09 | End: 2020-10-14

## 2020-10-14 RX ORDER — ASPIRIN ENTERIC COATED TABLETS 81 MG 81 MG/1
81 TABLET, DELAYED RELEASE ORAL DAILY
Qty: 30 | Refills: 0 | Status: COMPLETED | COMMUNITY
Start: 2020-10-09 | End: 2020-10-14

## 2020-10-14 RX ORDER — ASPIRIN/CALCIUM CARB/MAGNESIUM 324 MG
1 TABLET ORAL
Qty: 0 | Refills: 0 | DISCHARGE

## 2020-10-14 NOTE — H&P PST ADULT - NSICDXPROBLEM_GEN_ALL_CORE_FT
PROBLEM DIAGNOSES  Problem: Aortic stenosis  Assessment and Plan: Scheduled TAVR  labs as per CTSx ordered  covid and mrsa swab completed  sent for chest xray   will c/w Plavix as prescribed    Problem: T2DM (type 2 diabetes mellitus)  Assessment and Plan: last dose of metformin 10/15 in the morning  fingerstick on admission  A1c ordered w/ PST labs     Problem: Need for prophylactic measure  Assessment and Plan: The Caprini score indicates that this patient is at high risk for a VTE event (score 6 or greater). Surgical patients in this group will benefit from both pharmacologic prophylaxis and intermittent compression devices.  The surgical team will determine the balance between VTE risk and bleeding risk, and other clinical considerations

## 2020-10-14 NOTE — CONSULT LETTER
[Dear  ___] : Dear  [unfilled], [Courtesy Letter:] : I had the pleasure of seeing your patient, [unfilled], in my office today. [Please see my note below.] : Please see my note below. [Sincerely,] : Sincerely, [FreeTextEntry2] : Dr. Ten Chavira\par [address] [facility]\[place] [address]\par [phone] [FreeTextEntry3] : Dinorah Perea MD\par Attending Surgeon\par Cardiovascular & Thoracic Surgery\par  \par [facility]

## 2020-10-14 NOTE — HISTORY OF PRESENT ILLNESS
[FreeTextEntry1] : Mr. Palmer is a 74 year old male who presents today for evaluation of aortic stenosis (TAVR evaluation). To review, he has a PMH of CAD status post stents on Plavix, Afib (AC with Coumadin), HTN, HLD, PVD with fem-fem bypass and bilateral LE stents, 55 pack year smoking history who recently presented [facility] ED with chest pain, transferred to [facility] and is s/p OM2 REJI X1. Workup revealed 80-99% stenosis to right and left ICA and TTE showed severe aortic stenosis with calcified trileaflet aortic valve with decreased opening. Peak transaortic valve gradient equals 98 mm Hg, mean transaortic valve gradient equals 59 mm Hg, estimated aortic valve area equals 0.5 sqcm (by continuity equation),\par consistent with severe aortic stenosis. Mild-moderate aortic regurgitation. Follows Dr. Ten Chavira for cardiology. \par \par He presents today with his wife and reports he has felt better since his DC from the hospital however has noted a marked decline in his ambulation since a week prior to hospitalization. He and his wife report he is now walking with a walker and or a cane and has to take breaks walking room to room. He reports he feels as if he doesn't have the strength to his legs.  He also reports inability to lay flat due to SOB. Reports occasional swelling to LE that comes and goes.  Denies chest pain, palpitations, weight gain, fever or chills. Has difficulty with basic ADL's around the house as well since his hospitalization.(NYHA III) He reports he has not smoked a cigarette since his DC.  On Coumadin followed by PCP Dr. KENNEDY and recently stopped Coumadin last Friday due to INR elevated to 7.  INR/Coumadin managed by PCP Dr. KENNEDY. \par \par PMH includes DM.  Denies liver, kidney, pulmonary disease.  Denies prior CVA, MI or GI bleed.  [Diabetes Mellitus] : Diabetes Mellitus [Dyslipidemia] : Dyslipidemia [Home Oxygen] : no home oxygen use [Unresponsive Neurologic State] : not in a unresponsive neurologic state [Liver Disease] : no liver disease [Cerebrovascular Disease] : Cerebrovascular Disease [Heart Failure within 2 Weeks] : Heart Failure in last 2 weeks [Prior Myocardial Infarction] : No prior myocardial infarction [Prior Heart Failure] : Prior Heart Failure [A fib / A flutter] : A fib / A flutter

## 2020-10-14 NOTE — H&P PST ADULT - ASSESSMENT
CAPRINI SCORE [CLOT updated 18]    AGE RELATED RISK FACTORS                                                       MOBILITY RELATED FACTORS  [ ] Age 41-60 years                                            (1 Point)                    [ ] Bed rest                                                        (1 Point)  [ ] Age: 61-74 years                                           (2 Points)                  [ ] Plaster cast                                                   (2 Points)  [ ] Age= 75 years                                              (3 Points)                    [ ] Bed bound for more than 72 hours                 (2 Points)    DISEASE RELATED RISK FACTORS                                               GENDER SPECIFIC FACTORS  [ ] Edema in the lower extremities                       (1 Point)              [ ] Pregnancy                                                     (1 Point)  [ ] Varicose veins                                               (1 Point)                     [ ] Post-partum < 6 weeks                                   (1 Point)             [ ] BMI > 25 Kg/m2                                            (1 Point)                     [ ] Hormonal therapy  or oral contraception          (1 Point)                 [ ] Sepsis (in the previous month)                        (1 Point)               [ ] History of pregnancy complications                 (1 point)  [ ] Pneumonia or serious lung disease                                               [ ] Unexplained or recurrent                     (1 Point)           (in the previous month)                               (1 Point)  [ ] Abnormal pulmonary function test                     (1 Point)                 SURGERY RELATED RISK FACTORS  [ ] Acute myocardial infarction                              (1 Point)               [ ]  Section                                             (1 Point)  [ ] Congestive heart failure (in the previous month)  (1 Point)      [ ] Minor surgery                                                  (1 Point)   [ ] Inflammatory bowel disease                             (1 Point)               [ ] Arthroscopic surgery                                        (2 Points)  [ ] Central venous access                                      (2 Points)                [ ] General surgery lasting more than 45 minutes (2 points)  [ ] Present or previous malignancy                     (2 Points)                [ ] Elective arthroplasty                                         (5 points)    [ ] Stroke (in the previous month)                          (5 Points)                                                                                                                                                           HEMATOLOGY RELATED FACTORS                                                 TRAUMA RELATED RISK FACTORS  [ ] Prior episodes of VTE                                     (3 Points)                [ ] Fracture of the hip, pelvis, or leg                       (5 Points)  [ ] Positive family history for VTE                         (3 Points)             [ ] Acute spinal cord injury (in the previous month)  (5 Points)  [ ] Prothrombin 14974 A                                     (3 Points)               [ ] Paralysis  (less than 1 month)                             (5 Points)  [ ] Factor V Leiden                                             (3 Points)                  [ ] Multiple Trauma within 1 month                        (5 Points)  [ ] Lupus anticoagulants                                     (3 Points)                                                           [ ] Anticardiolipin antibodies                               (3 Points)                                                       [ ] High homocysteine in the blood                      (3 Points)                                             [ ] Other congenital or acquired thrombophilia      (3 Points)                                                [ ] Heparin induced thrombocytopenia                  (3 Points)                                     Total Score [    6      ]

## 2020-10-14 NOTE — DATA REVIEWED
[FreeTextEntry1] : CT 9/22/2020: Severely calcified (Agatston calcium score 2005) tricuspid aortic valve.  Aortic and peripheral access vessel measurements as reported above.  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 Patent stent extending from the distal abdominal aorta to the mid right external iliac artery.  Patent femorofemoral bypass graft extending from the right common femoral artery to the left common femoral artery.  Total occlusion of the proximal left common iliac artery. Mixed calcified and noncalcified plaque within the proximal segment of the renal artery resulting in severe luminal narrowing.\par \par Cardiac Cath 9/15/2020: The coronary circulation is right dominant. LM: Normal. LAD: LAD: Angiography showed minor luminal irregularities with no flow limiting lesions. CX-- OM2: There was a diffuse 60 % stenosis. RCA:RCA: Angiography showed minor luminal irregularities with no low limiting lesions.\par \par Carotid Duplex: 9/12/2020: Severe heterogenous plaque noted within the proximal right and left internal carotid arteries, consistent with 80-99% stenoses in both proximal vessels.\par \par TTE 9/11/2020: Mitral annular calcification, otherwise normal mitral valve. Mild-moderate mitral regurgitation. Calcified trileaflet aortic valve with decreased opening. Peak transaortic valve gradient equals 98 mm Hg, mean transaortic valve gradient equals 59 mm Hg, estimated aortic valve area equals 0.5 sqcm (by continuity equation), consistent with severe aortic stenosis. Mild-moderate aortic regurgitation. Moderately dilated left atrium.  LA volume index = 43cc/m2. Increased relative wall thickness with normal left ventricular mass index, consistent with concentric left ventricular remodeling. Normal left ventricular systolic function. No segmental wall motion abnormalities. Normal right ventricular size and function. Estimated right ventricular systolic pressure equals 54mm Hg, assuming right atrial pressure equals 10 mm Hg, consistent with moderate pulmonary hypertension.  A bubble study was performed with the intravenous injection of agitated saline.  Following contrast  injection, no obvious bubbles were seen in the left heart.\par

## 2020-10-14 NOTE — H&P PST ADULT - HISTORY OF PRESENT ILLNESS
75 y/o male with a PMH of CAD s/p PCI w/ REJI on Plavix, Afib on coumadin, HTN, HLD,  PVD s/p fem-fem bypass and bilateral LE stents, 55 pack year smoking history,     present to the [facility] ED with chest pain, found to have L ICA 66% stenosis and R ICA 80% stenosis in setting of acute dysarthria. He has been transferred to [facility] for R CEA.     73 y/o male with a PMH of CAD s/p PCI w/ REJI on Plavix, Afib on coumadin, HTN, HLD, PVD s/p fem-fem bypass and bilateral LE stents, 55 pack year smoking history, with recent hospital admission for chest pain in 9/2020, found to have left ICA stenosis at 66% and right ICA stenosis at 80%, s/p right CEA.          75 y/o male with pmhx of HTN, HLD, T2DM, Afib on AC, CAD s/p PCI with REJI placement on Plavix, PVD s/p bilateral LE vascular stents, w/ recent hospital admission in 9/2020 for chest pain and dysarthria which resolved mostly likely 2/2 TIA. During work-up he was found to have severe aortic stenosis and bilateral ICA stenosis. He is s/p left CEA and cardiac cath on 9/21 with stent placement, is now being scheduled for TAVR. As per pt and wife, he is feeling very weak and having exertional dyspnea with minimal activity. He is also reporting orthopnea, denies CP, palpitations, syncope. His last dose of Coumadin was 10/9/2020. Covid swab completed at PST.          75 y/o male with pmhx of HTN, HLD, T2DM, Afib on AC, CAD s/p PCI with REJI placement on Plavix, PVD s/p bilateral LE vascular stents, w/ recent ED visit to [facility] in 9/2020 for chest pain and dysarthria which resolved, neuro consult - mostly likely 2/2 TIA. During work-up he was found to have severe aortic stenosis and bilateral ICA stenosis. He was transferred to [facility] for left CEA and cardiac cath on 9/21 with stent placement and is now being scheduled for TAVR. As per pt and wife, he is feeling very weak and having exertional dyspnea with minimal activity. He is also reporting orthopnea, denies CP, palpitations, syncope. His last dose of Coumadin was 10/9/2020. Covid swab completed at PST.

## 2020-10-14 NOTE — ASSESSMENT
[FreeTextEntry1] : Mr. Palmer is a 74 year old male PMH of DM, CAD status post stents on Plavix, Afib (AC with Coumadin), HTN, HLD, PVD with fem-fem bypass and bilateral LE stents, 55 pack year smoking history with severe symptomatic aortic stenosis.  Given the pt's PVD, he will need alternative access.\par \par \par I reviewed the cardiac imaging, medical records and reports with patient and discussed the case. I discussed the risks, benefits and alternatives to TAVR.  All questions and concerns were addressed and patient agrees to proceed with TAVR on 10/16/2020.\par \par Plan:\par 1) TAVR 10/16/2020\par 2) Continue off Coumadin for now. Continue Plavix through TAVR for recent PCI.\par 3) PST today will get INR and pro BNP

## 2020-10-14 NOTE — H&P PST ADULT - NSICDXPASTMEDICALHX_GEN_ALL_CORE_FT
PAST MEDICAL HISTORY:  Atrial fibrillation     Coronary artery disease     Hyperlipidemia     Hypertension     Peripheral vascular disease     Stented coronary artery     T2DM (type 2 diabetes mellitus)      PAST MEDICAL HISTORY:  Atrial fibrillation     Bilateral carotid artery stenosis ICA stenosis   left 66%, right 80%    Coronary artery disease     Hyperlipidemia     Hypertension     Peripheral vascular disease     Stented coronary artery     T2DM (type 2 diabetes mellitus)      PAST MEDICAL HISTORY:  Atrial fibrillation     Bilateral carotid artery stenosis ICA stenosis    Coronary artery disease     Hyperlipidemia     Hypertension     Peripheral vascular disease     Stented coronary artery     T2DM (type 2 diabetes mellitus)

## 2020-10-14 NOTE — H&P PST ADULT - NSICDXPASTSURGICALHX_GEN_ALL_CORE_FT
PAST SURGICAL HISTORY:  History of coronary artery stent placement two coronary stents    Status post angiography of extremity 1 stent in each leg     PAST SURGICAL HISTORY:  History of coronary artery stent placement two coronary stents    S/P carotid endarterectomy left    9/2020    Status post angiography of extremity 1 stent in each leg     PAST SURGICAL HISTORY:  History of coronary artery stent placement two coronary stents; most recent 9/21/2020    S/P carotid endarterectomy left    9/2020    Status post angiography of extremity 1 stent in each leg

## 2020-10-14 NOTE — PHYSICAL EXAM
[General Appearance - Alert] : alert [General Appearance - In No Acute Distress] : in no acute distress [Sclera] : the sclera and conjunctiva were normal [Outer Ear] : the ears and nose were normal in appearance [Neck Appearance] : the appearance of the neck was normal [Jugular Venous Distention Increased] : there was no jugular-venous distention [] : no respiratory distress [Respiration, Rhythm And Depth] : normal respiratory rhythm and effort [Exaggerated Use Of Accessory Muscles For Inspiration] : no accessory muscle use [Auscultation Breath Sounds / Voice Sounds] : lungs were clear to auscultation bilaterally [Apical Impulse] : the apical impulse was normal [Heart Sounds] : normal S1 and S2 [Examination Of The Chest] : the chest was normal in appearance [Bowel Sounds] : normal bowel sounds [Abdomen Soft] : soft [Abdomen Tenderness] : non-tender [No CVA Tenderness] : no ~M costovertebral angle tenderness [Involuntary Movements] : no involuntary movements were seen [Skin Color & Pigmentation] : normal skin color and pigmentation [No Focal Deficits] : no focal deficits [Oriented To Time, Place, And Person] : oriented to person, place, and time [Impaired Insight] : insight and judgment were intact [Affect] : the affect was normal [Mood] : the mood was normal [FreeTextEntry1] : Deferred

## 2020-10-15 ENCOUNTER — APPOINTMENT (OUTPATIENT)
Dept: VASCULAR SURGERY | Facility: CLINIC | Age: 75
End: 2020-10-15
Payer: MEDICARE

## 2020-10-15 VITALS
SYSTOLIC BLOOD PRESSURE: 153 MMHG | BODY MASS INDEX: 21.56 KG/M2 | DIASTOLIC BLOOD PRESSURE: 83 MMHG | WEIGHT: 154 LBS | HEIGHT: 71 IN | HEART RATE: 95 BPM

## 2020-10-15 VITALS — TEMPERATURE: 98.7 F

## 2020-10-15 LAB
A1C WITH ESTIMATED AVERAGE GLUCOSE RESULT: 5.7 % — HIGH (ref 4–5.6)
APTT BLD: 37.5 SEC — HIGH (ref 27.5–35.5)
ESTIMATED AVERAGE GLUCOSE: 117 MG/DL — HIGH (ref 68–114)
INR BLD: 1.36 RATIO — HIGH (ref 0.88–1.16)
MRSA PCR RESULT.: SIGNIFICANT CHANGE UP
PROTHROM AB SERPL-ACNC: 15.9 SEC — HIGH (ref 10.6–13.6)
S AUREUS DNA NOSE QL NAA+PROBE: SIGNIFICANT CHANGE UP

## 2020-10-15 PROCEDURE — 93880 EXTRACRANIAL BILAT STUDY: CPT

## 2020-10-15 PROCEDURE — 93926 LOWER EXTREMITY STUDY: CPT

## 2020-10-15 PROCEDURE — 99024 POSTOP FOLLOW-UP VISIT: CPT

## 2020-10-15 NOTE — REASON FOR VISIT
[de-identified] : Left carotid endarterectomy [de-identified] : 74-year-old gentleman status post left carotid endarterectomy which was done urgently for symptomatic left carotid stenosis.  Surgery was conducted under left cervical block.  At that admission patient was also found to have a high-grade right internal carotid artery stenosis and aortic stenosis which will require TAVR.  Patient returns for his first postoperative visit.  He has no complaints referable to his neck.

## 2020-10-15 NOTE — PHYSICAL EXAM
[Normal Breath Sounds] : Normal breath sounds [2+] : left 2+ [No Rash or Lesion] : No rash or lesion [de-identified] : Appears well [de-identified] : Well-healed scar

## 2020-10-15 NOTE — DISCUSSION/SUMMARY
[FreeTextEntry1] : Patient underwent a carotid duplex study which shows a patent left endarterectomy site.  There is an 80 to 99% right internal carotid artery stenosis.  In addition, patient has a history of a femoral-femoral bypass and he underwent an arterial duplex study today which showed no evidence of significant stenosis.  At this time there is no vascular contraindication for proposed TAVR.  Patient will require right carotid endarterectomy several weeks after TAVR.  He should remain on antiplatelet agent.

## 2020-10-16 PROBLEM — I65.23 OCCLUSION AND STENOSIS OF BILATERAL CAROTID ARTERIES: Chronic | Status: ACTIVE | Noted: 2020-10-14

## 2020-10-16 PROBLEM — E11.9 TYPE 2 DIABETES MELLITUS WITHOUT COMPLICATIONS: Chronic | Status: ACTIVE | Noted: 2020-10-14

## 2020-10-20 ENCOUNTER — APPOINTMENT (OUTPATIENT)
Dept: DISASTER EMERGENCY | Facility: CLINIC | Age: 75
End: 2020-10-20

## 2020-10-21 LAB — SARS-COV-2 N GENE NPH QL NAA+PROBE: NOT DETECTED

## 2020-10-22 ENCOUNTER — APPOINTMENT (OUTPATIENT)
Dept: CARDIOTHORACIC SURGERY | Facility: HOSPITAL | Age: 75
End: 2020-10-22

## 2020-10-22 ENCOUNTER — INPATIENT (INPATIENT)
Facility: HOSPITAL | Age: 75
LOS: 4 days | Discharge: HOME HEALTH | DRG: 266 | End: 2020-10-27
Attending: STUDENT IN AN ORGANIZED HEALTH CARE EDUCATION/TRAINING PROGRAM | Admitting: STUDENT IN AN ORGANIZED HEALTH CARE EDUCATION/TRAINING PROGRAM
Payer: COMMERCIAL

## 2020-10-22 VITALS
SYSTOLIC BLOOD PRESSURE: 141 MMHG | TEMPERATURE: 98 F | HEART RATE: 87 BPM | RESPIRATION RATE: 18 BRPM | OXYGEN SATURATION: 97 % | WEIGHT: 136.47 LBS | DIASTOLIC BLOOD PRESSURE: 64 MMHG | HEIGHT: 70 IN

## 2020-10-22 DIAGNOSIS — Z95.5 PRESENCE OF CORONARY ANGIOPLASTY IMPLANT AND GRAFT: Chronic | ICD-10-CM

## 2020-10-22 DIAGNOSIS — Z98.890 OTHER SPECIFIED POSTPROCEDURAL STATES: Chronic | ICD-10-CM

## 2020-10-22 DIAGNOSIS — I35.0 NONRHEUMATIC AORTIC (VALVE) STENOSIS: ICD-10-CM

## 2020-10-22 LAB
ALBUMIN SERPL ELPH-MCNC: 3.3 G/DL — SIGNIFICANT CHANGE UP (ref 3.3–5)
ALP SERPL-CCNC: 53 U/L — SIGNIFICANT CHANGE UP (ref 40–120)
ALT FLD-CCNC: 14 U/L — SIGNIFICANT CHANGE UP (ref 10–45)
ANION GAP SERPL CALC-SCNC: 12 MMOL/L — SIGNIFICANT CHANGE UP (ref 5–17)
APTT BLD: 30.6 SEC — SIGNIFICANT CHANGE UP (ref 27.5–35.5)
APTT BLD: 34.3 SEC — SIGNIFICANT CHANGE UP (ref 27.5–35.5)
AST SERPL-CCNC: 25 U/L — SIGNIFICANT CHANGE UP (ref 10–40)
BASOPHILS # BLD AUTO: 0.06 K/UL — SIGNIFICANT CHANGE UP (ref 0–0.2)
BASOPHILS NFR BLD AUTO: 0.5 % — SIGNIFICANT CHANGE UP (ref 0–2)
BILIRUB SERPL-MCNC: 0.4 MG/DL — SIGNIFICANT CHANGE UP (ref 0.2–1.2)
BUN SERPL-MCNC: 18 MG/DL — SIGNIFICANT CHANGE UP (ref 7–23)
CALCIUM SERPL-MCNC: 8.6 MG/DL — SIGNIFICANT CHANGE UP (ref 8.4–10.5)
CHLORIDE SERPL-SCNC: 95 MMOL/L — LOW (ref 96–108)
CK MB BLD-MCNC: 8.4 % — HIGH (ref 0–3.5)
CK MB CFR SERPL CALC: 7.7 NG/ML — HIGH (ref 0–6.7)
CK SERPL-CCNC: 92 U/L — SIGNIFICANT CHANGE UP (ref 30–200)
CO2 SERPL-SCNC: 25 MMOL/L — SIGNIFICANT CHANGE UP (ref 22–31)
CREAT SERPL-MCNC: 0.83 MG/DL — SIGNIFICANT CHANGE UP (ref 0.5–1.3)
EOSINOPHIL # BLD AUTO: 0.08 K/UL — SIGNIFICANT CHANGE UP (ref 0–0.5)
EOSINOPHIL NFR BLD AUTO: 0.7 % — SIGNIFICANT CHANGE UP (ref 0–6)
FIBRINOGEN PPP-MCNC: 480 MG/DL — SIGNIFICANT CHANGE UP (ref 290–520)
GAS PNL BLDA: SIGNIFICANT CHANGE UP
GLUCOSE BLDC GLUCOMTR-MCNC: 100 MG/DL — HIGH (ref 70–99)
GLUCOSE SERPL-MCNC: 201 MG/DL — HIGH (ref 70–99)
HCT VFR BLD CALC: 28.9 % — LOW (ref 39–50)
HGB BLD-MCNC: 8.9 G/DL — LOW (ref 13–17)
IMM GRANULOCYTES NFR BLD AUTO: 1 % — SIGNIFICANT CHANGE UP (ref 0–1.5)
INR BLD: 1.05 RATIO — SIGNIFICANT CHANGE UP (ref 0.88–1.16)
LYMPHOCYTES # BLD AUTO: 1.68 K/UL — SIGNIFICANT CHANGE UP (ref 1–3.3)
LYMPHOCYTES # BLD AUTO: 14.5 % — SIGNIFICANT CHANGE UP (ref 13–44)
MAGNESIUM SERPL-MCNC: 1.5 MG/DL — LOW (ref 1.6–2.6)
MCHC RBC-ENTMCNC: 27.4 PG — SIGNIFICANT CHANGE UP (ref 27–34)
MCHC RBC-ENTMCNC: 30.8 GM/DL — LOW (ref 32–36)
MCV RBC AUTO: 88.9 FL — SIGNIFICANT CHANGE UP (ref 80–100)
MONOCYTES # BLD AUTO: 1.14 K/UL — HIGH (ref 0–0.9)
MONOCYTES NFR BLD AUTO: 9.9 % — SIGNIFICANT CHANGE UP (ref 2–14)
NEUTROPHILS # BLD AUTO: 8.48 K/UL — HIGH (ref 1.8–7.4)
NEUTROPHILS NFR BLD AUTO: 73.4 % — SIGNIFICANT CHANGE UP (ref 43–77)
NRBC # BLD: 0 /100 WBCS — SIGNIFICANT CHANGE UP (ref 0–0)
PHOSPHATE SERPL-MCNC: 3.9 MG/DL — SIGNIFICANT CHANGE UP (ref 2.5–4.5)
PLATELET # BLD AUTO: 207 K/UL — SIGNIFICANT CHANGE UP (ref 150–400)
POTASSIUM SERPL-MCNC: 3.5 MMOL/L — SIGNIFICANT CHANGE UP (ref 3.5–5.3)
POTASSIUM SERPL-SCNC: 3.5 MMOL/L — SIGNIFICANT CHANGE UP (ref 3.5–5.3)
PROT SERPL-MCNC: 6.5 G/DL — SIGNIFICANT CHANGE UP (ref 6–8.3)
PROTHROM AB SERPL-ACNC: 12.6 SEC — SIGNIFICANT CHANGE UP (ref 10.6–13.6)
RBC # BLD: 3.25 M/UL — LOW (ref 4.2–5.8)
RBC # FLD: 16.3 % — HIGH (ref 10.3–14.5)
SODIUM SERPL-SCNC: 132 MMOL/L — LOW (ref 135–145)
TROPONIN T, HIGH SENSITIVITY RESULT: 186 NG/L — HIGH (ref 0–51)
WBC # BLD: 11.55 K/UL — HIGH (ref 3.8–10.5)
WBC # FLD AUTO: 11.55 K/UL — HIGH (ref 3.8–10.5)

## 2020-10-22 PROCEDURE — G0463: CPT

## 2020-10-22 PROCEDURE — 33365 REPLACE AORTIC VALVE OPEN: CPT | Mod: 62,Q0

## 2020-10-22 PROCEDURE — 93355 ECHO TRANSESOPHAGEAL (TEE): CPT

## 2020-10-22 PROCEDURE — 83036 HEMOGLOBIN GLYCOSYLATED A1C: CPT

## 2020-10-22 PROCEDURE — 33365 REPLACE AORTIC VALVE OPEN: CPT | Mod: Q0,62

## 2020-10-22 PROCEDURE — 71046 X-RAY EXAM CHEST 2 VIEWS: CPT

## 2020-10-22 PROCEDURE — 87641 MR-STAPH DNA AMP PROBE: CPT

## 2020-10-22 PROCEDURE — 87640 STAPH A DNA AMP PROBE: CPT

## 2020-10-22 PROCEDURE — 86901 BLOOD TYPING SEROLOGIC RH(D): CPT

## 2020-10-22 PROCEDURE — 85610 PROTHROMBIN TIME: CPT

## 2020-10-22 PROCEDURE — 86850 RBC ANTIBODY SCREEN: CPT

## 2020-10-22 PROCEDURE — 85730 THROMBOPLASTIN TIME PARTIAL: CPT

## 2020-10-22 PROCEDURE — 99233 SBSQ HOSP IP/OBS HIGH 50: CPT

## 2020-10-22 PROCEDURE — 83880 ASSAY OF NATRIURETIC PEPTIDE: CPT

## 2020-10-22 PROCEDURE — 86900 BLOOD TYPING SEROLOGIC ABO: CPT

## 2020-10-22 PROCEDURE — 80053 COMPREHEN METABOLIC PANEL: CPT

## 2020-10-22 PROCEDURE — U0003: CPT

## 2020-10-22 PROCEDURE — 85027 COMPLETE CBC AUTOMATED: CPT

## 2020-10-22 RX ORDER — WARFARIN SODIUM 2.5 MG/1
5 TABLET ORAL ONCE
Refills: 0 | Status: COMPLETED | OUTPATIENT
Start: 2020-10-22 | End: 2020-10-22

## 2020-10-22 RX ORDER — MAGNESIUM SULFATE 500 MG/ML
1 VIAL (ML) INJECTION ONCE
Refills: 0 | Status: COMPLETED | OUTPATIENT
Start: 2020-10-22 | End: 2020-10-22

## 2020-10-22 RX ORDER — SODIUM CHLORIDE 9 MG/ML
3 INJECTION INTRAMUSCULAR; INTRAVENOUS; SUBCUTANEOUS EVERY 8 HOURS
Refills: 0 | Status: DISCONTINUED | OUTPATIENT
Start: 2020-10-22 | End: 2020-10-22

## 2020-10-22 RX ORDER — HYDROMORPHONE HYDROCHLORIDE 2 MG/ML
0.5 INJECTION INTRAMUSCULAR; INTRAVENOUS; SUBCUTANEOUS ONCE
Refills: 0 | Status: DISCONTINUED | OUTPATIENT
Start: 2020-10-22 | End: 2020-10-22

## 2020-10-22 RX ORDER — PANTOPRAZOLE SODIUM 20 MG/1
40 TABLET, DELAYED RELEASE ORAL DAILY
Refills: 0 | Status: DISCONTINUED | OUTPATIENT
Start: 2020-10-22 | End: 2020-10-22

## 2020-10-22 RX ORDER — CHLORHEXIDINE GLUCONATE 213 G/1000ML
1 SOLUTION TOPICAL
Refills: 0 | Status: DISCONTINUED | OUTPATIENT
Start: 2020-10-22 | End: 2020-10-23

## 2020-10-22 RX ORDER — POLYETHYLENE GLYCOL 3350 17 G/17G
17 POWDER, FOR SOLUTION ORAL DAILY
Refills: 0 | Status: DISCONTINUED | OUTPATIENT
Start: 2020-10-22 | End: 2020-10-27

## 2020-10-22 RX ORDER — ASPIRIN/CALCIUM CARB/MAGNESIUM 324 MG
81 TABLET ORAL DAILY
Refills: 0 | Status: DISCONTINUED | OUTPATIENT
Start: 2020-10-22 | End: 2020-10-26

## 2020-10-22 RX ORDER — SODIUM CHLORIDE 9 MG/ML
1000 INJECTION INTRAMUSCULAR; INTRAVENOUS; SUBCUTANEOUS
Refills: 0 | Status: DISCONTINUED | OUTPATIENT
Start: 2020-10-22 | End: 2020-10-27

## 2020-10-22 RX ORDER — ATORVASTATIN CALCIUM 80 MG/1
80 TABLET, FILM COATED ORAL AT BEDTIME
Refills: 0 | Status: DISCONTINUED | OUTPATIENT
Start: 2020-10-22 | End: 2020-10-27

## 2020-10-22 RX ORDER — SODIUM CHLORIDE 9 MG/ML
1000 INJECTION, SOLUTION INTRAVENOUS
Refills: 0 | Status: DISCONTINUED | OUTPATIENT
Start: 2020-10-22 | End: 2020-10-27

## 2020-10-22 RX ORDER — DEXTROSE 50 % IN WATER 50 %
15 SYRINGE (ML) INTRAVENOUS ONCE
Refills: 0 | Status: DISCONTINUED | OUTPATIENT
Start: 2020-10-22 | End: 2020-10-27

## 2020-10-22 RX ORDER — INFLUENZA VIRUS VACCINE 15; 15; 15; 15 UG/.5ML; UG/.5ML; UG/.5ML; UG/.5ML
0.5 SUSPENSION INTRAMUSCULAR ONCE
Refills: 0 | Status: DISCONTINUED | OUTPATIENT
Start: 2020-10-22 | End: 2020-10-27

## 2020-10-22 RX ORDER — LIDOCAINE HCL 20 MG/ML
0.2 VIAL (ML) INJECTION ONCE
Refills: 0 | Status: DISCONTINUED | OUTPATIENT
Start: 2020-10-22 | End: 2020-10-22

## 2020-10-22 RX ORDER — POTASSIUM CHLORIDE 20 MEQ
40 PACKET (EA) ORAL ONCE
Refills: 0 | Status: COMPLETED | OUTPATIENT
Start: 2020-10-22 | End: 2020-10-22

## 2020-10-22 RX ORDER — INSULIN HUMAN 100 [IU]/ML
3 INJECTION, SOLUTION SUBCUTANEOUS
Qty: 100 | Refills: 0 | Status: DISCONTINUED | OUTPATIENT
Start: 2020-10-22 | End: 2020-10-22

## 2020-10-22 RX ORDER — CEFUROXIME AXETIL 250 MG
1500 TABLET ORAL EVERY 8 HOURS
Refills: 0 | Status: COMPLETED | OUTPATIENT
Start: 2020-10-22 | End: 2020-10-23

## 2020-10-22 RX ORDER — PANTOPRAZOLE SODIUM 20 MG/1
40 TABLET, DELAYED RELEASE ORAL
Refills: 0 | Status: DISCONTINUED | OUTPATIENT
Start: 2020-10-22 | End: 2020-10-27

## 2020-10-22 RX ORDER — CLOPIDOGREL BISULFATE 75 MG/1
75 TABLET, FILM COATED ORAL DAILY
Refills: 0 | Status: DISCONTINUED | OUTPATIENT
Start: 2020-10-22 | End: 2020-10-27

## 2020-10-22 RX ORDER — DEXTROSE 50 % IN WATER 50 %
12.5 SYRINGE (ML) INTRAVENOUS ONCE
Refills: 0 | Status: DISCONTINUED | OUTPATIENT
Start: 2020-10-22 | End: 2020-10-27

## 2020-10-22 RX ORDER — CEFUROXIME AXETIL 250 MG
1500 TABLET ORAL ONCE
Refills: 0 | Status: COMPLETED | OUTPATIENT
Start: 2020-10-22 | End: 2020-10-22

## 2020-10-22 RX ORDER — GLUCAGON INJECTION, SOLUTION 0.5 MG/.1ML
1 INJECTION, SOLUTION SUBCUTANEOUS ONCE
Refills: 0 | Status: DISCONTINUED | OUTPATIENT
Start: 2020-10-22 | End: 2020-10-27

## 2020-10-22 RX ORDER — INSULIN LISPRO 100/ML
VIAL (ML) SUBCUTANEOUS
Refills: 0 | Status: DISCONTINUED | OUTPATIENT
Start: 2020-10-22 | End: 2020-10-27

## 2020-10-22 RX ORDER — CHLORHEXIDINE GLUCONATE 213 G/1000ML
1 SOLUTION TOPICAL ONCE
Refills: 0 | Status: DISCONTINUED | OUTPATIENT
Start: 2020-10-22 | End: 2020-10-22

## 2020-10-22 RX ADMIN — HYDROMORPHONE HYDROCHLORIDE 0.5 MILLIGRAM(S): 2 INJECTION INTRAMUSCULAR; INTRAVENOUS; SUBCUTANEOUS at 17:35

## 2020-10-22 RX ADMIN — HYDROMORPHONE HYDROCHLORIDE 0.5 MILLIGRAM(S): 2 INJECTION INTRAMUSCULAR; INTRAVENOUS; SUBCUTANEOUS at 17:22

## 2020-10-22 RX ADMIN — Medication 40 MILLIEQUIVALENT(S): at 14:59

## 2020-10-22 RX ADMIN — Medication 2: at 17:15

## 2020-10-22 RX ADMIN — Medication 100 GRAM(S): at 13:07

## 2020-10-22 RX ADMIN — WARFARIN SODIUM 5 MILLIGRAM(S): 2.5 TABLET ORAL at 22:05

## 2020-10-22 RX ADMIN — ATORVASTATIN CALCIUM 80 MILLIGRAM(S): 80 TABLET, FILM COATED ORAL at 22:05

## 2020-10-22 RX ADMIN — Medication 100 MILLIGRAM(S): at 23:59

## 2020-10-22 RX ADMIN — Medication 81 MILLIGRAM(S): at 17:15

## 2020-10-22 RX ADMIN — HYDROMORPHONE HYDROCHLORIDE 0.5 MILLIGRAM(S): 2 INJECTION INTRAMUSCULAR; INTRAVENOUS; SUBCUTANEOUS at 13:45

## 2020-10-22 RX ADMIN — HYDROMORPHONE HYDROCHLORIDE 0.5 MILLIGRAM(S): 2 INJECTION INTRAMUSCULAR; INTRAVENOUS; SUBCUTANEOUS at 13:27

## 2020-10-22 RX ADMIN — CLOPIDOGREL BISULFATE 75 MILLIGRAM(S): 75 TABLET, FILM COATED ORAL at 17:15

## 2020-10-22 RX ADMIN — PANTOPRAZOLE SODIUM 40 MILLIGRAM(S): 20 TABLET, DELAYED RELEASE ORAL at 12:38

## 2020-10-22 RX ADMIN — Medication 100 MILLIGRAM(S): at 15:03

## 2020-10-22 RX ADMIN — Medication 100 MILLIGRAM(S): at 12:28

## 2020-10-22 NOTE — PROGRESS NOTE ADULT - SUBJECTIVE AND OBJECTIVE BOX
This patient has been implanted with a Transcatheter Aortic Valve Implant under the following NCT/JANINA:    TAVR:    Commercial Implant NCT [id number], JANINA N/A and will be placed into the TVT Registry.    DAPHNE Herr  [id number]

## 2020-10-22 NOTE — BRIEF OPERATIVE NOTE - NSICDXBRIEFPROCEDURE_GEN_ALL_CORE_FT
PROCEDURES:  Replacement, aortic valve, direct, transcatheter, in cardiac catheterization laboratory 22-Oct-2020 11:53:19  Donovan Arriaza

## 2020-10-22 NOTE — PRE-ANESTHESIA EVALUATION ADULT - NSANTHSUBSTSD_GEN_ALL_CORE
I will START or STAY ON the medications listed below when I get home from the hospital:    morphine 20 mg/mL oral concentrate  -- 0.1 milliliter(s) by mouth every 3 hours, As needed, Moderate Pain (4 - 6)  -- Indication: For Hospive    aspirin 81 mg oral tablet, chewable  -- 1 tab(s) by mouth once a day  -- Indication: For Hospice    morphine  -- 2 milligram(s) intravenous every 4 hours, As Needed  -- Indication: For Hospice    morphine 1 mg/mL injectable solution  -- 2 milligram(s) intravenously every 6 hours MDD:4  -- Caution federal law prohibits the transfer of this drug to any person other  than the person for whom it was prescribed.  May cause drowsiness.  Alcohol may intensify this effect.  Use care when operating dangerous machinery.  This prescription cannot be refilled.    -- Indication: For [facility]    dilTIAZem 30 mg oral tablet  -- 1 tab(s) by mouth every 8 hours  -- Indication: For Hospice    QUEtiapine 25 mg oral tablet  -- 1 tab(s) by mouth once a day (at bedtime)  -- Indication: For Hospice    ipratropium-albuterol 0.5 mg-2.5 mg/3 mLinhalation solution  -- 3 milliliter(s) inhaled every 6 hours, As needed, Shortness of Breath  -- Indication: For Hospice    Synthroid 25 mcg (0.025 mg) oral tablet  -- 0.5 tab(s) by mouth once a day  -- Indication: For Hospice
No

## 2020-10-22 NOTE — PATIENT PROFILE ADULT - FLU SEASON?
Subjective   Walter Fish is a 52 y.o. male.     History of Present Illness he is here today for follow-up after gallbladder surgery as well as hypertension and hypercholesterolemia.     he underwent laparoscopic cholecystectomy 2 weeks ago for acute cholecystitis.  He has done well since that time.  His appetite has returned and he has had no further abdominal pain.  His bowel habit is normal.  Home blood pressure readings are 120s over low 80s.  His review of systems is negative.        Review of Systems   Constitutional: Positive for activity change and appetite change. Negative for fatigue and fever.   HENT: Negative for trouble swallowing.    Respiratory: Negative for cough, chest tightness, shortness of breath and wheezing.    Cardiovascular: Negative for chest pain, palpitations and leg swelling.   Gastrointestinal: Negative for abdominal pain, anal bleeding, blood in stool, constipation, diarrhea, nausea and vomiting.   Genitourinary: Negative for difficulty urinating, dysuria, flank pain, frequency and hematuria.   Musculoskeletal: Negative for arthralgias and back pain.   Neurological: Negative for dizziness, syncope, speech difficulty, weakness, numbness and headaches.       Objective   Physical Exam   Constitutional: He is oriented to person, place, and time. He appears well-developed and well-nourished. He is active. He does not appear ill.   Eyes: Conjunctivae are normal.   Fundoscopic exam:       The right eye shows no AV nicking, no exudate and no hemorrhage.        The left eye shows no AV nicking, no exudate and no hemorrhage.   Neck: Carotid bruit is not present. No thyroid mass and no thyromegaly present.   Cardiovascular: Normal rate, regular rhythm, S1 normal and S2 normal.  Exam reveals no S3 and no S4.    No murmur heard.  Pulmonary/Chest: No tachypnea. No respiratory distress. He has no decreased breath sounds. He has no wheezes. He has no rhonchi. He has no rales.   Abdominal: Soft.  Normal appearance and bowel sounds are normal. He exhibits no abdominal bruit and no mass. There is no hepatosplenomegaly. There is no tenderness.       Vascular Status -  His exam exhibits no right foot edema. His exam exhibits no left foot edema.  Neurological: He is alert and oriented to person, place, and time. Gait normal.   Reflex Scores:       Bicep reflexes are 2+ on the right side.  Psychiatric: He has a normal mood and affect. His speech is normal and behavior is normal. Judgment and thought content normal. Cognition and memory are normal.       Assessment/Plan  assessment #1 status post cholecystectomy-he is doing very well postoperatively #2 hypertension-good out of office readings #3 hypercholesterolemia-treated successfully with diet and no sign of target organ injury    Recent CBC, CMP, lipase were normal.    Plan #1 no change medication #2 lipids today.  Routine follow-up with me in 6 months.              Yes...

## 2020-10-22 NOTE — PROGRESS NOTE ADULT - SUBJECTIVE AND OBJECTIVE BOX
PAIGE BYRNES  MRN-[id number]  Patient is a 74y old  Male who presents with a chief complaint of Asymptomatic, high-grade carotid stenosis.     HPI: 74 year old Male with a past medical history of Bilateral Carotid Artery Stenosis, ICA Stenosis, HLD, HTN, CAD s/p two coronary artery stents (most recent 9/2020), Carotid endarterectomy (9/2020), Angiography of extremity s/p B/L LE stents, Atrial Fibrillation, PVD, DM type 2. Admitted to CTU for Severe Aortic Stenosis requiring TAVR on 10/22/2020.    Surgery/Hospital course:  10/22 Transcatheter Aortic Valve Replacement    Today/Overnight:  Patient is recovering after a Transcatheter Aortic Valve replacement today.     Vital Signs Last 24 Hrs  T(C): 36.1 (22 Oct 2020 16:00), Max: 36.6 (22 Oct 2020 06:34)  T(F): 97 (22 Oct 2020 16:00), Max: 97.9 (22 Oct 2020 06:34)  HR: 86 (22 Oct 2020 18:00) (86 - 98)  BP: 145/68 (22 Oct 2020 17:00) (102/68 - 145/68)  BP(mean): 98 (22 Oct 2020 17:00) (81 - 98)  RR: 13 (22 Oct 2020 18:00) (13 - 18)  SpO2: 100% (22 Oct 2020 18:00) (97% - 100%)    Physical Exam:  Gen: Awake, alert   CNS: nonfocal  Neck: no JVD   Res: clear, no wheezing  CVS: Irregularly Irregular rhythm, normal S1/S2, Mediastinal chest tube x1  Abd: soft, non-distended, bowel sounds present   Skin: no rash, Sternal dressing clean, dry and intact.   Ext: no edema     ============================I/O===========================  I&O's Summary    22 Oct 2020 07:01  -  22 Oct 2020 18:05  --------------------------------------------------------  IN: 260 mL / OUT: 400 mL / NET: -140 mL    ============================ LABS =========================                        8.9    11.55 )-----------( 207      ( 22 Oct 2020 12:06 )             28.9     10-22    132<L>  |  95<L>  |  18  ----------------------------<  201<H>  3.5   |  25  |  0.83    Ca    8.6      22 Oct 2020 12:08  Phos  3.9     10-22  Mg     1.5     10-22    TPro  6.5  /  Alb  3.3  /  TBili  0.4  /  DBili  x   /  AST  25  /  ALT  14  /  AlkPhos  53  10-22    LIVER FUNCTIONS - ( 22 Oct 2020 12:08 )  Alb: 3.3 g/dL / Pro: 6.5 g/dL / ALK PHOS: 53 U/L / ALT: 14 U/L / AST: 25 U/L / GGT: x           PT/INR - ( 22 Oct 2020 06:46 )   PT: 12.6 sec;   INR: 1.05 ratio         PTT - ( 22 Oct 2020 12:06 )  PTT:30.6 sec  ABG - ( 22 Oct 2020 17:10 )  pH, Arterial: 7.37  pH, Blood: x     /  pCO2: 46    /  pO2: 119   / HCO3: 26    / Base Excess: 1.0   /  SaO2: 99            ======================Micro/Rad/Cardio=================  Culture: Reviewed   CXR: Reviewed  Echo: Reviewed  ======================================================  PAST MEDICAL & SURGICAL HISTORY:  Bilateral carotid artery stenosis  ICA stenosis    Stented coronary artery    T2DM (type 2 diabetes mellitus)    Peripheral vascular disease    Atrial fibrillation    Hypertension    Hyperlipidemia    Coronary artery disease    S/P carotid endarterectomy  left    9/2020    Status post angiography of extremity  1 stent in each leg    History of coronary artery stent placement  two coronary stents; most recent 9/21/2020      ========================ASSESSMENT ================  S/P Recent Carotid Endarterectomy (9/2020)    Severe Aortic Stenosis s/p Transcatheter Aortic Replacement  Atrial Fibrillation   Hypertension  Type 2 Diabetes Mellitus   Hyperlipidemia  Coronary Artery Disease  Current Daily Smoker    Plan:  ====================== NEUROLOGY=====================  Nonfocal, continue to monitor neuro status per ICU protocol.     ==================== RESPIRATORY======================  Respiratory status required supplemental oxygen via 4L nasal cannula, close monitoring of breathing pattern and respiratory rate & the following of continuous pulse oximetry for support & to prevent decompensation.  Encourage incentive spirometry.     ====================CARDIOVASCULAR==================  Patient with Severe Aortic Stenosis recovering s/p Transcatheter Aortic Valve Replacement on 10/22/2020. Invasive hemodynamic monitoring with a PA catheter & a Left radial A-line were required for the following of serial CI's/MV02's and continuous central venous, pulmonary artery pressure and MAP/BP monitoring to ensure adequate cardiovascular support. Aspirin and High dose Lipitor to be continued for coronary artery disease in addition to Plavix. Monitor Mediastinal chest tube output.     aspirin  chewable 81 milliGRAM(s) Oral daily  clopidogrel Tablet 75 milliGRAM(s) Oral daily  atorvastatin 80 milliGRAM(s) Oral at bedtime    ===================HEMATOLOGIC/ONC ===================  Monitor hemoglobin and hematocrit levels  Coumadin dosed for today for INR of 1.05, continue monitoring INR daily. Goal INR 2-3.     warfarin 5 milliGRAM(s) Oral once    ===================== RENAL =========================  Optimize renal perfusion with adequate volume resuscitation and continued monitoring of urine output, fluid balance, BUN/Creatinine.     ==================== GASTROINTESTINAL===================  Tolerating Consistent Carbohydrate Diet. Protonix for stress ulcer prophylaxis. Continue bowel regimen with Miralax.     pantoprazole  Injectable 40 milliGRAM(s) IV Push daily  polyethylene glycol 3350 17 Gram(s) Oral daily  sodium chloride 0.9%. 1000 milliLiter(s) (10 mL/Hr) IV Continuous <Continuous>    =======================    ENDOCRINE  =====================  History of Type 2 Diabetes Mellitus, requiring glucose control with insulin gtt, titrate as per insulin drip protocol along with hourly glucose checks as well as Admelog sliding scale insulin premeal and qhs.     glucagon  Injectable 1 milliGRAM(s) IntraMuscular once PRN Glucose <70 milliGRAM(s)/deciLiter  insulin lispro (ADMELOG) corrective regimen sliding scale   SubCutaneous Before meals and at bedtime  insulin regular Infusion 3 Unit(s)/Hr (3 mL/Hr) IV Continuous <Continuous>    ========================INFECTIOUS DISEASE================  Afebrile, WBC within normal limits.   Continue Perioperative prophylactic antibiotic coverage with Cefuroxime    cefuroxime  IVPB 1500 milliGRAM(s) IV Intermittent every 8 hours      Patient requires continuous monitoring with bedside rhythm monitoring, pulse oximetry monitoring, and continuous central venous and arterial pressure monitoring; and intermittent blood gas analysis.  Care plan discussed with ICU care team.    Patient remained critical, at risk for life threatening decompensation.   I have spent 35 minutes providing acute care with multiple re-evaluations throughout the evening.     By signing my name below, I, Berkley Seals, attest that this documentation has been prepared under the direction and in the presence of Margaret Mukherjee MD   Electronically signed: Stuart Hernandez, 10-22-20 @ 18:01    I, Margaret Mukherjee MD, personally performed the services described in this documentation. All medical record entries made by the yunibe were at my direction and in my presence. I have reviewed the chart and agree that the record reflects my personal performance and is accurate and complete  Electronically signed: Margaret Mukherjee MD  10-22-20 @ 18:01       PAIGE BYRNES  MRN-[id number]  Patient is a 74y old  Male who presents with a chief complaint of Asymptomatic, high-grade carotid stenosis.     HPI: 74 year old Male with a past medical history of Bilateral Carotid Artery Stenosis, ICA Stenosis, HLD, HTN, CAD s/p two coronary artery stents (most recent 9/2020), Carotid endarterectomy (9/2020), Angiography of extremity s/p B/L LE stents, Atrial Fibrillation, PVD, DM type 2. Admitted to CTU for Severe Aortic Stenosis requiring TAVR on 10/22/2020.    Surgery/Hospital course:  10/22 Transcatheter Aortic Valve Replacement    Today/Overnight:  Patient is recovering after a Transcatheter Aortic Valve replacement today.     Vital Signs Last 24 Hrs  T(C): 36.1 (22 Oct 2020 16:00), Max: 36.6 (22 Oct 2020 06:34)  T(F): 97 (22 Oct 2020 16:00), Max: 97.9 (22 Oct 2020 06:34)  HR: 86 (22 Oct 2020 18:00) (86 - 98)  BP: 145/68 (22 Oct 2020 17:00) (102/68 - 145/68)  BP(mean): 98 (22 Oct 2020 17:00) (81 - 98)  RR: 13 (22 Oct 2020 18:00) (13 - 18)  SpO2: 100% (22 Oct 2020 18:00) (97% - 100%)    Physical Exam:  Gen: Drowsy, but arousable   CNS: nonfocal  Neck: no JVD, scar over left neck  Res: clear bilateral breath sounds  CVS: Irregularly Irregular rhythm, normal S1/S2, Mediastinal chest tube x1  Abd: soft, non-distended, bowel sounds present   Skin: LE dark skin changes consistent with PVD and possibly venous stasis, sternal dressing clean, dry and intact.   Ext: no edema, dorsalis pedis pulses by doppler only    ============================I/O===========================  I&O's Summary    22 Oct 2020 07:01  -  22 Oct 2020 18:05  --------------------------------------------------------  IN: 260 mL / OUT: 400 mL / NET: -140 mL    ============================ LABS =========================                        8.9    11.55 )-----------( 207      ( 22 Oct 2020 12:06 )             28.9     10-22    132<L>  |  95<L>  |  18  ----------------------------<  201<H>  3.5   |  25  |  0.83    Ca    8.6      22 Oct 2020 12:08  Phos  3.9     10-22  Mg     1.5     10-22    TPro  6.5  /  Alb  3.3  /  TBili  0.4  /  DBili  x   /  AST  25  /  ALT  14  /  AlkPhos  53  10-22    LIVER FUNCTIONS - ( 22 Oct 2020 12:08 )  Alb: 3.3 g/dL / Pro: 6.5 g/dL / ALK PHOS: 53 U/L / ALT: 14 U/L / AST: 25 U/L / GGT: x           PT/INR - ( 22 Oct 2020 06:46 )   PT: 12.6 sec;   INR: 1.05 ratio         PTT - ( 22 Oct 2020 12:06 )  PTT:30.6 sec  ABG - ( 22 Oct 2020 17:10 )  pH, Arterial: 7.37  pH, Blood: x     /  pCO2: 46    /  pO2: 119   / HCO3: 26    / Base Excess: 1.0   /  SaO2: 99        ======================Micro/Rad/Cardio=================  Culture: Reviewed   CXR: Reviewed  Echo: Reviewed  ======================================================  PAST MEDICAL & SURGICAL HISTORY:  Bilateral carotid artery stenosis  ICA stenosis    Stented coronary artery    T2DM (type 2 diabetes mellitus)    Peripheral vascular disease    Atrial fibrillation    Hypertension    Hyperlipidemia    Coronary artery disease    S/P carotid endarterectomy  left    9/2020    Status post angiography of extremity  1 stent in each leg    History of coronary artery stent placement  two coronary stents; most recent 9/21/2020    ========================ASSESSMENT ================  S/P Recent Carotid Endarterectomy (9/2020)    Severe Aortic Stenosis s/p Transcatheter Aortic Replacement  Atrial Fibrillation   Hypertension  Type 2 Diabetes Mellitus   Hyperlipidemia  Coronary Artery Disease  Current Daily Smoker    Plan:  ====================== NEUROLOGY=====================  Nonfocal, continue to monitor neuro status per ICU protocol.     ==================== RESPIRATORY======================  Respiratory status required supplemental oxygen via 4L nasal cannula, close monitoring of breathing pattern and respiratory rate & the following of continuous pulse oximetry for support & to prevent decompensation.  Encourage incentive spirometry.     ====================CARDIOVASCULAR==================  Patient with Severe Aortic Stenosis recovering s/p Transcatheter Aortic Valve Replacement on 10/22/2020. Continuous rhythm monitoring and invasive hemodynamic monitoring with a Left radial A-line was required for continuous MAP/BP monitoring to ensure adequate cardiovascular support. Aspirin and high dose Lipitor to be continued for coronary artery disease in addition to Plavix. Monitor Mediastinal chest tube output.     aspirin  chewable 81 milliGRAM(s) Oral daily  clopidogrel Tablet 75 milliGRAM(s) Oral daily  atorvastatin 80 milliGRAM(s) Oral at bedtime    ===================HEMATOLOGIC/ONC ===================  Anemia likely due to chronic illness. No current indication for transfusion. Monitor hemoglobin and hematocrit levels  Coumadin dosed for today for INR of 1.05, continue monitoring INR daily. Goal INR 2-3.     warfarin 5 milliGRAM(s) Oral once    ===================== RENAL =========================  Optimize renal perfusion with adequate volume resuscitation and continued monitoring of urine output, fluid balance, and BUN/Creatinine.     ==================== GASTROINTESTINAL===================  Tolerating Consistent Carbohydrate Diet. Protonix for stress ulcer prophylaxis. Continue bowel regimen with Miralax.     pantoprazole  Injectable 40 milliGRAM(s) IV Push daily  polyethylene glycol 3350 17 Gram(s) Oral daily  sodium chloride 0.9%. 1000 milliLiter(s) (10 mL/Hr) IV Continuous <Continuous>    =======================    ENDOCRINE  =====================  History of Type 2 Diabetes Mellitus, requiring glucose control with glucose checks and Admelog sliding scale insulin premeal and qhs with insulin infusion ordered as indicated.     glucagon  Injectable 1 milliGRAM(s) IntraMuscular once PRN Glucose <70 milliGRAM(s)/deciLiter  insulin lispro (ADMELOG) corrective regimen sliding scale   SubCutaneous Before meals and at bedtime  insulin regular Infusion 3 Unit(s)/Hr (3 mL/Hr) IV Continuous <Continuous>    ========================INFECTIOUS DISEASE================  Afebrile, WBC within normal limits.   Continue Perioperative prophylactic antibiotic coverage with Cefuroxime    cefuroxime  IVPB 1500 milliGRAM(s) IV Intermittent every 8 hours      Patient requires continuous monitoring with bedside rhythm monitoring, arterial pressure and pulse oximetry monitoring and intermittent blood gas analysis.  Care plan discussed with ICU care team.    By signing my name below, I, Berkley Seals, attest that this documentation has been prepared under the direction and in the presence of Margaret Mukherjee MD   Electronically signed: Stuart Hernandez, 10-22-20 @ 18:01    I, Margaret Mukherjee MD, personally performed the services described in this documentation. All medical record entries made by the yunibchristopher were at my direction and in my presence. I have reviewed the chart and agree that the record reflects my personal performance and is accurate and complete  Electronically signed: Margaret Mukherjee MD  10-22-20 @ 18:01       PAIGE BYRNES  MRN-[id number]  Patient is a 74y old  Male who presents with a chief complaint of Asymptomatic, high-grade carotid stenosis.     HPI: 74 year old Male with a past medical history of Bilateral Carotid Artery Stenosis, ICA Stenosis, HLD, HTN, CAD s/p two coronary artery stents (most recent 9/2020), Carotid endarterectomy (9/2020), Angiography of extremity s/p B/L LE stents, Atrial Fibrillation, PVD, DM type 2. Admitted to CTU for Severe Aortic Stenosis requiring TAVR on 10/22/2020.    Surgery/Hospital course:  10/22 Transcatheter Aortic Valve Replacement    Today/Overnight:  Patient is recovering after a Transcatheter Aortic Valve replacement today.     Vital Signs Last 24 Hrs  T(C): 36.1 (22 Oct 2020 16:00), Max: 36.6 (22 Oct 2020 06:34)  T(F): 97 (22 Oct 2020 16:00), Max: 97.9 (22 Oct 2020 06:34)  HR: 86 (22 Oct 2020 18:00) (86 - 98)  BP: 145/68 (22 Oct 2020 17:00) (102/68 - 145/68)  BP(mean): 98 (22 Oct 2020 17:00) (81 - 98)  RR: 13 (22 Oct 2020 18:00) (13 - 18)  SpO2: 100% (22 Oct 2020 18:00) (97% - 100%)    Physical Exam:  Gen: Drowsy, but arousable   CNS: nonfocal  Neck: no JVD, scar over left neck  Res: clear bilateral breath sounds  CVS: Irregularly Irregular rhythm, normal S1/S2, Mediastinal chest tube x1  Abd: soft, non-distended, bowel sounds present   Skin: LE dark skin changes consistent with PVD and possibly venous stasis, sternal dressing clean, dry and intact.   Ext: no edema, dorsalis pedis pulses by doppler only    ============================I/O===========================  I&O's Summary    22 Oct 2020 07:01  -  22 Oct 2020 18:05  --------------------------------------------------------  IN: 260 mL / OUT: 400 mL / NET: -140 mL    ============================ LABS =========================                        8.9    11.55 )-----------( 207      ( 22 Oct 2020 12:06 )             28.9     10-22    132<L>  |  95<L>  |  18  ----------------------------<  201<H>  3.5   |  25  |  0.83    Ca    8.6      22 Oct 2020 12:08  Phos  3.9     10-22  Mg     1.5     10-22    TPro  6.5  /  Alb  3.3  /  TBili  0.4  /  DBili  x   /  AST  25  /  ALT  14  /  AlkPhos  53  10-22    LIVER FUNCTIONS - ( 22 Oct 2020 12:08 )  Alb: 3.3 g/dL / Pro: 6.5 g/dL / ALK PHOS: 53 U/L / ALT: 14 U/L / AST: 25 U/L / GGT: x           PT/INR - ( 22 Oct 2020 06:46 )   PT: 12.6 sec;   INR: 1.05 ratio         PTT - ( 22 Oct 2020 12:06 )  PTT:30.6 sec  ABG - ( 22 Oct 2020 17:10 )  pH, Arterial: 7.37  pH, Blood: x     /  pCO2: 46    /  pO2: 119   / HCO3: 26    / Base Excess: 1.0   /  SaO2: 99        ======================Micro/Rad/Cardio=================  Culture: Reviewed   CXR: Reviewed  Echo: Reviewed  ======================================================  PAST MEDICAL & SURGICAL HISTORY:  Bilateral carotid artery stenosis  ICA stenosis    Stented coronary artery    T2DM (type 2 diabetes mellitus)    Peripheral vascular disease    Atrial fibrillation    Hypertension    Hyperlipidemia    Coronary artery disease    S/P carotid endarterectomy  left    9/2020    Status post angiography of extremity  1 stent in each leg    History of coronary artery stent placement  two coronary stents; most recent 9/21/2020    ========================ASSESSMENT ================  S/P Recent Carotid Endarterectomy (9/2020)    Severe Aortic Stenosis s/p Transcatheter Aortic Replacement  Atrial Fibrillation   Hypertension  Type 2 Diabetes Mellitus   Hyperlipidemia  Coronary Artery Disease  Current Daily Smoker    Plan:  ====================== NEUROLOGY=====================  Nonfocal, continue to monitor neuro status per ICU protocol.     ==================== RESPIRATORY======================  Respiratory status required supplemental oxygen via 4L nasal cannula, close monitoring of breathing pattern and respiratory rate & the following of continuous pulse oximetry for support & to prevent decompensation.  Encourage incentive spirometry.     ====================CARDIOVASCULAR==================  Patient with Severe Aortic Stenosis recovering s/p Transcatheter Aortic Valve Replacement on 10/22/2020. Continuous rhythm monitoring and invasive hemodynamic monitoring with a Left radial A-line was required for continuous MAP/BP monitoring to ensure adequate cardiovascular support. Aspirin and high dose Lipitor to be continued for coronary artery disease in addition to Plavix. Monitor Mediastinal chest tube output.     aspirin  chewable 81 milliGRAM(s) Oral daily  clopidogrel Tablet 75 milliGRAM(s) Oral daily  atorvastatin 80 milliGRAM(s) Oral at bedtime    ===================HEMATOLOGIC/ONC ===================  Anemia likely due to chronic illness. No current indication for transfusion. Monitor hemoglobin and hematocrit levels.  Coumadin dosed for today for atrial fibrillation. INR of 1.05, continue monitoring INR daily. Goal INR 2-3.     warfarin 5 milliGRAM(s) Oral once    ===================== RENAL =========================  Optimize renal perfusion with adequate volume resuscitation and continued monitoring of urine output, fluid balance, and BUN/Creatinine.     ==================== GASTROINTESTINAL===================  Tolerating Consistent Carbohydrate Diet. Protonix for stress ulcer prophylaxis. Continue bowel regimen with Miralax.     pantoprazole  Injectable 40 milliGRAM(s) IV Push daily  polyethylene glycol 3350 17 Gram(s) Oral daily  sodium chloride 0.9%. 1000 milliLiter(s) (10 mL/Hr) IV Continuous <Continuous>    =======================    ENDOCRINE  =====================  History of Type 2 Diabetes Mellitus, requiring glucose control with glucose checks and Admelog sliding scale insulin premeal and qhs with insulin infusion ordered as indicated.     glucagon  Injectable 1 milliGRAM(s) IntraMuscular once PRN Glucose <70 milliGRAM(s)/deciLiter  insulin lispro (ADMELOG) corrective regimen sliding scale   SubCutaneous Before meals and at bedtime  insulin regular Infusion 3 Unit(s)/Hr (3 mL/Hr) IV Continuous <Continuous>    ========================INFECTIOUS DISEASE================  Afebrile, WBC within normal limits.   Continue Perioperative prophylactic antibiotic coverage with Cefuroxime    cefuroxime  IVPB 1500 milliGRAM(s) IV Intermittent every 8 hours      Patient requires continuous monitoring with bedside rhythm monitoring, arterial pressure and pulse oximetry monitoring and intermittent blood gas analysis.  Care plan discussed with ICU care team.    By signing my name below, I, Berkley Seals, attest that this documentation has been prepared under the direction and in the presence of Margaret Mukherjee MD   Electronically signed: Stuart Hernandez, 10-22-20 @ 18:01    I, Margaret Mukherjee MD, personally performed the services described in this documentation. All medical record entries made by the scribe were at my direction and in my presence. I have reviewed the chart and agree that the record reflects my personal performance and is accurate and complete  Electronically signed: Margaret Mukherjee MD  10-22-20 @ 18:01

## 2020-10-22 NOTE — BRIEF OPERATIVE NOTE - OPERATION/FINDINGS
TAVR (Javier size 26) via direct aortic placement using an upper hemisternotomy.  1 Chest tube left in place.  Patient extubated at case end

## 2020-10-23 LAB
ALBUMIN SERPL ELPH-MCNC: 3.4 G/DL — SIGNIFICANT CHANGE UP (ref 3.3–5)
ALP SERPL-CCNC: 55 U/L — SIGNIFICANT CHANGE UP (ref 40–120)
ALT FLD-CCNC: 19 U/L — SIGNIFICANT CHANGE UP (ref 10–45)
ANION GAP SERPL CALC-SCNC: 10 MMOL/L — SIGNIFICANT CHANGE UP (ref 5–17)
ANION GAP SERPL CALC-SCNC: 16 MMOL/L — SIGNIFICANT CHANGE UP (ref 5–17)
APTT BLD: 32 SEC — SIGNIFICANT CHANGE UP (ref 27.5–35.5)
AST SERPL-CCNC: 27 U/L — SIGNIFICANT CHANGE UP (ref 10–40)
BILIRUB SERPL-MCNC: 0.6 MG/DL — SIGNIFICANT CHANGE UP (ref 0.2–1.2)
BUN SERPL-MCNC: 16 MG/DL — SIGNIFICANT CHANGE UP (ref 7–23)
BUN SERPL-MCNC: 25 MG/DL — HIGH (ref 7–23)
CALCIUM SERPL-MCNC: 9.3 MG/DL — SIGNIFICANT CHANGE UP (ref 8.4–10.5)
CALCIUM SERPL-MCNC: 9.6 MG/DL — SIGNIFICANT CHANGE UP (ref 8.4–10.5)
CHLORIDE SERPL-SCNC: 95 MMOL/L — LOW (ref 96–108)
CHLORIDE SERPL-SCNC: 97 MMOL/L — SIGNIFICANT CHANGE UP (ref 96–108)
CO2 SERPL-SCNC: 21 MMOL/L — LOW (ref 22–31)
CO2 SERPL-SCNC: 25 MMOL/L — SIGNIFICANT CHANGE UP (ref 22–31)
CREAT SERPL-MCNC: 0.7 MG/DL — SIGNIFICANT CHANGE UP (ref 0.5–1.3)
CREAT SERPL-MCNC: 1.33 MG/DL — HIGH (ref 0.5–1.3)
GAS PNL BLDA: SIGNIFICANT CHANGE UP
GLUCOSE BLDC GLUCOMTR-MCNC: 166 MG/DL — HIGH (ref 70–99)
GLUCOSE BLDC GLUCOMTR-MCNC: 173 MG/DL — HIGH (ref 70–99)
GLUCOSE BLDC GLUCOMTR-MCNC: 181 MG/DL — HIGH (ref 70–99)
GLUCOSE BLDC GLUCOMTR-MCNC: 234 MG/DL — HIGH (ref 70–99)
GLUCOSE SERPL-MCNC: 165 MG/DL — HIGH (ref 70–99)
GLUCOSE SERPL-MCNC: 172 MG/DL — HIGH (ref 70–99)
HCT VFR BLD CALC: 30.4 % — LOW (ref 39–50)
HGB BLD-MCNC: 9 G/DL — LOW (ref 13–17)
INR BLD: 1.05 RATIO — SIGNIFICANT CHANGE UP (ref 0.88–1.16)
INR BLD: 1.23 RATIO — HIGH (ref 0.88–1.16)
MAGNESIUM SERPL-MCNC: 1.6 MG/DL — SIGNIFICANT CHANGE UP (ref 1.6–2.6)
MCHC RBC-ENTMCNC: 26.4 PG — LOW (ref 27–34)
MCHC RBC-ENTMCNC: 29.6 GM/DL — LOW (ref 32–36)
MCV RBC AUTO: 89.1 FL — SIGNIFICANT CHANGE UP (ref 80–100)
NRBC # BLD: 0 /100 WBCS — SIGNIFICANT CHANGE UP (ref 0–0)
PHOSPHATE SERPL-MCNC: 4.1 MG/DL — SIGNIFICANT CHANGE UP (ref 2.5–4.5)
PLATELET # BLD AUTO: 219 K/UL — SIGNIFICANT CHANGE UP (ref 150–400)
POTASSIUM SERPL-MCNC: 4.7 MMOL/L — SIGNIFICANT CHANGE UP (ref 3.5–5.3)
POTASSIUM SERPL-MCNC: 4.8 MMOL/L — SIGNIFICANT CHANGE UP (ref 3.5–5.3)
POTASSIUM SERPL-SCNC: 4.7 MMOL/L — SIGNIFICANT CHANGE UP (ref 3.5–5.3)
POTASSIUM SERPL-SCNC: 4.8 MMOL/L — SIGNIFICANT CHANGE UP (ref 3.5–5.3)
PROT SERPL-MCNC: 7.4 G/DL — SIGNIFICANT CHANGE UP (ref 6–8.3)
PROTHROM AB SERPL-ACNC: 12.6 SEC — SIGNIFICANT CHANGE UP (ref 10.6–13.6)
PROTHROM AB SERPL-ACNC: 14.6 SEC — HIGH (ref 10.6–13.6)
RBC # BLD: 3.41 M/UL — LOW (ref 4.2–5.8)
RBC # FLD: 16.3 % — HIGH (ref 10.3–14.5)
SODIUM SERPL-SCNC: 132 MMOL/L — LOW (ref 135–145)
SODIUM SERPL-SCNC: 132 MMOL/L — LOW (ref 135–145)
WBC # BLD: 13.93 K/UL — HIGH (ref 3.8–10.5)
WBC # FLD AUTO: 13.93 K/UL — HIGH (ref 3.8–10.5)

## 2020-10-23 PROCEDURE — 99291 CRITICAL CARE FIRST HOUR: CPT

## 2020-10-23 PROCEDURE — 71045 X-RAY EXAM CHEST 1 VIEW: CPT | Mod: 26

## 2020-10-23 PROCEDURE — 93306 TTE W/DOPPLER COMPLETE: CPT | Mod: 26

## 2020-10-23 PROCEDURE — 99292 CRITICAL CARE ADDL 30 MIN: CPT

## 2020-10-23 PROCEDURE — 93306 TTE W/DOPPLER COMPLETE: CPT | Mod: 26,77

## 2020-10-23 PROCEDURE — 99233 SBSQ HOSP IP/OBS HIGH 50: CPT

## 2020-10-23 PROCEDURE — 93010 ELECTROCARDIOGRAM REPORT: CPT

## 2020-10-23 RX ORDER — SENNA PLUS 8.6 MG/1
2 TABLET ORAL AT BEDTIME
Refills: 0 | Status: DISCONTINUED | OUTPATIENT
Start: 2020-10-23 | End: 2020-10-27

## 2020-10-23 RX ORDER — ALBUMIN HUMAN 25 %
250 VIAL (ML) INTRAVENOUS ONCE
Refills: 0 | Status: COMPLETED | OUTPATIENT
Start: 2020-10-23 | End: 2020-10-23

## 2020-10-23 RX ORDER — PHENYLEPHRINE HYDROCHLORIDE 10 MG/ML
0.4 INJECTION INTRAVENOUS
Qty: 40 | Refills: 0 | Status: DISCONTINUED | OUTPATIENT
Start: 2020-10-23 | End: 2020-10-24

## 2020-10-23 RX ORDER — HYDROMORPHONE HYDROCHLORIDE 2 MG/ML
0.5 INJECTION INTRAMUSCULAR; INTRAVENOUS; SUBCUTANEOUS ONCE
Refills: 0 | Status: DISCONTINUED | OUTPATIENT
Start: 2020-10-23 | End: 2020-10-23

## 2020-10-23 RX ORDER — WARFARIN SODIUM 2.5 MG/1
5 TABLET ORAL ONCE
Refills: 0 | Status: COMPLETED | OUTPATIENT
Start: 2020-10-23 | End: 2020-10-23

## 2020-10-23 RX ORDER — ACETAMINOPHEN 500 MG
1000 TABLET ORAL ONCE
Refills: 0 | Status: COMPLETED | OUTPATIENT
Start: 2020-10-23 | End: 2020-10-23

## 2020-10-23 RX ORDER — FENTANYL CITRATE 50 UG/ML
25 INJECTION INTRAVENOUS ONCE
Refills: 0 | Status: DISCONTINUED | OUTPATIENT
Start: 2020-10-23 | End: 2020-10-23

## 2020-10-23 RX ORDER — FERROUS SULFATE 325(65) MG
325 TABLET ORAL DAILY
Refills: 0 | Status: DISCONTINUED | OUTPATIENT
Start: 2020-10-23 | End: 2020-10-27

## 2020-10-23 RX ORDER — METFORMIN HYDROCHLORIDE 850 MG/1
1000 TABLET ORAL
Refills: 0 | Status: DISCONTINUED | OUTPATIENT
Start: 2020-10-23 | End: 2020-10-24

## 2020-10-23 RX ORDER — METOPROLOL TARTRATE 50 MG
25 TABLET ORAL
Refills: 0 | Status: DISCONTINUED | OUTPATIENT
Start: 2020-10-23 | End: 2020-10-23

## 2020-10-23 RX ADMIN — HYDROMORPHONE HYDROCHLORIDE 0.5 MILLIGRAM(S): 2 INJECTION INTRAMUSCULAR; INTRAVENOUS; SUBCUTANEOUS at 06:30

## 2020-10-23 RX ADMIN — Medication 125 MILLILITER(S): at 12:56

## 2020-10-23 RX ADMIN — ATORVASTATIN CALCIUM 80 MILLIGRAM(S): 80 TABLET, FILM COATED ORAL at 21:11

## 2020-10-23 RX ADMIN — WARFARIN SODIUM 5 MILLIGRAM(S): 2.5 TABLET ORAL at 21:11

## 2020-10-23 RX ADMIN — METFORMIN HYDROCHLORIDE 1000 MILLIGRAM(S): 850 TABLET ORAL at 08:04

## 2020-10-23 RX ADMIN — Medication 1: at 21:53

## 2020-10-23 RX ADMIN — Medication 81 MILLIGRAM(S): at 11:03

## 2020-10-23 RX ADMIN — SENNA PLUS 2 TABLET(S): 8.6 TABLET ORAL at 21:11

## 2020-10-23 RX ADMIN — Medication 1: at 00:21

## 2020-10-23 RX ADMIN — METFORMIN HYDROCHLORIDE 1000 MILLIGRAM(S): 850 TABLET ORAL at 17:12

## 2020-10-23 RX ADMIN — Medication 400 MILLIGRAM(S): at 11:30

## 2020-10-23 RX ADMIN — CHLORHEXIDINE GLUCONATE 1 APPLICATION(S): 213 SOLUTION TOPICAL at 04:27

## 2020-10-23 RX ADMIN — Medication 1000 MILLIGRAM(S): at 12:00

## 2020-10-23 RX ADMIN — CLOPIDOGREL BISULFATE 75 MILLIGRAM(S): 75 TABLET, FILM COATED ORAL at 11:03

## 2020-10-23 RX ADMIN — PHENYLEPHRINE HYDROCHLORIDE 9.29 MICROGRAM(S)/KG/MIN: 10 INJECTION INTRAVENOUS at 10:30

## 2020-10-23 RX ADMIN — PANTOPRAZOLE SODIUM 40 MILLIGRAM(S): 20 TABLET, DELAYED RELEASE ORAL at 06:32

## 2020-10-23 RX ADMIN — Medication 1: at 17:12

## 2020-10-23 RX ADMIN — Medication 125 MILLILITER(S): at 17:00

## 2020-10-23 RX ADMIN — Medication 2: at 12:05

## 2020-10-23 RX ADMIN — Medication 325 MILLIGRAM(S): at 11:03

## 2020-10-23 RX ADMIN — Medication 100 MILLIGRAM(S): at 08:05

## 2020-10-23 RX ADMIN — FENTANYL CITRATE 25 MICROGRAM(S): 50 INJECTION INTRAVENOUS at 21:15

## 2020-10-23 RX ADMIN — POLYETHYLENE GLYCOL 3350 17 GRAM(S): 17 POWDER, FOR SOLUTION ORAL at 11:03

## 2020-10-23 RX ADMIN — HYDROMORPHONE HYDROCHLORIDE 0.5 MILLIGRAM(S): 2 INJECTION INTRAMUSCULAR; INTRAVENOUS; SUBCUTANEOUS at 06:45

## 2020-10-23 RX ADMIN — FENTANYL CITRATE 25 MICROGRAM(S): 50 INJECTION INTRAVENOUS at 21:45

## 2020-10-23 RX ADMIN — Medication 25 MILLIGRAM(S): at 05:16

## 2020-10-23 RX ADMIN — Medication 125 MILLILITER(S): at 11:04

## 2020-10-23 RX ADMIN — Medication 1: at 08:09

## 2020-10-23 RX ADMIN — Medication 125 MILLILITER(S): at 20:15

## 2020-10-23 NOTE — PROGRESS NOTE ADULT - SUBJECTIVE AND OBJECTIVE BOX
*****Structural Heart Team*****    Subjective:    Patient is resting comfortably in bed, offering no complaints. His BP has been a little low, which has been requiring a neosynephrine drip, and 2 units of albumin.      PAST MEDICAL & SURGICAL HISTORY:  Bilateral carotid artery stenosis  ICA stenosis    Stented coronary artery    T2DM (type 2 diabetes mellitus)    Peripheral vascular disease    Atrial fibrillation    Hypertension    Hyperlipidemia    Coronary artery disease    S/P carotid endarterectomy  left    9/2020    Status post angiography of extremity  1 stent in each leg    History of coronary artery stent placement  two coronary stents; most recent 9/21/2020          T(C): 36.2 (10-23-20 @ 12:00), Max: 37.2 (10-23-20 @ 04:00)  HR: 76 (10-23-20 @ 14:30) (68 - 111)  BP: 109/56 (10-23-20 @ 14:30) (81/48 - 145/68)  RR: 18 (10-23-20 @ 14:30) (13 - 32)  SpO2: 97% (10-23-20 @ 14:30) (92% - 100%)  Wt(kg): --  10-22 @ 07:01  -  10-23 @ 07:00  --------------------------------------------------------  IN: 450 mL / OUT: 1400 mL / NET: -950 mL    10-23 @ 07:01  -  10-23 @ 14:45  --------------------------------------------------------  IN: 747.9 mL / OUT: 80 mL / NET: 667.9 mL      MEDICATIONS  (STANDING):  aspirin  chewable 81 milliGRAM(s) Oral daily  atorvastatin 80 milliGRAM(s) Oral at bedtime  clopidogrel Tablet 75 milliGRAM(s) Oral daily  dextrose 5%. 1000 milliLiter(s) (50 mL/Hr) IV Continuous <Continuous>  dextrose 50% Injectable 12.5 Gram(s) IV Push once  ferrous    sulfate 325 milliGRAM(s) Oral daily  influenza   Vaccine 0.5 milliLiter(s) IntraMuscular once  insulin lispro (ADMELOG) corrective regimen sliding scale   SubCutaneous Before meals and at bedtime  metFORMIN 1000 milliGRAM(s) Oral two times a day  pantoprazole    Tablet 40 milliGRAM(s) Oral before breakfast  phenylephrine    Infusion 0.4 MICROgram(s)/kG/Min (9.29 mL/Hr) IV Continuous <Continuous>  polyethylene glycol 3350 17 Gram(s) Oral daily  senna 2 Tablet(s) Oral at bedtime  sodium chloride 0.9%. 1000 milliLiter(s) (10 mL/Hr) IV Continuous <Continuous>  warfarin 5 milliGRAM(s) Oral once    MEDICATIONS  (PRN):  dextrose 40% Gel 15 Gram(s) Oral once PRN Blood Glucose LESS THAN 70 milliGRAM(s)/deciLiter  glucagon  Injectable 1 milliGRAM(s) IntraMuscular once PRN Glucose <70 milliGRAM(s)/deciLiter      Review of Symptoms:  Constitutional: Awake, Alert, Follows commands  Respiratory: + SOB, + MAX on admission  Cardiac: Denies CP, Denies Palpitations  Gastrointestinal: Denies Pain, Denies N/V, tolerating po intake  Vascular: Negative  Extremities: No Edema, No joint pain or swelling  Neurological: Negative  Endocrine: No heat or cold intolerance, No excessive thirst  Heme/Onc: Negative    Exam:  General: A/O x3, GUILLAUME, NAD, Follows all commands  HEENT: Supple, No JVD, Trachea midline, no masses  Pulmonary: CTAB, = Chest Excursion, no accessory muscle use  Cor: S1S2, irregular, No murmur or rub noted.  ECG: AFib  Groin/Wound: Ministernotomy: Dressing C/D/I, no erythema, non tender and without any drainage  Gastrointestinal: Soft, NT/ND, + Bowel Sounds  Neuro: = motor and sensory B/L, No focal deficits  Vascular: + RCA Thrill, 1+ pulses B/L, No Edema  Extremities: No joint pain or swelling  Skin: Warm/Dry/Normal color, Normal turgor, no rashes                          9.0    13.93 )-----------( 219      ( 23 Oct 2020 00:24 )             30.4   10-23    132<L>  |  97  |  16  ----------------------------<  172<H>  4.8   |  25  |  0.70    Ca    9.3      23 Oct 2020 00:24  Phos  4.1     10-23  Mg     1.6     10-23    TPro  7.4  /  Alb  3.4  /  TBili  0.6  /  DBili  x   /  AST  27  /  ALT  19  /  AlkPhos  55  10-23  PT/INR - ( 23 Oct 2020 09:17 )   PT: 14.6 sec;   INR: 1.23 ratio         PTT - ( 23 Oct 2020 00:24 )  PTT:32.0 sec    Imaging Reviewed:    Post Op TTE: Pending      Assesment/Plan:  74 M S/P TAVR via Direct Aortic Approach for Severe AS, Carotid Stenosis, Chronic diastolic heart failure  1.) S/P TAVR: ASA 81 mg po daily, Plavix 75 mg po daily. Ambulate as tolerated. Patient will be discharged on an MCOT for a period of 30days to monitor for rhythm changes post TAVR. Follow up post operative echo  2.) Carotid Stenosis: patient is s/p L CEA. Will need Right sided CEA, which will be performed by Dr. Rodriguez several weeks after discharge.  3.) Chronic Heart Failure: Monitor I and O's, daily weight. Patient does not appear volume overloaded, and in light of hypotension, would not diurese.  4.) Ambulate as tolerated.  5.) Continue to wean neosynephrine. Avoid hypotension with significant Carotid Disease.  6.) Discharge Plan: Patient is to follow up with Dr. Perea in 1 week post discharge. HE should then follow up with Dr. Villeda  in 30 days, with a repeat Transthoracic Echo to be done at that visit.

## 2020-10-23 NOTE — PROGRESS NOTE ADULT - SUBJECTIVE AND OBJECTIVE BOX
Patient seen and examined at the bedside.      Remained critically ill on continuous ICU monitoring.    OBJECTIVE:  ICU Vital Signs Last 24 Hrs  T(C): 35.6 (23 Oct 2020 16:00), Max: 37.2 (23 Oct 2020 04:00)  T(F): 96 (23 Oct 2020 16:00), Max: 98.9 (23 Oct 2020 04:00)  HR: 77 (23 Oct 2020 18:00) (68 - 111)  BP: 118/74 (23 Oct 2020 18:00) (81/48 - 140/82)  BP(mean): 92 (23 Oct 2020 18:00) (60 - 104)  ABP: 124/61 (23 Oct 2020 18:00) (34/32 - 158/68)  ABP(mean): 82 (23 Oct 2020 18:00) (34 - 94)  RR: 23 (23 Oct 2020 18:00) (12 - 32)  SpO2: 98% (23 Oct 2020 18:00) (92% - 100%)        10-22 @ 07:  -  10-23 @ 07:00  --------------------------------------------------------  IN: 450 mL / OUT: 1400 mL / NET: -950 mL    10-23 @ :  -  10-23 @ 18:04  --------------------------------------------------------  IN: 1079.8 mL / OUT: 470 mL / NET: 609.8 mL          POCT Blood Glucose.: 166 mg/dL (23 Oct 2020 17:08)        Review of Systems:  GENERAL:  no weakness, fatigue, fevers or chills  NEURO: no dizziness, numbness, tingling or weakness  SKIN: no itching, burning, rashes, or lesions   HEENT: no visual changes;  no headache, no vertigo, no recent colds  RESPIRATORY: no shortness of breath, no cough, sputum, wheezing  CARDIOVASCULAR:  no chest pain,  or palpitations  GI: no abd pain. no N/V/D.  PERIPHERAL VASCULAR: no swelling, no tenderness, no erythema      PHYSICAL EXAM:    Daily Weight in k.3 (23 Oct 2020 00:00)  General: WN/WD NAD, Drowsy but follows commands  Neurology: A&Ox3, nonfocal, ADDISON x 4  Eyes: PERRLA/ EOMI, Gross vision intact  ENT/Neck: Neck supple, trachea midline, No JVD, Gross hearing intact. Left neck scar.   Respiratory: No wheezing, rhonchi. Rales noted bilaterally.  CV: Irregularly Irregular rhythm- AFIb, S1S2, no murmurs, rubs or gallops,                            MEDS CT  x1  Abdominal: Soft, NT, ND +BS,   Extremities: No pedal edema noted, + peripheral pulses  Skin: LE dark skin changes consistent with PVD and possibly venous stasis. sternal dressing C/D/I.  No Rashes, Hematoma, Ecchymosis      LINES:  [x ] Arterial Line   [ ] Central Line  [ ] PA catheter  [ ] IABP  [ ] ECMO  [ ] LVAD  [ ] Ventilator  [ ] pacemaker [ ] Impella    Beside evaluation, monitoring, treatment of hemodynamics, fluids, IVP/IVCD meds,         Ventilator (  )              Hemodynamic lability, instability. Requires IVCD [ x] vasopressors [ ] inotropes  [ ] vasodilator  [x ]IVSS fluid  to maintain MAP, perfusion, C.I.     Ventilator Management:  [ ]AC-rest    [ ]CPAP-PS Wean    [ ]Trach Collar     [ ]Extubate    [ ] T-Piece  [ ]peep>5     ALL MEDICATIONS:  MEDICATIONS  (STANDING):  aspirin  chewable 81 milliGRAM(s) Oral daily  atorvastatin 80 milliGRAM(s) Oral at bedtime  clopidogrel Tablet 75 milliGRAM(s) Oral daily  dextrose 5%. 1000 milliLiter(s) (50 mL/Hr) IV Continuous <Continuous>  dextrose 50% Injectable 12.5 Gram(s) IV Push once  ferrous    sulfate 325 milliGRAM(s) Oral daily  influenza   Vaccine 0.5 milliLiter(s) IntraMuscular once  insulin lispro (ADMELOG) corrective regimen sliding scalxe   SubCutaneous Before meals and at bedtime  metFORMIN 1000 milliGRAM(s) Oral two times a day  pantoprazole    Tablet 40 milliGRAM(s) Oral before breakfast  phenylephrine    Infusion 0.4 MICROgram(s)/kG/Min (9.29 mL/Hr) IV Continuous <Continuous>  polyethylene glycol 3350 17 Gram(s) Oral daily  senna 2 Tablet(s) Oral at bedtime  sodium chloride 0.9%. 1000 milliLiter(s) (10 mL/Hr) IV Continuous <Continuous>  warfarin 5 milliGRAM(s) Oral once    MEDICATIONS  (PRN):  dextrose 40% Gel 15 Gram(s) Oral once PRN Blood Glucose LESS THAN 70 milliGRAM(s)/deciLiter  glucagon  Injectable 1 milliGRAM(s) IntraMuscular once PRN Glucose <70 milliGRAM(s)/deciLiter      LABS:                        9.0    13.93 )-----------( 219      ( 23 Oct 2020 00:24 )             30.4     10-23    132<L>  |  95<L>  |  25<H>  ----------------------------<  165<H>  4.7   |  21<L>  |  1.33<H>    Ca    9.6      23 Oct 2020 17:00  Phos  4.1     10-23  Mg     1.6     10-23    TPro  7.4  /  Alb  3.4  /  TBili  0.6  /  DBili  x   /  AST  27  /  ALT  19  /  AlkPhos  55  10-23    PT/INR - ( 23 Oct 2020 09:17 )   PT: 14.6 sec;   INR: 1.23 ratio         PTT - ( 23 Oct 2020 00:24 )  PTT:32.0 sec    Arterial Blood Gas:  10-23 @ 17:37  7.41/37/79/23/96/-1.0  ABG lactate: --  Arterial Blood Gas:  10-23 @ 16:00  7.33/43/121/22/99/-3.3  ABG lactate: --  Arterial Blood Gas:  10-22 @ 23:55  7.41/43/73/27/95/2.6  ABG lactate: --  Arterial Blood Gas:  10-22 @ 19:52  7.36/47/77/26/96/.4  ABG lactate: --  Arterial Blood Gas:  10-22 @ 17:10  7.37/46/119/26/99/1.0  ABG lactate: --  Arterial Blood Gas:  10-22 @ 11:47  7.35/48/70/26/92/.2  ABG lactate: --      CARDIAC MARKERS ( 22 Oct 2020 12:08 )  x     / x     / 92 U/L / x     / 7.7 ng/mL    LIVER FUNCTIONS - ( 23 Oct 2020 00:24 )  Alb: 3.4 g/dL / Pro: 7.4 g/dL / ALK PHOS: 55 U/L / ALT: 19 U/L / AST: 27 U/L / GGT: x               MICROBIOLOGY:   All BCx NGTD    RADIOLOGY:  Xray Chest 1 View- PORTABLE-Routine (10.23.20 @ 02:19)   Poor inspiratory effort.   The heart is normal in size. Lungs are clear.   A pleural effusion. No pneumothorax.    < end of copied text >      TTE with Doppler (w/Cont) (10.23.20 @ 08:16)   Moderate left atrial enlargement.   Normal left ventricular systolic function. Moderate diastolic dysfunction (Stage II).  Moderate right atrial enlargement.  Normal right ventricular size with borderline right ventricular function.  Moderate tricuspid regurgitation.  borderline pulmonary pressures    < end of copied text >    Assessment:  74 year old Male with a past medical history of Bilateral Carotid Artery Stenosis, ICA Stenosis, HLD, HTN, CAD s/p two coronary artery stents (most recent 2020), Carotid endarterectomy (2020), Angiography of extremity s/p B/L LE stents, Atrial Fibrillation, PVD, DM type 2. Admitted to CTU for Severe Aortic Stenosis requiring TAVR on 10/22/2020.    S/P Recent Carotid Endarterectomy (2020)    Severe Aortic Stenosis s/p TAVR  POD 1  Atrial Fibrillation   HTN  Type 2 Diabetes Mellitus   HLD  CAD  Current Daily Smoker    Plan:   ***Neuro***  Nonfocal, continue to monitor neuro status per ICU protocol.   Ambulate as tolerated    ***Cardiovascular***  Severe Aortic stenosis s/p TAVR POD 1  c/b Hypertension, Atrial Fibrillation   Blood pressure support with IV Phenylphrine 0.4mcg for goal MAP 65-70  Dual antiplatelet therapy with ASA & Plavix and Lipitor for History of CAD/ HLD  Monitor Telemetry   Invasive Hemodynamic monitoring   Maintain MEDS chest tube and monitor output  Coumadin dosed today 5mg for Afib, INR1.23 .  Dose for goal INR of 2-3.   Monitor strict I&Os, lytes, daily weights     ***Pulmonary***  Pleural effusion on CXR   Comfortable on 4L NC, SpO2 %.  Will follow CXRs, spO2, ABGs, and maintain Arterial line.   Encourage incentive spirometry      ***Hematology***  H&H (  )   Anemia, likely of chronic illness  Monitor H&H and transfuse prn   Continue Ferrous sulfate 325mg as no indication for transfusions as this time.     ***GI***  Tolerates Consistent Carb Diet  Continue Protonix for stress ulcer prophylaxis.   Bowel regimen with Miralax and senna.     ***Renal***  No acute issues.   Continue to monitor I/Os, BUN/Creatinine, and urine output.   Replete lytes PRN. Keep K> 4 and Mg >2.     ***ID***  Afebrile, Mild Leukocytosis with WBC at 13.93  Completed Perioperative prophylactic antibiotic coverage with Cefuroxime.    ***Endocrine***  DMT2, glycemic control with Admelog sliding scale, Metformin, and Glucagon PRN.   Monitor blood glucose levels.     I have spent 30 minutes providing critical care management to this patient.    By signing my name below, I, Berkley Seals, attest that this documentation has been prepared under the direction and in the presence of Cheryl Mejía MD   Electronically signed: Stuart Hernandez, 10-23-20 @ 18:04    I, Cheryl Mejía, personally performed the services described in this documentation. all medical record entries made by the yunibe were at my direction and in my presence. I have reviewed the chart and agree that the record reflects my personal performance and is accurate and complete  Electronically signed: Cheryl Mejía MD Patient seen and examined at the bedside.      Remained critically ill on continuous ICU monitoring.    OBJECTIVE:  ICU Vital Signs Last 24 Hrs  T(C): 35.6 (23 Oct 2020 16:00), Max: 37.2 (23 Oct 2020 04:00)  T(F): 96 (23 Oct 2020 16:00), Max: 98.9 (23 Oct 2020 04:00)  HR: 77 (23 Oct 2020 18:00) (68 - 111)  BP: 118/74 (23 Oct 2020 18:00) (81/48 - 140/82)  BP(mean): 92 (23 Oct 2020 18:00) (60 - 104)  ABP: 124/61 (23 Oct 2020 18:00) (34/32 - 158/68)  ABP(mean): 82 (23 Oct 2020 18:00) (34 - 94)  RR: 23 (23 Oct 2020 18:00) (12 - 32)  SpO2: 98% (23 Oct 2020 18:00) (92% - 100%)        10-22 @ 07:  -  10-23 @ 07:00  --------------------------------------------------------  IN: 450 mL / OUT: 1400 mL / NET: -950 mL    10-23 @ :01  -  10-23 @ 18:04  --------------------------------------------------------  IN: 1079.8 mL / OUT: 470 mL / NET: 609.8 mL          POCT Blood Glucose.: 166 mg/dL (23 Oct 2020 17:08)        Review of Systems:  GENERAL:  ++ weakness,++ fatigue,   NEURO: no dizziness, numbness, tingling or weakness  SKIN: no itching, burning, rashes, or lesions   HEENT: no visual changes;  no headache, no vertigo, no recent colds  RESPIRATORY: no shortness of breath, no cough, sputum, wheezing  CARDIOVASCULAR:  + incsional chest pain,   GI: no abd pain. no N/V/D.  PERIPHERAL VASCULAR: no swelling, no tenderness, no erythema      PHYSICAL EXAM:    Daily Weight in k.3 (23 Oct 2020 00:00)  General:  in bed with invasive lines on continuos hemodynamic monitoring    Neurology: A&Ox3, nonfocal, ADDISON x 4  Eyes: PERRLA/ EOMI, Gross vision intact  ENT/Neck: Neck supple, trachea midline, No JVD, Gross hearing intact. Left neck scar.   Respiratory: No wheezing, rhonchi. Rales noted bilaterally.  sternal dressing   CV: Irregularly Irregular rhythm- AFIb,                              Abdominal: Soft, NT, ND +BS,   Extremities: No pedal edema noted, + peripheral pulses via doppler cool to touch   Skin: LE dark skin changes consistent with PVD and possibly venous stasis.   No Rashes, Hematoma, Ecchymosis      LINES:  [x ] Arterial Line   [ ] Central Line  [ ] PA catheter  [ ] IABP  [ ] ECMO  [ ] LVAD  [ ] Ventilator  [ ] pacemaker [ ] Impella    Beside evaluation, monitoring, treatment of hemodynamics, fluids, IVP/IVCD meds,         Ventilator (  )              Hemodynamic lability, instability. Requires IVCD [ x] vasopressors [ ] inotrope  [ ] vasodilator  [x ]IVSS fluid  to maintain MAP, perfusion, C.I.     Ventilator Management:  [ ]AC-rest    [ ]CPAP-PS Wean    [ ]Trach Collar     [ ]Extubate    [ ] T-Piece  [ ]peep>5     ALL MEDICATIONS:  MEDICATIONS  (STANDING):  aspirin  chewable 81 milliGRAM(s) Oral daily  atorvastatin 80 milliGRAM(s) Oral at bedtime  clopidogrel Tablet 75 milliGRAM(s) Oral daily  dextrose 5%. 1000 milliLiter(s) (50 mL/Hr) IV Continuous <Continuous>  dextrose 50% Injectable 12.5 Gram(s) IV Push once  ferrous    sulfate 325 milliGRAM(s) Oral daily  influenza   Vaccine 0.5 milliLiter(s) IntraMuscular once  insulin lispro (ADMELOG) corrective regimen sliding scalxe   SubCutaneous Before meals and at bedtime  metFORMIN 1000 milliGRAM(s) Oral two times a day  pantoprazole    Tablet 40 milliGRAM(s) Oral before breakfast  phenylephrine    Infusion 0.4 MICROgram(s)/kG/Min (9.29 mL/Hr) IV Continuous <Continuous>  polyethylene glycol 3350 17 Gram(s) Oral daily  senna 2 Tablet(s) Oral at bedtime  sodium chloride 0.9%. 1000 milliLiter(s) (10 mL/Hr) IV Continuous <Continuous>  warfarin 5 milliGRAM(s) Oral once    MEDICATIONS  (PRN):  dextrose 40% Gel 15 Gram(s) Oral once PRN Blood Glucose LESS THAN 70 milliGRAM(s)/deciLiter  glucagon  Injectable 1 milliGRAM(s) IntraMuscular once PRN Glucose <70 milliGRAM(s)/deciLiter      LABS:                        9.0    13.93 )-----------( 219      ( 23 Oct 2020 00:24 )             30.4     10    132<L>  |  95<L>  |  25<H>  ----------------------------<  165<H>  4.7   |  21<L>  |  1.33<H>    Ca    9.6      23 Oct 2020 17:00  Phos  4.1     10-23  Mg     1.6     10-23    TPro  7.4  /  Alb  3.4  /  TBili  0.6  /  DBili  x   /  AST  27  /  ALT  19  /  AlkPhos  55  10-23    PT/INR - ( 23 Oct 2020 09:17 )   PT: 14.6 sec;   INR: 1.23 ratio         PTT - ( 23 Oct 2020 00:24 )  PTT:32.0 sec    Arterial Blood Gas:  10-23 @ 17:37  7.41/37/79/23/96/-1.0  ABG lactate: --  Arterial Blood Gas:  10-23 @ 16:00  7.33/43/121/22/99/-3.3  ABG lactate: --  Arterial Blood Gas:  10-22 @ 23:55  7.41/43/73/27/95/2.6  ABG lactate: --  Arterial Blood Gas:  10-22 @ 19:52  7.36/47/77/26/96/.4  ABG lactate: --  Arterial Blood Gas:  10-22 @ 17:10  7.37/46/119/26/99/1.0  ABG lactate: --  Arterial Blood Gas:  10-22 @ 11:47  7.35/48/70/26/92/.2  ABG lactate: --      CARDIAC MARKERS ( 22 Oct 2020 12:08 )  x     / x     / 92 U/L / x     / 7.7 ng/mL    LIVER FUNCTIONS - ( 23 Oct 2020 00:24 )  Alb: 3.4 g/dL / Pro: 7.4 g/dL / ALK PHOS: 55 U/L / ALT: 19 U/L / AST: 27 U/L / GGT: x                     RADIOLOGY:  Xray Chest 1 View- PORTABLE-Routine (10.23.20 @ 02:19)   Poor inspiratory effort.   The heart is normal in size. Lungs are clear.    No pneumothorax.    < end of copied text >      TTE with Doppler (w/Cont) (10.23.20 @ 08:16)   Moderate left atrial enlargement.   Normal left ventricular systolic function. Moderate diastolic dysfunction (Stage II).  Moderate right atrial enlargement.  Normal right ventricular size with borderline right ventricular function.  Moderate tricuspid regurgitation.  borderline pulmonary pressures    < end of copied text >    Assessment:  74 year old male w H/O 55 Pack year smoking, DM2, HTN, PVD, B/L LE stents,  Bilateral Carotid Artery Stenosis, ICA Stenosis, HLD, HTN, CAD s/p two coronary artery stents , Left Carotid endarterectomy (2020),  Atrial Fibrillation, PVD, DM type 2. Admitted to CTU for Severe Aortic Stenosis with symptoms of valvular heart failure     S/P Recent Carotid Endarterectomy (2020)    Severe Aortic Stenosis   Atrial Fibrillation   HTN  Type 2 Diabetes Mellitus   HLD  CAD  Current Daily Smoker  S/P Sternotomy TAVR (Edward #26) via Direct aortic approach   Post op lactic acidosis  Hypotension  Anemia     Plan:   ***Neuro***  Severe Carotid disease maintain MAP >70 for cerebral perfusion   Nonfocal, continue to monitor neuro status per ICU protocol.       ***Cardiovascular***  Severe Aortic stenosis s/p TAVR POD 1   Post op Hypotension requiring Volume resuscitation & pressor support  Blood pressure support with IV Phenylphrine 0.4mcg for goal MAP 65-70  Dual antiplatelet therapy with ASA & Plavix and Lipitor for History of CAD/ HLD  Stat TTE no pericardial effusion no PVL, +TR  Invasive Hemodynamic monitoring   Trend perfusion indices serial lactates   Coumadin dosed today 5mg for Afib, INR1.23 .  Dose for goal INR of 2-3.   Monitor strict I&Os,  insert Farfan  Hct 25 %  S/P Albumin 750 cc Anemia related to hemodilution  transfuse I units PRBC Tx     ***Pulmonary***    supplemental O2 on 4L NC, SpO2 %.  Will follow CXRs, spO2, ABGs, and maintain Arterial line.   Encourage incentive spirometry      ***Hematology***    H&H (  )   Anemia,   Monitor H&H and transfuse prn   Continue Ferrous sulfate 325mg as no indication for transfusions as this time.     ***GI***  Tolerates Consistent Carb Diet  Continue Protonix for stress ulcer prophylaxis.   Bowel regimen with Miralax and senna.     ***Renal***  Farfan reinserted   Continue to monitor I/Os, BUN/Creatinine, and urine output.   Replete lytes PRN. Keep K> 4 and Mg >2.     ***ID***  Afebrile, Mild Leukocytosis with WBC at 13.93  Completed Perioperative prophylactic antibiotic coverage with Cefuroxime.    ***Endocrine***  DMT2, glycemic control with Admelog sliding scale, Metformin, and Glucagon PRN.   Monitor blood glucose levels.     I have spent 30 minutes providing critical care management to this patient.    By signing my name below, I, Berkley Seals, attest that this documentation has been prepared under the direction and in the presence of Cheryl Mejía MD   Electronically signed: Nicola Hernandez, 10-23-20 @ 18:04    I, Cheryl Mejía, personally performed the services described in this documentation. all medical record entries made by the nicola were at my direction and in my presence. I have reviewed the chart and agree that the record reflects my personal performance and is accurate and complete  Electronically signed: Cheryl Mejía MD

## 2020-10-23 NOTE — PROGRESS NOTE ADULT - ATTENDING COMMENTS
The patient has been noted to asymptomatic hypotensive.  He is currently sitting in a chair.  He denies any ccardiopulmonary complaints.  No fevers, chills or sweats.  No pain at insertion site.  Drain has been removed.  He has been given albumin a total of 500cc and started on phenylephrine drip which is being titrated off.  He has a decreased appetite.    --Patient will need to be closely monitored.  He has symmetric trace DP/PT pulses.  Legs are warm and symmetric in touch/appearance.  --Continue aspirin 81mg PO QD and clopidogrel 75mg PO QD.  Aspirin will be discontinued once his coumadin is therapeutic.  Concern about triple therapy.  --TTE was reviewed.  Device is in an appropriate position with no pericardial effusion.  --Would hold metformin for 48 hours following completion of procedure.  --LA elevated.  ?hypoperfusion ?metformin  --Mild troponin elevation likely secondary to wire manipulation during TAVR procedure.    --Patient is s/p L CEA. Will need Right sided CEA, which will be performed by Dr. Rodriguez several weeks after discharge.  --Continue telemetry monitoring.  --Aim for K>4 and Mg>2.    All questions and concerns of the patient were addressed.

## 2020-10-24 DIAGNOSIS — I48.91 UNSPECIFIED ATRIAL FIBRILLATION: ICD-10-CM

## 2020-10-24 DIAGNOSIS — E11.9 TYPE 2 DIABETES MELLITUS WITHOUT COMPLICATIONS: ICD-10-CM

## 2020-10-24 DIAGNOSIS — Z95.3 PRESENCE OF XENOGENIC HEART VALVE: ICD-10-CM

## 2020-10-24 LAB
ALBUMIN SERPL ELPH-MCNC: 3.4 G/DL — SIGNIFICANT CHANGE UP (ref 3.3–5)
ALP SERPL-CCNC: 51 U/L — SIGNIFICANT CHANGE UP (ref 40–120)
ALT FLD-CCNC: 16 U/L — SIGNIFICANT CHANGE UP (ref 10–45)
ANION GAP SERPL CALC-SCNC: 14 MMOL/L — SIGNIFICANT CHANGE UP (ref 5–17)
AST SERPL-CCNC: 21 U/L — SIGNIFICANT CHANGE UP (ref 10–40)
BASOPHILS # BLD AUTO: 0.06 K/UL — SIGNIFICANT CHANGE UP (ref 0–0.2)
BASOPHILS NFR BLD AUTO: 0.4 % — SIGNIFICANT CHANGE UP (ref 0–2)
BILIRUB SERPL-MCNC: 0.8 MG/DL — SIGNIFICANT CHANGE UP (ref 0.2–1.2)
BUN SERPL-MCNC: 28 MG/DL — HIGH (ref 7–23)
CALCIUM SERPL-MCNC: 9.2 MG/DL — SIGNIFICANT CHANGE UP (ref 8.4–10.5)
CHLORIDE SERPL-SCNC: 97 MMOL/L — SIGNIFICANT CHANGE UP (ref 96–108)
CK MB CFR SERPL CALC: 1.6 NG/ML — SIGNIFICANT CHANGE UP (ref 0–6.7)
CK SERPL-CCNC: 67 U/L — SIGNIFICANT CHANGE UP (ref 30–200)
CO2 SERPL-SCNC: 21 MMOL/L — LOW (ref 22–31)
CREAT SERPL-MCNC: 1.07 MG/DL — SIGNIFICANT CHANGE UP (ref 0.5–1.3)
EOSINOPHIL # BLD AUTO: 0.03 K/UL — SIGNIFICANT CHANGE UP (ref 0–0.5)
EOSINOPHIL NFR BLD AUTO: 0.2 % — SIGNIFICANT CHANGE UP (ref 0–6)
GAS PNL BLDA: SIGNIFICANT CHANGE UP
GLUCOSE BLDC GLUCOMTR-MCNC: 121 MG/DL — HIGH (ref 70–99)
GLUCOSE BLDC GLUCOMTR-MCNC: 121 MG/DL — HIGH (ref 70–99)
GLUCOSE BLDC GLUCOMTR-MCNC: 133 MG/DL — HIGH (ref 70–99)
GLUCOSE BLDC GLUCOMTR-MCNC: 143 MG/DL — HIGH (ref 70–99)
GLUCOSE BLDC GLUCOMTR-MCNC: 185 MG/DL — HIGH (ref 70–99)
GLUCOSE BLDC GLUCOMTR-MCNC: 210 MG/DL — HIGH (ref 70–99)
GLUCOSE BLDC GLUCOMTR-MCNC: 289 MG/DL — HIGH (ref 70–99)
GLUCOSE BLDC GLUCOMTR-MCNC: 295 MG/DL — HIGH (ref 70–99)
GLUCOSE BLDC GLUCOMTR-MCNC: 90 MG/DL — SIGNIFICANT CHANGE UP (ref 70–99)
GLUCOSE SERPL-MCNC: 129 MG/DL — HIGH (ref 70–99)
HCT VFR BLD CALC: 29.5 % — LOW (ref 39–50)
HGB BLD-MCNC: 9 G/DL — LOW (ref 13–17)
IMM GRANULOCYTES NFR BLD AUTO: 0.7 % — SIGNIFICANT CHANGE UP (ref 0–1.5)
INR BLD: 1.31 RATIO — HIGH (ref 0.88–1.16)
LYMPHOCYTES # BLD AUTO: 1.15 K/UL — SIGNIFICANT CHANGE UP (ref 1–3.3)
LYMPHOCYTES # BLD AUTO: 7.7 % — LOW (ref 13–44)
MAGNESIUM SERPL-MCNC: 1.6 MG/DL — SIGNIFICANT CHANGE UP (ref 1.6–2.6)
MCHC RBC-ENTMCNC: 27.4 PG — SIGNIFICANT CHANGE UP (ref 27–34)
MCHC RBC-ENTMCNC: 30.5 GM/DL — LOW (ref 32–36)
MCV RBC AUTO: 89.9 FL — SIGNIFICANT CHANGE UP (ref 80–100)
MONOCYTES # BLD AUTO: 1.39 K/UL — HIGH (ref 0–0.9)
MONOCYTES NFR BLD AUTO: 9.3 % — SIGNIFICANT CHANGE UP (ref 2–14)
NEUTROPHILS # BLD AUTO: 12.2 K/UL — HIGH (ref 1.8–7.4)
NEUTROPHILS NFR BLD AUTO: 81.7 % — HIGH (ref 43–77)
NRBC # BLD: 0 /100 WBCS — SIGNIFICANT CHANGE UP (ref 0–0)
PHOSPHATE SERPL-MCNC: 3.3 MG/DL — SIGNIFICANT CHANGE UP (ref 2.5–4.5)
PLATELET # BLD AUTO: 167 K/UL — SIGNIFICANT CHANGE UP (ref 150–400)
POTASSIUM SERPL-MCNC: 4.7 MMOL/L — SIGNIFICANT CHANGE UP (ref 3.5–5.3)
POTASSIUM SERPL-SCNC: 4.7 MMOL/L — SIGNIFICANT CHANGE UP (ref 3.5–5.3)
PROT SERPL-MCNC: 7.1 G/DL — SIGNIFICANT CHANGE UP (ref 6–8.3)
PROTHROM AB SERPL-ACNC: 15.5 SEC — HIGH (ref 10.6–13.6)
RBC # BLD: 3.28 M/UL — LOW (ref 4.2–5.8)
RBC # FLD: 16.2 % — HIGH (ref 10.3–14.5)
SODIUM SERPL-SCNC: 132 MMOL/L — LOW (ref 135–145)
TROPONIN T, HIGH SENSITIVITY RESULT: 77 NG/L — HIGH (ref 0–51)
WBC # BLD: 14.94 K/UL — HIGH (ref 3.8–10.5)
WBC # FLD AUTO: 14.94 K/UL — HIGH (ref 3.8–10.5)

## 2020-10-24 PROCEDURE — 71045 X-RAY EXAM CHEST 1 VIEW: CPT | Mod: 26

## 2020-10-24 PROCEDURE — 93010 ELECTROCARDIOGRAM REPORT: CPT

## 2020-10-24 PROCEDURE — 99232 SBSQ HOSP IP/OBS MODERATE 35: CPT

## 2020-10-24 PROCEDURE — 99291 CRITICAL CARE FIRST HOUR: CPT

## 2020-10-24 RX ORDER — FUROSEMIDE 40 MG
20 TABLET ORAL ONCE
Refills: 0 | Status: COMPLETED | OUTPATIENT
Start: 2020-10-24 | End: 2020-10-24

## 2020-10-24 RX ORDER — ACETAMINOPHEN 500 MG
1000 TABLET ORAL ONCE
Refills: 0 | Status: COMPLETED | OUTPATIENT
Start: 2020-10-24 | End: 2020-10-24

## 2020-10-24 RX ORDER — WARFARIN SODIUM 2.5 MG/1
5 TABLET ORAL ONCE
Refills: 0 | Status: COMPLETED | OUTPATIENT
Start: 2020-10-24 | End: 2020-10-24

## 2020-10-24 RX ORDER — FENTANYL CITRATE 50 UG/ML
25 INJECTION INTRAVENOUS ONCE
Refills: 0 | Status: DISCONTINUED | OUTPATIENT
Start: 2020-10-24 | End: 2020-10-24

## 2020-10-24 RX ORDER — INSULIN HUMAN 100 [IU]/ML
2 INJECTION, SOLUTION SUBCUTANEOUS
Qty: 100 | Refills: 0 | Status: DISCONTINUED | OUTPATIENT
Start: 2020-10-24 | End: 2020-10-24

## 2020-10-24 RX ORDER — METOPROLOL TARTRATE 50 MG
25 TABLET ORAL
Refills: 0 | Status: DISCONTINUED | OUTPATIENT
Start: 2020-10-24 | End: 2020-10-25

## 2020-10-24 RX ORDER — OXYCODONE AND ACETAMINOPHEN 5; 325 MG/1; MG/1
1 TABLET ORAL EVERY 6 HOURS
Refills: 0 | Status: DISCONTINUED | OUTPATIENT
Start: 2020-10-24 | End: 2020-10-27

## 2020-10-24 RX ORDER — ALBUMIN HUMAN 25 %
250 VIAL (ML) INTRAVENOUS ONCE
Refills: 0 | Status: COMPLETED | OUTPATIENT
Start: 2020-10-24 | End: 2020-10-24

## 2020-10-24 RX ADMIN — FENTANYL CITRATE 25 MICROGRAM(S): 50 INJECTION INTRAVENOUS at 11:35

## 2020-10-24 RX ADMIN — FENTANYL CITRATE 25 MICROGRAM(S): 50 INJECTION INTRAVENOUS at 05:50

## 2020-10-24 RX ADMIN — Medication 400 MILLIGRAM(S): at 22:22

## 2020-10-24 RX ADMIN — INSULIN HUMAN 2 UNIT(S)/HR: 100 INJECTION, SOLUTION SUBCUTANEOUS at 13:37

## 2020-10-24 RX ADMIN — OXYCODONE AND ACETAMINOPHEN 1 TABLET(S): 5; 325 TABLET ORAL at 18:35

## 2020-10-24 RX ADMIN — Medication 81 MILLIGRAM(S): at 11:29

## 2020-10-24 RX ADMIN — OXYCODONE AND ACETAMINOPHEN 1 TABLET(S): 5; 325 TABLET ORAL at 19:15

## 2020-10-24 RX ADMIN — Medication 1: at 07:53

## 2020-10-24 RX ADMIN — PANTOPRAZOLE SODIUM 40 MILLIGRAM(S): 20 TABLET, DELAYED RELEASE ORAL at 06:18

## 2020-10-24 RX ADMIN — FENTANYL CITRATE 25 MICROGRAM(S): 50 INJECTION INTRAVENOUS at 05:35

## 2020-10-24 RX ADMIN — Medication 1000 MILLIGRAM(S): at 22:40

## 2020-10-24 RX ADMIN — SENNA PLUS 2 TABLET(S): 8.6 TABLET ORAL at 21:46

## 2020-10-24 RX ADMIN — Medication 3: at 11:37

## 2020-10-24 RX ADMIN — Medication 25 MILLIGRAM(S): at 08:42

## 2020-10-24 RX ADMIN — WARFARIN SODIUM 5 MILLIGRAM(S): 2.5 TABLET ORAL at 21:46

## 2020-10-24 RX ADMIN — FENTANYL CITRATE 25 MICROGRAM(S): 50 INJECTION INTRAVENOUS at 11:20

## 2020-10-24 RX ADMIN — CLOPIDOGREL BISULFATE 75 MILLIGRAM(S): 75 TABLET, FILM COATED ORAL at 11:29

## 2020-10-24 RX ADMIN — Medication 25 MILLIGRAM(S): at 18:35

## 2020-10-24 RX ADMIN — POLYETHYLENE GLYCOL 3350 17 GRAM(S): 17 POWDER, FOR SOLUTION ORAL at 11:29

## 2020-10-24 RX ADMIN — Medication 325 MILLIGRAM(S): at 11:29

## 2020-10-24 RX ADMIN — ATORVASTATIN CALCIUM 80 MILLIGRAM(S): 80 TABLET, FILM COATED ORAL at 21:46

## 2020-10-24 RX ADMIN — Medication 125 MILLILITER(S): at 11:18

## 2020-10-24 RX ADMIN — Medication 20 MILLIGRAM(S): at 15:29

## 2020-10-24 RX ADMIN — Medication 2: at 21:46
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
## 2020-10-24 NOTE — PHYSICAL THERAPY INITIAL EVALUATION ADULT - ADDITIONAL COMMENTS
Patient lives in private home with spouse, 2 steps to enter with UHR, bedroom/bathroom on main level, patient ambulates independently, DME: cane

## 2020-10-24 NOTE — PHYSICAL THERAPY INITIAL EVALUATION ADULT - PLANNED THERAPY INTERVENTIONS, PT EVAL
gait training/transfer training/bed mobility training/Stair negotiation GOAL: Patient will negotiate up / down 2 steps with supervision in 2 weeks

## 2020-10-24 NOTE — PROGRESS NOTE ADULT - SUBJECTIVE AND OBJECTIVE BOX
PAIGE BYRNES  MRN-[id number]  Patient is a 74y old  Male who presents with a chief complaint of Nonrheumatic Aortic Valve Stenosis (23 Oct 2020 18:04)    HPI:  74 year old Male with a past medical history of Bilateral Carotid Artery Stenosis, ICA Stenosis, HLD, HTN, CAD s/p two coronary artery stents (most recent 9/2020), Carotid endarterectomy (9/2020), Angiography of extremity s/p B/L LE stents, Atrial Fibrillation, PVD, DM type 2. Admitted to CTU for Severe Aortic Stenosis requiring TAVR on 10/22/2020.    Surgery/Hospital Course:  10/22 Direct Transcatheter Aortic Valve Replacement with willie-sternotomy    Today:    REVIEW OF SYSTEMS:  Gen: No fever  EYES/ENT: No visual changes;  No vertigo or throat pain   NECK: No pain   RES:  No shortness of breath or Cough  CARD: No chest pain   GI: No abdominal pain  : No dysuria  NEURO: No weakness  SKIN: No itching, rashes     ICU Vital Signs Last 24 Hrs  T(C): 36.2 (24 Oct 2020 12:00), Max: 37.4 (24 Oct 2020 08:00)  T(F): 97.2 (24 Oct 2020 12:00), Max: 99.4 (24 Oct 2020 08:00)  HR: 69 (24 Oct 2020 14:00) (66 - 88)  BP: 95/51 (24 Oct 2020 14:00) (91/53 - 151/70)  BP(mean): 68 (24 Oct 2020 14:00) (67 - 104)  ABP: 106/43 (24 Oct 2020 14:00) (102/46 - 169/61)  ABP(mean): 61 (24 Oct 2020 14:00) (61 - 89)  RR: 35 (24 Oct 2020 14:00) (12 - 35)  SpO2: 97% (24 Oct 2020 14:00) (78% - 100%)      Physical Exam:  Gen: Drowsy, but arousable   CNS: nonfocal  Neck: no JVD, scar over left neck  Res: clear bilateral breath sounds  CVS: Irregularly Irregular rhythm, normal S1/S2, Mediastinal chest tube x1  Abd: soft, non-distended, bowel sounds present   Skin: LE dark skin changes consistent with PVD and possibly venous stasis, sternal dressing clean, dry and intact.   Ext: no edema, dorsalis pedis pulses by doppler only    ============================I/O===========================   I&O's Detail    23 Oct 2020 07:01  -  24 Oct 2020 07:00  --------------------------------------------------------  IN:    Albumin 5%  - 250 mL: 1000 mL    IV PiggyBack: 150 mL    Oral Fluid: 60 mL    Phenylephrine: 72.1 mL    PRBCs (Packed Red Blood Cells): 350 mL    sodium chloride 0.9%: 240 mL  Total IN: 1872.1 mL    OUT:    Chest Tube (mL): 80 mL    Indwelling Catheter - Urethral (mL): 1135 mL  Total OUT: 1215 mL    Total NET: 657.1 mL      24 Oct 2020 07:01  -  24 Oct 2020 14:54  --------------------------------------------------------  IN:    Albumin 5%  - 250 mL: 250 mL    Insulin: 5 mL    Oral Fluid: 960 mL    sodium chloride 0.9%: 10 mL  Total IN: 1225 mL    OUT:    Indwelling Catheter - Urethral (mL): 260 mL    Phenylephrine: 0 mL  Total OUT: 260 mL    Total NET: 965 mL        ============================ LABS =========================                        9.0    14.94 )-----------( 167      ( 24 Oct 2020 01:05 )             29.5     10-24    132<L>  |  97  |  28<H>  ----------------------------<  129<H>  4.7   |  21<L>  |  1.07    Ca    9.2      24 Oct 2020 01:05  Phos  3.3     10-24  Mg     1.6     10-24    TPro  7.1  /  Alb  3.4  /  TBili  0.8  /  DBili  x   /  AST  21  /  ALT  16  /  AlkPhos  51  10-24    LIVER FUNCTIONS - ( 24 Oct 2020 01:05 )  Alb: 3.4 g/dL / Pro: 7.1 g/dL / ALK PHOS: 51 U/L / ALT: 16 U/L / AST: 21 U/L / GGT: x           PT/INR - ( 24 Oct 2020 14:16 )   PT: 15.5 sec;   INR: 1.31 ratio         PTT - ( 23 Oct 2020 00:24 )  PTT:32.0 sec  ABG - ( 24 Oct 2020 13:15 )  pH, Arterial: 7.40  pH, Blood: x     /  pCO2: 43    /  pO2: 82    / HCO3: 26    / Base Excess: 2.0   /  SaO2: 96        ======================Micro/Rad/Cardio=================  Culture: Reviewed   CXR: Reviewed  Echo: Reviewed  ======================================================  PAST MEDICAL & SURGICAL HISTORY:  Bilateral carotid artery stenosis  ICA stenosis    Stented coronary artery    T2DM (type 2 diabetes mellitus)    Peripheral vascular disease    Atrial fibrillation    Hypertension    Hyperlipidemia    Coronary artery disease    S/P carotid endarterectomy  left    9/2020    Status post angiography of extremity  1 stent in each leg    History of coronary artery stent placement  two coronary stents; most recent 9/21/2020      ====================ASSESSMENT ==============  S/P Recent Carotid Endarterectomy (9/2020)    Severe Aortic Stenosis s/p Transcatheter Aortic Replacement  Atrial Fibrillation   Hypertension  Type 2 Diabetes Mellitus   Hyperlipidemia  Coronary Artery Disease  Current Daily Smoker    Plan:  ====================== NEUROLOGY=====================  Nonfocal, continue to monitor neuro status per ICU protocol.     ==================== RESPIRATORY======================  Continue close monitoring of breathing pattern and respiratory rate & the following of continuous pulse oximetry for support & to prevent decompensation.  Encourage incentive spirometry.     ====================CARDIOVASCULAR==================  aspirin  chewable 81 milliGRAM(s) Oral daily  clopidogrel Tablet 75 milliGRAM(s) Oral daily  atorvastatin 80 milliGRAM(s) Oral at bedtime  metoprolol tartrate 25 milliGRAM(s) Oral two times a day  phenylephrine    Infusion 0.4 MICROgram(s)/kG/Min (9.29 mL/Hr) IV Continuous <Continuous>    ===================HEMATOLOGIC/ONC ===================  Anemia likely due to chronic illness. No current indication for transfusion. Monitor hemoglobin and hematocrit levels.  Continue monitoring INR daily. Goal INR 2-3.    Monitor BUN/Cr    warfarin 5 milliGRAM(s) Oral once    ===================== RENAL =========================  Continue monitoring urine output, monitor BUN/SrC, I/Os.  Diuresis with Lasix    furosemide   Injectable 20 milliGRAM(s) IV Push once    ==================== GASTROINTESTINAL===================  Tolerating Consistent Carbohydrate Diet. Protonix for stress ulcer prophylaxis. Continue bowel regimen with Miralax and senna.    pantoprazole    Tablet 40 milliGRAM(s) Oral before breakfast  polyethylene glycol 3350 17 Gram(s) Oral daily  senna 2 Tablet(s) Oral at bedtime    =======================    ENDOCRINE  =====================  History of Type 2 Diabetes Mellitus, requiring glucose control with glucose checks, glucagon PRN, Admelog sliding scale insulin premeal and qhs and IV insulin.    glucagon  Injectable 1 milliGRAM(s) IntraMuscular once PRN Glucose <70 milliGRAM(s)/deciLiter  insulin lispro (ADMELOG) corrective regimen sliding scale   SubCutaneous Before meals and at bedtime  insulin regular Infusion 2 Unit(s)/Hr (2 mL/Hr) IV Continuous <Continuous>    ========================INFECTIOUS DISEASE================  S/p post-operative prophylactic antibiotic coverage with Cefuroxime  No active issues, monitor fever curve and WBC.      Patient requires continuous monitoring with bedside rhythm monitoring, pulse ox monitoring, and intermittent blood gas analysis. Care plan discussed with ICU care team. Patient remained critical and at risk for life threatening decompensation.     By signing my name below, I, Kim Velasquez, attest that this documentation has been prepared under the direction and in the presence of Radha Belle PA.  Electronically signed: Nicola Manrique, 10-24-20 @ 14:54    I, Radha Belle, personally performed the services described in this documentation. All medical record entries made by the nicola were at my direction and in my presence. I have reviewed the chart and agree that the record reflects my personal performance and is accurate and complete  Electronically signed: Radha Belle PA.

## 2020-10-24 NOTE — PROGRESS NOTE ADULT - PROBLEM SELECTOR PLAN 1
Continue with ASA 81 mg PO Daily ans Plavix 75 mg PO daily for recent stent  Continue with Lopressor 25 mg PO BID.   Continue with Lipitor 80 mg PO HS   Strict RENE's   Increase activity as tolerated.   Encourage Chest PT / Pulmonary toileting and Incentive spirometry every 1 hour x 10 while awake.   Continue with PUD prophylaxis.   D/C plan home once medically cleared   Plan of care discussed with attending

## 2020-10-24 NOTE — PHYSICAL THERAPY INITIAL EVALUATION ADULT - GENERAL OBSERVATIONS, REHAB EVAL
Patient received sitting in bedside chair, NAD, +left radial jamaal, +PIV infusing, +bacon, RN present throughout session Patient received sitting in bedside chair, NAD, +left radial jamaal, +PIV infusing, +4L O2 via NC, +KIM bacon present throughout session

## 2020-10-24 NOTE — PROGRESS NOTE ADULT - PROBLEM SELECTOR PLAN 3
Coumadin 5 mg PO tonight for a therapeutic INR 2-3   Daily PT/ INR  Continue with Lopressor 25 mg PO BID.

## 2020-10-24 NOTE — PROGRESS NOTE ADULT - SUBJECTIVE AND OBJECTIVE BOX
VITAL SIGNS    Subjective: "I'm feeling ok., I just have some pain on my incision" Denies palpitation, SOB, MAX, HA, dizziness, N/V/D, fever or chills.  No acute event noted overnight.     Telemetry: Afib 70       Vital Signs Last 24 Hrs  T(C): 36.5 (10-24-20 @ 17:39), Max: 37.4 (10-24-20 @ 08:00)  T(F): 97.7 (10-24-20 @ 17:39), Max: 99.4 (10-24-20 @ 08:00)  HR: 73 (10-24-20 @ 17:39) (66 - 88)  BP: 141/67 (10-24-20 @ 17:39) (91/53 - 151/70)  RR: 19 (10-24-20 @ 17:39) (16 - 35)  SpO2: 96% (10-24-20 @ 17:39) (78% - 100%)           10-23 @ 07:01  -  10-24 @ 07:00  --------------------------------------------------------  IN: 1872.1 mL / OUT: 1215 mL / NET: 657.1 mL    10-24 @ 07:01  -  10-24 @ 18:27  --------------------------------------------------------  IN: 1233 mL / OUT: 540 mL / NET: 693 mL    Daily     Daily Weight in k.7 (24 Oct 2020 00:00)    CAPILLARY BLOOD GLUCOSE    POCT Blood Glucose.: 133 mg/dL (24 Oct 2020 18:03)  POCT Blood Glucose.: 90 mg/dL (24 Oct 2020 17:01)  POCT Blood Glucose.: 121 mg/dL (24 Oct 2020 16:11)  POCT Blood Glucose.: 121 mg/dL (24 Oct 2020 15:27)  POCT Blood Glucose.: 143 mg/dL (24 Oct 2020 15:04)  POCT Blood Glucose.: 295 mg/dL (24 Oct 2020 14:07)  POCT Blood Glucose.: 289 mg/dL (24 Oct 2020 11:36)  POCT Blood Glucose.: 185 mg/dL (24 Oct 2020 07:52)  POCT Blood Glucose.: 173 mg/dL (23 Oct 2020 21:13)        PHYSICAL EXAM    Neurology: alert and oriented x 3, nonfocal, no gross deficits    CV: (+) Irregular S1 and S2, No murmurs, rubs, gallops or clicks     Sternal Wound: MSI --> with coverlet dressing --> CDI , sternum stable    Lungs: CTA B/L     Abdomen: soft, nontender, nondistended, positive bowel sounds, (+) Flatus; (+) BM     : Indwelling bacon cath --> SD                Extremities:  B/L LE (+) trace edema; negative calf tenderness; (+) 2 DP palpable        aspirin  chewable 81 milliGRAM(s) Oral daily  atorvastatin 80 milliGRAM(s) Oral at bedtime  clopidogrel Tablet 75 milliGRAM(s) Oral daily  ferrous sulfate 325 milliGRAM(s) Oral daily  glucagon  Injectable 1 milliGRAM(s) IntraMuscular once PRN  influenza Vaccine 0.5 milliLiter(s) IntraMuscular once  insulin lispro (ADMELOG) corrective regimen sliding scale   SubCutaneous Before meals and at bedtime  insulin regular Infusion 2 Unit(s)/Hr IV Continuous <Continuous>  metoprolol tartrate 25 milliGRAM(s) Oral two times a day  oxycodone 5 mG/acetaminophen 325 mG 1 Tablet(s) Oral every 6 hours PRN  pantoprazole Tablet 40 milliGRAM(s) Oral before breakfast  phenylephrine Infusion 0.4 MICROgram(s)/kG/Min IV Continuous <Continuous>  polyethylene glycol 3350 17 Gram(s) Oral daily  senna 2 Tablet(s) Oral at bedtime  sodium chloride 0.9%. 1000 milliLiter(s) IV Continuous <Continuous>  warfarin 5 milliGRAM(s) Oral once    Physical Therapy Rec:   Home  [  ]   Home w/ PT  [ X ]  Rehab  [  ]    Discussed with Cardiothoracic Team at AM rounds.

## 2020-10-24 NOTE — PHYSICAL THERAPY INITIAL EVALUATION ADULT - PERTINENT HX OF CURRENT PROBLEM, REHAB EVAL
74 year old Male with a past medical history of Bilateral Carotid Artery Stenosis, ICA Stenosis, HLD, HTN, CAD s/p two coronary artery stents (most recent 9/2020), Carotid endarterectomy (9/2020), Angiography of extremity s/p B/L LE stents, Atrial Fibrillation, PVD, DM type 2. Admitted to CTU for Severe Aortic Stenosis requiring TAVR on 10/22/2020. 10/23: Mediastinal chest tube removed

## 2020-10-24 NOTE — PROGRESS NOTE ADULT - ASSESSMENT
74 year old Male with a past medical history of Bilateral Carotid Artery Stenosis, ICA Stenosis, HLD, HTN, CAD s/p two coronary artery stents (most recent 9/2020), Carotid endarterectomy (9/2020), Angiography of extremity s/p B/L LE stents, Atrial Fibrillation, PVD, DM type 2. Admitted to CTU for Severe Aortic Stenosis requiring TAVR on 10/22/2020.  Surgery/Hospital Course:  10/22 Direct Transcatheter Aortic Valve Replacement with willie-sternotomy.   Post op Course:   Mediastinal chest tube removed.  Hypotension à requiring Foster gtt.  Weaned off.   Hyperglycemia à Insulin gtt   Elevated Lactate level as high 4.4 à now 2.2   10/23 Post op TTE: small pericardial effusion; negative paravalvular leak.   10/24 Insulin gtt wean off à started on Metformin this AM. Low UO --> 250 SPA x 1 followed by Lasix 20 --> Farfan cath maintained.  Transferred to SDU @ 17:00 Coumadin 5 mg Po tonight for chronic Afib. Continue with current medication regimen.

## 2020-10-25 LAB
ANION GAP SERPL CALC-SCNC: 11 MMOL/L — SIGNIFICANT CHANGE UP (ref 5–17)
BUN SERPL-MCNC: 28 MG/DL — HIGH (ref 7–23)
CALCIUM SERPL-MCNC: 9.5 MG/DL — SIGNIFICANT CHANGE UP (ref 8.4–10.5)
CHLORIDE SERPL-SCNC: 98 MMOL/L — SIGNIFICANT CHANGE UP (ref 96–108)
CO2 SERPL-SCNC: 20 MMOL/L — LOW (ref 22–31)
CREAT SERPL-MCNC: 1.01 MG/DL — SIGNIFICANT CHANGE UP (ref 0.5–1.3)
GLUCOSE BLDC GLUCOMTR-MCNC: 143 MG/DL — HIGH (ref 70–99)
GLUCOSE BLDC GLUCOMTR-MCNC: 181 MG/DL — HIGH (ref 70–99)
GLUCOSE BLDC GLUCOMTR-MCNC: 204 MG/DL — HIGH (ref 70–99)
GLUCOSE BLDC GLUCOMTR-MCNC: 214 MG/DL — HIGH (ref 70–99)
GLUCOSE BLDC GLUCOMTR-MCNC: 269 MG/DL — HIGH (ref 70–99)
GLUCOSE SERPL-MCNC: 126 MG/DL — HIGH (ref 70–99)
HCT VFR BLD CALC: 32.1 % — LOW (ref 39–50)
HGB BLD-MCNC: 9.9 G/DL — LOW (ref 13–17)
INR BLD: 1.29 RATIO — HIGH (ref 0.88–1.16)
MCHC RBC-ENTMCNC: 27.4 PG — SIGNIFICANT CHANGE UP (ref 27–34)
MCHC RBC-ENTMCNC: 30.8 GM/DL — LOW (ref 32–36)
MCV RBC AUTO: 88.9 FL — SIGNIFICANT CHANGE UP (ref 80–100)
NRBC # BLD: 0 /100 WBCS — SIGNIFICANT CHANGE UP (ref 0–0)
PLATELET # BLD AUTO: 173 K/UL — SIGNIFICANT CHANGE UP (ref 150–400)
POTASSIUM SERPL-MCNC: 4.7 MMOL/L — SIGNIFICANT CHANGE UP (ref 3.5–5.3)
POTASSIUM SERPL-SCNC: 4.7 MMOL/L — SIGNIFICANT CHANGE UP (ref 3.5–5.3)
PROTHROM AB SERPL-ACNC: 15.3 SEC — HIGH (ref 10.6–13.6)
RBC # BLD: 3.61 M/UL — LOW (ref 4.2–5.8)
RBC # FLD: 16.2 % — HIGH (ref 10.3–14.5)
SODIUM SERPL-SCNC: 129 MMOL/L — LOW (ref 135–145)
WBC # BLD: 12.34 K/UL — HIGH (ref 3.8–10.5)
WBC # FLD AUTO: 12.34 K/UL — HIGH (ref 3.8–10.5)

## 2020-10-25 PROCEDURE — 71045 X-RAY EXAM CHEST 1 VIEW: CPT | Mod: 26

## 2020-10-25 PROCEDURE — 93010 ELECTROCARDIOGRAM REPORT: CPT

## 2020-10-25 PROCEDURE — 99232 SBSQ HOSP IP/OBS MODERATE 35: CPT

## 2020-10-25 RX ORDER — WARFARIN SODIUM 2.5 MG/1
5 TABLET ORAL ONCE
Refills: 0 | Status: COMPLETED | OUTPATIENT
Start: 2020-10-25 | End: 2020-10-25

## 2020-10-25 RX ORDER — FUROSEMIDE 40 MG
20 TABLET ORAL ONCE
Refills: 0 | Status: COMPLETED | OUTPATIENT
Start: 2020-10-25 | End: 2020-10-25

## 2020-10-25 RX ORDER — METOPROLOL TARTRATE 50 MG
25 TABLET ORAL ONCE
Refills: 0 | Status: COMPLETED | OUTPATIENT
Start: 2020-10-25 | End: 2020-10-25

## 2020-10-25 RX ORDER — METOPROLOL TARTRATE 50 MG
50 TABLET ORAL
Refills: 0 | Status: DISCONTINUED | OUTPATIENT
Start: 2020-10-25 | End: 2020-10-27

## 2020-10-25 RX ADMIN — Medication 20 MILLIGRAM(S): at 00:48

## 2020-10-25 RX ADMIN — Medication 25 MILLIGRAM(S): at 06:13

## 2020-10-25 RX ADMIN — PANTOPRAZOLE SODIUM 40 MILLIGRAM(S): 20 TABLET, DELAYED RELEASE ORAL at 05:51

## 2020-10-25 RX ADMIN — OXYCODONE AND ACETAMINOPHEN 1 TABLET(S): 5; 325 TABLET ORAL at 21:45

## 2020-10-25 RX ADMIN — ATORVASTATIN CALCIUM 80 MILLIGRAM(S): 80 TABLET, FILM COATED ORAL at 21:00

## 2020-10-25 RX ADMIN — Medication 2: at 17:41

## 2020-10-25 RX ADMIN — OXYCODONE AND ACETAMINOPHEN 1 TABLET(S): 5; 325 TABLET ORAL at 21:00

## 2020-10-25 RX ADMIN — SENNA PLUS 2 TABLET(S): 8.6 TABLET ORAL at 21:00

## 2020-10-25 RX ADMIN — Medication 50 MILLIGRAM(S): at 17:42

## 2020-10-25 RX ADMIN — Medication 325 MILLIGRAM(S): at 12:03

## 2020-10-25 RX ADMIN — WARFARIN SODIUM 5 MILLIGRAM(S): 2.5 TABLET ORAL at 21:00

## 2020-10-25 RX ADMIN — Medication 81 MILLIGRAM(S): at 12:03

## 2020-10-25 RX ADMIN — Medication 3: at 22:05

## 2020-10-25 RX ADMIN — POLYETHYLENE GLYCOL 3350 17 GRAM(S): 17 POWDER, FOR SOLUTION ORAL at 12:03

## 2020-10-25 RX ADMIN — Medication 25 MILLIGRAM(S): at 06:32

## 2020-10-25 RX ADMIN — CLOPIDOGREL BISULFATE 75 MILLIGRAM(S): 75 TABLET, FILM COATED ORAL at 12:03

## 2020-10-25 RX ADMIN — Medication 2: at 12:01

## 2020-10-25 NOTE — PROGRESS NOTE ADULT - PROBLEM SELECTOR PLAN 1
Continue with ASA 81 mg PO Daily ans Plavix 75 mg PO daily for recent stent  Continue with Lopressor 25 mg PO BID.   Continue with Lipitor 80 mg PO HS   Strict RENE's   Increase activity as tolerated.   Encourage Chest PT / Pulmonary toileting and Incentive spirometry every 1 hour x 10 while awake.   Continue with PUD prophylaxis.   D/C plan home once medically cleared   Plan of care discussed with attending Continue with ASA 81 mg PO Daily ans Plavix 75 mg PO daily for recent stent   Lopressor 50 mg PO BID.   Continue with Lipitor 80 mg PO HS   Strict RENE's   Increase activity as tolerated.   Encourage Chest PT / Pulmonary toileting and Incentive spirometry every 1 hour x 10 while awake.   Continue with PUD prophylaxis.   D/C plan home once medically cleared   Plan of care discussed with attending

## 2020-10-25 NOTE — PROGRESS NOTE ADULT - ASSESSMENT
74 year old Male with a past medical history of Bilateral Carotid Artery Stenosis, ICA Stenosis, HLD, HTN, CAD s/p two coronary artery stents (most recent 9/2020), Carotid endarterectomy (9/2020), Angiography of extremity s/p B/L LE stents, Atrial Fibrillation, PVD, DM type 2. Admitted to CTU for Severe Aortic Stenosis requiring TAVR on 10/22/2020.  Surgery/Hospital Course:  10/22 Direct Transcatheter Aortic Valve Replacement with willie-sternotomy.   Post op Course:   Mediastinal chest tube removed.  Hypotension à requiring Foster gtt.  Weaned off.   Hyperglycemia à Insulin gtt   Elevated Lactate level as high 4.4 à now 2.2   10/23 Post op TTE: small pericardial effusion; negative paravalvular leak.   10/24 Insulin gtt wean off à started on Metformin this AM. Low UO --> 250 SPA x 1 followed by Lasix 20 --> Farfan cath maintained.  Transferred to SDU @ 17:00 Coumadin 5 mg Po tonight for chronic Afib. Continue with current medication regimen.       74 year old Male with a past medical history of Bilateral Carotid Artery Stenosis, ICA Stenosis, HLD, HTN, CAD s/p two coronary artery stents (most recent 9/2020), Carotid endarterectomy (9/2020), Angiography of extremity s/p B/L LE stents, Atrial Fibrillation, PVD, DM type 2. Admitted to CTU for Severe Aortic Stenosis requiring TAVR on 10/22/2020.  Surgery/Hospital Course:  10/22 Direct Transcatheter Aortic Valve Replacement with willie-sternotomy.   Post op Course:   Mediastinal chest tube removed.  Hypotension à requiring Foster gtt.  Weaned off.   Hyperglycemia à Insulin gtt   Elevated Lactate level as high 4.4 à now 2.2   10/23 Post op TTE: small pericardial effusion; negative paravalvular leak.   10/24 Insulin gtt wean off à started on Metformin this AM. Low UO --> 250 SPA x 1 followed by Lasix 20 --> Farfan cath maintained.  Transferred to SDU @ 17:00 Coumadin 5 mg Po tonight for chronic Afib. Continue with current medication regimen.    10/25 VSS - POD # 3 - will d/c Farfan @ midnight tonight-d/c planning - anticipate home

## 2020-10-25 NOTE — PROGRESS NOTE ADULT - PROBLEM SELECTOR PLAN 3
Coumadin 5 mg PO tonight for a therapeutic INR 2-3   Daily PT/ INR  Continue with Lopressor 25 mg PO BID. Coumadin 5 mg PO tonight for a therapeutic INR 2-3   Daily PT/ INR  Continue with Lopressor 50 mg PO BID.

## 2020-10-25 NOTE — PROGRESS NOTE ADULT - SUBJECTIVE AND OBJECTIVE BOX
VITAL SIGNS-Telemetry:    Vital Signs Last 24 Hrs  T(C): 36.4 (10-25-20 @ 02:59), Max: 37.4 (10-24-20 @ 08:00)  T(F): 97.6 (10-25-20 @ 02:59), Max: 99.4 (10-24-20 @ 08:00)  HR: 72 (10-25-20 @ 06:11) (62 - 81)  BP: 176/78 (10-25-20 @ 06:11) (91/53 - 176/78)  RR: 18 (10-25-20 @ 02:59) (18 - 35)  SpO2: 95% (10-25-20 @ 06:11) (86% - 98%)         10-23 @ 07:01  -  10-24 @ 07:00  --------------------------------------------------------  IN: 1872.1 mL / OUT: 1215 mL / NET: 657.1 mL    10-24 @ 07:01  -  10-25 @ 06:43  --------------------------------------------------------  IN: 1603 mL / OUT: 1490 mL / NET: 113 mL        Daily     Daily Weight in k.9 (25 Oct 2020 06:00)    CAPILLARY BLOOD GLUCOSE      POCT Blood Glucose.: 181 mg/dL (25 Oct 2020 02:08)  POCT Blood Glucose.: 210 mg/dL (24 Oct 2020 21:25)  POCT Blood Glucose.: 133 mg/dL (24 Oct 2020 18:03)  POCT Blood Glucose.: 90 mg/dL (24 Oct 2020 17:01)  POCT Blood Glucose.: 121 mg/dL (24 Oct 2020 16:11)  POCT Blood Glucose.: 121 mg/dL (24 Oct 2020 15:27)  POCT Blood Glucose.: 143 mg/dL (24 Oct 2020 15:04)  POCT Blood Glucose.: 295 mg/dL (24 Oct 2020 14:07)  POCT Blood Glucose.: 289 mg/dL (24 Oct 2020 11:36)  POCT Blood Glucose.: 185 mg/dL (24 Oct 2020 07:52)          Drains:     MS         [  ] Drainage:                 L Pleural  [  ]  Drainage:                R Pleural  [  ]  Drainage:  Pacing Wires        [  ]   Settings:                                  Isolated  [  ]  Coumadin    [ ] YES          [  ]      NO         Reason:       PHYSICAL EXAM:  Neurology: alert and oriented x 3, nonfocal, no gross deficits  CV :  Sternal Wound :  CDI , Stable  Lungs:  Abdomen: soft, nontender, nondistended, positive bowel sounds, last bowel movement         Extremities:         aspirin  chewable 81 milliGRAM(s) Oral daily  atorvastatin 80 milliGRAM(s) Oral at bedtime  clopidogrel Tablet 75 milliGRAM(s) Oral daily  dextrose 40% Gel 15 Gram(s) Oral once PRN  dextrose 5%. 1000 milliLiter(s) IV Continuous <Continuous>  dextrose 50% Injectable 12.5 Gram(s) IV Push once  ferrous    sulfate 325 milliGRAM(s) Oral daily  glucagon  Injectable 1 milliGRAM(s) IntraMuscular once PRN  influenza   Vaccine 0.5 milliLiter(s) IntraMuscular once  insulin lispro (ADMELOG) corrective regimen sliding scale   SubCutaneous Before meals and at bedtime  metoprolol tartrate 50 milliGRAM(s) Oral two times a day  oxycodone    5 mG/acetaminophen 325 mG 1 Tablet(s) Oral every 6 hours PRN  pantoprazole    Tablet 40 milliGRAM(s) Oral before breakfast  polyethylene glycol 3350 17 Gram(s) Oral daily  senna 2 Tablet(s) Oral at bedtime  sodium chloride 0.9%. 1000 milliLiter(s) IV Continuous <Continuous>      Physical Therapy Rec:   Home  [  ]   Home w/ PT  [  ]  Rehab  [  ]  Discussed with Cardiothoracic Team at AM rounds. VITAL SIGNS-Telemetry:  Accelerated junctional 6080 - 0- - 5am - converted to AFib 50'-80s  Vital Signs Last 24 Hrs  T(C): 36.4 (10-25-20 @ 02:59), Max: 37.4 (10-24-20 @ 08:00)  T(F): 97.6 (10-25-20 @ 02:59), Max: 99.4 (10-24-20 @ 08:00)  HR: 72 (10-25-20 @ 06:11) (62 - 81)  BP: 176/78 (10-25-20 @ 06:11) (91/53 - 176/78)  RR: 18 (10-25-20 @ 02:59) (18 - 35)  SpO2: 95% (10-25-20 @ 06:11) (86% - 98%)         10-23 @ 07:01  -  10-24 @ 07:00  --------------------------------------------------------  IN: 1872.1 mL / OUT: 1215 mL / NET: 657.1 mL    10-24 @ 07:01  -  10-25 @ 06:43  --------------------------------------------------------  IN: 1603 mL / OUT: 1490 mL / NET: 113 mL    Daily     Daily Weight in k.9 (25 Oct 2020 06:00)    CAPILLARY BLOOD GLUCOSE  POCT Blood Glucose.: 181 mg/dL (25 Oct 2020 02:08)  POCT Blood Glucose.: 210 mg/dL (24 Oct 2020 21:25)  POCT Blood Glucose.: 133 mg/dL (24 Oct 2020 18:03)  POCT Blood Glucose.: 90 mg/dL (24 Oct 2020 17:01)  POCT Blood Glucose.: 121 mg/dL (24 Oct 2020 16:11)    Coumadin    [x ] YES          [  ]      NO         Reason:     afib  PHYSICAL EXAM:  Neurology: alert and oriented x 3, nonfocal, no gross deficits  CV : IRR  MT inc x 2 CDI w/ dressing in place  Lungs: Crackles LLL  Abdomen: soft, nontender, nondistended, positive bowel sounds, last bowel movement  pre-op       Extremities:     no edema, no calf tenderness    aspirin  chewable 81 milliGRAM(s) Oral daily  atorvastatin 80 milliGRAM(s) Oral at bedtime  clopidogrel Tablet 75 milliGRAM(s) Oral daily  dextrose 40% Gel 15 Gram(s) Oral once PRN  dextrose 5%. 1000 milliLiter(s) IV Continuous <Continuous>  dextrose 50% Injectable 12.5 Gram(s) IV Push once  ferrous    sulfate 325 milliGRAM(s) Oral daily  glucagon  Injectable 1 milliGRAM(s) IntraMuscular once PRN  influenza   Vaccine 0.5 milliLiter(s) IntraMuscular once  insulin lispro (ADMELOG) corrective regimen sliding scale   SubCutaneous Before meals and at bedtime  metoprolol tartrate 50 milliGRAM(s) Oral two times a day  oxycodone    5 mG/acetaminophen 325 mG 1 Tablet(s) Oral every 6 hours PRN  pantoprazole    Tablet 40 milliGRAM(s) Oral before breakfast  polyethylene glycol 3350 17 Gram(s) Oral daily  senna 2 Tablet(s) Oral at bedtime  sodium chloride 0.9%. 1000 milliLiter(s) IV Continuous <Continuous>    Physical Therapy Rec:   Home  [  ]   Home w/ PT  [  ]  Rehab  [  ]  Discussed with Cardiothoracic Team at AM rounds.

## 2020-10-26 ENCOUNTER — TRANSCRIPTION ENCOUNTER (OUTPATIENT)
Age: 75
End: 2020-10-26

## 2020-10-26 ENCOUNTER — FORM ENCOUNTER (OUTPATIENT)
Age: 75
End: 2020-10-26

## 2020-10-26 ENCOUNTER — NON-APPOINTMENT (OUTPATIENT)
Age: 75
End: 2020-10-26

## 2020-10-26 LAB
ANION GAP SERPL CALC-SCNC: 13 MMOL/L — SIGNIFICANT CHANGE UP (ref 5–17)
APTT BLD: 36.1 SEC — HIGH (ref 27.5–35.5)
BUN SERPL-MCNC: 24 MG/DL — HIGH (ref 7–23)
CALCIUM SERPL-MCNC: 9.9 MG/DL — SIGNIFICANT CHANGE UP (ref 8.4–10.5)
CHLORIDE SERPL-SCNC: 96 MMOL/L — SIGNIFICANT CHANGE UP (ref 96–108)
CO2 SERPL-SCNC: 23 MMOL/L — SIGNIFICANT CHANGE UP (ref 22–31)
CREAT SERPL-MCNC: 0.98 MG/DL — SIGNIFICANT CHANGE UP (ref 0.5–1.3)
GLUCOSE BLDC GLUCOMTR-MCNC: 133 MG/DL — HIGH (ref 70–99)
GLUCOSE BLDC GLUCOMTR-MCNC: 156 MG/DL — HIGH (ref 70–99)
GLUCOSE BLDC GLUCOMTR-MCNC: 170 MG/DL — HIGH (ref 70–99)
GLUCOSE BLDC GLUCOMTR-MCNC: 246 MG/DL — HIGH (ref 70–99)
GLUCOSE BLDC GLUCOMTR-MCNC: 269 MG/DL — HIGH (ref 70–99)
GLUCOSE SERPL-MCNC: 121 MG/DL — HIGH (ref 70–99)
HCT VFR BLD CALC: 33.1 % — LOW (ref 39–50)
HGB BLD-MCNC: 10.2 G/DL — LOW (ref 13–17)
INR BLD: 1.63 RATIO — HIGH (ref 0.88–1.16)
MCHC RBC-ENTMCNC: 27.1 PG — SIGNIFICANT CHANGE UP (ref 27–34)
MCHC RBC-ENTMCNC: 30.8 GM/DL — LOW (ref 32–36)
MCV RBC AUTO: 88 FL — SIGNIFICANT CHANGE UP (ref 80–100)
NRBC # BLD: 0 /100 WBCS — SIGNIFICANT CHANGE UP (ref 0–0)
PLATELET # BLD AUTO: 216 K/UL — SIGNIFICANT CHANGE UP (ref 150–400)
POTASSIUM SERPL-MCNC: 4 MMOL/L — SIGNIFICANT CHANGE UP (ref 3.5–5.3)
POTASSIUM SERPL-SCNC: 4 MMOL/L — SIGNIFICANT CHANGE UP (ref 3.5–5.3)
PROTHROM AB SERPL-ACNC: 19.1 SEC — HIGH (ref 10.6–13.6)
RBC # BLD: 3.76 M/UL — LOW (ref 4.2–5.8)
RBC # FLD: 16 % — HIGH (ref 10.3–14.5)
SODIUM SERPL-SCNC: 132 MMOL/L — LOW (ref 135–145)
WBC # BLD: 11.53 K/UL — HIGH (ref 3.8–10.5)
WBC # FLD AUTO: 11.53 K/UL — HIGH (ref 3.8–10.5)

## 2020-10-26 PROCEDURE — 99233 SBSQ HOSP IP/OBS HIGH 50: CPT

## 2020-10-26 PROCEDURE — 93010 ELECTROCARDIOGRAM REPORT: CPT

## 2020-10-26 RX ORDER — METFORMIN HYDROCHLORIDE 850 MG/1
500 TABLET ORAL
Refills: 0 | Status: DISCONTINUED | OUTPATIENT
Start: 2020-10-26 | End: 2020-10-27

## 2020-10-26 RX ORDER — WARFARIN SODIUM 2.5 MG/1
5 TABLET ORAL ONCE
Refills: 0 | Status: COMPLETED | OUTPATIENT
Start: 2020-10-26 | End: 2020-10-26

## 2020-10-26 RX ADMIN — CLOPIDOGREL BISULFATE 75 MILLIGRAM(S): 75 TABLET, FILM COATED ORAL at 11:15

## 2020-10-26 RX ADMIN — OXYCODONE AND ACETAMINOPHEN 1 TABLET(S): 5; 325 TABLET ORAL at 20:06

## 2020-10-26 RX ADMIN — SENNA PLUS 2 TABLET(S): 8.6 TABLET ORAL at 21:44

## 2020-10-26 RX ADMIN — Medication 50 MILLIGRAM(S): at 06:06

## 2020-10-26 RX ADMIN — METFORMIN HYDROCHLORIDE 500 MILLIGRAM(S): 850 TABLET ORAL at 19:00

## 2020-10-26 RX ADMIN — Medication 50 MILLIGRAM(S): at 17:06

## 2020-10-26 RX ADMIN — Medication 3: at 21:49

## 2020-10-26 RX ADMIN — OXYCODONE AND ACETAMINOPHEN 1 TABLET(S): 5; 325 TABLET ORAL at 19:36

## 2020-10-26 RX ADMIN — PANTOPRAZOLE SODIUM 40 MILLIGRAM(S): 20 TABLET, DELAYED RELEASE ORAL at 06:06

## 2020-10-26 RX ADMIN — Medication 1: at 11:35

## 2020-10-26 RX ADMIN — Medication 2: at 17:06

## 2020-10-26 RX ADMIN — Medication 81 MILLIGRAM(S): at 11:15

## 2020-10-26 RX ADMIN — Medication 325 MILLIGRAM(S): at 11:15

## 2020-10-26 RX ADMIN — ATORVASTATIN CALCIUM 80 MILLIGRAM(S): 80 TABLET, FILM COATED ORAL at 21:44

## 2020-10-26 RX ADMIN — WARFARIN SODIUM 5 MILLIGRAM(S): 2.5 TABLET ORAL at 21:44

## 2020-10-26 NOTE — PROGRESS NOTE ADULT - SUBJECTIVE AND OBJECTIVE BOX
im  ok    VITAL SIGNS    Telemetry:  afib 70    Vital Signs Last 24 Hrs  T(C): 36.6 (10-26-20 @ 07:02), Max: 36.7 (10-25-20 @ 10:50)  T(F): 97.8 (10-26-20 @ 07:02), Max: 98 (10-25-20 @ 10:50)  HR: 67 (10-26-20 @ 07:02) (64 - 73)  BP: 146/76 (10-26-20 @ 07:02) (107/62 - 154/70)  RR: 18 (10-26-20 @ 07:02) (18 - 18)  SpO2: 91% (10-26-20 @ 07:02) (91% - 98%)                   10-25 @ 07:01  -  10-26 @ 07:00  --------------------------------------------------------  IN: 340 mL / OUT: 1200 mL / NET: -860 mL          Daily     Daily Weight in k (26 Oct 2020 06:00)            CAPILLARY BLOOD GLUCOSE      POCT Blood Glucose.: 133 mg/dL (26 Oct 2020 07:42)  POCT Blood Glucose.: 156 mg/dL (26 Oct 2020 02:06)  POCT Blood Glucose.: 269 mg/dL (25 Oct 2020 21:33)  POCT Blood Glucose.: 214 mg/dL (25 Oct 2020 17:01)  POCT Blood Glucose.: 204 mg/dL (25 Oct 2020 11:22)                  Coumadin    [x ] YES          [  ]      NO         afib                          PHYSICAL EXAM        Neurology: alert and oriented x 3, nonfocal, no gross deficits  CV : irreg  Sternal Wound :  CDI , Stable, min invasive  Lungs: cta  Abdomen: soft, nontender, nondistended, positive bowel sounds, last bowel movement                       chest tubes  :    voiding         Extremities:   -   edema   /  -   calve tenderness ,           aspirin  chewable 81 milliGRAM(s) Oral daily  atorvastatin 80 milliGRAM(s) Oral at bedtime  clopidogrel Tablet 75 milliGRAM(s) Oral daily  dextrose 40% Gel 15 Gram(s) Oral once PRN  dextrose 5%. 1000 milliLiter(s) IV Continuous <Continuous>  dextrose 50% Injectable 12.5 Gram(s) IV Push once  ferrous    sulfate 325 milliGRAM(s) Oral daily  glucagon  Injectable 1 milliGRAM(s) IntraMuscular once PRN  influenza   Vaccine 0.5 milliLiter(s) IntraMuscular once  insulin lispro (ADMELOG) corrective regimen sliding scale   SubCutaneous Before meals and at bedtime  metoprolol tartrate 50 milliGRAM(s) Oral two times a day  oxycodone    5 mG/acetaminophen 325 mG 1 Tablet(s) Oral every 6 hours PRN  pantoprazole    Tablet 40 milliGRAM(s) Oral before breakfast  polyethylene glycol 3350 17 Gram(s) Oral daily  senna 2 Tablet(s) Oral at bedtime  sodium chloride 0.9%. 1000 milliLiter(s) IV Continuous <Continuous>                    Physical Therapy Rec:   Home  [  ]   Home w/ PT  [  ]  Rehab  [  ]  Discussed with Cardiothoracic Team at AM rounds.

## 2020-10-26 NOTE — PROGRESS NOTE ADULT - SUBJECTIVE AND OBJECTIVE BOX
Subjective  No acute events reported overnight.  The patient is clinically doing well.  He notes some discomfort when sitting upwards in a chair in the middle of his chest that is relieved when leaning forwards.  No pain when he is laying flat.  No chest pain when he coughs.  No cardiopulmonary complaints upon exertion.  Denies any fevers, chills or sweats.  No cough.    Telemetry  Atrial fibrillation with rates in the 60s to 80s    Review of Systems   14 point review of systems is negative except what is described above in the history of present illness    Physical Examination    General: A/O x3, GUILLAUME, NAD, Follows all commands  HEENT: Supple, No JVD, Trachea midline, no masses  Pulmonary: CTAB, = Chest Excursion, no accessory muscle use  Cor: S1S2, irregular irregular with no murmur,gallop or rub  Groin/Wound: Ministernotomy: Dressing C/D/I, no erythema, non tender and without any drainage  Gastrointestinal: Soft, NT/ND, + Bowel Sounds  Neuro: = motor and sensory B/L, No focal deficits  Vascular: + RCA Thrill, Trace bilateral DP/PT pulses, no edema/clubbing/cyanosis  Extremities: No joint pain or swelling  Skin: Warm/Dry/Normal color, Normal turgor, no rashesT(C): 36.8 (10-26-20 @ 14:00), Max: 36.8 (10-26-20 @ 14:00)  HR: 71 (10-26-20 @ 14:00) (64 - 76)  BP: 115/62 (10-26-20 @ 14:00) (107/62 - 154/70)  RR: 16 (10-26-20 @ 14:00) (16 - 18)  SpO2: 93% (10-26-20 @ 14:00) (91% - 98%)  Wt(kg): --  10-25 @ 07:01  -  10-26 @ 07:00  --------------------------------------------------------  IN: 340 mL / OUT: 1200 mL / NET: -860 mL    10-26 @ 07:01  -  10-26 @ 15:23  --------------------------------------------------------  IN: 220 mL / OUT: 0 mL / NET: 220 mL      MEDICATIONS  (STANDING):  aspirin  chewable 81 milliGRAM(s) Oral daily  atorvastatin 80 milliGRAM(s) Oral at bedtime  clopidogrel Tablet 75 milliGRAM(s) Oral daily  dextrose 5%. 1000 milliLiter(s) (50 mL/Hr) IV Continuous <Continuous>  dextrose 50% Injectable 12.5 Gram(s) IV Push once  ferrous    sulfate 325 milliGRAM(s) Oral daily  influenza   Vaccine 0.5 milliLiter(s) IntraMuscular once  insulin lispro (ADMELOG) corrective regimen sliding scale   SubCutaneous Before meals and at bedtime  metoprolol tartrate 50 milliGRAM(s) Oral two times a day  pantoprazole    Tablet 40 milliGRAM(s) Oral before breakfast  polyethylene glycol 3350 17 Gram(s) Oral daily  senna 2 Tablet(s) Oral at bedtime  sodium chloride 0.9%. 1000 milliLiter(s) (10 mL/Hr) IV Continuous <Continuous>    MEDICATIONS  (PRN):  dextrose 40% Gel 15 Gram(s) Oral once PRN Blood Glucose LESS THAN 70 milliGRAM(s)/deciLiter  glucagon  Injectable 1 milliGRAM(s) IntraMuscular once PRN Glucose <70 milliGRAM(s)/deciLiter  oxycodone    5 mG/acetaminophen 325 mG 1 Tablet(s) Oral every 6 hours PRN Severe Pain (7 - 10)    Home Medications:  ferrous sulfate 325 mg (65 mg elemental iron) oral tablet: 1 tab(s) orally once a day (23 Oct 2020 12:37)  lisinopril-hydrochlorothiazide 20 mg-25 mg oral tablet: 1 tab(s) orally once a day (23 Oct 2020 12:37)  magnesium oxide 400 mg (241.3 mg elemental magnesium) oral tablet: 1 tab(s) orally 2 times a day (with meals) x 2 days (23 Oct 2020 12:37)  metFORMIN 1000 mg oral tablet: 1 tab(s) orally 2 times a day (23 Oct 2020 12:37)  senna oral tablet: 2 tab(s) orally once a day (at bedtime) (23 Oct 2020 12:37)  simvastatin 10 mg oral tablet: 1 tab(s) orally once a day (at bedtime) (23 Oct 2020 12:37)  warfarin 5 mg oral tablet: 1 tab(s) orally once a day (23 Oct 2020 12:37)                       10.2   11.53 )-----------( 216      ( 26 Oct 2020 06:52 )             33.1   10-26    132<L>  |  96  |  24<H>  ----------------------------<  121<H>  4.0   |  23  |  0.98    Ca    9.9      26 Oct 2020 06:51    PT/INR - ( 26 Oct 2020 06:52 )   PT: 19.1 sec;   INR: 1.63 ratio    PTT - ( 26 Oct 2020 06:52 )  PTT:36.1 sec    Assessment/Plan  74 M S/P TAVR via Direct Aortic Approach for Severe AS, Carotid Stenosis and Chronic diastolic heart failure.  The patient is clinically doing better.  He denies any cardiopulmonary complaints upon exertion.    --Continue aspirin 81mg PO QD and clopidogrel 75mg PO QD (patient has received greater than 1 month of dual antiplatelet therapy).  Aspirin will be discontinued once his coumadin is therapeutic.  Concern about triple therapy.  INR is 1.6.  Continue coumadin 5mg PO QD.  --TTE was personally reviewed.  Device is in an appropriate position with no pericardial effusion.  --Continue metoprolol tartrate 50mg PO BID.  --Continue atorvastatin 80mg PO QD.  --Mild troponin elevation likely secondary to wire manipulation during TAVR procedure.    --Patient is s/p L CEA. Will need Right sided CEA, which will be performed by Dr. Rodriguez several weeks after discharge.  The patient was reminded the importance of close vascular following.  --Continue telemetry monitoring.  --Reviewed indications and details of MCOT monitor which will be applied at time of discharge.  --Daily PT.  Out of bed to chair.  --Aim for K>4 and Mg>2.    All questions and concerns of the patient were addressed.  If the patient continues to clinically do well plan for discharge tomorrow.    Findings and plan discussed with cardiac surgery and structural heart team.

## 2020-10-26 NOTE — PROGRESS NOTE ADULT - PROBLEM SELECTOR PLAN 3
Coumadin 5 mg PO tonight for a therapeutic INR 2-3   Daily PT/ INR  Continue with Lopressor 50 mg PO BID.

## 2020-10-26 NOTE — DISCHARGE NOTE NURSING/CASE MANAGEMENT/SOCIAL WORK - NSDCPEEMAIL_GEN_ALL_CORE
[facility] for Tobacco Control email [email or web address]

## 2020-10-26 NOTE — DISCHARGE NOTE NURSING/CASE MANAGEMENT/SOCIAL WORK - PATIENT PORTAL LINK FT
You can access the FollowMyHealth Patient Portal offered by [facility] by registering at the following website: [email or web address]. By joining [facility] FollowMyHealth portal, you will also be able to view your health information using other applications (apps) compatible with our system.

## 2020-10-26 NOTE — PROGRESS NOTE ADULT - ASSESSMENT
74 year old Male with a past medical history of Bilateral Carotid Artery Stenosis, ICA Stenosis, HLD, HTN, CAD s/p two coronary artery stents (most recent 9/2020), Carotid endarterectomy (9/2020), Angiography of extremity s/p B/L LE stents, Atrial Fibrillation, PVD, DM type 2. Admitted to CTU for Severe Aortic Stenosis requiring TAVR on 10/22/2020.  Surgery/Hospital Course:  10/22 Direct Transcatheter Aortic Valve Replacement with willie-sternotomy.   Post op Course:   Mediastinal chest tube removed.  Hypotension à requiring Foster gtt.  Weaned off.   Hyperglycemia à Insulin gtt   Elevated Lactate level as high 4.4 à now 2.2   10/23 Post op TTE: small pericardial effusion; negative paravalvular leak.   10/24 Insulin gtt wean off à started on Metformin this AM. Low UO --> 250 SPA x 1 followed by Lasix 20 --> Farfan cath maintained.  Transferred to SDU @ 17:00 Coumadin 5 mg Po tonight for chronic Afib. Continue with current medication regimen.    10/25 VSS - POD # 3 - will d/c Farfan @ midnight tonight-d/c planning - anticipate home  10/26 Continue anticoagulation, ambulate today.  Plavix and coumadin only.  Transfer to floor, d/c home tomorrow

## 2020-10-26 NOTE — PROGRESS NOTE ADULT - PROBLEM SELECTOR PLAN 1
Continue with ASA 81 mg PO Daily ans Plavix 75 mg PO daily for recent stent   Lopressor 50 mg PO BID.   Continue with Lipitor 80 mg PO HS   Strict RENE's   Increase activity as tolerated.   Encourage Chest PT / Pulmonary toileting and Incentive spirometry every 1 hour x 10 while awake.   Continue with PUD prophylaxis.   D/C plan home once medically cleared   Plan of care discussed with attending

## 2020-10-26 NOTE — DISCHARGE NOTE NURSING/CASE MANAGEMENT/SOCIAL WORK - NSDCPEWEB_GEN_ALL_CORE
[place] website --- [email or web address] Tobacco [email or web address] website --- [email or web address]

## 2020-10-27 ENCOUNTER — TRANSCRIPTION ENCOUNTER (OUTPATIENT)
Age: 75
End: 2020-10-27

## 2020-10-27 VITALS — SYSTOLIC BLOOD PRESSURE: 121 MMHG | DIASTOLIC BLOOD PRESSURE: 73 MMHG | HEART RATE: 94 BPM | TEMPERATURE: 98 F

## 2020-10-27 LAB
ALBUMIN SERPL ELPH-MCNC: 3.7 G/DL — SIGNIFICANT CHANGE UP (ref 3.3–5)
ALP SERPL-CCNC: 78 U/L — SIGNIFICANT CHANGE UP (ref 40–120)
ALT FLD-CCNC: 20 U/L — SIGNIFICANT CHANGE UP (ref 10–45)
ANION GAP SERPL CALC-SCNC: 12 MMOL/L — SIGNIFICANT CHANGE UP (ref 5–17)
AST SERPL-CCNC: 23 U/L — SIGNIFICANT CHANGE UP (ref 10–40)
BILIRUB SERPL-MCNC: 0.7 MG/DL — SIGNIFICANT CHANGE UP (ref 0.2–1.2)
BUN SERPL-MCNC: 26 MG/DL — HIGH (ref 7–23)
CALCIUM SERPL-MCNC: 9.9 MG/DL — SIGNIFICANT CHANGE UP (ref 8.4–10.5)
CHLORIDE SERPL-SCNC: 96 MMOL/L — SIGNIFICANT CHANGE UP (ref 96–108)
CO2 SERPL-SCNC: 25 MMOL/L — SIGNIFICANT CHANGE UP (ref 22–31)
CREAT SERPL-MCNC: 0.93 MG/DL — SIGNIFICANT CHANGE UP (ref 0.5–1.3)
GLUCOSE BLDC GLUCOMTR-MCNC: 127 MG/DL — HIGH (ref 70–99)
GLUCOSE BLDC GLUCOMTR-MCNC: 165 MG/DL — HIGH (ref 70–99)
GLUCOSE BLDC GLUCOMTR-MCNC: 192 MG/DL — HIGH (ref 70–99)
GLUCOSE SERPL-MCNC: 158 MG/DL — HIGH (ref 70–99)
HCT VFR BLD CALC: 34.7 % — LOW (ref 39–50)
HGB BLD-MCNC: 10.8 G/DL — LOW (ref 13–17)
INR BLD: 2 RATIO — HIGH (ref 0.88–1.16)
MCHC RBC-ENTMCNC: 27.1 PG — SIGNIFICANT CHANGE UP (ref 27–34)
MCHC RBC-ENTMCNC: 31.1 GM/DL — LOW (ref 32–36)
MCV RBC AUTO: 87.2 FL — SIGNIFICANT CHANGE UP (ref 80–100)
NRBC # BLD: 0 /100 WBCS — SIGNIFICANT CHANGE UP (ref 0–0)
PLATELET # BLD AUTO: 225 K/UL — SIGNIFICANT CHANGE UP (ref 150–400)
POTASSIUM SERPL-MCNC: 3.6 MMOL/L — SIGNIFICANT CHANGE UP (ref 3.5–5.3)
POTASSIUM SERPL-SCNC: 3.6 MMOL/L — SIGNIFICANT CHANGE UP (ref 3.5–5.3)
PROT SERPL-MCNC: 8 G/DL — SIGNIFICANT CHANGE UP (ref 6–8.3)
PROTHROM AB SERPL-ACNC: 23.2 SEC — HIGH (ref 10.6–13.6)
RBC # BLD: 3.98 M/UL — LOW (ref 4.2–5.8)
RBC # FLD: 15.9 % — HIGH (ref 10.3–14.5)
SODIUM SERPL-SCNC: 133 MMOL/L — LOW (ref 135–145)
WBC # BLD: 11.53 K/UL — HIGH (ref 3.8–10.5)
WBC # FLD AUTO: 11.53 K/UL — HIGH (ref 3.8–10.5)

## 2020-10-27 PROCEDURE — 93010 ELECTROCARDIOGRAM REPORT: CPT

## 2020-10-27 PROCEDURE — 93005 ELECTROCARDIOGRAM TRACING: CPT

## 2020-10-27 PROCEDURE — 76000 FLUOROSCOPY <1 HR PHYS/QHP: CPT

## 2020-10-27 PROCEDURE — 85610 PROTHROMBIN TIME: CPT

## 2020-10-27 PROCEDURE — 80053 COMPREHEN METABOLIC PANEL: CPT

## 2020-10-27 PROCEDURE — 99232 SBSQ HOSP IP/OBS MODERATE 35: CPT

## 2020-10-27 PROCEDURE — C1769: CPT

## 2020-10-27 PROCEDURE — 85730 THROMBOPLASTIN TIME PARTIAL: CPT

## 2020-10-27 PROCEDURE — 97161 PT EVAL LOW COMPLEX 20 MIN: CPT

## 2020-10-27 PROCEDURE — 85014 HEMATOCRIT: CPT

## 2020-10-27 PROCEDURE — 97116 GAIT TRAINING THERAPY: CPT

## 2020-10-27 PROCEDURE — 84100 ASSAY OF PHOSPHORUS: CPT

## 2020-10-27 PROCEDURE — 84295 ASSAY OF SERUM SODIUM: CPT

## 2020-10-27 PROCEDURE — 83605 ASSAY OF LACTIC ACID: CPT

## 2020-10-27 PROCEDURE — P9045: CPT

## 2020-10-27 PROCEDURE — 83735 ASSAY OF MAGNESIUM: CPT

## 2020-10-27 PROCEDURE — C1887: CPT

## 2020-10-27 PROCEDURE — 85027 COMPLETE CBC AUTOMATED: CPT

## 2020-10-27 PROCEDURE — C1889: CPT

## 2020-10-27 PROCEDURE — 82803 BLOOD GASES ANY COMBINATION: CPT

## 2020-10-27 PROCEDURE — 84132 ASSAY OF SERUM POTASSIUM: CPT

## 2020-10-27 PROCEDURE — 93355 ECHO TRANSESOPHAGEAL (TEE): CPT

## 2020-10-27 PROCEDURE — C8929: CPT

## 2020-10-27 PROCEDURE — 82553 CREATINE MB FRACTION: CPT

## 2020-10-27 PROCEDURE — 97530 THERAPEUTIC ACTIVITIES: CPT

## 2020-10-27 PROCEDURE — 85025 COMPLETE CBC W/AUTO DIFF WBC: CPT

## 2020-10-27 PROCEDURE — C9399: CPT

## 2020-10-27 PROCEDURE — 82550 ASSAY OF CK (CPK): CPT

## 2020-10-27 PROCEDURE — 85018 HEMOGLOBIN: CPT

## 2020-10-27 PROCEDURE — 80048 BASIC METABOLIC PNL TOTAL CA: CPT

## 2020-10-27 PROCEDURE — C1894: CPT

## 2020-10-27 PROCEDURE — 93306 TTE W/DOPPLER COMPLETE: CPT

## 2020-10-27 PROCEDURE — 36430 TRANSFUSION BLD/BLD COMPNT: CPT

## 2020-10-27 PROCEDURE — 71045 X-RAY EXAM CHEST 1 VIEW: CPT

## 2020-10-27 PROCEDURE — 82962 GLUCOSE BLOOD TEST: CPT

## 2020-10-27 PROCEDURE — P9016: CPT

## 2020-10-27 PROCEDURE — 99239 HOSP IP/OBS DSCHRG MGMT >30: CPT

## 2020-10-27 PROCEDURE — 82330 ASSAY OF CALCIUM: CPT

## 2020-10-27 PROCEDURE — 82947 ASSAY GLUCOSE BLOOD QUANT: CPT

## 2020-10-27 PROCEDURE — 82435 ASSAY OF BLOOD CHLORIDE: CPT

## 2020-10-27 PROCEDURE — 85384 FIBRINOGEN ACTIVITY: CPT

## 2020-10-27 PROCEDURE — 84484 ASSAY OF TROPONIN QUANT: CPT

## 2020-10-27 PROCEDURE — 86923 COMPATIBILITY TEST ELECTRIC: CPT

## 2020-10-27 RX ORDER — SENNA PLUS 8.6 MG/1
2 TABLET ORAL
Qty: 60 | Refills: 0
Start: 2020-10-27 | End: 2020-11-25

## 2020-10-27 RX ORDER — CLOPIDOGREL BISULFATE 75 MG/1
1 TABLET, FILM COATED ORAL
Qty: 30 | Refills: 0
Start: 2020-10-27 | End: 2020-11-25

## 2020-10-27 RX ORDER — LISINOPRIL 2.5 MG/1
10 TABLET ORAL DAILY
Refills: 0 | Status: DISCONTINUED | OUTPATIENT
Start: 2020-10-27 | End: 2020-10-27

## 2020-10-27 RX ORDER — METOPROLOL TARTRATE 50 MG
1 TABLET ORAL
Qty: 60 | Refills: 0
Start: 2020-10-27 | End: 2020-11-25

## 2020-10-27 RX ORDER — LISINOPRIL/HYDROCHLOROTHIAZIDE 10-12.5 MG
1 TABLET ORAL
Qty: 0 | Refills: 0 | DISCHARGE

## 2020-10-27 RX ORDER — HYDRALAZINE HCL 50 MG
5 TABLET ORAL ONCE
Refills: 0 | Status: COMPLETED | OUTPATIENT
Start: 2020-10-27 | End: 2020-10-27

## 2020-10-27 RX ORDER — LISINOPRIL 2.5 MG/1
1 TABLET ORAL
Qty: 30 | Refills: 0
Start: 2020-10-27 | End: 2020-11-25

## 2020-10-27 RX ORDER — WARFARIN SODIUM 2.5 MG/1
1 TABLET ORAL
Qty: 30 | Refills: 0
Start: 2020-10-27 | End: 2020-11-25

## 2020-10-27 RX ORDER — WARFARIN SODIUM 2.5 MG/1
1 TABLET ORAL
Qty: 0 | Refills: 0 | DISCHARGE

## 2020-10-27 RX ORDER — ATORVASTATIN CALCIUM 80 MG/1
1 TABLET, FILM COATED ORAL
Qty: 30 | Refills: 0
Start: 2020-10-27 | End: 2020-11-25

## 2020-10-27 RX ORDER — SIMVASTATIN 20 MG/1
1 TABLET, FILM COATED ORAL
Qty: 0 | Refills: 0 | DISCHARGE

## 2020-10-27 RX ORDER — HYDRALAZINE HCL 50 MG
10 TABLET ORAL ONCE
Refills: 0 | Status: COMPLETED | OUTPATIENT
Start: 2020-10-27 | End: 2020-10-27

## 2020-10-27 RX ADMIN — METFORMIN HYDROCHLORIDE 500 MILLIGRAM(S): 850 TABLET ORAL at 05:20

## 2020-10-27 RX ADMIN — Medication 10 MILLIGRAM(S): at 06:01

## 2020-10-27 RX ADMIN — CLOPIDOGREL BISULFATE 75 MILLIGRAM(S): 75 TABLET, FILM COATED ORAL at 12:00

## 2020-10-27 RX ADMIN — METFORMIN HYDROCHLORIDE 500 MILLIGRAM(S): 850 TABLET ORAL at 17:06

## 2020-10-27 RX ADMIN — Medication 1: at 12:00

## 2020-10-27 RX ADMIN — Medication 5 MILLIGRAM(S): at 05:21

## 2020-10-27 RX ADMIN — Medication 50 MILLIGRAM(S): at 05:20

## 2020-10-27 RX ADMIN — Medication 325 MILLIGRAM(S): at 12:00

## 2020-10-27 RX ADMIN — Medication 50 MILLIGRAM(S): at 17:06

## 2020-10-27 RX ADMIN — PANTOPRAZOLE SODIUM 40 MILLIGRAM(S): 20 TABLET, DELAYED RELEASE ORAL at 05:20

## 2020-10-27 RX ADMIN — LISINOPRIL 10 MILLIGRAM(S): 2.5 TABLET ORAL at 08:48

## 2020-10-27 RX ADMIN — Medication 1: at 18:18

## 2020-10-27 NOTE — DISCHARGE NOTE PROVIDER - NSDCCPCAREPLAN_GEN_ALL_CORE_FT
PRINCIPAL DISCHARGE DIAGNOSIS  Diagnosis: S/p TAVR (transcatheter aortic valve replacement), bioprosthetic  Assessment and Plan of Treatment: Direct approach TAVR 10/22/20  follow up appt. with Dr. Perea On Friday, October 30 @ 9:45am  Follow up appt with Dr. Juan M Tripp for your Coumadin dosing  you must have your Blood drawn on Thursday, October 29 then weekly after that.  ambulate 4-5 times a day  shower daily  take medications as directed

## 2020-10-27 NOTE — DISCHARGE NOTE PROVIDER - CARE PROVIDER_API CALL
Marquita Perea  THORACIC AND CARDIAC SURGERY  [address] [place] [address]  Phone: [phone]  Fax: [phone]  Follow Up Time:

## 2020-10-27 NOTE — DISCHARGE NOTE PROVIDER - NSDCMRMEDTOKEN_GEN_ALL_CORE_FT
atorvastatin 80 mg oral tablet: 1 tab(s) orally once a day   clopidogrel 75 mg oral tablet: 1 tab(s) orally once a day  ferrous sulfate 325 mg (65 mg elemental iron) oral tablet: 1 tab(s) orally once a day  lisinopril 10 mg oral tablet: 1 tab(s) orally once a day  magnesium oxide 400 mg (241.3 mg elemental magnesium) oral tablet: 1 tab(s) orally 2 times a day (with meals) x 2 days  metFORMIN 1000 mg oral tablet: 1 tab(s) orally 2 times a day  metoprolol tartrate 50 mg oral tablet: 1 tab(s) orally 2 times a day  senna oral tablet: 2 tab(s) orally once a day (at bedtime)  warfarin 5 mg oral tablet: 1 tab(s) orally once a day

## 2020-10-27 NOTE — DISCHARGE NOTE PROVIDER - NSDCFUSCHEDAPPT_GEN_ALL_CORE_FT
PAIGE BYRNES ; 10/30/2020 ; [facility] CT Surg [address] PAIGE Leiva ; 11/24/2020 ; [facility] Surg [address] PAIGE Dorado ; 11/24/2020 ; P Surg [address]

## 2020-10-27 NOTE — DISCHARGE NOTE PROVIDER - CARE PROVIDERS DIRECT ADDRESSES
[email or web address]

## 2020-10-27 NOTE — PROGRESS NOTE ADULT - SUBJECTIVE AND OBJECTIVE BOX
Subjective   No acute events reported overnight.  The patient is not having any chest pain/tightness/discomfort, fevers, chills or sweats.  No light-headed sensation, dizziness or palpitations.    Review of Systems  14 point review of systems is negative except what is described above in the history of present illness    Telemetry  Atrial fibrillation with rates in the 60s to 80s with PVCs and couplets    Physical Examination  General: A/O x3, GUILLAUME, NAD, Follows all commands  HEENT: Supple, No JVD, Trachea midline, no masses  Pulmonary: CTAB, = Chest Excursion, no accessory muscle use  Cor: S1S2, irregular irregular with no murmur, gallop or rub  Groin/Wound: Ministernotomy: Dressing C/D/I, no erythema, non tender and without any drainage  Gastrointestinal: Soft, NT/ND, + Bowel Sounds  Neuro: = motor and sensory B/L, No focal deficits  Vascular: + RCA Thrill, Trace bilateral DP/PT pulses, no edema/clubbing/cyanosis  Extremities: No joint pain or swelling  Skin: Warm/Dry/Normal color, Normal turgor, no rashes  T(C): 36.7 (10-27-20 @ 17:00), Max: 36.8 (10-26-20 @ 19:40)  HR: 94 (10-27-20 @ 17:00) (64 - 94)  BP: 121/73 (10-27-20 @ 17:00) (113/61 - 193/106)  RR: 18 (10-27-20 @ 08:19) (16 - 18)  SpO2: 95% (10-27-20 @ 08:19) (93% - 96%)  10-26 @ 07:01  -  10-27 @ 07:00  --------------------------------------------------------  IN: 300 mL / OUT: 650 mL / NET: -350 mL    10-27 @ 07:01  -  10-27 @ 18:05  --------------------------------------------------------  IN: 357 mL / OUT: 250 mL / NET: 107 mL      MEDICATIONS  (STANDING):  atorvastatin 80 milliGRAM(s) Oral at bedtime  clopidogrel Tablet 75 milliGRAM(s) Oral daily  dextrose 5%. 1000 milliLiter(s) (50 mL/Hr) IV Continuous <Continuous>  dextrose 50% Injectable 12.5 Gram(s) IV Push once  ferrous    sulfate 325 milliGRAM(s) Oral daily  influenza   Vaccine 0.5 milliLiter(s) IntraMuscular once  insulin lispro (ADMELOG) corrective regimen sliding scale   SubCutaneous Before meals and at bedtime  lisinopril 10 milliGRAM(s) Oral daily  metFORMIN 500 milliGRAM(s) Oral two times a day  metoprolol tartrate 50 milliGRAM(s) Oral two times a day  pantoprazole    Tablet 40 milliGRAM(s) Oral before breakfast  polyethylene glycol 3350 17 Gram(s) Oral daily  senna 2 Tablet(s) Oral at bedtime  sodium chloride 0.9%. 1000 milliLiter(s) (10 mL/Hr) IV Continuous <Continuous>    MEDICATIONS  (PRN):  dextrose 40% Gel 15 Gram(s) Oral once PRN Blood Glucose LESS THAN 70 milliGRAM(s)/deciLiter  glucagon  Injectable 1 milliGRAM(s) IntraMuscular once PRN Glucose <70 milliGRAM(s)/deciLiter  oxycodone    5 mG/acetaminophen 325 mG 1 Tablet(s) Oral every 6 hours PRN Severe Pain (7 - 10)    Home Medications:  ferrous sulfate 325 mg (65 mg elemental iron) oral tablet: 1 tab(s) orally once a day (27 Oct 2020 07:55)  magnesium oxide 400 mg (241.3 mg elemental magnesium) oral tablet: 1 tab(s) orally 2 times a day (with meals) x 2 days (27 Oct 2020 07:55)  metFORMIN 1000 mg oral tablet: 1 tab(s) orally 2 times a day (27 Oct 2020 07:55)                       10.8   11.53 )-----------( 225      ( 27 Oct 2020 04:57 )             34.7   10-27    133<L>  |  96  |  26<H>  ----------------------------<  158<H>  3.6   |  25  |  0.93    Ca    9.9      27 Oct 2020 04:57    TPro  8.0  /  Alb  3.7  /  TBili  0.7  /  DBili  x   /  AST  23  /  ALT  20  /  AlkPhos  78  10-27  PT/INR - ( 27 Oct 2020 08:47 )   PT: 23.2 sec;   INR: 2.00 ratio        Assessment/Plan  74 M S/P TAVR via Direct Aortic Approach for Severe AS, Carotid Stenosis and Chronic diastolic heart failure.  The patient is clinically doing better.  He denies any cardiopulmonary complaints upon exertion.    --Continue aspirin 81mg PO QD and clopidogrel 75mg PO QD (patient has received greater than 1 month of dual antiplatelet therapy).  Aspirin has been discontinued.  Coumadin is therapeutic.  Concern about triple therapy.  INR is 2.0.  Continue coumadin 5mg PO QD.  --TTE was personally reviewed.  Device is in an appropriate position with no pericardial effusion.  --Continue metoprolol tartrate 50mg PO BID.  --Patient started on lisinopril.  --Continue atorvastatin 80mg PO QD.  --Mild troponin elevation likely secondary to wire manipulation during TAVR procedure.    --Patient is s/p L CEA. Will need Right sided CEA, which will be performed by Dr. Rodriguez several weeks after discharge.  The patient was reminded the importance of close vascular following.  --Continue telemetry monitoring.  --Reviewed indications and details of MCOT monitor which will be applied at time of discharge.  Patient is agreeable.  --Daily PT.  Out of bed to chair.  --Aim for K>4 and Mg>2.    All questions and concerns of the patient were addressed.  If the patient continues to clinically do well plan for discharge tomorrow.    Findings and plan discussed with cardiac surgery/Dr. Perea and structural heart team.

## 2020-10-27 NOTE — PROGRESS NOTE ADULT - REASON FOR ADMISSION
Nonrheumatic Aortic Valve Stenosis
TAVR
aortic valve stenosis
aortic valve stenosis
Severe AS
sob

## 2020-10-27 NOTE — DISCHARGE NOTE PROVIDER - NSDCPNSUBOBJ_GEN_ALL_CORE
PHYSICAL EXAM  Neurology: alert and oriented x 3, nonfocal, no gross deficits  CV : irreg  Sternal Wound :  CDI , Stable, min invasive  Lungs: cta  Abdomen: soft, nontender, nondistended, positive bowel sounds, + bowel movement   :    voiding         Extremities:   -   edema   /  -   calf tenderness ,     35 min. spent on this discharge

## 2020-10-27 NOTE — DISCHARGE NOTE PROVIDER - HOSPITAL COURSE
74M PMH: Bilateral Carotid Artery Stenosis, ICA Stenosis, HLD, HTN, CAD s/p two coronary artery stents (most recent 9/2020), Carotid endarterectomy (9/2020), Angiography of extremity s/p B/L LE stents, Atrial Fibrillation, PVD, DM type 2. Admitted to CTU for Severe Aortic Stenosis requiring TAVR on 10/22/2020.  Surgery/Hospital Course:  10/22 Direct Transcatheter Aortic Valve Replacement with willie-sternotomy.   Post op Course:   Mediastinal chest tube removed.  Hypotension à requiring Foster gtt.  Weaned off.   Hyperglycemia à Insulin gtt   Elevated Lactate level as high 4.4 à now 2.2   10/23 Post op TTE: small pericardial effusion; negative paravalvular leak.   10/24 Insulin gtt wean off à started on Metformin this AM. Low UO --> 250 SPA x 1 followed by Lasix 20 --> Farfan cath maintained.  Transferred to SDU @ 17:00 Coumadin 5 mg Po tonight for chronic Afib. Continue with current medication regimen.    10/25 VSS - POD # 3 - will d/c Farfan @ midnight tonight-d/c planning - anticipate home  10/26 Continue anticoagulation, ambulate today.  Plavix and coumadin only.  Transfer to floor, d/c home tomorrow  10/27 VSS pt voiding - bladder scan yields 70cc.  + bm - cleared for d/c home by dR. Perea

## 2020-10-27 NOTE — DISCHARGE NOTE PROVIDER - NSDCFUADDAPPT_GEN_ALL_CORE_FT
follow up appt with DR. Perea on Friday, October 30 @ 9:45am  follow up with your Primary Care MD, Dr. [facility] to follow your Coumadin dosing as before- you will have your blood drawn on thursday, then weekly

## 2020-10-28 ENCOUNTER — TRANSCRIPTION ENCOUNTER (OUTPATIENT)
Age: 75
End: 2020-10-28

## 2020-10-28 DIAGNOSIS — L76.82 OTHER POSTPROCEDURAL COMPLICATIONS OF SKIN AND SUBCUTANEOUS TISSUE: ICD-10-CM

## 2020-10-28 RX ORDER — METOPROLOL TARTRATE 50 MG/1
50 TABLET, FILM COATED ORAL TWICE DAILY
Qty: 30 | Refills: 0 | Status: ACTIVE | COMMUNITY

## 2020-10-28 RX ORDER — METFORMIN HYDROCHLORIDE 1000 MG/1
1000 TABLET, COATED ORAL
Refills: 0 | Status: ACTIVE | COMMUNITY
Start: 2020-10-14

## 2020-10-28 RX ORDER — CHLORHEXIDINE GLUCONATE 4 %
325 (65 FE) LIQUID (ML) TOPICAL DAILY
Refills: 0 | Status: ACTIVE | COMMUNITY
Start: 2020-10-09

## 2020-10-28 RX ORDER — SENNOSIDES 8.6 MG/1
CAPSULE, GELATIN COATED ORAL
Refills: 0 | Status: ACTIVE | COMMUNITY

## 2020-10-28 RX ORDER — ACETAMINOPHEN 500 MG/1
500 TABLET, COATED ORAL
Qty: 30 | Refills: 0 | Status: ACTIVE | COMMUNITY
Start: 2020-10-28 | End: 1900-01-01

## 2020-10-28 RX ORDER — MAGNESIUM OXIDE 241.3 MG/1000MG
400 TABLET ORAL TWICE DAILY
Refills: 0 | Status: ACTIVE | COMMUNITY
Start: 2020-10-09

## 2020-10-28 RX ORDER — METOPROLOL TARTRATE 25 MG/1
25 TABLET, FILM COATED ORAL
Qty: 30 | Refills: 1 | Status: DISCONTINUED | COMMUNITY
Start: 2020-10-09 | End: 2020-10-28

## 2020-10-29 ENCOUNTER — TRANSCRIPTION ENCOUNTER (OUTPATIENT)
Age: 75
End: 2020-10-29

## 2020-10-29 ENCOUNTER — INPATIENT (INPATIENT)
Facility: HOSPITAL | Age: 75
LOS: 5 days | Discharge: HOME | DRG: 864 | End: 2020-11-04
Attending: STUDENT IN AN ORGANIZED HEALTH CARE EDUCATION/TRAINING PROGRAM | Admitting: STUDENT IN AN ORGANIZED HEALTH CARE EDUCATION/TRAINING PROGRAM
Payer: COMMERCIAL

## 2020-10-29 ENCOUNTER — APPOINTMENT (OUTPATIENT)
Dept: CARE COORDINATION | Facility: HOME HEALTH | Age: 75
End: 2020-10-29

## 2020-10-29 VITALS
DIASTOLIC BLOOD PRESSURE: 70 MMHG | RESPIRATION RATE: 16 BRPM | SYSTOLIC BLOOD PRESSURE: 120 MMHG | HEART RATE: 86 BPM | OXYGEN SATURATION: 96 % | TEMPERATURE: 98.8 F

## 2020-10-29 VITALS
RESPIRATION RATE: 17 BRPM | SYSTOLIC BLOOD PRESSURE: 110 MMHG | TEMPERATURE: 98 F | DIASTOLIC BLOOD PRESSURE: 62 MMHG | HEART RATE: 74 BPM | HEIGHT: 70 IN | WEIGHT: 166.89 LBS | OXYGEN SATURATION: 100 %

## 2020-10-29 DIAGNOSIS — Z95.5 PRESENCE OF CORONARY ANGIOPLASTY IMPLANT AND GRAFT: Chronic | ICD-10-CM

## 2020-10-29 DIAGNOSIS — A41.9 SEPSIS, UNSPECIFIED ORGANISM: ICD-10-CM

## 2020-10-29 DIAGNOSIS — R41.0 DISORIENTATION, UNSPECIFIED: ICD-10-CM

## 2020-10-29 DIAGNOSIS — Z98.890 OTHER SPECIFIED POSTPROCEDURAL STATES: Chronic | ICD-10-CM

## 2020-10-29 DIAGNOSIS — R29.898 OTHER SYMPTOMS AND SIGNS INVOLVING THE MUSCULOSKELETAL SYSTEM: ICD-10-CM

## 2020-10-29 LAB
ALBUMIN SERPL ELPH-MCNC: 2.8 G/DL — LOW (ref 3.3–5)
ALBUMIN SERPL ELPH-MCNC: 2.9 G/DL — LOW (ref 3.3–5)
ALP SERPL-CCNC: 67 U/L — SIGNIFICANT CHANGE UP (ref 40–120)
ALP SERPL-CCNC: 67 U/L — SIGNIFICANT CHANGE UP (ref 40–120)
ALT FLD-CCNC: 18 U/L — SIGNIFICANT CHANGE UP (ref 10–45)
ALT FLD-CCNC: 20 U/L — SIGNIFICANT CHANGE UP (ref 10–45)
AMYLASE P1 CFR SERPL: 46 U/L — SIGNIFICANT CHANGE UP (ref 25–125)
ANION GAP SERPL CALC-SCNC: 12 MMOL/L — SIGNIFICANT CHANGE UP (ref 5–17)
ANION GAP SERPL CALC-SCNC: 12 MMOL/L — SIGNIFICANT CHANGE UP (ref 5–17)
APPEARANCE UR: CLEAR — SIGNIFICANT CHANGE UP
APTT BLD: 36.5 SEC — HIGH (ref 27.5–35.5)
APTT BLD: 39.3 SEC — HIGH (ref 27.5–35.5)
AST SERPL-CCNC: 22 U/L — SIGNIFICANT CHANGE UP (ref 10–40)
AST SERPL-CCNC: 25 U/L — SIGNIFICANT CHANGE UP (ref 10–40)
BASE EXCESS BLDV CALC-SCNC: 0.9 MMOL/L — SIGNIFICANT CHANGE UP (ref -2–2)
BASOPHILS # BLD AUTO: 0.04 K/UL — SIGNIFICANT CHANGE UP (ref 0–0.2)
BASOPHILS # BLD AUTO: 0.07 K/UL — SIGNIFICANT CHANGE UP (ref 0–0.2)
BASOPHILS NFR BLD AUTO: 0.3 % — SIGNIFICANT CHANGE UP (ref 0–2)
BASOPHILS NFR BLD AUTO: 0.5 % — SIGNIFICANT CHANGE UP (ref 0–2)
BILIRUB DIRECT SERPL-MCNC: 0.2 MG/DL — SIGNIFICANT CHANGE UP (ref 0–0.2)
BILIRUB INDIRECT FLD-MCNC: 0.3 MG/DL — SIGNIFICANT CHANGE UP (ref 0.2–1)
BILIRUB SERPL-MCNC: 0.5 MG/DL — SIGNIFICANT CHANGE UP (ref 0.2–1.2)
BILIRUB SERPL-MCNC: 0.6 MG/DL — SIGNIFICANT CHANGE UP (ref 0.2–1.2)
BILIRUB UR-MCNC: NEGATIVE — SIGNIFICANT CHANGE UP
BLD GP AB SCN SERPL QL: NEGATIVE — SIGNIFICANT CHANGE UP
BUN SERPL-MCNC: 22 MG/DL — SIGNIFICANT CHANGE UP (ref 7–23)
BUN SERPL-MCNC: 22 MG/DL — SIGNIFICANT CHANGE UP (ref 7–23)
CA-I SERPL-SCNC: 1.14 MMOL/L — SIGNIFICANT CHANGE UP (ref 1.12–1.3)
CALCIUM SERPL-MCNC: 8.8 MG/DL — SIGNIFICANT CHANGE UP (ref 8.4–10.5)
CALCIUM SERPL-MCNC: 9.1 MG/DL — SIGNIFICANT CHANGE UP (ref 8.4–10.5)
CHLORIDE BLDV-SCNC: 99 MMOL/L — SIGNIFICANT CHANGE UP (ref 96–108)
CHLORIDE SERPL-SCNC: 96 MMOL/L — SIGNIFICANT CHANGE UP (ref 96–108)
CHLORIDE SERPL-SCNC: 98 MMOL/L — SIGNIFICANT CHANGE UP (ref 96–108)
CK MB BLD-MCNC: 3.1 % — SIGNIFICANT CHANGE UP (ref 0–3.5)
CK MB CFR SERPL CALC: 1 NG/ML — SIGNIFICANT CHANGE UP (ref 0–6.7)
CK SERPL-CCNC: 32 U/L — SIGNIFICANT CHANGE UP (ref 30–200)
CO2 BLDV-SCNC: 26 MMOL/L — SIGNIFICANT CHANGE UP (ref 22–30)
CO2 SERPL-SCNC: 22 MMOL/L — SIGNIFICANT CHANGE UP (ref 22–31)
CO2 SERPL-SCNC: 22 MMOL/L — SIGNIFICANT CHANGE UP (ref 22–31)
COLOR SPEC: YELLOW — SIGNIFICANT CHANGE UP
CREAT SERPL-MCNC: 1.12 MG/DL — SIGNIFICANT CHANGE UP (ref 0.5–1.3)
CREAT SERPL-MCNC: 1.28 MG/DL — SIGNIFICANT CHANGE UP (ref 0.5–1.3)
DIFF PNL FLD: NEGATIVE — SIGNIFICANT CHANGE UP
EOSINOPHIL # BLD AUTO: 0.03 K/UL — SIGNIFICANT CHANGE UP (ref 0–0.5)
EOSINOPHIL # BLD AUTO: 0.08 K/UL — SIGNIFICANT CHANGE UP (ref 0–0.5)
EOSINOPHIL NFR BLD AUTO: 0.2 % — SIGNIFICANT CHANGE UP (ref 0–6)
EOSINOPHIL NFR BLD AUTO: 0.6 % — SIGNIFICANT CHANGE UP (ref 0–6)
GAS PNL BLDA: SIGNIFICANT CHANGE UP
GAS PNL BLDV: 128 MMOL/L — LOW (ref 135–145)
GAS PNL BLDV: SIGNIFICANT CHANGE UP
GAS PNL BLDV: SIGNIFICANT CHANGE UP
GLUCOSE BLDC GLUCOMTR-MCNC: 250 MG/DL — HIGH (ref 70–99)
GLUCOSE BLDV-MCNC: 316 MG/DL — HIGH (ref 70–99)
GLUCOSE SERPL-MCNC: 263 MG/DL — HIGH (ref 70–99)
GLUCOSE SERPL-MCNC: 349 MG/DL — HIGH (ref 70–99)
GLUCOSE UR QL: NEGATIVE — SIGNIFICANT CHANGE UP
HCO3 BLDV-SCNC: 25 MMOL/L — SIGNIFICANT CHANGE UP (ref 21–29)
HCT VFR BLD CALC: 26.2 % — LOW (ref 39–50)
HCT VFR BLD CALC: 27.7 % — LOW (ref 39–50)
HCT VFR BLDA CALC: 24 % — LOW (ref 39–50)
HGB BLD CALC-MCNC: 7.7 G/DL — LOW (ref 13–17)
HGB BLD-MCNC: 8.2 G/DL — LOW (ref 13–17)
HGB BLD-MCNC: 8.2 G/DL — LOW (ref 13–17)
IMM GRANULOCYTES NFR BLD AUTO: 0.7 % — SIGNIFICANT CHANGE UP (ref 0–1.5)
IMM GRANULOCYTES NFR BLD AUTO: 1 % — SIGNIFICANT CHANGE UP (ref 0–1.5)
INR BLD: 3.41 RATIO — HIGH (ref 0.88–1.16)
INR BLD: 3.56 RATIO — HIGH (ref 0.88–1.16)
KETONES UR-MCNC: NEGATIVE — SIGNIFICANT CHANGE UP
LACTATE BLDV-MCNC: 1.2 MMOL/L — SIGNIFICANT CHANGE UP (ref 0.7–2)
LACTATE SERPL-SCNC: 1.6 MMOL/L — SIGNIFICANT CHANGE UP (ref 0.7–2)
LEUKOCYTE ESTERASE UR-ACNC: NEGATIVE — SIGNIFICANT CHANGE UP
LYMPHOCYTES # BLD AUTO: 0.97 K/UL — LOW (ref 1–3.3)
LYMPHOCYTES # BLD AUTO: 1.71 K/UL — SIGNIFICANT CHANGE UP (ref 1–3.3)
LYMPHOCYTES # BLD AUTO: 12.6 % — LOW (ref 13–44)
LYMPHOCYTES # BLD AUTO: 7 % — LOW (ref 13–44)
MAGNESIUM SERPL-MCNC: 1.6 MG/DL — SIGNIFICANT CHANGE UP (ref 1.6–2.6)
MCHC RBC-ENTMCNC: 26.5 PG — LOW (ref 27–34)
MCHC RBC-ENTMCNC: 27.2 PG — SIGNIFICANT CHANGE UP (ref 27–34)
MCHC RBC-ENTMCNC: 29.6 GM/DL — LOW (ref 32–36)
MCHC RBC-ENTMCNC: 31.3 GM/DL — LOW (ref 32–36)
MCV RBC AUTO: 87 FL — SIGNIFICANT CHANGE UP (ref 80–100)
MCV RBC AUTO: 89.6 FL — SIGNIFICANT CHANGE UP (ref 80–100)
MONOCYTES # BLD AUTO: 1.46 K/UL — HIGH (ref 0–0.9)
MONOCYTES # BLD AUTO: 1.46 K/UL — HIGH (ref 0–0.9)
MONOCYTES NFR BLD AUTO: 10.6 % — SIGNIFICANT CHANGE UP (ref 2–14)
MONOCYTES NFR BLD AUTO: 10.8 % — SIGNIFICANT CHANGE UP (ref 2–14)
NEUTROPHILS # BLD AUTO: 10.08 K/UL — HIGH (ref 1.8–7.4)
NEUTROPHILS # BLD AUTO: 11.19 K/UL — HIGH (ref 1.8–7.4)
NEUTROPHILS NFR BLD AUTO: 74.5 % — SIGNIFICANT CHANGE UP (ref 43–77)
NEUTROPHILS NFR BLD AUTO: 81.2 % — HIGH (ref 43–77)
NITRITE UR-MCNC: NEGATIVE — SIGNIFICANT CHANGE UP
NRBC # BLD: 0 /100 WBCS — SIGNIFICANT CHANGE UP (ref 0–0)
NRBC # BLD: 0 /100 WBCS — SIGNIFICANT CHANGE UP (ref 0–0)
PCO2 BLDV: 41 MMHG — SIGNIFICANT CHANGE UP (ref 35–50)
PH BLDV: 7.4 — SIGNIFICANT CHANGE UP (ref 7.35–7.45)
PH UR: 6 — SIGNIFICANT CHANGE UP (ref 5–8)
PLATELET # BLD AUTO: 237 K/UL — SIGNIFICANT CHANGE UP (ref 150–400)
PLATELET # BLD AUTO: 244 K/UL — SIGNIFICANT CHANGE UP (ref 150–400)
PO2 BLDV: 69 MMHG — HIGH (ref 25–45)
POTASSIUM BLDV-SCNC: 3.6 MMOL/L — SIGNIFICANT CHANGE UP (ref 3.5–5.3)
POTASSIUM SERPL-MCNC: 3.6 MMOL/L — SIGNIFICANT CHANGE UP (ref 3.5–5.3)
POTASSIUM SERPL-MCNC: 3.8 MMOL/L — SIGNIFICANT CHANGE UP (ref 3.5–5.3)
POTASSIUM SERPL-SCNC: 3.6 MMOL/L — SIGNIFICANT CHANGE UP (ref 3.5–5.3)
POTASSIUM SERPL-SCNC: 3.8 MMOL/L — SIGNIFICANT CHANGE UP (ref 3.5–5.3)
PROCALCITONIN SERPL-MCNC: 0.26 NG/ML — HIGH (ref 0.02–0.1)
PROT SERPL-MCNC: 6.8 G/DL — SIGNIFICANT CHANGE UP (ref 6–8.3)
PROT SERPL-MCNC: 6.8 G/DL — SIGNIFICANT CHANGE UP (ref 6–8.3)
PROT UR-MCNC: 100 — SIGNIFICANT CHANGE UP
PROTHROM AB SERPL-ACNC: 38.6 SEC — HIGH (ref 10.6–13.6)
PROTHROM AB SERPL-ACNC: 40.2 SEC — HIGH (ref 10.6–13.6)
RBC # BLD: 3.01 M/UL — LOW (ref 4.2–5.8)
RBC # BLD: 3.09 M/UL — LOW (ref 4.2–5.8)
RBC # FLD: 16.9 % — HIGH (ref 10.3–14.5)
RBC # FLD: 17.1 % — HIGH (ref 10.3–14.5)
RH IG SCN BLD-IMP: POSITIVE — SIGNIFICANT CHANGE UP
SAO2 % BLDV: 92 % — HIGH (ref 67–88)
SARS-COV-2 RNA SPEC QL NAA+PROBE: SIGNIFICANT CHANGE UP
SODIUM SERPL-SCNC: 130 MMOL/L — LOW (ref 135–145)
SODIUM SERPL-SCNC: 132 MMOL/L — LOW (ref 135–145)
SP GR SPEC: 1.02 — SIGNIFICANT CHANGE UP (ref 1.01–1.02)
TROPONIN T, HIGH SENSITIVITY RESULT: 42 NG/L — SIGNIFICANT CHANGE UP (ref 0–51)
UROBILINOGEN FLD QL: ABNORMAL
WBC # BLD: 13.54 K/UL — HIGH (ref 3.8–10.5)
WBC # BLD: 13.79 K/UL — HIGH (ref 3.8–10.5)
WBC # FLD AUTO: 13.54 K/UL — HIGH (ref 3.8–10.5)
WBC # FLD AUTO: 13.79 K/UL — HIGH (ref 3.8–10.5)

## 2020-10-29 PROCEDURE — 93010 ELECTROCARDIOGRAM REPORT: CPT

## 2020-10-29 PROCEDURE — 71045 X-RAY EXAM CHEST 1 VIEW: CPT | Mod: 26

## 2020-10-29 PROCEDURE — 71275 CT ANGIOGRAPHY CHEST: CPT | Mod: 26

## 2020-10-29 PROCEDURE — 70450 CT HEAD/BRAIN W/O DYE: CPT | Mod: 26

## 2020-10-29 PROCEDURE — 99291 CRITICAL CARE FIRST HOUR: CPT

## 2020-10-29 RX ORDER — INSULIN LISPRO 100/ML
VIAL (ML) SUBCUTANEOUS AT BEDTIME
Refills: 0 | Status: DISCONTINUED | OUTPATIENT
Start: 2020-10-29 | End: 2020-11-04

## 2020-10-29 RX ORDER — MAGNESIUM SULFATE 500 MG/ML
1 VIAL (ML) INJECTION ONCE
Refills: 0 | Status: COMPLETED | OUTPATIENT
Start: 2020-10-29 | End: 2020-10-29

## 2020-10-29 RX ORDER — SODIUM CHLORIDE 9 MG/ML
1000 INJECTION, SOLUTION INTRAVENOUS
Refills: 0 | Status: DISCONTINUED | OUTPATIENT
Start: 2020-10-29 | End: 2020-11-04

## 2020-10-29 RX ORDER — VANCOMYCIN HCL 1 G
1000 VIAL (EA) INTRAVENOUS EVERY 12 HOURS
Refills: 0 | Status: DISCONTINUED | OUTPATIENT
Start: 2020-10-29 | End: 2020-10-31

## 2020-10-29 RX ORDER — PANTOPRAZOLE SODIUM 20 MG/1
40 TABLET, DELAYED RELEASE ORAL
Refills: 0 | Status: DISCONTINUED | OUTPATIENT
Start: 2020-10-29 | End: 2020-11-04

## 2020-10-29 RX ORDER — ACETAMINOPHEN 500 MG
650 TABLET ORAL ONCE
Refills: 0 | Status: COMPLETED | OUTPATIENT
Start: 2020-10-29 | End: 2020-10-29

## 2020-10-29 RX ORDER — POTASSIUM CHLORIDE 20 MEQ
20 PACKET (EA) ORAL ONCE
Refills: 0 | Status: COMPLETED | OUTPATIENT
Start: 2020-10-29 | End: 2020-10-29

## 2020-10-29 RX ORDER — INSULIN LISPRO 100/ML
VIAL (ML) SUBCUTANEOUS
Refills: 0 | Status: DISCONTINUED | OUTPATIENT
Start: 2020-10-29 | End: 2020-11-04

## 2020-10-29 RX ORDER — PIPERACILLIN AND TAZOBACTAM 4; .5 G/20ML; G/20ML
3.38 INJECTION, POWDER, LYOPHILIZED, FOR SOLUTION INTRAVENOUS EVERY 8 HOURS
Refills: 0 | Status: DISCONTINUED | OUTPATIENT
Start: 2020-10-29 | End: 2020-10-31

## 2020-10-29 RX ORDER — SODIUM CHLORIDE 9 MG/ML
1000 INJECTION INTRAMUSCULAR; INTRAVENOUS; SUBCUTANEOUS ONCE
Refills: 0 | Status: DISCONTINUED | OUTPATIENT
Start: 2020-10-29 | End: 2020-10-29

## 2020-10-29 RX ORDER — FERROUS SULFATE 325(65) MG
325 TABLET ORAL DAILY
Refills: 0 | Status: DISCONTINUED | OUTPATIENT
Start: 2020-10-29 | End: 2020-11-04

## 2020-10-29 RX ORDER — INFLUENZA VIRUS VACCINE 15; 15; 15; 15 UG/.5ML; UG/.5ML; UG/.5ML; UG/.5ML
0.5 SUSPENSION INTRAMUSCULAR ONCE
Refills: 0 | Status: DISCONTINUED | OUTPATIENT
Start: 2020-10-29 | End: 2020-11-04

## 2020-10-29 RX ORDER — DEXTROSE 50 % IN WATER 50 %
12.5 SYRINGE (ML) INTRAVENOUS ONCE
Refills: 0 | Status: DISCONTINUED | OUTPATIENT
Start: 2020-10-29 | End: 2020-11-04

## 2020-10-29 RX ORDER — HEPARIN SODIUM 5000 [USP'U]/ML
INJECTION INTRAVENOUS; SUBCUTANEOUS
Qty: 25000 | Refills: 0 | Status: DISCONTINUED | OUTPATIENT
Start: 2020-10-29 | End: 2020-10-30

## 2020-10-29 RX ORDER — GLUCAGON INJECTION, SOLUTION 0.5 MG/.1ML
1 INJECTION, SOLUTION SUBCUTANEOUS ONCE
Refills: 0 | Status: DISCONTINUED | OUTPATIENT
Start: 2020-10-29 | End: 2020-11-04

## 2020-10-29 RX ORDER — CHLORHEXIDINE GLUCONATE 213 G/1000ML
1 SOLUTION TOPICAL
Refills: 0 | Status: DISCONTINUED | OUTPATIENT
Start: 2020-10-29 | End: 2020-11-04

## 2020-10-29 RX ORDER — VANCOMYCIN HCL 1 G
1000 VIAL (EA) INTRAVENOUS ONCE
Refills: 0 | Status: COMPLETED | OUTPATIENT
Start: 2020-10-29 | End: 2020-10-29

## 2020-10-29 RX ORDER — DEXTROSE 50 % IN WATER 50 %
15 SYRINGE (ML) INTRAVENOUS ONCE
Refills: 0 | Status: DISCONTINUED | OUTPATIENT
Start: 2020-10-29 | End: 2020-11-04

## 2020-10-29 RX ORDER — CLOPIDOGREL BISULFATE 75 MG/1
75 TABLET, FILM COATED ORAL DAILY
Refills: 0 | Status: DISCONTINUED | OUTPATIENT
Start: 2020-10-29 | End: 2020-11-04

## 2020-10-29 RX ORDER — PIPERACILLIN AND TAZOBACTAM 4; .5 G/20ML; G/20ML
3.38 INJECTION, POWDER, LYOPHILIZED, FOR SOLUTION INTRAVENOUS ONCE
Refills: 0 | Status: COMPLETED | OUTPATIENT
Start: 2020-10-29 | End: 2020-10-29

## 2020-10-29 RX ORDER — ATORVASTATIN CALCIUM 80 MG/1
80 TABLET, FILM COATED ORAL AT BEDTIME
Refills: 0 | Status: DISCONTINUED | OUTPATIENT
Start: 2020-10-29 | End: 2020-11-04

## 2020-10-29 RX ADMIN — ATORVASTATIN CALCIUM 80 MILLIGRAM(S): 80 TABLET, FILM COATED ORAL at 22:21

## 2020-10-29 RX ADMIN — Medication 250 MILLIGRAM(S): at 22:22

## 2020-10-29 RX ADMIN — Medication 650 MILLIGRAM(S): at 14:22

## 2020-10-29 RX ADMIN — Medication 325 MILLIGRAM(S): at 22:21

## 2020-10-29 RX ADMIN — Medication 250 MILLIGRAM(S): at 15:34

## 2020-10-29 RX ADMIN — Medication 100 GRAM(S): at 23:24

## 2020-10-29 RX ADMIN — CLOPIDOGREL BISULFATE 75 MILLIGRAM(S): 75 TABLET, FILM COATED ORAL at 22:21

## 2020-10-29 RX ADMIN — HEPARIN SODIUM 1400 UNIT(S)/HR: 5000 INJECTION INTRAVENOUS; SUBCUTANEOUS at 22:23

## 2020-10-29 RX ADMIN — PIPERACILLIN AND TAZOBACTAM 200 GRAM(S): 4; .5 INJECTION, POWDER, LYOPHILIZED, FOR SOLUTION INTRAVENOUS at 14:22

## 2020-10-29 RX ADMIN — Medication 20 MILLIEQUIVALENT(S): at 22:21

## 2020-10-29 RX ADMIN — PIPERACILLIN AND TAZOBACTAM 25 GRAM(S): 4; .5 INJECTION, POWDER, LYOPHILIZED, FOR SOLUTION INTRAVENOUS at 22:22

## 2020-10-29 NOTE — ED ADULT NURSE REASSESSMENT NOTE - NS ED NURSE REASSESS COMMENT FT1
Handoff report received from nithya de dios RN. Upon assessment, pt is sleeping, VSS, and in NAD. Per day RN, Report given to CCU, awaiting CCU resident for transport. Handoff report received from nithya de dios RN. Upon assessment, pt is sleeping, arousable upon stimulation. VSS, and in NAD. Per day RN, Report given to CCU, awaiting CCU resident for transport.

## 2020-10-29 NOTE — ED ADULT NURSE NOTE - CHIEF COMPLAINT QUOTE
pt biba from home with c/o weakness associated with periods of confusion.  pt is s/p heart valve and stent last week.  LJ=[id number] in field.

## 2020-10-29 NOTE — ED ADULT NURSE REASSESSMENT NOTE - NS ED NURSE REASSESS COMMENT FT1
Pt straight cathed by 2 ED RN using sterile technique, 200mL clear yellow urine drained from bladder. Rigo PEACE at bedside performing US stating pt will be going to CTICU. Pt to CT now.

## 2020-10-29 NOTE — H&P ADULT - HISTORY OF PRESENT ILLNESS
74M w/ b/l carotid artery stenosis s/p L endarterectomy (9/20), CAD s/p stents x 2 (most recent 9/20 to OM2), PAD s/p BLE stents, afib on warfarin, HLD, HTN, DMII, recently hospitalized (10/22-27) for severe AS s/p TAVR on 10/22, p/w weakness/fatigue/chills in AM 10/29.   Patient stated that he was feeling fine last night, but woke up feeling weak and chill. Has significant chest pain with coughing, but chest-pain free otherwise. Minimal cough, not productive. (+) abdominal pain in the AM, but not currently. Denied headache, visual changes, neck pain, SOB, diarrhea, dysuria, or BLE pain/edema.   Patient was brought in by EMS. Per ED note, was confused upon encounter, s/p 1L IVF on field and A + O x 3 upon arrival in ED. Found to have rectal T 101.7 F in ED.     In ED: Tmax rectal 101.7 F; HR in 60-70's; BP min 86/61 --> WNL; RR max 22, SaO2 min 94% --> 2L NC with SaO2 98%. Leukocytosis in 13's and H&H 8.2/26.2 (was 10.8/34.7 on 10/27), INR 3.4; Na 130; Cr 1.28 (baseline 0.7's - 1.0's), procal 0.26; UA (-), CXR (-), CTH no acute changes since 9/11/20, CTA chest w/o PE, ? sternal fracture. S/p vanc and Zosyn x 1. Bcx, Ucx sent.     Patient was admitted to CICU for further monitoring.

## 2020-10-29 NOTE — ED PROVIDER NOTE - PHYSICAL EXAMINATION
ADEBAYO Renee PA-C see below:  GEN: NAD, awake, eyes open spontaneously  HEENT: NCAT, MMM, Trachea midline, normal conjunctiva, perrl  CHEST/LUNGS: Non-tachypneic, CTAB, bilateral breath sounds  CARDIAC: Non-tachycardic, normal perfusion  ABDOMEN: Soft, NTND, No rebound/guarding  MSK: No edema, no gross deformity of extremities  SKIN: No rashes, no petechiae, no vesicles  NEURO: CN grossly intact, normal coordination, no focal motor or sensory deficits  PSYCH: Alert, appropriate, cooperative, with capacity and insight  ----------------------------------------

## 2020-10-29 NOTE — H&P ADULT - NSHPPHYSICALEXAM_GEN_ALL_CORE
PHYSICAL EXAM:  GENERAL: NAD, Resting in bed  HEENT:  Head atraumatic, EOMI, PERRLA, conjunctiva clear; dry mucous membranes  NECK: Supple, No JVD, scar from L endarterectomy clean and dry  CHEST/LUNG: Clear to auscultation bilaterally; No rales, rhonchi, wheezing, or rubs. Unlabored respirations on NC; willie-sternotomy site clean and dry, no erythema, warmth or tenderness  HEART: Regular rate and rhythm; extra heart sound difficulty to appreciate with positioning  ABDOMEN: Bowel sounds present; Soft, Nontender, Nondistended. No hepatomegaly  EXTREMITIES:  1+ Peripheral Pulses; No clubbing, cyanosis, or edema  NERVOUS SYSTEM:  Alert & Oriented X 3, non-focal and spontaneous movements of all extremities; generalized weakness LE > UE. Difficulty lifting LE against gravity while in bed  SKIN: No rashes or lesions

## 2020-10-29 NOTE — PHYSICAL EXAM
[Sclera] : the sclera and conjunctiva were normal [Neck Appearance] : the appearance of the neck was normal [] : no respiratory distress [Respiration, Rhythm And Depth] : normal respiratory rhythm and effort [Auscultation Breath Sounds / Voice Sounds] : lungs were clear to auscultation bilaterally [Apical Impulse] : the apical impulse was normal [Irregularly Irregular] : the rhythm was irregularly irregular [Bowel Sounds] : normal bowel sounds [Skin Color & Pigmentation] : normal skin color and pigmentation [FreeTextEntry1] : upper portion of chest with MTI [Oriented To Time, Place, And Person] : oriented to person, place, and time

## 2020-10-29 NOTE — ED PROVIDER NOTE - OBJECTIVE STATEMENT
74y M PMH Bilateral Carotid Artery Stenosis, ICA Stenosis, HLD, HTN, CAD s/p two coronary artery stents (most recent 9/2020), Carotid endarterectomy (9/2020), Angiography of extremity s/p B/L LE stents, Atrial Fibrillation on warfarin, PVD on plavix, DM type 2, discharged yesterday s/p Severe Aortic Stenosis requiring TAVR on 10/22/2020 p/w weakness/fatigue/chills since this morning. Pt was discharged yesterday and states he felt fine until this morning when he started feelings symptoms. Pt denies fever, chest pain/SOB, abdominal pain, N/V/D, urinary complaints.  Pt was BIBEMS told to be confused. Pt A&Ox3 in ER. given 1L NS in the field by EMS. Rectal Temperature in .7.

## 2020-10-29 NOTE — ED ADULT TRIAGE NOTE - CHIEF COMPLAINT QUOTE
pt biba from home with c/o weakness associated with periods of confusion.  pt is s/p heart valve and stent last week.  [id number] in field.

## 2020-10-29 NOTE — ED ADULT NURSE NOTE - NSIMPLEMENTINTERV_GEN_ALL_ED
Implemented All Fall with Harm Risk Interventions:  [place] to call system. Call bell, personal items and telephone within reach. Instruct patient to call for assistance. Room bathroom lighting operational. Non-slip footwear when patient is off stretcher. Physically safe environment: no spills, clutter or unnecessary equipment. Stretcher in lowest position, wheels locked, appropriate side rails in place. Provide visual cue, wrist band, yellow gown, etc. Monitor gait and stability. Monitor for mental status changes and reorient to person, place, and time. Review medications for side effects contributing to fall risk. Reinforce activity limits and safety measures with patient and family. Provide visual clues: red socks.

## 2020-10-29 NOTE — ED ADULT NURSE REASSESSMENT NOTE - NS ED NURSE REASSESS COMMENT FT1
Pt progressively becoming more lethargic and less responsive. Pt sleeping and snoring but protecting airway. BP noted to be 80s systolic. Curt PEACE and Víctor SERNA notified and to bedside/ ICU MD also come to pt bedside. Repeat vbg, abg, tsh and procalcitonin sent to lab. Awaiting admission to ICU.

## 2020-10-29 NOTE — ED PROVIDER NOTE - ATTENDING CONTRIBUTION TO CARE
I have personally performed a face to face medical and diagnostic evaluation of the patient. I have discussed with and reviewed the ACP's note and agree with the History, ROS, Physical Exam and MDM unless otherwise indicated. A brief summary of my personal evaluation and impression can be found below.    74M hx of b/l carotid stenosis ICA HLD CAD s/p stents, afib on warfarin DM s/p TAVR, presents with a cc of weakness, chills this this morning was dc'd yesterday. Otherwise has no complaints. Denies /c/cp/sob. Denies headache, syncope, lightheadedness, dizziness. Denies chest palpitations, abdominal pain. Denies edema. Denies dysuria, hematuria, BRBPR, tarry stools, diarrhea, constipation.     All other ROS negative, except as above and as per HPI and ROS section.    VITALS: Initial triage and subsequent vitals have been reviewed by me.  GEN APPEARANCE: WDWN, alert, non-toxic, NAD  HEAD: Atraumatic.  EYES: PERRLa, EOMI, vision grossly intact.   NECK: Supple  CV: RRR, S1S2, no c/r/m/g. Cap refill <2 seconds. No bruits.   LUNGS: CTAB. No abnormal breath sounds.  ABDOMEN: Soft, NTND. No guarding or rebound.   MSK/EXT: No spinal or extremity point tenderness. No CVA ttp. Pelvis stable. No peripheral edema.  NEURO: Alert, follows commands. Weight bearing normal. Speech normal. Sensation and motor normal x4 extremities.   SKIN: Warm, dry and intact. No rash.  PSYCH: Appropriate    Plan/MDM: 74M complex cardiac history, dc'd from [facility] yesterday presents with a cc of weakness, confusion/AMS?. Febrile in ED, hypotensive in ED, c/f sepsis 2/2 pna/uti/covid? will check labs cth cta eval for pe, sepsis bundle ivf abx, discuss with ct surg, admit.

## 2020-10-29 NOTE — PROGRESS NOTE ADULT - SUBJECTIVE AND OBJECTIVE BOX
PAIGE BYRNES  MRN-[id number]  Patient is a 74y old  Male who presents with a chief complaint of Fever and weakness (29 Oct 2020 20:53)    HPI:  74M w/ b/l carotid artery stenosis s/p L endarterectomy (), CAD s/p stents x 2 (most recent  to OM2), PAD s/p BLE stents, afib on warfarin, HLD, HTN, DMII, recently hospitalized (10/22-) for severe AS s/p TAVR on 10/22, p/w weakness/fatigue/chills in AM 10/29.   Patient stated that he was feeling fine last night, but woke up feeling weak and chill. Has significant chest pain with coughing, but chest-pain free otherwise. Minimal cough, not productive. (+) abdominal pain in the AM, but not currently. Denied headache, visual changes, neck pain, SOB, diarrhea, dysuria, or BLE pain/edema.   Patient was brought in by EMS. Per ED note, was confused upon encounter, s/p 1L IVF on field and A + O x 3 upon arrival in ED. Found to have rectal T 101.7 F in ED.     In ED: Tmax rectal 101.7 F; HR in 60-70's; BP min 86/61 --> WNL; RR max 22, SaO2 min 94% --> 2L NC with SaO2 98%. Leukocytosis in 13's and H&H 8.2/26.2 (was 10.8/34.7 on 10/27), INR 3.4; Na 130; Cr 1.28 (baseline 0.7's - 1.0's), procal 0.26; UA (-), CXR (-), CTH no acute changes since 20, CTA chest w/o PE, ? sternal fracture. S/p vanc and Zosyn x 1. Bcx, Ucx sent.     Patient was admitted to CICU for further monitoring.   (29 Oct 2020 20:53)      Hospital Course:  10/29 Admitted to CICU for monitoring of fever and weakness after presenting to ED with fever, weakness, chills, and significant CP with coughing.     24 HOUR EVENTS:  Patient chest pain free at this time.     REVIEW OF SYSTEMS:   Constitutional: + weakness, No fevers, or chills  Eyes/ENT: No visual changes  Respiratory:  (+) dry cough with rare blood-tinged sputum, wheezing, hemoptysis  Cardiovascular: No chest pain, no palpitations at rest, (+) chest pain with coughing  Gastrointestinal: No abdominal pain. No nausea, vomiting, hematemesis.   Genitourinary: No dysuria  Neurological: No numbness, no weakness  Skin: No itching, rashes    ICU Vital Signs Last 24 Hrs  T(C): 36.9 (29 Oct 2020 19:30), Max: 38.7 (29 Oct 2020 13:46)  T(F): 98.4 (29 Oct 2020 19:30), Max: 101.7 (29 Oct 2020 13:46)  HR: 70 (29 Oct 2020 20:00) (64 - 81)  BP: 119/77 (29 Oct 2020 20:00) (86/61 - 121/88)  BP(mean): 85 (29 Oct 2020 20:00) (70 - 97)  ABP: --  ABP(mean): --  RR: 26 (29 Oct 2020 20:00) (17 - 26)  SpO2: 100% (29 Oct 2020 20:00) (94% - 100%)        I&O's Summary          POCT Blood Glucose.: 250 mg/dL (29 Oct 2020 22:19)      PHYSICAL EXAM:   General: No acute distress in bed on NC  Eyes: EOMI, PERRLA, conjunctiva and sclera clear  Chest/Lung: CTAB, no wheezes, rales, or rhonchi  Heart: Regular rate, regular rhythm. Normal S1/S2. No murmurs, rubs, or gallops.  Abdomen: Soft, nontender, nondistended. Normal bowel sounds.  Extremites: 2+ peripheral pulses B/L. No clubbing, cyanosis, or edema.  Neurology: A&O x3, no focal deficits  Skin: No rashes or lesions    ============================I/O===========================  I&O's Detail    ============================ LABS =========================                        8.2    13.54 )-----------( 237      ( 29 Oct 2020 20:22 )             27.7     10-29    132<L>  |  98  |  22  ----------------------------<  263<H>  3.8   |  22  |  1.12    Ca    9.1      29 Oct 2020 20:26  Mg     1.6     10-29    TPro  6.8  /  Alb  2.8<L>  /  TBili  0.5  /  DBili  0.2  /  AST  22  /  ALT  18  /  AlkPhos  67  10-29    Troponin T, High Sensitivity Result: 42 ng/L (10-29-20 @ 20:26)    CKMB Units: 1.0 ng/mL (10-29-20 @ 20:26)    Creatine Kinase, Serum: 32 U/L (10-29-20 @ 20:26)    CPK Mass Assay %: 3.1 % (10-29-20 @ 20:26)        LIVER FUNCTIONS - ( 29 Oct 2020 20:26 )  Alb: 2.8 g/dL / Pro: 6.8 g/dL / ALK PHOS: 67 U/L / ALT: 18 U/L / AST: 22 U/L / GGT: x           PT/INR - ( 29 Oct 2020 20:22 )   PT: 40.2 sec;   INR: 3.56 ratio         PTT - ( 29 Oct 2020 20:22 )  PTT:36.5 sec    Lactate, Blood: 1.6 mmol/L (10-29-20 @ 20:22)  Blood Gas Venous - Lactate: 1.2 mmoL/L (10-29-20 @ 17:12)    Urinalysis Basic - ( 29 Oct 2020 15:12 )    Color: Yellow / Appearance: Clear / S.022 / pH: x  Gluc: x / Ketone: Negative  / Bili: Negative / Urobili: 3 mg/dL   Blood: x / Protein: 100 / Nitrite: Negative   Leuk Esterase: Negative / RBC: x / WBC x   Sq Epi: x / Non Sq Epi: x / Bacteria: x      ======================Micro/Rad/Cardio=================  Telemetry: Reviewed   EKG: Reviewed  CXR: Reviewed  Echo: Reviewed  Cath: Reviewed  ======================================================  PAST MEDICAL & SURGICAL HISTORY:  Bilateral carotid artery stenosis  ICA stenosis    Stented coronary artery    T2DM (type 2 diabetes mellitus)    Peripheral vascular disease    Atrial fibrillation    Hypertension    Hyperlipidemia    Coronary artery disease    S/P carotid endarterectomy  left  2020    Status post angiography of extremity  1 stent in each leg    History of coronary artery stent placement  two coronary stents; most recent 2020 to OM2      ====================ASSESSMENT ==============  Severe AS s/p TAVR 10/22   Admitted with fever and weakness  DM 2   Active smoker  Afib     Plan:  ====================== NEUROLOGY=====================  A&Ox3   - Per EMS, confused upon encounter and now normal baseline s/p IVF in ED  - Nonfocal with slight slurring of speech, continue to monitor neuro status per ICU protocol.   - CTH: Negative acute changes    ==================== RESPIRATORY======================  Dry cough   - Comfortable on 2L NC, SpO2 100%.  - Cont to monitor SpO2 via pulse oximetry.   - No PE on CTA    ====================CARDIOVASCULAR==================   Severe AS s/p TAVR 10/22  - Post-op TTE 10/23: EF 60%, normal LV systolic function, mod LV diastolic fx; borderline RV systolic fx; aortic valve mean gradient 5 mm Hg. Mod TR.   - Hold BB and ACE 2/2 hypotension  - f/u repeat TTE in AM  - f/u CTS consul in AM for post op fever  - Non Invasive hemodynamic monitoring    Afib on Coumadin   Coumadin on hold 2/2 supratherapeutic INR; resume once INR < 2  - cont Heparin gtt      HLD  - Plavix and Lipitor   - No BB or ACEi due to 2/2 hypotension    atorvastatin 80 milliGRAM(s) Oral at bedtime  clopidogrel Tablet 75 milliGRAM(s) Oral daily  ferrous    sulfate 325 milliGRAM(s) Oral daily    ===================HEMATOLOGIC/ONC ===================  H/H 8.2/27.7, stable.   - Continue to monitor hemoglobin and hematocrit levels.   - Heparin gtt for Afib, on Coumadin for Afib but holding due to INR Supratherapeutic       heparin  Infusion.  Unit(s)/Hr (14 mL/Hr) IV Continuous <Continuous>    ===================== RENAL =========================  - Cr. 1.12 today   -  Monitor Cr and avoid nephrotoxins  Continue to monitor I/Os, BUN/Creatinine, and urine output.   Goal net even fluid balance. Replete lytes PRN. Keep K> 4 and Mg >2.     ==================== GASTROINTESTINAL===================  Continue Protonix for stress ulcer prophylaxis.     dextrose 5%. 1000 milliLiter(s) (50 mL/Hr) IV Continuous <Continuous>  magnesium sulfate  IVPB 1 Gram(s) IV Intermittent once  pantoprazole    Tablet 40 milliGRAM(s) Oral before breakfast    =======================    ENDOCRINE  =====================  DMT2, glycemic control with Admelog sliding scale and Glucagon PRN.   Monitor blood glucose levels.     dextrose 40% Gel 15 Gram(s) Oral once PRN Blood Glucose LESS THAN 70 milliGRAM(s)/deciLiter  dextrose 50% Injectable 12.5 Gram(s) IV Push once  glucagon  Injectable 1 milliGRAM(s) IntraMuscular once PRN Glucose <70 milliGRAM(s)/deciLiter  insulin lispro (ADMELOG) corrective regimen sliding scale   SubCutaneous three times a day before meals  insulin lispro (ADMELOG) corrective regimen sliding scale   SubCutaneous at bedtime    ========================INFECTIOUS DISEASE================  Febrile, Leukocytosis at 13.54  - S/p 1L IVF on the field, and vanc and zosyn x 1 in ED  - Monitor fever curve / trend WBC  - Continue empiric antibiotic coverage with Vanco 1g BID and Zosyn 2.275g TID  - F/u Bcx and Ucx    piperacillin/tazobactam IVPB.. 3.375 Gram(s) IV Intermittent every 8 hours  vancomycin  IVPB 1000 milliGRAM(s) IV Intermittent every 12 hours      Patient requires continuous monitoring with bedside rhythm monitoring, pulse ox monitoring, and intermittent blood gas analysis. Care plan discussed with ICU care team. Patient remained critical and at risk for life threatening decompensation.  Patient seen, examined and plan discussed with CCU team during rounds.     I have personally provided 35 minutes of critical care time excluding time spent on separate procedures.    By signing my name below, I, Berkley Seals, attest that this documentation has been prepared under the direction and in the presence of Deepthi Demarco NP   Electronically signed: Stuart Hernandez, 10-29-20 @ 22:50    I, Deepthi Demarco NP , personally performed the services described in this documentation. all medical record entries made by the yunibchristopher were at my direction and in my presence. I have reviewed the chart and agree that the record reflects my personal performance and is accurate and complete  Electronically signed: Deepthi Demarco NP        PAIGE BYRNES  MRN-[id number]  Patient is a 74y old  Male who presents with a chief complaint of Fever and weakness (29 Oct 2020 20:53)    HPI:  74M w/ b/l carotid artery stenosis s/p L endarterectomy (), CAD s/p stents x 2 (most recent  to OM2), PAD s/p BLE stents, afib on warfarin, HLD, HTN, DMII, recently hospitalized (10/22-) for severe AS s/p TAVR on 10/22, p/w weakness/fatigue/chills in AM 10/29.   Patient stated that he was feeling fine last night, but woke up feeling weak and chill. Has significant chest pain with coughing, but chest-pain free otherwise. Minimal cough, not productive. (+) abdominal pain in the AM, but not currently. Denied headache, visual changes, neck pain, SOB, diarrhea, dysuria, or BLE pain/edema.   Patient was brought in by EMS. Per ED note, was confused upon encounter, s/p 1L IVF on field and A + O x 3 upon arrival in ED. Found to have rectal T 101.7 F in ED.     In ED: Tmax rectal 101.7 F; HR in 60-70's; BP min 86/61 --> WNL; RR max 22, SaO2 min 94% --> 2L NC with SaO2 98%. Leukocytosis in 13's and H&H 8.2/26.2 (was 10.8/34.7 on 10/27), INR 3.4; Na 130; Cr 1.28 (baseline 0.7's - 1.0's), procal 0.26; UA (-), CXR (-), CTH no acute changes since 20, CTA chest w/o PE, ? sternal fracture. S/p vanc and Zosyn x 1. Bcx, Ucx sent.     Patient was admitted to CICU for further monitoring.   (29 Oct 2020 20:53)      Hospital Course:  10/29 Admitted to CICU for monitoring of fever and weakness after presenting to ED with fever, weakness, chills, and significant CP with coughing.     24 HOUR EVENTS:  - no acute events    REVIEW OF SYSTEMS:   Constitutional: + weakness, No fevers, or chills  Eyes/ENT: No visual changes  Respiratory:  (+) dry cough with rare blood-tinged sputum, wheezing, hemoptysis  Cardiovascular: No chest pain, no palpitations at rest, (+) chest pain with coughing  Gastrointestinal: No abdominal pain. No nausea, vomiting, hematemesis.   Genitourinary: No dysuria  Neurological: No numbness, no weakness  Skin: No itching, rashes    ICU Vital Signs Last 24 Hrs  T(C): 36.9 (29 Oct 2020 19:30), Max: 38.7 (29 Oct 2020 13:46)  T(F): 98.4 (29 Oct 2020 19:30), Max: 101.7 (29 Oct 2020 13:46)  HR: 70 (29 Oct 2020 20:00) (64 - 81)  BP: 119/77 (29 Oct 2020 20:00) (86/61 - 121/88)  BP(mean): 85 (29 Oct 2020 20:00) (70 - 97)  RR: 26 (29 Oct 2020 20:00) (17 - 26)  SpO2: 100% (29 Oct 2020 20:00) (94% - 100%)  I&O's Summary    POCT Blood Glucose.: 250 mg/dL (29 Oct 2020 22:19)      PHYSICAL EXAM:   General: No acute distress in bed on NC  Eyes: EOMI, PERRLA, conjunctiva and sclera clear  Chest/Lung: CTAB, no wheezes, rales, or rhonchi  Heart: Regular rate, regular rhythm. Normal S1/S2. No murmurs, rubs, or gallops.  Abdomen: Soft, nontender, nondistended. Normal bowel sounds.  Extremites: 2+ peripheral pulses B/L. No clubbing, cyanosis, or edema.  Neurology: A&O x3, no focal deficits  Skin: No rashes or lesions    ============================I/O===========================  I&O's Detail    ============================ LABS =========================                        8.2    13.54 )-----------( 237      ( 29 Oct 2020 20:22 )             27.7     10-29    132<L>  |  98  |  22  ----------------------------<  263<H>  3.8   |  22  |  1.12    Ca    9.1      29 Oct 2020 20:  Mg     1.6     10-29    TPro  6.8  /  Alb  2.8<L>  /  TBili  0.5  /  DBili  0.2  /  AST  22  /  ALT  18  /  AlkPhos  67  10-29    Troponin T, High Sensitivity Result: 42 ng/L (10-29-20 @ 20:26)    CKMB Units: 1.0 ng/mL (10-29-20 @ 20:26)    Creatine Kinase, Serum: 32 U/L (10-29-20 @ 20:26)    CPK Mass Assay %: 3.1 % (10-29-20 @ 20:26)        LIVER FUNCTIONS - ( 29 Oct 2020 20:26 )  Alb: 2.8 g/dL / Pro: 6.8 g/dL / ALK PHOS: 67 U/L / ALT: 18 U/L / AST: 22 U/L / GGT: x           PT/INR - ( 29 Oct 2020 20:22 )   PT: 40.2 sec;   INR: 3.56 ratio         PTT - ( 29 Oct 2020 20:22 )  PTT:36.5 sec    Lactate, Blood: 1.6 mmol/L (10-29-20 @ 20:22)  Blood Gas Venous - Lactate: 1.2 mmoL/L (10-29-20 @ 17:12)    Urinalysis Basic - ( 29 Oct 2020 15:12 )    Color: Yellow / Appearance: Clear / S.022 / pH: x  Gluc: x / Ketone: Negative  / Bili: Negative / Urobili: 3 mg/dL   Blood: x / Protein: 100 / Nitrite: Negative   Leuk Esterase: Negative / RBC: x / WBC x   Sq Epi: x / Non Sq Epi: x / Bacteria: x      ======================Micro/Rad/Cardio=================  Telemetry: Reviewed   EKG: Reviewed  CXR: Reviewed  Echo: Reviewed  Cath: Reviewed  ======================================================  PAST MEDICAL & SURGICAL HISTORY:  Bilateral carotid artery stenosis  ICA stenosis    Stented coronary artery    T2DM (type 2 diabetes mellitus)    Peripheral vascular disease    Atrial fibrillation    Hypertension    Hyperlipidemia    Coronary artery disease    S/P carotid endarterectomy  left  2020    Status post angiography of extremity  1 stent in each leg    History of coronary artery stent placement  two coronary stents; most recent 2020 to OM2      ====================ASSESSMENT ==============  Severe AS s/p TAVR 10/22   Admitted with fever and weakness  DM 2   Active smoker  Afib     Plan:  ====================== NEUROLOGY=====================  A&Ox3   - Per EMS, confused upon encounter and now normal baseline s/p IVF: 1L  in ED  - Nonfocal with slight slurring of speech, continue to monitor neuro status per ICU protocol.   - CTH: Negative acute changes    ==================== RESPIRATORY======================  Dry cough   - Comfortable on 2L NC, SpO2 100%.  - Cont to monitor SpO2 via pulse oximetry.   - No PE on CTA    ====================CARDIOVASCULAR==================   Severe AS s/p TAVR 10/22  - Post-op TTE 10/23: EF 60%, normal LV systolic function, mod LV diastolic fx; borderline RV systolic fx; aortic valve mean gradient 5 mm Hg. Mod TR.   - Hold BB and ACE 2/2 hypotension  - f/u repeat TTE in AM  - f/u CTS consult in AM for post op fever  - Non Invasive hemodynamic monitoring    Afib on Coumadin  - Coumadin on hold 2/2 supratherapeutic INR; resume once INR < 2  - cont Heparin gtt      HLD  - Plavix and Lipitor   - No BB or ACEi due to 2/2 hypotension  ===================HEMATOLOGIC/ONC ===================  H/H 8.2/27.7, stable.   - Continue to monitor hemoglobin and hematocrit levels.   - Heparin gtt for Afib, on Coumadin for Afib but holding due to INR Supratherapeutic   ===================== RENAL =========================  - Cr. 1.12 today   -  Monitor Cr and avoid nephrotoxins  - Continue to monitor I/Os, BUN/Creatinine, and urine output.   - Goal net even fluid balance. Replete lytes PRN. Keep K> 4 and Mg >2.     ==================== GASTROINTESTINAL===================  Continue Protonix for stress ulcer prophylaxis.     =======================    ENDOCRINE  =====================  DMT2, glycemic control with Admelog sliding scale and Glucagon PRN.   Monitor blood glucose levels.   ========================INFECTIOUS DISEASE================  Febrile, Leukocytosis at 13.54  - S/p 1L IVF on the field, and vanc and zosyn x 1 in ED  - Monitor fever curve / trend WBC  - Continue empiric antibiotic coverage with Vanco 1g BID and Zosyn 2.275g TID  - F/u Bcx and Ucx    Patient requires continuous monitoring with bedside rhythm monitoring, pulse ox monitoring, and intermittent blood gas analysis. Care plan discussed with ICU care team. Patient remained critical and at risk for life threatening decompensation.  Patient seen, examined and plan discussed with CCU team during rounds.     I have personally provided 35 minutes of critical care time excluding time spent on separate procedures.    By signing my name below, I, Berkley Seals, attest that this documentation has been prepared under the direction and in the presence of Deepthi Demarco NP   Electronically signed: Stuart Hernandez, 10-29-20 @ 22:50    I, Deepthi Demarco NP , personally performed the services described in this documentation. all medical record entries made by the yunibchristopher were at my direction and in my presence. I have reviewed the chart and agree that the record reflects my personal performance and is accurate and complete  Electronically signed: Deepthi Demarco NP

## 2020-10-29 NOTE — H&P ADULT - NSICDXPASTMEDICALHX_GEN_ALL_CORE_FT
PAST MEDICAL HISTORY:  Atrial fibrillation     Bilateral carotid artery stenosis ICA stenosis    Coronary artery disease     Hyperlipidemia     Hypertension     Peripheral vascular disease     Stented coronary artery     T2DM (type 2 diabetes mellitus)

## 2020-10-29 NOTE — ED ADULT NURSE NOTE - OBJECTIVE STATEMENT
(Break coverage RN) 75 yo male presents to [facility] alert and oriented x 3, reports lethargy, chills and back pain since today, denies shortness of breath, chest pain, dizziness, blurry vision, burning on urination and all other complaints. As per ems, patient recently had endocardiectomy and TAVR, 911 was called by family at home due to patients inability to walk with walker as he normally does. Respirations even and nonlabored, breathing spontaneously on room air. Abdomen soft and nontender, patient falling asleep but easily arousable during nursing assessment. Labs collected, placed on cardiac monitor. Awaiting provider evaluation. KIM Robbins

## 2020-10-29 NOTE — ED ADULT NURSE REASSESSMENT NOTE - NS ED NURSE REASSESS COMMENT FT1
Pt currently sleeping. SpO2 decrease while sleeping, placed on 2L O2. Víctor SERNA notified. Pt awaiting admission, possibly to CTU. Pt currently sleeping and loudly snoring. SpO2 decrease while sleeping, placed on 2L O2. Víctor SERNA notified. Pt awaiting admission, possibly to CTU.

## 2020-10-29 NOTE — ED PROVIDER NOTE - PROGRESS NOTE DETAILS
Spoke with cardiac surgery team, discussed patient's care - hypotensive, febrile, performing septic workup - being treated with Vanc/Zosyn/Tylenol, receiving fluids. If pt unresponsive to fluids, may have to start pressors. Cardiac team stated they will alert inpatient team.  -Hiro Renee PA-C Curt PEACE: will hold full 30cc/kg bolus w c/f fluid overload. Curt PEACE: discussed at bedside with CTU team, advise agree w hold for furhter IVF w c/f fluid overload, pt with intermittent episodes of hypotension while in ED, per CTU team will hold pressors for now, plan for admission to CTU/CCU will call back for plan for dispo.

## 2020-10-29 NOTE — ED ADULT NURSE REASSESSMENT NOTE - NS ED NURSE REASSESS COMMENT FT1
Pt more awake and conversing with RN. Pt slow to respond but A&Ox3. BP improved. Admitted to CCU, awaiting bed assignment. Will continue to monitor.

## 2020-10-29 NOTE — H&P ADULT - ASSESSMENT
74M w/ b/l carotid artery stenosis s/p L endarterectomy (9/20), CAD s/p stents x 2 (most recent 9/20 to OM2), PAD s/p BLE stents, afib on warfarin, HLD, HTN, DMII, recently hospitalized (10/22-27) for severe AS s/p TAVR on 10/22, p/w weakness/fatigue/chills in AM 10/29. Met SIRS criteria c/b transient hypotension. Admitted for sepsis workup.     # Neuro  - Per report, was confused upon encounter with EMS on field, but back to baseline mental status s/p IVF and upon arrival to ED  - Likely transient metabolic encephalopathy  - Currently A + O x 3 and able to have goal-directed conversation;   - Slight slurring in speech, but non-focal  - CTH neg for acute changes  - Progressive weakness; PT rec for home with assistance last admission  - Consider repeat PT eval if patient has persistent weakness    # Respiratory  - CXR neg  - CTA neg for PE  - Wean O2 as tolerated  - Dry cough; supportive care as appropraite  - Active smoker, but last in past week. No craving currently    # Cardiovascular  * Severe AS s/p TAVR 10/22  - Post-op TTE 10/23: EF 60%, normal LV systolic function, mod LV diastolic fx; borderline RV systolic fx; aortic valve mean gradient 5 mm Hg. Mod TR.   - Hold BB and ACE 2/2 hypotension  - Monitor for post-op fever (late presentation); management as below in ID  - Repeat TTE in AM  - CTS consult in AM for post-op fever    * Hypotension  - Transient, resolved  - Likely sepsis-mediated; workup and management as below in ID  - No IV pressor requirement currently  - Hold home anti-hypertensives    * Afib on Coumadin  - Coumadin on hold 2/2 supratherapeutic INR; resume once INR < 2  - On heparin gtt  - BB on hold 2/2 hypotension    * CAD s/p stents (most recent 9/20 to OM2), PAD s/p BLE stents, b/l CA stenosis s/p L endarterectomy, HTN and HLD  - C/w Plavix, no ASA (2/2 on Coumadin for afib)  - C/w atorvastatin  - BB and ACE on hold 2/2 hypotension    # ID  - Fever, leukocytosis --> SIRS criteria, likely sepsis with presumed infection (unclear source currently)  - S/p 1L IVF on the field, and vanc and zosyn x 1 in ED  - UA, CXR and CTA chest (-); wound clean; BLE wnl  - F/u Bcx and Ucx  - Post-op fever workup (late presentation)  - C/w empirical abx coverage for now  - F/u TTE and potentially KEVIN if higher suspicion for endocarditis    # GI  - No active issues  - (+) abdominal pain in AM, but resolved currently; no diarrhea or constipation  - Hx of GIB in 09/20, s/p EGD w/ non-bleeding diffuse gastritis and small amount of fresh blood clots in duodenum.   - C/w PPI ppx   - Advance DASH and CC diet as tolerated  - Avoid NSAIDs    # Endo  - DM on metformin at home; A1C 5.7 last admission  - ISS and FS while inpatient  - FS elevated on admission, will adjust insulin regimen per requirement  - F/u repeat A1C, TSH and lipid panel (wnl last admission)    # Renal  - Cr 1.28 (baseline 0.7's - 1.0's)  - Monitor Cr and avoid nephrotoxic agents  - Monitor I's and O's    # Heme  - Supratherapeutic INR on Coumadin  - Hold coumadin, on heparin gtt  - H&H dropped compared to 10/27, but stable since admission in 8's; no active bleeding  - Keep active T & S; transfuse prn for hgb goal > 8    # Dispo:  - Pending clinical improvement  - If hemodynamically stable, may be a candidate to transfer to telemetry unit for continued sepsis workup 74M w/ b/l carotid artery stenosis s/p L endarterectomy (9/20), CAD s/p stents x 2 (most recent 9/20 to OM2), PAD s/p BLE stents, afib on warfarin, HLD, HTN, DMII, recently hospitalized (10/22-27) for severe AS s/p TAVR on 10/22, p/w weakness/fatigue/chills in AM 10/29. Met SIRS criteria c/b transient hypotension. Admitted for sepsis workup.     # Neuro  - Per report, was confused upon encounter with EMS on field, but back to baseline mental status s/p IVF and upon arrival to ED  - Likely transient metabolic encephalopathy  - Currently A + O x 3 and able to have goal-directed conversation;   - Slight slurring in speech, but non-focal  - CTH neg for acute changes  - Progressive weakness; PT rec for home with assistance last admission  - Consider repeat PT eval if patient has persistent weakness    # Respiratory  - CXR neg  - CTA neg for PE  - Wean O2 as tolerated  - Dry cough; supportive care as appropraite  - Active smoker, but last in past week. No craving currently    # Cardiovascular  * Severe AS s/p TAVR 10/22  - Post-op TTE 10/23: EF 60%, normal LV systolic function, mod LV diastolic fx; borderline RV systolic fx; aortic valve mean gradient 5 mm Hg. Mod TR.   - Hold BB and ACE 2/2 hypotension  - Monitor for post-op fever (late presentation); management as below in ID  - Repeat TTE in AM  - CTS consult in AM for post-op fever    * Hypotension  - Transient, resolved  - Likely sepsis-mediated; workup and management as below in ID  - No IV pressor requirement currently  - Hold home anti-hypertensives    * Afib on Coumadin  - Coumadin on hold 2/2 supratherapeutic INR;   - Start heparin gtt or resume Coumadin once INR < 2  - BB on hold 2/2 hypotension    * CAD s/p stents (most recent 9/20 to OM2), PAD s/p BLE stents, b/l CA stenosis s/p L endarterectomy, HTN and HLD  - C/w Plavix, no ASA (2/2 on Coumadin for afib)  - C/w atorvastatin  - BB and ACE on hold 2/2 hypotension    # ID  - Fever, leukocytosis --> SIRS criteria, likely sepsis with presumed infection (unclear source currently)  - S/p 1L IVF on the field, and vanc and zosyn x 1 in ED  - UA, CXR and CTA chest (-); wound clean; BLE wnl  - F/u Bcx and Ucx  - Post-op fever workup (late presentation)  - C/w empirical abx coverage for now  - F/u TTE and potentially KEVIN if higher suspicion for endocarditis    # GI  - No active issues  - (+) abdominal pain in AM, but resolved currently; no diarrhea or constipation  - Hx of GIB in 09/20, s/p EGD w/ non-bleeding diffuse gastritis and small amount of fresh blood clots in duodenum.   - C/w PPI ppx   - Advance DASH and CC diet as tolerated  - Avoid NSAIDs    # Endo  - DM on metformin at home; A1C 5.7 last admission  - ISS and FS while inpatient  - FS elevated on admission, will adjust insulin regimen per requirement  - F/u repeat A1C, TSH and lipid panel (wnl last admission)    # Renal  - Cr 1.28 (baseline 0.7's - 1.0's)  - Monitor Cr and avoid nephrotoxic agents  - Monitor I's and O's    # Heme  - Supratherapeutic INR on Coumadin  - Hold coumadin  - H&H dropped compared to 10/27, but stable since admission in 8's; no active bleeding  - Keep active T & S; transfuse prn for hgb goal > 8    # Dispo:  - Pending clinical improvement  - If hemodynamically stable, may be a candidate to transfer to telemetry unit for continued sepsis workup

## 2020-10-29 NOTE — ED PROVIDER NOTE - PMH
Atrial fibrillation    Bilateral carotid artery stenosis  ICA stenosis  Coronary artery disease    Hyperlipidemia    Hypertension    Peripheral vascular disease    Stented coronary artery    T2DM (type 2 diabetes mellitus)

## 2020-10-29 NOTE — ASSESSMENT
[FreeTextEntry1] : 74M PMH: Bilateral Carotid Artery Stenosis, ICA Stenosis, HLD, HTN, CAD s/p two \par coronary artery stents (most recent 9/2020), Carotid endarterectomy (9/2020), \par Angiography of extremity s/p B/L LE stents, Atrial Fibrillation, PVD, DM type I\par 10/22 Direct Transcatheter Aortic Valve Replacement with willie-sternotomy with Dr Perea.\par

## 2020-10-29 NOTE — H&P ADULT - NSHPSOCIALHISTORY_GEN_ALL_CORE
Lives with wife  Has been progressively weaker lately. Was able to walk by himself prior to the last admission.  1/2 pack/day smoking up to last admission; no smoking currently  Occasional ETOH use  No illicit drug use.

## 2020-10-29 NOTE — HISTORY OF PRESENT ILLNESS
[FreeTextEntry1] : FOLLOW YOUR HEART HOME [email or web address]\par Home Visit made Prem BYRNES ] .  A patient of Dr Manjit ANGEL S/P TAVR   on 10/22. Discharged from [facility]\par \par TCM COVID-19 screening protocol reviewed with patient and/or caregiver and denies any signs and symptoms.  Patient and/or care giver denies any contact with a suspect or known COVID-19 case within the last 14 days.  Patient and/or caregiver have tested negative for COVID-19. Patient and/or caregiver does not live in an assisted living or skilled nursing facility.  Patient and/or care giver [place]. \par \par \par \par Visiting patient for post discharge transitional care management and post surgical follow up. \par Arrived to  home, he is accompanied by his wife and son.\par He is sitting at the kitchen table, appears disoriented, able to answer questions but shifts off subject frequently.  Continues to state he is not doing well, c/o sholder pain, complains he is not able to stand and walk, complains of his body aching -family denies fever.\par was able to eat breakfast this morning\par is toileting but very difficult to get to and from the bathroom.\par Son states his concern because he thought  his father would go to rehab.  He is not physically able to care for himself now like he did prior to his hospitalizations.\par finger stick 300 -checked after breakfast.\par \par \par  [Diabetes Mellitus] : Diabetes Mellitus

## 2020-10-29 NOTE — H&P ADULT - NSICDXPASTSURGICALHX_GEN_ALL_CORE_FT
PAST SURGICAL HISTORY:  History of coronary artery stent placement two coronary stents; most recent 9/21/2020 to OM2    S/P carotid endarterectomy left  9/2020    Status post angiography of extremity 1 stent in each leg

## 2020-10-29 NOTE — ED CLERICAL - NS ED CLERK NOTE PRE-ARRIVAL INFORMATION; ADDITIONAL PRE-ARRIVAL INFORMATION
This patient is enrolled in the Follow Your Heart program and has undergone a cardiac surgery procedure within the last 30 days and has active care navigation. This patient can be followed up by the care navigation team within 24 hours. To arrange close follow-up or to obtain additional clinical information about this patient, please call the contact number above. Please call the cardiac surgery team once patient is registered at [phone] for consultation PRIOR to disposition decision.  The patient recently underwent a cardiac surgery procedure and the team can assist in acute medical management.

## 2020-10-29 NOTE — H&P ADULT - NSHPREVIEWOFSYSTEMS_GEN_ALL_CORE
REVIEW OF SYSTEMS:    CONSTITUTIONAL: No fevers or chills currently  EYES/ENT: No visual changes;  No vertigo or throat pain   NECK: No pain or stiffness  RESPIRATORY: (+) dry cough with rare blood-tinged sputum, no wheezing; No shortness of breath  CARDIOVASCULAR: No chest pain or palpitations at rest, (+) chest pain with coughing  GASTROINTESTINAL: No abdominal or epigastric pain currently. No nausea, vomiting, or hematemesis; No diarrhea or constipation. No melena or hematochezia.  GENITOURINARY: No dysuria, frequency or hematuria  NEUROLOGICAL: (+) generalized weakness (progressive)  SKIN: No itching, rashes

## 2020-10-29 NOTE — ED ADULT NURSE REASSESSMENT NOTE - NS ED NURSE REASSESS COMMENT FT1
Report given to CICU KIM Elder. Awaiting CICU resident for transport. Report given to Nightshift ED KIM Prado to ensure safe transport to CICU.

## 2020-10-29 NOTE — H&P ADULT - NSHPLABSRESULTS_GEN_ALL_CORE
8.2    13.54 )-----------( 237      ( 29 Oct 2020 20:22 )             27.7       10-29    132<L>  |  98  |  22  ----------------------------<  263<H>  3.8   |  22  |  1.12    Ca    9.1      29 Oct 2020 20:26  Mg     1.6     10-29    TPro  6.8  /  Alb  2.8<L>  /  TBili  0.5  /  DBili  0.2  /  AST  22  /  ALT  18  /  AlkPhos  67  10-29              Urinalysis Basic - ( 29 Oct 2020 15:12 )    Color: Yellow / Appearance: Clear / S.022 / pH: x  Gluc: x / Ketone: Negative  / Bili: Negative / Urobili: 3 mg/dL   Blood: x / Protein: 100 / Nitrite: Negative   Leuk Esterase: Negative / RBC: x / WBC x   Sq Epi: x / Non Sq Epi: x / Bacteria: x        PT/INR - ( 29 Oct 2020 20:22 )   PT: 40.2 sec;   INR: 3.56 ratio         PTT - ( 29 Oct 2020 20:22 )  PTT:36.5 sec    Lactate Trend  10-29 @ 20:22 Lactate:1.6       CARDIAC MARKERS ( 29 Oct 2020 20:26 )  x     / x     / 32 U/L / x     / x            CAPILLARY BLOOD GLUCOSE      POCT Blood Glucose.: 348 mg/dL (29 Oct 2020 17:15)

## 2020-10-29 NOTE — REVIEW OF SYSTEMS
[Feeling Poorly] : feeling poorly [Joint Stiffness] : joint stiffness [Confused] : confusion [Difficulty Walking] : difficulty walking [Depression] : depression [Negative] : Heme/Lymph

## 2020-10-29 NOTE — ED ADULT NURSE REASSESSMENT NOTE - NS ED NURSE REASSESS COMMENT FT1
Report received from Break coverage KIM Dennis. Pt present to ED c/o weakness. Pt febrile and hypotensive. Code sepsis initiated, As per Sonny RN, EMS administer 1L normal saline to pt, Víctor SERNA aware and states no additional IVF to be administered due to cardiac history. Blood cultures x2 sent to lab. Pt aware urine specimen needed. Antibiotics started as per Víctor SERNA. Will continue to monitor. Report received from Break coverage RN Sonny at 1400. Pt present to ED c/o weakness. Pt febrile and hypotensive. Code sepsis initiated, As per DeAlamin RN, EMS administer 1L normal saline to pt, Víctor SERNA aware and states no additional IVF to be administered due to cardiac history. Blood cultures x2 sent to lab. Pt aware urine specimen needed. Antibiotics started as per Víctor SERNA. Will continue to monitor. Report received from Break coverage KIM Dennis at 1400. Pt present to ED c/o weakness. Pt A&Ox3 but lethargic. Easily arousable. Pt febrile and hypotensive. Code sepsis initiated, As per Sonny RN, EMS administer 1L normal saline to pt, Víctor SERNA aware and states no additional IVF to be administered due to cardiac history. Blood cultures x2 sent to lab. Pt aware urine specimen needed. Antibiotics started as per Víctor SERNA. Will continue to monitor.

## 2020-10-30 ENCOUNTER — APPOINTMENT (OUTPATIENT)
Dept: CARDIOTHORACIC SURGERY | Facility: CLINIC | Age: 75
End: 2020-10-30

## 2020-10-30 DIAGNOSIS — Z95.2 PRESENCE OF PROSTHETIC HEART VALVE: ICD-10-CM

## 2020-10-30 LAB
A1C WITH ESTIMATED AVERAGE GLUCOSE RESULT: 6.3 % — HIGH (ref 4–5.6)
ALBUMIN SERPL ELPH-MCNC: 2.9 G/DL — LOW (ref 3.3–5)
ALP SERPL-CCNC: 76 U/L — SIGNIFICANT CHANGE UP (ref 40–120)
ALT FLD-CCNC: 23 U/L — SIGNIFICANT CHANGE UP (ref 10–45)
ANION GAP SERPL CALC-SCNC: 10 MMOL/L — SIGNIFICANT CHANGE UP (ref 5–17)
ANION GAP SERPL CALC-SCNC: 9 MMOL/L — SIGNIFICANT CHANGE UP (ref 5–17)
APTT BLD: 133.4 SEC — CRITICAL HIGH (ref 27.5–35.5)
APTT BLD: 45.8 SEC — HIGH (ref 27.5–35.5)
AST SERPL-CCNC: 27 U/L — SIGNIFICANT CHANGE UP (ref 10–40)
BASOPHILS # BLD AUTO: 0.07 K/UL — SIGNIFICANT CHANGE UP (ref 0–0.2)
BASOPHILS NFR BLD AUTO: 0.6 % — SIGNIFICANT CHANGE UP (ref 0–2)
BILIRUB SERPL-MCNC: 0.4 MG/DL — SIGNIFICANT CHANGE UP (ref 0.2–1.2)
BUN SERPL-MCNC: 20 MG/DL — SIGNIFICANT CHANGE UP (ref 7–23)
BUN SERPL-MCNC: 22 MG/DL — SIGNIFICANT CHANGE UP (ref 7–23)
CALCIUM SERPL-MCNC: 9.1 MG/DL — SIGNIFICANT CHANGE UP (ref 8.4–10.5)
CALCIUM SERPL-MCNC: 9.1 MG/DL — SIGNIFICANT CHANGE UP (ref 8.4–10.5)
CHLORIDE SERPL-SCNC: 96 MMOL/L — SIGNIFICANT CHANGE UP (ref 96–108)
CHLORIDE SERPL-SCNC: 99 MMOL/L — SIGNIFICANT CHANGE UP (ref 96–108)
CHOLEST SERPL-MCNC: 71 MG/DL — SIGNIFICANT CHANGE UP
CO2 SERPL-SCNC: 25 MMOL/L — SIGNIFICANT CHANGE UP (ref 22–31)
CO2 SERPL-SCNC: 25 MMOL/L — SIGNIFICANT CHANGE UP (ref 22–31)
CREAT SERPL-MCNC: 1.06 MG/DL — SIGNIFICANT CHANGE UP (ref 0.5–1.3)
CREAT SERPL-MCNC: 1.25 MG/DL — SIGNIFICANT CHANGE UP (ref 0.5–1.3)
CULTURE RESULTS: NO GROWTH — SIGNIFICANT CHANGE UP
EOSINOPHIL # BLD AUTO: 0.14 K/UL — SIGNIFICANT CHANGE UP (ref 0–0.5)
EOSINOPHIL NFR BLD AUTO: 1.2 % — SIGNIFICANT CHANGE UP (ref 0–6)
ESTIMATED AVERAGE GLUCOSE: 134 MG/DL — HIGH (ref 68–114)
GLUCOSE BLDC GLUCOMTR-MCNC: 181 MG/DL — HIGH (ref 70–99)
GLUCOSE BLDC GLUCOMTR-MCNC: 190 MG/DL — HIGH (ref 70–99)
GLUCOSE BLDC GLUCOMTR-MCNC: 253 MG/DL — HIGH (ref 70–99)
GLUCOSE SERPL-MCNC: 178 MG/DL — HIGH (ref 70–99)
GLUCOSE SERPL-MCNC: 214 MG/DL — HIGH (ref 70–99)
HCT VFR BLD CALC: 28.5 % — LOW (ref 39–50)
HDLC SERPL-MCNC: 22 MG/DL — LOW
HGB BLD-MCNC: 8.6 G/DL — LOW (ref 13–17)
IMM GRANULOCYTES NFR BLD AUTO: 0.7 % — SIGNIFICANT CHANGE UP (ref 0–1.5)
INR BLD: 3.71 RATIO — HIGH (ref 0.88–1.16)
INR BLD: 4.73 RATIO — HIGH (ref 0.88–1.16)
LIPID PNL WITH DIRECT LDL SERPL: 39 MG/DL — SIGNIFICANT CHANGE UP
LYMPHOCYTES # BLD AUTO: 1.57 K/UL — SIGNIFICANT CHANGE UP (ref 1–3.3)
LYMPHOCYTES # BLD AUTO: 13 % — SIGNIFICANT CHANGE UP (ref 13–44)
MAGNESIUM SERPL-MCNC: 1.8 MG/DL — SIGNIFICANT CHANGE UP (ref 1.6–2.6)
MCHC RBC-ENTMCNC: 27 PG — SIGNIFICANT CHANGE UP (ref 27–34)
MCHC RBC-ENTMCNC: 30.2 GM/DL — LOW (ref 32–36)
MCV RBC AUTO: 89.3 FL — SIGNIFICANT CHANGE UP (ref 80–100)
MONOCYTES # BLD AUTO: 1.21 K/UL — HIGH (ref 0–0.9)
MONOCYTES NFR BLD AUTO: 10 % — SIGNIFICANT CHANGE UP (ref 2–14)
NEUTROPHILS # BLD AUTO: 9.03 K/UL — HIGH (ref 1.8–7.4)
NEUTROPHILS NFR BLD AUTO: 74.5 % — SIGNIFICANT CHANGE UP (ref 43–77)
NON HDL CHOLESTEROL: 49 MG/DL — SIGNIFICANT CHANGE UP
NRBC # BLD: 0 /100 WBCS — SIGNIFICANT CHANGE UP (ref 0–0)
PHOSPHATE SERPL-MCNC: 3.6 MG/DL — SIGNIFICANT CHANGE UP (ref 2.5–4.5)
PLATELET # BLD AUTO: 246 K/UL — SIGNIFICANT CHANGE UP (ref 150–400)
POTASSIUM SERPL-MCNC: 3.8 MMOL/L — SIGNIFICANT CHANGE UP (ref 3.5–5.3)
POTASSIUM SERPL-MCNC: 4.2 MMOL/L — SIGNIFICANT CHANGE UP (ref 3.5–5.3)
POTASSIUM SERPL-SCNC: 3.8 MMOL/L — SIGNIFICANT CHANGE UP (ref 3.5–5.3)
POTASSIUM SERPL-SCNC: 4.2 MMOL/L — SIGNIFICANT CHANGE UP (ref 3.5–5.3)
PROT SERPL-MCNC: 7.1 G/DL — SIGNIFICANT CHANGE UP (ref 6–8.3)
PROTHROM AB SERPL-ACNC: 41.8 SEC — HIGH (ref 10.6–13.6)
PROTHROM AB SERPL-ACNC: 52.7 SEC — HIGH (ref 10.6–13.6)
RBC # BLD: 3.19 M/UL — LOW (ref 4.2–5.8)
RBC # FLD: 16.9 % — HIGH (ref 10.3–14.5)
SODIUM SERPL-SCNC: 131 MMOL/L — LOW (ref 135–145)
SODIUM SERPL-SCNC: 133 MMOL/L — LOW (ref 135–145)
SPECIMEN SOURCE: SIGNIFICANT CHANGE UP
TRIGL SERPL-MCNC: 48 MG/DL — SIGNIFICANT CHANGE UP
TSH SERPL-MCNC: 3.04 UIU/ML — SIGNIFICANT CHANGE UP (ref 0.27–4.2)
TSH SERPL-MCNC: 3.18 UIU/ML — SIGNIFICANT CHANGE UP (ref 0.27–4.2)
WBC # BLD: 12.1 K/UL — HIGH (ref 3.8–10.5)
WBC # FLD AUTO: 12.1 K/UL — HIGH (ref 3.8–10.5)

## 2020-10-30 PROCEDURE — 93306 TTE W/DOPPLER COMPLETE: CPT | Mod: 26

## 2020-10-30 PROCEDURE — 93010 ELECTROCARDIOGRAM REPORT: CPT

## 2020-10-30 PROCEDURE — 99233 SBSQ HOSP IP/OBS HIGH 50: CPT | Mod: GC

## 2020-10-30 RX ORDER — SENNA PLUS 8.6 MG/1
2 TABLET ORAL AT BEDTIME
Refills: 0 | Status: DISCONTINUED | OUTPATIENT
Start: 2020-10-30 | End: 2020-11-04

## 2020-10-30 RX ORDER — METOPROLOL TARTRATE 50 MG
12.5 TABLET ORAL EVERY 12 HOURS
Refills: 0 | Status: DISCONTINUED | OUTPATIENT
Start: 2020-10-30 | End: 2020-10-31

## 2020-10-30 RX ORDER — ASCORBIC ACID 60 MG
500 TABLET,CHEWABLE ORAL DAILY
Refills: 0 | Status: DISCONTINUED | OUTPATIENT
Start: 2020-10-31 | End: 2020-11-04

## 2020-10-30 RX ORDER — ACETAMINOPHEN 500 MG
650 TABLET ORAL ONCE
Refills: 0 | Status: COMPLETED | OUTPATIENT
Start: 2020-10-30 | End: 2020-10-30

## 2020-10-30 RX ORDER — POTASSIUM CHLORIDE 20 MEQ
20 PACKET (EA) ORAL ONCE
Refills: 0 | Status: COMPLETED | OUTPATIENT
Start: 2020-10-30 | End: 2020-10-30

## 2020-10-30 RX ORDER — MAGNESIUM SULFATE 500 MG/ML
2 VIAL (ML) INJECTION ONCE
Refills: 0 | Status: COMPLETED | OUTPATIENT
Start: 2020-10-30 | End: 2020-10-30

## 2020-10-30 RX ORDER — FOLIC ACID 0.8 MG
1 TABLET ORAL DAILY
Refills: 0 | Status: DISCONTINUED | OUTPATIENT
Start: 2020-10-31 | End: 2020-11-04

## 2020-10-30 RX ORDER — ACETAMINOPHEN 500 MG
650 TABLET ORAL EVERY 6 HOURS
Refills: 0 | Status: DISCONTINUED | OUTPATIENT
Start: 2020-10-30 | End: 2020-11-04

## 2020-10-30 RX ADMIN — Medication 650 MILLIGRAM(S): at 11:00

## 2020-10-30 RX ADMIN — PIPERACILLIN AND TAZOBACTAM 25 GRAM(S): 4; .5 INJECTION, POWDER, LYOPHILIZED, FOR SOLUTION INTRAVENOUS at 21:54

## 2020-10-30 RX ADMIN — SENNA PLUS 2 TABLET(S): 8.6 TABLET ORAL at 21:54

## 2020-10-30 RX ADMIN — Medication 50 GRAM(S): at 06:09

## 2020-10-30 RX ADMIN — PANTOPRAZOLE SODIUM 40 MILLIGRAM(S): 20 TABLET, DELAYED RELEASE ORAL at 06:09

## 2020-10-30 RX ADMIN — Medication 1: at 17:38

## 2020-10-30 RX ADMIN — Medication 1: at 12:20

## 2020-10-30 RX ADMIN — ATORVASTATIN CALCIUM 80 MILLIGRAM(S): 80 TABLET, FILM COATED ORAL at 21:54

## 2020-10-30 RX ADMIN — Medication 650 MILLIGRAM(S): at 11:30

## 2020-10-30 RX ADMIN — Medication 325 MILLIGRAM(S): at 11:19

## 2020-10-30 RX ADMIN — Medication 1: at 09:05

## 2020-10-30 RX ADMIN — CLOPIDOGREL BISULFATE 75 MILLIGRAM(S): 75 TABLET, FILM COATED ORAL at 11:19

## 2020-10-30 RX ADMIN — PIPERACILLIN AND TAZOBACTAM 25 GRAM(S): 4; .5 INJECTION, POWDER, LYOPHILIZED, FOR SOLUTION INTRAVENOUS at 13:23

## 2020-10-30 RX ADMIN — Medication 20 MILLIEQUIVALENT(S): at 06:09

## 2020-10-30 RX ADMIN — Medication 12.5 MILLIGRAM(S): at 17:37

## 2020-10-30 RX ADMIN — CHLORHEXIDINE GLUCONATE 1 APPLICATION(S): 213 SOLUTION TOPICAL at 06:10

## 2020-10-30 RX ADMIN — Medication 2: at 21:54

## 2020-10-30 RX ADMIN — PIPERACILLIN AND TAZOBACTAM 25 GRAM(S): 4; .5 INJECTION, POWDER, LYOPHILIZED, FOR SOLUTION INTRAVENOUS at 06:09

## 2020-10-30 RX ADMIN — Medication 250 MILLIGRAM(S): at 06:09

## 2020-10-30 RX ADMIN — Medication 250 MILLIGRAM(S): at 17:37

## 2020-10-30 NOTE — CHART NOTE - NSCHARTNOTEFT_GEN_A_CORE
CCU Transfer Note    Transfer from: CCU  Transfer to:  ( X) Medicine    (  ) Telemetry    (  ) RCU    (  ) Palliative    (  ) Stroke Unit    (  ) _______________  Accepting physician: Dr. Ba Hong    MEDICATIONS:  STANDING MEDICATIONS  atorvastatin 80 milliGRAM(s) Oral at bedtime  chlorhexidine 2% Cloths 1 Application(s) Topical <User Schedule>  clopidogrel Tablet 75 milliGRAM(s) Oral daily  dextrose 5%. 1000 milliLiter(s) IV Continuous <Continuous>  dextrose 50% Injectable 12.5 Gram(s) IV Push once  ferrous    sulfate 325 milliGRAM(s) Oral daily  influenza   Vaccine 0.5 milliLiter(s) IntraMuscular once  insulin lispro (ADMELOG) corrective regimen sliding scale   SubCutaneous three times a day before meals  insulin lispro (ADMELOG) corrective regimen sliding scale   SubCutaneous at bedtime  pantoprazole    Tablet 40 milliGRAM(s) Oral before breakfast  piperacillin/tazobactam IVPB.. 3.375 Gram(s) IV Intermittent every 8 hours  vancomycin  IVPB 1000 milliGRAM(s) IV Intermittent every 12 hours    PRN MEDICATIONS  dextrose 40% Gel 15 Gram(s) Oral once PRN  glucagon  Injectable 1 milliGRAM(s) IntraMuscular once PRN      VITAL SIGNS: Last 24 Hours  T(C): 36.7 (30 Oct 2020 07:00), Max: 38.7 (29 Oct 2020 13:46)  T(F): 98 (30 Oct 2020 07:00), Max: 101.7 (29 Oct 2020 13:46)  HR: 80 (30 Oct 2020 10:00) (64 - 81)  BP: 105/66 (30 Oct 2020 10:00) (86/61 - 148/66)  BP(mean): 81 (30 Oct 2020 10:00) (67 - 109)  RR: 22 (30 Oct 2020 10:00) (17 - 29)  SpO2: 100% (30 Oct 2020 10:00) (94% - 100%)    LABS:                        8.6    12.10 )-----------( 246      ( 30 Oct 2020 04:50 )             28.5     10-30    133<L>  |  99  |  20  ----------------------------<  178<H>  4.2   |  25  |  1.06    Ca    9.1      30 Oct 2020 09:02  Phos  3.6     10-30  Mg     1.8     10-30    TPro  7.1  /  Alb  2.9<L>  /  TBili  0.4  /  DBili  x   /  AST  27  /  ALT  23  /  AlkPhos  76  10-30    PT/INR - ( 30 Oct 2020 04:50 )   PT: 52.7 sec;   INR: 4.73 ratio         PTT - ( 30 Oct 2020 09:02 )  PTT:45.8 sec  Urinalysis Basic - ( 29 Oct 2020 15:12 )    Color: Yellow / Appearance: Clear / S.022 / pH: x  Gluc: x / Ketone: Negative  / Bili: Negative / Urobili: 3 mg/dL   Blood: x / Protein: 100 / Nitrite: Negative   Leuk Esterase: Negative / RBC: x / WBC x   Sq Epi: x / Non Sq Epi: x / Bacteria: x          CARDIAC MARKERS ( 29 Oct 2020 20:26 )  x     / x     / 32 U/L / x     / 1.0 ng/mL      RADIOLOGY:  < from: CT Angio Chest w/ IV Cont (10.29.20 @ 18:28) >    IMPRESSION:  No pulmonary embolism. Clear lungs.    Retrosternal fluid with foci of air.    Sternotomy. Question sternal fracture    < end of copied text >    < from: CT Head No Cont (10.29.20 @ 18:27) >    IMPRESSION:  Age-appropriate involutional changes and microvascular ischemic disease. No significant interval change 2020    < end of copied text >    < from: Xray Chest 1 View-PORTABLE IMMEDIATE (10.29.20 @ 14:10) >        IMPRESSION:  No active pulmonary disease.    Thank you for the courtesy of this referral.    < end of copied text >        CCU COURSE: 74M w/ b/l carotid artery stenosis s/p L endarterectomy (), CAD s/p stents x 2 (most recent  to OM2), PAD s/p BLE stents, afib on warfarin, active smoker, HLD, HTN, DMII, recently hospitalized (10/22-) for severe AS s/p TAVR on 10/22, p/w weakness/fatigue/chills with chest pain with non productive cough in AM 10/29. Minimal cough, not productive. (+) abdominal pain in the AM, but not currently. Denied headache, visual changes, neck pain, SOB, diarrhea, dysuria, or BLE pain/edema. Patient was brought in by EMS. Per ED note, was confused upon encounter, s/p 1L IVF on field and A + O x 3 upon arrival in ED. In ED: Tmax rectal 101.7 F; HR in 60-70's; BP min 86/61 --> WNL; RR max 22, SaO2 min 94% --> 2L NC with SaO2 98%. Leukocytosis in 13's and H&H 8.2/26.2 (was 10.8/34.7 on 10/27), INR 3.4; Na 130; Cr 1.28 (baseline 0.7's - 1.0's), procal 0.26; UA (-), CXR (-), CTH no acute changes since 20, CTA chest w/o PE. S/p vanc and Zosyn x 1. Bcx, Ucx sent.  In CCU, TTE was performed. Patient kept NPO for KEVIN to rule out endocarditis. Continued on IV Vanc/Zosyn. Has not required pressors. Has remained afebrile and hemodynamically stable. Coumadin held for supratherapeutic INR. Weaned off O2.             ASSESSMENT & PLAN: 74M w/ b/l carotid artery stenosis s/p L endarterectomy (), CAD s/p stents x 2 (most recent  to OM2), PAD s/p BLE stents, afib on warfarin, HLD, HTN, DMII, recently hospitalized (10/22-) for severe AS s/p TAVR on 10/22, p/w weakness/fatigue/chills in AM 10/29. Met SIRS criteria c/b transient hypotension. Admitted for sepsis workup.         For Follow-Up:  [] F/u TTE results  [] Currently NPO for KEVIN  [] Hold Coumadin   [] On Vanc/Zosyn  [] F/u BCx, UCx  [] CT Sx recs

## 2020-10-30 NOTE — PROGRESS NOTE ADULT - SUBJECTIVE AND OBJECTIVE BOX
PATIENT:  PAIGE BYRNES  [id number]    CHIEF COMPLAINT:  Patient is a 74y old  Male who presents with a chief complaint of Fever and weakness (29 Oct 2020 22:49)      INTERVAL HISTORY/OVERNIGHT EVENTS:      REVIEW OF SYSTEMS:    Constitutional:     [ ] negative [ ] fevers [ ] chills [ ] weight loss [ ] weight gain  HEENT:                  [ ] negative [ ] dry eyes [ ] eye irritation [ ] postnasal drip [ ] nasal congestion  CV:                         [ ] negative  [ ] chest pain [ ] orthopnea [ ] palpitations [ ] murmur  Resp:                     [ ] negative [ ] cough [ ] shortness of breath [ ] dyspnea [ ] wheezing [ ] sputum [ ] hemoptysis  GI:                          [ ] negative [ ] nausea [ ] vomiting [ ] diarrhea [ ] constipation [ ] abd pain [ ] dysphagia   :                        [ ] negative [ ] dysuria [ ] nocturia [ ] hematuria [ ] increased urinary frequency  Musculoskeletal: [ ] negative [ ] back pain [ ] myalgias [ ] arthralgias [ ] fracture  Skin:                       [ ] negative [ ] rash [ ] itch  Neurological:        [ ] negative [ ] headache [ ] dizziness [ ] syncope [ ] weakness [ ] numbness  Psychiatric:           [ ] negative [ ] anxiety [ ] depression  Endocrine:            [ ] negative [ ] diabetes [ ] thyroid problem  Heme/Lymph:      [ ] negative [ ] anemia [ ] bleeding problem  Allergic/Immune: [ ] negative [ ] itchy eyes [ ] nasal discharge [ ] hives [ ] angioedema    [ ] All other systems negative  [ ] Unable to assess ROS because ________.    MEDICATIONS:  MEDICATIONS  (STANDING):  atorvastatin 80 milliGRAM(s) Oral at bedtime  chlorhexidine 2% Cloths 1 Application(s) Topical <User Schedule>  clopidogrel Tablet 75 milliGRAM(s) Oral daily  dextrose 5%. 1000 milliLiter(s) (50 mL/Hr) IV Continuous <Continuous>  dextrose 50% Injectable 12.5 Gram(s) IV Push once  ferrous    sulfate 325 milliGRAM(s) Oral daily  influenza   Vaccine 0.5 milliLiter(s) IntraMuscular once  insulin lispro (ADMELOG) corrective regimen sliding scale   SubCutaneous three times a day before meals  insulin lispro (ADMELOG) corrective regimen sliding scale   SubCutaneous at bedtime  pantoprazole    Tablet 40 milliGRAM(s) Oral before breakfast  piperacillin/tazobactam IVPB.. 3.375 Gram(s) IV Intermittent every 8 hours  vancomycin  IVPB 1000 milliGRAM(s) IV Intermittent every 12 hours    MEDICATIONS  (PRN):  dextrose 40% Gel 15 Gram(s) Oral once PRN Blood Glucose LESS THAN 70 milliGRAM(s)/deciLiter  glucagon  Injectable 1 milliGRAM(s) IntraMuscular once PRN Glucose <70 milliGRAM(s)/deciLiter      ALLERGIES:  Allergies    No Known Allergies    Intolerances        OBJECTIVE:  ICU Vital Signs Last 24 Hrs  T(C): 36.8 (30 Oct 2020 04:00), Max: 38.7 (29 Oct 2020 13:46)  T(F): 98.2 (30 Oct 2020 04:00), Max: 101.7 (29 Oct 2020 13:46)  HR: 72 (30 Oct 2020 06:00) (64 - 81)  BP: 133/74 (30 Oct 2020 06:00) (86/61 - 146/72)  BP(mean): 90 (30 Oct 2020 06:00) (67 - 109)  ABP: --  ABP(mean): --  RR: 19 (30 Oct 2020 06:00) (17 - 29)  SpO2: 100% (30 Oct 2020 06:00) (94% - 100%)      Adult Advanced Hemodynamics Last 24 Hrs  CVP(mm Hg): --  CVP(cm H2O): --  CO: --  CI: --  PA: --  PA(mean): --  PCWP: --  SVR: --  SVRI: --  PVR: --  PVRI: --  CAPILLARY BLOOD GLUCOSE      POCT Blood Glucose.: 250 mg/dL (29 Oct 2020 22:19)  POCT Blood Glucose.: 348 mg/dL (29 Oct 2020 17:15)  POCT Blood Glucose.: 345 mg/dL (29 Oct 2020 13:31)    CAPILLARY BLOOD GLUCOSE      POCT Blood Glucose.: 250 mg/dL (29 Oct 2020 22:19)    I&O's Summary    29 Oct 2020 07:01  -  30 Oct 2020 07:00  --------------------------------------------------------  IN: 938 mL / OUT: 500 mL / NET: 438 mL      Daily Height in cm: 177.8 (29 Oct 2020 13:17)    Daily Weight in k (30 Oct 2020 05:00)    PHYSICAL EXAMINATION:  General: WN/WD NAD  HEENT: PERRLA, EOMI, moist mucous membranes  Neurology: A&Ox3, nonfocal, ADDISON x 4  Respiratory: CTA B/L, normal respiratory effort, no wheezes, crackles, rales  CV: RRR, S1S2, no murmurs, rubs or gallops  Abdominal: Soft, NT, ND +BS, Last BM  Extremities: No edema, + peripheral pulses  Incisions:   Tubes:    LABS:                          8.6    12.10 )-----------( 246      ( 30 Oct 2020 04:50 )             28.5     10-30    131<L>  |  96  |  22  ----------------------------<  214<H>  3.8   |  25  |  1.25    Ca    9.1      30 Oct 2020 04:50  Phos  3.6     10-30  Mg     1.8     10-30    TPro  7.1  /  Alb  2.9<L>  /  TBili  0.4  /  DBili  x   /  AST  27  /  ALT  23  /  AlkPhos  76  10-30    LIVER FUNCTIONS - ( 30 Oct 2020 04:50 )  Alb: 2.9 g/dL / Pro: 7.1 g/dL / ALK PHOS: 76 U/L / ALT: 23 U/L / AST: 27 U/L / GGT: x           PT/INR - ( 30 Oct 2020 04:50 )   PT: 52.7 sec;   INR: 4.73 ratio         PTT - ( 30 Oct 2020 04:50 )  PTT:133.4 sec  Creatine Kinase, Serum: 32 U/L (10-29 @ 20:26)  CKMB Units: 1.0 ng/mL (10-29 @ 20:26)    CARDIAC MARKERS ( 29 Oct 2020 20:26 )  x     / x     / 32 U/L / x     / 1.0 ng/mL      Urinalysis Basic - ( 29 Oct 2020 15:12 )    Color: Yellow / Appearance: Clear / S.022 / pH: x  Gluc: x / Ketone: Negative  / Bili: Negative / Urobili: 3 mg/dL   Blood: x / Protein: 100 / Nitrite: Negative   Leuk Esterase: Negative / RBC: x / WBC x   Sq Epi: x / Non Sq Epi: x / Bacteria: x        TELEMETRY:     EKG:     IMAGING:           PATIENT:  PAIGE BYRNES  [id number]    CHIEF COMPLAINT:  Patient is a 74y old  Male who presents with a chief complaint of Fever and weakness (29 Oct 2020 22:49)      INTERVAL HISTORY/OVERNIGHT EVENTS:      REVIEW OF SYSTEMS:    Constitutional:     [ ] negative [ ] fevers [ ] chills [ ] weight loss [ ] weight gain  HEENT:                  [ ] negative [ ] dry eyes [ ] eye irritation [ ] postnasal drip [ ] nasal congestion  CV:                         [ ] negative  [ ] chest pain [ ] orthopnea [ ] palpitations [ ] murmur  Resp:                     [ ] negative [ ] cough [ ] shortness of breath [ ] dyspnea [ ] wheezing [ ] sputum [ ] hemoptysis  GI:                          [ ] negative [ ] nausea [ ] vomiting [ ] diarrhea [ ] constipation [ ] abd pain [ ] dysphagia   :                        [ ] negative [ ] dysuria [ ] nocturia [ ] hematuria [ ] increased urinary frequency  Musculoskeletal: [ ] negative [ ] back pain [ ] myalgias [ ] arthralgias [ ] fracture  Skin:                       [ ] negative [ ] rash [ ] itch  Neurological:        [ ] negative [ ] headache [ ] dizziness [ ] syncope [ ] weakness [ ] numbness  Psychiatric:           [ ] negative [ ] anxiety [ ] depression  Endocrine:            [ ] negative [ ] diabetes [ ] thyroid problem  Heme/Lymph:      [ ] negative [ ] anemia [ ] bleeding problem  Allergic/Immune: [ ] negative [ ] itchy eyes [ ] nasal discharge [ ] hives [ ] angioedema    [ ] All other systems negative  [ ] Unable to assess ROS because ________.    MEDICATIONS:  MEDICATIONS  (STANDING):  atorvastatin 80 milliGRAM(s) Oral at bedtime  chlorhexidine 2% Cloths 1 Application(s) Topical <User Schedule>  clopidogrel Tablet 75 milliGRAM(s) Oral daily  dextrose 5%. 1000 milliLiter(s) (50 mL/Hr) IV Continuous <Continuous>  dextrose 50% Injectable 12.5 Gram(s) IV Push once  ferrous    sulfate 325 milliGRAM(s) Oral daily  influenza   Vaccine 0.5 milliLiter(s) IntraMuscular once  insulin lispro (ADMELOG) corrective regimen sliding scale   SubCutaneous three times a day before meals  insulin lispro (ADMELOG) corrective regimen sliding scale   SubCutaneous at bedtime  pantoprazole    Tablet 40 milliGRAM(s) Oral before breakfast  piperacillin/tazobactam IVPB.. 3.375 Gram(s) IV Intermittent every 8 hours  vancomycin  IVPB 1000 milliGRAM(s) IV Intermittent every 12 hours    MEDICATIONS  (PRN):  dextrose 40% Gel 15 Gram(s) Oral once PRN Blood Glucose LESS THAN 70 milliGRAM(s)/deciLiter  glucagon  Injectable 1 milliGRAM(s) IntraMuscular once PRN Glucose <70 milliGRAM(s)/deciLiter      ALLERGIES:  Allergies    No Known Allergies    Intolerances        OBJECTIVE:  ICU Vital Signs Last 24 Hrs  T(C): 36.8 (30 Oct 2020 04:00), Max: 38.7 (29 Oct 2020 13:46)  T(F): 98.2 (30 Oct 2020 04:00), Max: 101.7 (29 Oct 2020 13:46)  HR: 72 (30 Oct 2020 06:00) (64 - 81)  BP: 133/74 (30 Oct 2020 06:00) (86/61 - 146/72)  BP(mean): 90 (30 Oct 2020 06:00) (67 - 109)  ABP: --  ABP(mean): --  RR: 19 (30 Oct 2020 06:00) (17 - 29)  SpO2: 100% (30 Oct 2020 06:00) (94% - 100%)      Adult Advanced Hemodynamics Last 24 Hrs  CVP(mm Hg): --  CVP(cm H2O): --  CO: --  CI: --  PA: --  PA(mean): --  PCWP: --  SVR: --  SVRI: --  PVR: --  PVRI: --  CAPILLARY BLOOD GLUCOSE      POCT Blood Glucose.: 250 mg/dL (29 Oct 2020 22:19)  POCT Blood Glucose.: 348 mg/dL (29 Oct 2020 17:15)  POCT Blood Glucose.: 345 mg/dL (29 Oct 2020 13:31)    CAPILLARY BLOOD GLUCOSE      POCT Blood Glucose.: 250 mg/dL (29 Oct 2020 22:19)    I&O's Summary    29 Oct 2020 07:01  -  30 Oct 2020 07:00  --------------------------------------------------------  IN: 938 mL / OUT: 500 mL / NET: 438 mL      Daily Height in cm: 177.8 (29 Oct 2020 13:17)    Daily Weight in k (30 Oct 2020 05:00)    PHYSICAL EXAMINATION:  GENERAL: NAD, Resting in bed  HEENT:  Head atraumatic, EOMI, PERRLA, conjunctiva clear; dry mucous membranes  NECK: Supple, No JVD, scar from L endarterectomy clean and dry  CHEST/LUNG: Clear to auscultation bilaterally; No rales, rhonchi, wheezing, or rubs. Unlabored respirations on NC; willie-sternotomy site clean and dry, no erythema, warmth or tenderness  HEART: Regular rate and rhythm; extra heart sound difficulty to appreciate with positioning  ABDOMEN: Bowel sounds present; Soft, Nontender, Nondistended. No hepatomegaly  EXTREMITIES:  1+ Peripheral Pulses; No clubbing, cyanosis, or edema  NERVOUS SYSTEM:  Alert & Oriented X 3, non-focal and spontaneous movements of all extremities; generalized weakness LE > UE. Difficulty lifting LE against gravity while in bed  SKIN: No rashes or lesions    LABS:                          8.6    12.10 )-----------( 246      ( 30 Oct 2020 04:50 )             28.5     10-30    131<L>  |  96  |  22  ----------------------------<  214<H>  3.8   |  25  |  1.25    Ca    9.1      30 Oct 2020 04:50  Phos  3.6     10-30  Mg     1.8     10-30    TPro  7.1  /  Alb  2.9<L>  /  TBili  0.4  /  DBili  x   /  AST  27  /  ALT  23  /  AlkPhos  76  10-30    LIVER FUNCTIONS - ( 30 Oct 2020 04:50 )  Alb: 2.9 g/dL / Pro: 7.1 g/dL / ALK PHOS: 76 U/L / ALT: 23 U/L / AST: 27 U/L / GGT: x           PT/INR - ( 30 Oct 2020 04:50 )   PT: 52.7 sec;   INR: 4.73 ratio         PTT - ( 30 Oct 2020 04:50 )  PTT:133.4 sec  Creatine Kinase, Serum: 32 U/L (10-29 @ 20:26)  CKMB Units: 1.0 ng/mL (10-29 @ 20:26)    CARDIAC MARKERS ( 29 Oct 2020 20:26 )  x     / x     / 32 U/L / x     / 1.0 ng/mL      Urinalysis Basic - ( 29 Oct 2020 15:12 )    Color: Yellow / Appearance: Clear / S.022 / pH: x  Gluc: x / Ketone: Negative  / Bili: Negative / Urobili: 3 mg/dL   Blood: x / Protein: 100 / Nitrite: Negative   Leuk Esterase: Negative / RBC: x / WBC x   Sq Epi: x / Non Sq Epi: x / Bacteria: x        TELEMETRY:     EKG:     IMAGING:           PATIENT:  PAIGE BYRNES  [id number]    CHIEF COMPLAINT:  Patient is a 74y old  Male who presents with a chief complaint of Fever and weakness (29 Oct 2020 22:49)      INTERVAL HISTORY/OVERNIGHT EVENTS: Admitted O/N for sepsis workup with T 101.7F, hypotensive and AMS. Was recently admitted for severe AS s/p TAVR procedure. Received IVF with good response of BP, Vanc/Zosyn. BCx and UCx sent. This AM, complaints of chest pain with coughing, lifting his legs. Otherwise, has remained afebrile, normotensive. No other complaints. NPO for KEVIN.       REVIEW OF SYSTEMS:    Constitutional:     [ ] negative [ ] fevers [ ] chills [ ] weight loss [ ] weight gain  HEENT:                  [ ] negative [ ] dry eyes [ ] eye irritation [ ] postnasal drip [ ] nasal congestion  CV:                         [ ] negative  [ ] chest pain [ ] orthopnea [ ] palpitations [ ] murmur  Resp:                     [ ] negative [ ] cough [ ] shortness of breath [ ] dyspnea [ ] wheezing [ ] sputum [ ] hemoptysis  GI:                          [ ] negative [ ] nausea [ ] vomiting [ ] diarrhea [ ] constipation [ ] abd pain [ ] dysphagia   :                        [ ] negative [ ] dysuria [ ] nocturia [ ] hematuria [ ] increased urinary frequency  Musculoskeletal: [ ] negative [ ] back pain [ ] myalgias [ ] arthralgias [ ] fracture  Skin:                       [ ] negative [ ] rash [ ] itch  Neurological:        [ ] negative [ ] headache [ ] dizziness [ ] syncope [ ] weakness [ ] numbness  Psychiatric:           [ ] negative [ ] anxiety [ ] depression  Endocrine:            [ ] negative [ ] diabetes [ ] thyroid problem  Heme/Lymph:      [ ] negative [ ] anemia [ ] bleeding problem  Allergic/Immune: [ ] negative [ ] itchy eyes [ ] nasal discharge [ ] hives [ ] angioedema    [X ] All other systems negative  [ ] Unable to assess ROS because ________.    MEDICATIONS:  MEDICATIONS  (STANDING):  atorvastatin 80 milliGRAM(s) Oral at bedtime  chlorhexidine 2% Cloths 1 Application(s) Topical <User Schedule>  clopidogrel Tablet 75 milliGRAM(s) Oral daily  dextrose 5%. 1000 milliLiter(s) (50 mL/Hr) IV Continuous <Continuous>  dextrose 50% Injectable 12.5 Gram(s) IV Push once  ferrous    sulfate 325 milliGRAM(s) Oral daily  influenza   Vaccine 0.5 milliLiter(s) IntraMuscular once  insulin lispro (ADMELOG) corrective regimen sliding scale   SubCutaneous three times a day before meals  insulin lispro (ADMELOG) corrective regimen sliding scale   SubCutaneous at bedtime  pantoprazole    Tablet 40 milliGRAM(s) Oral before breakfast  piperacillin/tazobactam IVPB.. 3.375 Gram(s) IV Intermittent every 8 hours  vancomycin  IVPB 1000 milliGRAM(s) IV Intermittent every 12 hours    MEDICATIONS  (PRN):  dextrose 40% Gel 15 Gram(s) Oral once PRN Blood Glucose LESS THAN 70 milliGRAM(s)/deciLiter  glucagon  Injectable 1 milliGRAM(s) IntraMuscular once PRN Glucose <70 milliGRAM(s)/deciLiter      ALLERGIES:  Allergies    No Known Allergies    Intolerances        OBJECTIVE:  ICU Vital Signs Last 24 Hrs  T(C): 36.8 (30 Oct 2020 04:00), Max: 38.7 (29 Oct 2020 13:46)  T(F): 98.2 (30 Oct 2020 04:00), Max: 101.7 (29 Oct 2020 13:46)  HR: 72 (30 Oct 2020 06:00) (64 - 81)  BP: 133/74 (30 Oct 2020 06:00) (86/61 - 146/72)  BP(mean): 90 (30 Oct 2020 06:00) (67 - 109)  ABP: --  ABP(mean): --  RR: 19 (30 Oct 2020 06:00) (17 - 29)  SpO2: 100% (30 Oct 2020 06:00) (94% - 100%)      Adult Advanced Hemodynamics Last 24 Hrs  CVP(mm Hg): --  CVP(cm H2O): --  CO: --  CI: --  PA: --  PA(mean): --  PCWP: --  SVR: --  SVRI: --  PVR: --  PVRI: --  CAPILLARY BLOOD GLUCOSE      POCT Blood Glucose.: 250 mg/dL (29 Oct 2020 22:19)  POCT Blood Glucose.: 348 mg/dL (29 Oct 2020 17:15)  POCT Blood Glucose.: 345 mg/dL (29 Oct 2020 13:31)    CAPILLARY BLOOD GLUCOSE      POCT Blood Glucose.: 250 mg/dL (29 Oct 2020 22:19)    I&O's Summary    29 Oct 2020 07:01  -  30 Oct 2020 07:00  --------------------------------------------------------  IN: 938 mL / OUT: 500 mL / NET: 438 mL      Daily Height in cm: 177.8 (29 Oct 2020 13:17)    Daily Weight in k (30 Oct 2020 05:00)    PHYSICAL EXAMINATION:  GENERAL: NAD, Resting in bed  HEENT:  Head atraumatic, EOMI, PERRLA, conjunctiva clear; dry mucous membranes  NECK: Supple, No JVD, scar from L endarterectomy clean and dry  CHEST/LUNG: Clear to auscultation bilaterally; No rales, rhonchi, wheezing, or rubs. Unlabored respirations on NC; willie-sternotomy site clean and dry, no erythema, warmth or tenderness  HEART: Regular rate and rhythm; extra heart sound difficulty to appreciate with positioning  ABDOMEN: Bowel sounds present; Soft, Nontender, Nondistended. No hepatomegaly  EXTREMITIES:  1+ Peripheral Pulses; No clubbing, cyanosis, or edema  NERVOUS SYSTEM:  Alert & Oriented X 3, non-focal and spontaneous movements of all extremities; generalized weakness LE > UE. Difficulty lifting LE against gravity while in bed  SKIN: No rashes or lesions    LABS:                          8.6    12.10 )-----------( 246      ( 30 Oct 2020 04:50 )             28.5     10-30    131<L>  |  96  |  22  ----------------------------<  214<H>  3.8   |  25  |  1.25    Ca    9.1      30 Oct 2020 04:50  Phos  3.6     10-30  Mg     1.8     10-30    TPro  7.1  /  Alb  2.9<L>  /  TBili  0.4  /  DBili  x   /  AST  27  /  ALT  23  /  AlkPhos  76  10-30    LIVER FUNCTIONS - ( 30 Oct 2020 04:50 )  Alb: 2.9 g/dL / Pro: 7.1 g/dL / ALK PHOS: 76 U/L / ALT: 23 U/L / AST: 27 U/L / GGT: x           PT/INR - ( 30 Oct 2020 04:50 )   PT: 52.7 sec;   INR: 4.73 ratio         PTT - ( 30 Oct 2020 04:50 )  PTT:133.4 sec  Creatine Kinase, Serum: 32 U/L (10-29 @ 20:26)  CKMB Units: 1.0 ng/mL (10-29 @ 20:26)    CARDIAC MARKERS ( 29 Oct 2020 20:26 )  x     / x     / 32 U/L / x     / 1.0 ng/mL      Urinalysis Basic - ( 29 Oct 2020 15:12 )    Color: Yellow / Appearance: Clear / S.022 / pH: x  Gluc: x / Ketone: Negative  / Bili: Negative / Urobili: 3 mg/dL   Blood: x / Protein: 100 / Nitrite: Negative   Leuk Esterase: Negative / RBC: x / WBC x   Sq Epi: x / Non Sq Epi: x / Bacteria: x        TELEMETRY:     EKG:     IMAGING:

## 2020-10-30 NOTE — CHART NOTE - NSCHARTNOTEFT_GEN_A_CORE
MAR Accept Note  Transfer to:  Medicine  Accepting Attending Physician:  Dr. Hong  Assigned Room:      Patient seen and examined.   Labs and data reviewed.   No findings precluding transfer of service.       HPI/MICU COURSE:   Please refer to MICU transfer note for full details. Briefly, this is a  74M w/ b/l carotid artery stenosis s/p L endarterectomy (9/20), CAD s/p stents x 2 (most recent 9/20 to OM2), PAD s/p BLE stents, afib on warfarin, active smoker, HLD, HTN, DMII, recently hospitalized (10/22-27) for severe AS s/p TAVR on 10/22, p/w weakness/fatigue/chills with chest pain with non productive cough in AM 10/29. Minimal cough, not productive. (+) abdominal pain in the AM, but not currently. Denied headache, visual changes, neck pain, SOB, diarrhea, dysuria, or BLE pain/edema. Patient was brought in by EMS. Per ED note, was confused upon encounter, s/p 1L IVF on field and A + O x 3 upon arrival in ED. In ED: Tmax rectal 101.7 F; HR in 60-70's; BP min 86/61 --> WNL; RR max 22, SaO2 min 94% --> 2L NC with SaO2 98%. Leukocytosis in 13's and H&H 8.2/26.2 (was 10.8/34.7 on 10/27), INR 3.4; Na 130; Cr 1.28 (baseline 0.7's - 1.0's), procal 0.26; UA (-), CXR (-), CTH no acute changes since 9/11/20, CTA chest w/o PE. S/p vanc and Zosyn x 1. Bcx, Ucx sent.  In CCU, TTE was performed. Patient kept NPO for KEVIN to rule out endocarditis. Continued on IV Vanc/Zosyn. Has not required pressors. Has remained afebrile and hemodynamically stable. Coumadin held for supratherapeutic INR.    FOR FOLLOW-UP:  [] Currently NPO for KEVIN  [] Hold Coumadin   [] On Vanc/Zosyn  [] F/u BCx, UCx  [] CT Sx recs.    Nadege Sharma MD  Internal Medicine PGY-3  MAR

## 2020-10-30 NOTE — PROGRESS NOTE ADULT - SUBJECTIVE AND OBJECTIVE BOX
Interval Hx; Events Overnight:  SUBJECTIVE: " I feel okay , I am feeling better today  "    LABS:                x                    133  | 25   | 20           x     >-----------< x       ------------------------< 178                   x                    4.2  | 99   | 1.06                                         Ca 9.1   Mg x     Ph x          PT/INR - ( 30 Oct 2020 09:02 )   PT: 41.8 sec;   INR: 3.71 ratio         PTT - ( 30 Oct 2020 09:02 )  PTT:45.8 sec    VITAL SIGNS    Telemetry: afib 70-80s     Daily     Daily Weight in k (30 Oct 2020 05:00)      Vital Signs Last 24 Hrs  T(C): 37 (10-30-20 @ 12:50), Max: 37 (10-30-20 @ 12:50)  T(F): 98.6 (10-30-20 @ 12:50), Max: 98.6 (10-30-20 @ 12:50)  HR: 90 (10-30-20 @ 12:50) (64 - 90)  BP: 147/93 (10-30-20 @ 12:50) (86/61 - 148/66)  RR: 20 (10-30-20 @ 12:50) (17 - 29)  SpO2: 98% (10-30-20 @ 12:50) (97% - 100%)             I&O's Detail    29 Oct 2020 07:01  -  30 Oct 2020 07:00  --------------------------------------------------------  IN:    Heparin Infusion: 98 mL    IV PiggyBack: 600 mL    Oral Fluid: 240 mL  Total IN: 938 mL    OUT:    Voided (mL): 500 mL  Total OUT: 500 mL    Total NET: 438 mL      30 Oct 2020 07:01  -  30 Oct 2020 16:00  --------------------------------------------------------  IN:    Oral Fluid: 180 mL  Total IN: 180 mL    OUT:    Voided (mL): 350 mL  Total OUT: 350 mL    Total NET: -170 mL                    GLUCOSE  CAPILLARY BLOOD GLUCOSE      POCT Blood Glucose.: 190 mg/dL (30 Oct 2020 12:16)  POCT Blood Glucose.: 250 mg/dL (29 Oct 2020 22:19)  POCT Blood Glucose.: 348 mg/dL (29 Oct 2020 17:15)                      PHYSICAL EXAM      General: NAD, well appearing, in no distress  Neurology: A&O x3, non focal, no neuro deficits. Moves all extremities to command.  CV : s1 s2 RRR, no murmurs, gallops, clicks.   Sternal Wound :  Midsternal incision stable, open to air, well approximated  Lungs: clear to auscultation  Abdomen: soft, nontender, nondistended, positive bowel sounds  :    voiding          Extremities:    no  edema. + pedal pulses      Skin: intact, no lesions        MEDICATIONS  atorvastatin 80 milliGRAM(s) Oral at bedtime  chlorhexidine 2% Cloths 1 Application(s) Topical <User Schedule>  clopidogrel Tablet 75 milliGRAM(s) Oral daily  dextrose 40% Gel 15 Gram(s) Oral once PRN  dextrose 5%. 1000 milliLiter(s) IV Continuous <Continuous>  dextrose 50% Injectable 12.5 Gram(s) IV Push once  ferrous    sulfate 325 milliGRAM(s) Oral daily  glucagon  Injectable 1 milliGRAM(s) IntraMuscular once PRN  influenza   Vaccine 0.5 milliLiter(s) IntraMuscular once  insulin lispro (ADMELOG) corrective regimen sliding scale   SubCutaneous three times a day before meals  insulin lispro (ADMELOG) corrective regimen sliding scale   SubCutaneous at bedtime  pantoprazole    Tablet 40 milliGRAM(s) Oral before breakfast  piperacillin/tazobactam IVPB.. 3.375 Gram(s) IV Intermittent every 8 hours  vancomycin  IVPB 1000 milliGRAM(s) IV Intermittent every 12 hours    < from: Transthoracic Echocardiogram (10.30.20 @ 08:52) >  Conclusions:  1. Transcatheter aortic valve replacement. Peak transaortic  valve gradientequals 27 mm Hg, mean transaortic valve  gradient equals 12 mm Hg, which is probably normal in the  presence of a transcatheter aortic valve replacement.  2. Severely dilated left atrium.  LA volume index = 62  cc/m2.  3. Moderate concentric left ventricular hypertrophy.  4. Normal left ventricular systolic function.  5. Right atrial enlargement.  6. Right ventricular enlargement with normal right  ventricular systolic function.  7. Normal tricuspid valve. Moderate tricuspid  regurgitation.  8. Estimated pulmonary artery systolic pressure equals 46  mm Hg, assuming right atrial pressure equals 10 mm Hg,  consistent with mild pulmonary pressures.  Unable to rule out endocarditis. Consider KEVIN if clinically  indicated.    < end of copied text >

## 2020-10-30 NOTE — PROGRESS NOTE ADULT - ATTENDING COMMENTS
I have personally seen, examined and participated in the care of this patient. I have reviewed all pertinent clinical information, including history, physical exam, plan and the resident's note. I agree with the resident's note with the following additions:    S/p recent TAVR presented with hypotension and fever  A+Ox3 but still confused, unclear etiology of mental status  Blood pressure normalized with fluid  TTE without TAVR valve pathology, mean gradient 12 without collection/abscess  O2 sats mid to high 90s on nasal cannula  DASH/diabetic diet  Normal renal function  H/H low but acceptable  INR supertherapeutic, ? if his dose was too high vs. he was taking too much in the setting of confusion - hold Coumadin and trend  Afebrile since admission, on empiric broad spectrum antibiotics since yesterday with cultures pending  Leukocytosis that is consistent since TAVR admission  Sugars controlled

## 2020-10-30 NOTE — PROGRESS NOTE ADULT - ASSESSMENT
74M w/ b/l carotid artery stenosis s/p L endarterectomy (9/20), CAD s/p stents x 2 (most recent 9/20 to OM2), PAD s/p BLE stents, afib on warfarin, HLD, HTN, DMII, recently hospitalized (10/22-27) for severe AS s/p Direct Aortic TAVR on 10/22, p/w weakness/fatigue/chills in AM 10/29. Met SIRS criteria c/b transient hypotension. Admitted to CCU for sepsis workup.     10/29 Blood cultures x2 sent in ED, pending, IV Zosyn initiated / IV Vanco. s/p IVF resuscitation for hypotension. CCU admission.    10/30 Hemodynamics stable, no fevers overnight, WBC 12. Better BP. Stable for transfer to floor. Echo: Grossly normal LV function, minimal bioproesthetic aortic valve regurgitation. RV enlargement , normal RV function. trace pericardial effusion. Holding coumadin for supra-therapeutic INR. Check INR in AM  CT surgery [id number]

## 2020-10-30 NOTE — PROGRESS NOTE ADULT - ASSESSMENT
74M w/ b/l carotid artery stenosis s/p L endarterectomy (9/20), CAD s/p stents x 2 (most recent 9/20 to OM2), PAD s/p BLE stents, afib on warfarin, HLD, HTN, DMII, recently hospitalized (10/22-27) for severe AS s/p TAVR on 10/22, p/w weakness/fatigue/chills in AM 10/29. Met SIRS criteria c/b transient hypotension. Admitted for sepsis workup.     # Neuro  - Per report, was confused upon encounter with EMS on field, but back to baseline mental status s/p IVF and upon arrival to ED  - Likely transient metabolic encephalopathy  - Currently A + O x 3 and able to have goal-directed conversation;   - Slight slurring in speech, but non-focal  - CTH neg for acute changes  - Progressive weakness; PT rec for home with assistance last admission  - Consider repeat PT eval if patient has persistent weakness    # Respiratory  - CXR neg  - CTA neg for PE  - Wean O2 as tolerated  - Dry cough; supportive care as appropraite  - Active smoker, but last in past week. No craving currently    # Cardiovascular  * Severe AS s/p TAVR 10/22  - Post-op TTE 10/23: EF 60%, normal LV systolic function, mod LV diastolic fx; borderline RV systolic fx; aortic valve mean gradient 5 mm Hg. Mod TR.   - Hold BB and ACE 2/2 hypotension  - Monitor for post-op fever (late presentation); management as below in ID  - Repeat TTE in AM  - CTS consult in AM for post-op fever    * Hypotension  - Transient, resolved  - Likely sepsis-mediated; workup and management as below in ID  - No IV pressor requirement currently  - Hold home anti-hypertensives    * Afib on Coumadin  - Coumadin on hold 2/2 supratherapeutic INR;   - Start heparin gtt or resume Coumadin once INR < 2  - BB on hold 2/2 hypotension    * CAD s/p stents (most recent 9/20 to OM2), PAD s/p BLE stents, b/l CA stenosis s/p L endarterectomy, HTN and HLD  - C/w Plavix, no ASA (2/2 on Coumadin for afib)  - C/w atorvastatin  - BB and ACE on hold 2/2 hypotension    # ID  - Fever, leukocytosis --> SIRS criteria, likely sepsis with presumed infection (unclear source currently)  - S/p 1L IVF on the field, and vanc and zosyn x 1 in ED  - UA, CXR and CTA chest (-); wound clean; BLE wnl  - F/u Bcx and Ucx  - Post-op fever workup (late presentation)  - C/w empirical abx coverage for now  - F/u TTE and potentially KEVIN if higher suspicion for endocarditis    # GI  - No active issues  - (+) abdominal pain in AM, but resolved currently; no diarrhea or constipation  - Hx of GIB in 09/20, s/p EGD w/ non-bleeding diffuse gastritis and small amount of fresh blood clots in duodenum.   - C/w PPI ppx   - Advance DASH and CC diet as tolerated  - Avoid NSAIDs    # Endo  - DM on metformin at home; A1C 5.7 last admission  - ISS and FS while inpatient  - FS elevated on admission, will adjust insulin regimen per requirement  - F/u repeat A1C, TSH and lipid panel (wnl last admission)    # Renal  - Cr 1.28 (baseline 0.7's - 1.0's)  - Monitor Cr and avoid nephrotoxic agents  - Monitor I's and O's    # Heme  - Supratherapeutic INR on Coumadin  - Hold coumadin  - H&H dropped compared to 10/27, but stable since admission in 8's; no active bleeding  - Keep active T & S; transfuse prn for hgb goal > 8    # Dispo:  - Pending clinical improvement  - If hemodynamically stable, may be a candidate to transfer to telemetry unit for continued sepsis workup 74M w/ b/l carotid artery stenosis s/p L endarterectomy (9/20), CAD s/p stents x 2 (most recent 9/20 to OM2), PAD s/p BLE stents, afib on warfarin, HLD, HTN, DMII, recently hospitalized (10/22-27) for severe AS s/p TAVR on 10/22, p/w weakness/fatigue/chills in AM 10/29. Met SIRS criteria c/b transient hypotension. Admitted for sepsis workup.     # Neuro  - Per report, was confused upon encounter with EMS on field, but back to baseline mental status s/p IVF and upon arrival to ED  - Likely transient metabolic encephalopathy 2/2 to hypotension vs infection  - Currently A + O x 3 and able to have goal-directed conversation;   - Slight slurring in speech, but non-focal  - CTH neg for acute changes  - Progressive weakness; PT rec for home with assistance last admission  - Consider repeat PT eval if patient has persistent weakness    # Respiratory  - CXR neg  - CTA neg for PE  - Wean O2 as tolerated  - Dry cough; supportive care as appropriate  - Active smoker, but last in past week. No craving currently    # Cardiovascular  * Severe AS s/p TAVR 10/22  - Post-op TTE 10/23: EF 60%, normal LV systolic function, mod LV diastolic fx; borderline RV systolic fx; aortic valve mean gradient 5 mm Hg. Mod TR.   - Hold BB and ACE 2/2 hypotension  - Monitor for post-op fever (late presentation); management as below in ID  - F/u TTE results for abscess, fluid collection  - KEVIN cancelled  - CTS consult in AM for post-op fever    * Hypotension  - Transient, resolved  - Likely sepsis-mediated; workup and management as below in ID  - No IV pressor requirement currently  - Hold home anti-hypertensives    * Afib on Coumadin  - Coumadin on hold 2/2 supratherapeutic INR;   - D/C heparin gtt, hold Coumadin   - BB on hold 2/2 hypotension, restart as BP allows    * CAD s/p stents (most recent 9/20 to OM2), PAD s/p BLE stents, b/l CA stenosis s/p L endarterectomy, HTN and HLD  - C/w Plavix, no ASA (2/2 on Coumadin for afib)  - C/w atorvastatin  - BB and ACE on hold 2/2 hypotension, restart as BP allows    # ID  - Fever, leukocytosis in ED --> SIRS criteria, likely sepsis with presumed infection (unclear source currently)  - Afebrile with improving WBC and normotensive in CCU  - S/p 1L IVF on the field, and vanc and zosyn x 1 in ED  - UA, CXR and CTA chest (-); wound clean; BLE wnl  - F/u Bcx and Ucx  - Post-op fever workup (late presentation)  - C/w empirical abx coverage for now  - F/u TTE results    # GI  - No active issues  - (+) abdominal pain in AM, but resolved currently; no diarrhea or constipation  - Hx of GIB in 09/20, s/p EGD w/ non-bleeding diffuse gastritis and small amount of fresh blood clots in duodenum.   - C/w PPI ppx   - Initially NPO for possible KEVIN, KEVIN cancelled  - Start DASH and CC diet as tolerated  - Avoid NSAIDs    # Endo  - DM on metformin at home; A1C 5.7 last admission  - ISS and FS while inpatient  - FS elevated on admission, will adjust insulin regimen per requirement  - Repeat A1C, TSH and lipid panel wnl     # Renal  - Cr 1.28 (baseline 0.7's - 1.0's) likely 2/2 to hypotension  - IVF  - Restart ACE-I as Cr improves  - Monitor Cr and avoid nephrotoxic agents  - Monitor I's and O's    # Heme  - Supratherapeutic INR on Coumadin  - Hold coumadin  - H&H dropped compared to 10/27, but stable since admission in 8's; no active bleeding  - Keep active T & S; transfuse prn for hgb goal > 8    # Dispo:  - Candidate to transfer to Medicine or CT-ICU continued sepsis workup

## 2020-10-30 NOTE — PROGRESS NOTE ADULT - PROBLEM SELECTOR PLAN 1
cont monitor tele - remains afib rate controlled  TTE results noted - good bioprosthetic aortic valve function  sepsis work up , f/u BCx x2 sent on 10/29  cont IV abx IV vanco and IV Zosyn  daily INR levels - coum held for supra therapeutic INR  will start low dose BB - tonight and uptitrate as BP tolerates to maintain rate control a-fib  uptitrate BB as BP permits

## 2020-10-31 DIAGNOSIS — I48.20 CHRONIC ATRIAL FIBRILLATION, UNSPECIFIED: ICD-10-CM

## 2020-10-31 DIAGNOSIS — R50.9 FEVER, UNSPECIFIED: ICD-10-CM

## 2020-10-31 LAB
ALBUMIN SERPL ELPH-MCNC: 2.9 G/DL — LOW (ref 3.3–5)
ALP SERPL-CCNC: 67 U/L — SIGNIFICANT CHANGE UP (ref 40–120)
ALT FLD-CCNC: 24 U/L — SIGNIFICANT CHANGE UP (ref 10–45)
ANION GAP SERPL CALC-SCNC: 8 MMOL/L — SIGNIFICANT CHANGE UP (ref 5–17)
APTT BLD: 45.1 SEC — HIGH (ref 27.5–35.5)
AST SERPL-CCNC: 28 U/L — SIGNIFICANT CHANGE UP (ref 10–40)
BILIRUB SERPL-MCNC: 0.6 MG/DL — SIGNIFICANT CHANGE UP (ref 0.2–1.2)
BUN SERPL-MCNC: 16 MG/DL — SIGNIFICANT CHANGE UP (ref 7–23)
CALCIUM SERPL-MCNC: 9 MG/DL — SIGNIFICANT CHANGE UP (ref 8.4–10.5)
CHLORIDE SERPL-SCNC: 99 MMOL/L — SIGNIFICANT CHANGE UP (ref 96–108)
CO2 SERPL-SCNC: 24 MMOL/L — SIGNIFICANT CHANGE UP (ref 22–31)
CREAT SERPL-MCNC: 1.2 MG/DL — SIGNIFICANT CHANGE UP (ref 0.5–1.3)
GLUCOSE BLDC GLUCOMTR-MCNC: 126 MG/DL — HIGH (ref 70–99)
GLUCOSE BLDC GLUCOMTR-MCNC: 151 MG/DL — HIGH (ref 70–99)
GLUCOSE BLDC GLUCOMTR-MCNC: 181 MG/DL — HIGH (ref 70–99)
GLUCOSE BLDC GLUCOMTR-MCNC: 219 MG/DL — HIGH (ref 70–99)
GLUCOSE SERPL-MCNC: 140 MG/DL — HIGH (ref 70–99)
HCT VFR BLD CALC: 28.5 % — LOW (ref 39–50)
HGB BLD-MCNC: 8.8 G/DL — LOW (ref 13–17)
INR BLD: 4.19 RATIO — HIGH (ref 0.88–1.16)
MAGNESIUM SERPL-MCNC: 1.8 MG/DL — SIGNIFICANT CHANGE UP (ref 1.6–2.6)
MCHC RBC-ENTMCNC: 26.9 PG — LOW (ref 27–34)
MCHC RBC-ENTMCNC: 30.9 GM/DL — LOW (ref 32–36)
MCV RBC AUTO: 87.2 FL — SIGNIFICANT CHANGE UP (ref 80–100)
NRBC # BLD: 0 /100 WBCS — SIGNIFICANT CHANGE UP (ref 0–0)
PHOSPHATE SERPL-MCNC: 3.4 MG/DL — SIGNIFICANT CHANGE UP (ref 2.5–4.5)
PLATELET # BLD AUTO: 296 K/UL — SIGNIFICANT CHANGE UP (ref 150–400)
POTASSIUM SERPL-MCNC: 4.2 MMOL/L — SIGNIFICANT CHANGE UP (ref 3.5–5.3)
POTASSIUM SERPL-SCNC: 4.2 MMOL/L — SIGNIFICANT CHANGE UP (ref 3.5–5.3)
PROT SERPL-MCNC: 6.9 G/DL — SIGNIFICANT CHANGE UP (ref 6–8.3)
PROTHROM AB SERPL-ACNC: 46.6 SEC — HIGH (ref 10.6–13.6)
RBC # BLD: 3.27 M/UL — LOW (ref 4.2–5.8)
RBC # FLD: 16.9 % — HIGH (ref 10.3–14.5)
SODIUM SERPL-SCNC: 131 MMOL/L — LOW (ref 135–145)
WBC # BLD: 10.96 K/UL — HIGH (ref 3.8–10.5)
WBC # FLD AUTO: 10.96 K/UL — HIGH (ref 3.8–10.5)

## 2020-10-31 PROCEDURE — 99232 SBSQ HOSP IP/OBS MODERATE 35: CPT

## 2020-10-31 PROCEDURE — 71045 X-RAY EXAM CHEST 1 VIEW: CPT | Mod: 26

## 2020-10-31 RX ORDER — MAGNESIUM SULFATE 500 MG/ML
1 VIAL (ML) INJECTION ONCE
Refills: 0 | Status: COMPLETED | OUTPATIENT
Start: 2020-10-31 | End: 2020-10-31

## 2020-10-31 RX ORDER — METOPROLOL TARTRATE 50 MG
25 TABLET ORAL
Refills: 0 | Status: DISCONTINUED | OUTPATIENT
Start: 2020-10-31 | End: 2020-11-04

## 2020-10-31 RX ADMIN — Medication 325 MILLIGRAM(S): at 12:36

## 2020-10-31 RX ADMIN — Medication 250 MILLIGRAM(S): at 05:08

## 2020-10-31 RX ADMIN — CLOPIDOGREL BISULFATE 75 MILLIGRAM(S): 75 TABLET, FILM COATED ORAL at 12:36

## 2020-10-31 RX ADMIN — Medication 100 GRAM(S): at 17:32

## 2020-10-31 RX ADMIN — Medication 650 MILLIGRAM(S): at 23:45

## 2020-10-31 RX ADMIN — PANTOPRAZOLE SODIUM 40 MILLIGRAM(S): 20 TABLET, DELAYED RELEASE ORAL at 05:08

## 2020-10-31 RX ADMIN — Medication 500 MILLIGRAM(S): at 12:36

## 2020-10-31 RX ADMIN — Medication 12.5 MILLIGRAM(S): at 05:08

## 2020-10-31 RX ADMIN — PIPERACILLIN AND TAZOBACTAM 25 GRAM(S): 4; .5 INJECTION, POWDER, LYOPHILIZED, FOR SOLUTION INTRAVENOUS at 05:08

## 2020-10-31 RX ADMIN — ATORVASTATIN CALCIUM 80 MILLIGRAM(S): 80 TABLET, FILM COATED ORAL at 21:17

## 2020-10-31 RX ADMIN — Medication 25 MILLIGRAM(S): at 17:33

## 2020-10-31 RX ADMIN — CHLORHEXIDINE GLUCONATE 1 APPLICATION(S): 213 SOLUTION TOPICAL at 05:09

## 2020-10-31 RX ADMIN — Medication 1 MILLIGRAM(S): at 12:36

## 2020-10-31 RX ADMIN — Medication 1: at 17:34

## 2020-10-31 RX ADMIN — Medication 1: at 12:36

## 2020-10-31 NOTE — PROVIDER CONTACT NOTE (OTHER) - ASSESSMENT
pt is AOx4. VS as per flowsheet. pt is asymptomatic, sitting upright in chair speaking to son. pt denies headache, dizziness, blurred vision.

## 2020-10-31 NOTE — PROGRESS NOTE ADULT - PROBLEM SELECTOR PLAN 2
Sepsis work up  WBC trending down, Afebrile   BCx x2 10/29 NGTD, UCx Negative   D'cd IV abx IV vanco and IV Zosyn 10/31/20

## 2020-10-31 NOTE — PROGRESS NOTE ADULT - PROBLEM SELECTOR PLAN 1
Cont monitor tele - remains afib rate controlled  TTE results noted - good bioprosthetic aortic valve function  uptitrate BB as BP permits

## 2020-10-31 NOTE — PROGRESS NOTE ADULT - SUBJECTIVE AND OBJECTIVE BOX
Patient seen sitting OOBTC comfortably with no acute complaints. Afebrile 24 hours. No chills or rigors.     VITAL SIGNS    Telemetry:  SR 80-90  Vital Signs Last 24 Hrs  T(C): 37.1 (10-31-20 @ 08:00), Max: 37.2 (10-31-20 @ 04:42)  T(F): 98.8 (10-31-20 @ 08:00), Max: 98.9 (10-31-20 @ 04:42)  HR: 80 (10-31-20 @ 04:42) (73 - 95)  BP: 138/80 (10-31-20 @ 04:42) (117/68 - 154/81)  RR: 18 (10-31-20 @ 04:42) (16 - 20)  SpO2: 95% (10-31-20 @ 04:42) (95% - 98%)            10-30 @ 07:01  -  10-31 @ 07:00  --------------------------------------------------------  IN: 1210 mL / OUT: 1250 mL / NET: -40 mL    10-31 @ 08:01  -  10-31 @ 12:31  --------------------------------------------------------  IN: 170 mL / OUT: 0 mL / NET: 170 mL       Daily     Daily   Admit Wt: Drug Dosing Weight  Height (cm): 177.8 (29 Oct 2020 13:17)  Weight (kg): 75.7 (29 Oct 2020 13:17)  BMI (kg/m2): 23.9 (29 Oct 2020 13:17)  BSA (m2): 1.93 (29 Oct 2020 13:17)    Bilirubin Total, Serum: 0.6 mg/dL (10-31 @ 06:54)    CAPILLARY BLOOD GLUCOSE      POCT Blood Glucose.: 151 mg/dL (31 Oct 2020 11:47)  POCT Blood Glucose.: 126 mg/dL (31 Oct 2020 07:31)  POCT Blood Glucose.: 253 mg/dL (30 Oct 2020 21:51)  POCT Blood Glucose.: 181 mg/dL (30 Oct 2020 17:25)          MEDICATIONS  acetaminophen   Tablet .. 650 milliGRAM(s) Oral every 6 hours PRN  ascorbic acid 500 milliGRAM(s) Oral daily  atorvastatin 80 milliGRAM(s) Oral at bedtime  chlorhexidine 2% Cloths 1 Application(s) Topical <User Schedule>  clopidogrel Tablet 75 milliGRAM(s) Oral daily  dextrose 40% Gel 15 Gram(s) Oral once PRN  dextrose 5%. 1000 milliLiter(s) IV Continuous <Continuous>  dextrose 50% Injectable 12.5 Gram(s) IV Push once  ferrous    sulfate 325 milliGRAM(s) Oral daily  folic acid 1 milliGRAM(s) Oral daily  glucagon  Injectable 1 milliGRAM(s) IntraMuscular once PRN  influenza   Vaccine 0.5 milliLiter(s) IntraMuscular once  insulin lispro (ADMELOG) corrective regimen sliding scale   SubCutaneous three times a day before meals  insulin lispro (ADMELOG) corrective regimen sliding scale   SubCutaneous at bedtime  metoprolol tartrate 12.5 milliGRAM(s) Oral every 12 hours  pantoprazole    Tablet 40 milliGRAM(s) Oral before breakfast  senna 2 Tablet(s) Oral at bedtime      >>> <<<  PHYSICAL EXAM  Neurology: alert and oriented x 3, nonfocal, no gross deficits  CV : RRR S1S2, no murmurs, rubs or gallops   Lungs: CTA B/L, No wheezing, rales, rhonchi. Non labored respirations   Abdomen: soft, NT,ND, (+ )BM  :  voiding  Extremities: No LE edema bilaterally, +DP, No calf tenderness     LABS  10-31    131<L>  |  99  |  16  ----------------------------<  140<H>  4.2   |  24  |  1.20    Ca    9.0      31 Oct 2020 06:54  Phos  3.4     10-31  Mg     1.8     10-31    TPro  6.9  /  Alb  2.9<L>  /  TBili  0.6  /  DBili  x   /  AST  28  /  ALT  24  /  AlkPhos  67  10-31                                 8.8    10.96 )-----------( 296      ( 31 Oct 2020 06:54 )             28.5          PT/INR - ( 31 Oct 2020 09:31 )   PT: 46.6 sec;   INR: 4.19 ratio         PTT - ( 31 Oct 2020 09:31 )  PTT:45.1 sec       PAST MEDICAL & SURGICAL HISTORY:  Bilateral carotid artery stenosis  ICA stenosis    Stented coronary artery    T2DM (type 2 diabetes mellitus)    Peripheral vascular disease    Atrial fibrillation    Hypertension    Hyperlipidemia    Coronary artery disease    S/P carotid endarterectomy  left  9/2020    Status post angiography of extremity  1 stent in each leg    History of coronary artery stent placement  two coronary stents; most recent 9/21/2020 to OM2

## 2020-10-31 NOTE — PROGRESS NOTE ADULT - ASSESSMENT
74M w/ b/l carotid artery stenosis s/p L endarterectomy (9/20), CAD s/p stents x 2 (most recent 9/20 to OM2), PAD s/p BLE stents, afib on warfarin, HLD, HTN, DMII, recently hospitalized (10/22-27) for severe AS s/p Direct Aortic TAVR on 10/22, p/w weakness/fatigue/chills in AM 10/29. Met SIRS criteria c/b transient hypotension. Admitted to CCU for sepsis workup.     10/29 Blood cultures x2 sent in ED, pending, IV Zosyn initiated / IV Vanco. s/p IVF resuscitation for hypotension. CCU admission.    10/30 Hemodynamics stable, no fevers overnight, WBC 12. Better BP. Stable for transfer to floor. Echo: Grossly normal LV function, minimal bioproesthetic aortic valve regurgitation. RV enlargement , normal RV function. trace pericardial effusion. Holding coumadin for supra-therapeutic INR. Check INR in AM  CT surgery [id number]  10/31 VSS; afebrile, WBC trending down, Ucx negative, Bcx NGTD. Abx d'cd as per Dr. Perea. Coumadin Held for INR drifting up.

## 2020-11-01 LAB
ANION GAP SERPL CALC-SCNC: 10 MMOL/L — SIGNIFICANT CHANGE UP (ref 5–17)
BUN SERPL-MCNC: 16 MG/DL — SIGNIFICANT CHANGE UP (ref 7–23)
CALCIUM SERPL-MCNC: 10.1 MG/DL — SIGNIFICANT CHANGE UP (ref 8.4–10.5)
CHLORIDE SERPL-SCNC: 101 MMOL/L — SIGNIFICANT CHANGE UP (ref 96–108)
CO2 SERPL-SCNC: 25 MMOL/L — SIGNIFICANT CHANGE UP (ref 22–31)
CREAT SERPL-MCNC: 1.08 MG/DL — SIGNIFICANT CHANGE UP (ref 0.5–1.3)
GLUCOSE BLDC GLUCOMTR-MCNC: 111 MG/DL — HIGH (ref 70–99)
GLUCOSE BLDC GLUCOMTR-MCNC: 114 MG/DL — HIGH (ref 70–99)
GLUCOSE BLDC GLUCOMTR-MCNC: 201 MG/DL — HIGH (ref 70–99)
GLUCOSE BLDC GLUCOMTR-MCNC: 326 MG/DL — HIGH (ref 70–99)
GLUCOSE SERPL-MCNC: 130 MG/DL — HIGH (ref 70–99)
HCT VFR BLD CALC: 32.3 % — LOW (ref 39–50)
HGB BLD-MCNC: 9.6 G/DL — LOW (ref 13–17)
INR BLD: 2.81 RATIO — HIGH (ref 0.88–1.16)
MCHC RBC-ENTMCNC: 26.6 PG — LOW (ref 27–34)
MCHC RBC-ENTMCNC: 29.7 GM/DL — LOW (ref 32–36)
MCV RBC AUTO: 89.5 FL — SIGNIFICANT CHANGE UP (ref 80–100)
NRBC # BLD: 0 /100 WBCS — SIGNIFICANT CHANGE UP (ref 0–0)
PLATELET # BLD AUTO: 332 K/UL — SIGNIFICANT CHANGE UP (ref 150–400)
POTASSIUM SERPL-MCNC: 4.2 MMOL/L — SIGNIFICANT CHANGE UP (ref 3.5–5.3)
POTASSIUM SERPL-SCNC: 4.2 MMOL/L — SIGNIFICANT CHANGE UP (ref 3.5–5.3)
PROTHROM AB SERPL-ACNC: 32.1 SEC — HIGH (ref 10.6–13.6)
RBC # BLD: 3.61 M/UL — LOW (ref 4.2–5.8)
RBC # FLD: 16.8 % — HIGH (ref 10.3–14.5)
SODIUM SERPL-SCNC: 136 MMOL/L — SIGNIFICANT CHANGE UP (ref 135–145)
WBC # BLD: 10.84 K/UL — HIGH (ref 3.8–10.5)
WBC # FLD AUTO: 10.84 K/UL — HIGH (ref 3.8–10.5)

## 2020-11-01 PROCEDURE — 99232 SBSQ HOSP IP/OBS MODERATE 35: CPT

## 2020-11-01 RX ORDER — WARFARIN SODIUM 2.5 MG/1
2.5 TABLET ORAL ONCE
Refills: 0 | Status: COMPLETED | OUTPATIENT
Start: 2020-11-01 | End: 2020-11-01

## 2020-11-01 RX ADMIN — ATORVASTATIN CALCIUM 80 MILLIGRAM(S): 80 TABLET, FILM COATED ORAL at 21:24

## 2020-11-01 RX ADMIN — CHLORHEXIDINE GLUCONATE 1 APPLICATION(S): 213 SOLUTION TOPICAL at 08:25

## 2020-11-01 RX ADMIN — CLOPIDOGREL BISULFATE 75 MILLIGRAM(S): 75 TABLET, FILM COATED ORAL at 11:26

## 2020-11-01 RX ADMIN — Medication 325 MILLIGRAM(S): at 11:25

## 2020-11-01 RX ADMIN — Medication 650 MILLIGRAM(S): at 21:54

## 2020-11-01 RX ADMIN — Medication 25 MILLIGRAM(S): at 17:28

## 2020-11-01 RX ADMIN — Medication 650 MILLIGRAM(S): at 00:15

## 2020-11-01 RX ADMIN — Medication 25 MILLIGRAM(S): at 05:40

## 2020-11-01 RX ADMIN — PANTOPRAZOLE SODIUM 40 MILLIGRAM(S): 20 TABLET, DELAYED RELEASE ORAL at 05:40

## 2020-11-01 RX ADMIN — Medication 4: at 12:58

## 2020-11-01 RX ADMIN — Medication 500 MILLIGRAM(S): at 11:25

## 2020-11-01 RX ADMIN — Medication 1 MILLIGRAM(S): at 11:25

## 2020-11-01 RX ADMIN — WARFARIN SODIUM 2.5 MILLIGRAM(S): 2.5 TABLET ORAL at 21:24

## 2020-11-01 RX ADMIN — Medication 650 MILLIGRAM(S): at 21:24

## 2020-11-01 NOTE — PROGRESS NOTE ADULT - SUBJECTIVE AND OBJECTIVE BOX
Subjective: "Hello, I have no pain"  OOB chair    Tele: Afib 80-90                               T(C): 37.1 (11-01-20 @ 12:41), Max: 37.1 (11-01-20 @ 12:41)  HR: 85 (11-01-20 @ 12:41) (63 - 94)  BP: 134/80 (11-01-20 @ 12:41) (98/62 - 147/86)  RR: 17 (11-01-20 @ 12:41) (17 - 18)  SpO2: 93% (11-01-20 @ 12:41) (92% - 100%)        11-01    136  |  101  |  16  ----------------------------<  130<H>  4.2   |  25  |  1.08    Ca    10.1      01 Nov 2020 06:21  Phos  3.4     10-31  Mg     1.8     10-31    TPro  6.9  /  Alb  2.9<L>  /  TBili  0.6  /  DBili  x   /  AST  28  /  ALT  24  /  AlkPhos  67  10-31                               9.6    10.84 )-----------( 332      ( 01 Nov 2020 06:23 )             32.3        PT/INR - ( 01 Nov 2020 06:24 )   PT: 32.1 sec;   INR: 2.81 ratio         PTT - ( 31 Oct 2020 09:31 )  PTT:45.1 sec    CAPILLARY BLOOD GLUCOSE      POCT Blood Glucose.: 326 mg/dL (01 Nov 2020 11:54)  POCT Blood Glucose.: 114 mg/dL (01 Nov 2020 08:23)  POCT Blood Glucose.: 219 mg/dL (31 Oct 2020 21:42)  POCT Blood Glucose.: 181 mg/dL (31 Oct 2020 17:13)           CXR:      Assessment    Neurology: alert and oriented x 3, nonfocal, no gross deficits    CV : RRR S1S2, no murmurs, rubs or gallops     Lungs: CTA B/L, No wheezing, rales, rhonchi. Non labored respirations     Abdomen: soft, NT,ND, (+ )BM    :  voiding    Extremities: No LE edema bilaterally, +DP, No calf tenderness       MEDICATIONS  (STANDING):  ascorbic acid 500 milliGRAM(s) Oral daily  atorvastatin 80 milliGRAM(s) Oral at bedtime  chlorhexidine 2% Cloths 1 Application(s) Topical <User Schedule>  clopidogrel Tablet 75 milliGRAM(s) Oral daily  dextrose 5%. 1000 milliLiter(s) (50 mL/Hr) IV Continuous <Continuous>  dextrose 50% Injectable 12.5 Gram(s) IV Push once  ferrous    sulfate 325 milliGRAM(s) Oral daily  folic acid 1 milliGRAM(s) Oral daily  influenza   Vaccine 0.5 milliLiter(s) IntraMuscular once  insulin lispro (ADMELOG) corrective regimen sliding scale   SubCutaneous three times a day before meals  insulin lispro (ADMELOG) corrective regimen sliding scale   SubCutaneous at bedtime  metoprolol tartrate 25 milliGRAM(s) Oral two times a day  pantoprazole    Tablet 40 milliGRAM(s) Oral before breakfast  senna 2 Tablet(s) Oral at bedtime  warfarin 2.5 milliGRAM(s) Oral once       PAST MEDICAL & SURGICAL HISTORY:  Bilateral carotid artery stenosis  ICA stenosis    Stented coronary artery    T2DM (type 2 diabetes mellitus)    Peripheral vascular disease    Atrial fibrillation    Hypertension    Hyperlipidemia    Coronary artery disease    S/P carotid endarterectomy  left  9/2020    Status post angiography of extremity  1 stent in each leg    History of coronary artery stent placement  two coronary stents; most recent 9/21/2020 to OM2

## 2020-11-01 NOTE — PHYSICAL THERAPY INITIAL EVALUATION ADULT - PERTINENT HX OF CURRENT PROBLEM, REHAB EVAL
Pt is a 75 y/o M with PMH of BL carotid artery stenosis s/p L endarterectomy (9/20), CAD s/p stents x 2 (most recent 9/20), PAD s/p BLE stents, afib, HTN, DMII, recently hospitalized (10/22-27) for severe AS s/p TAVR on 10/22. He presnted with weakness/fatigue/chills in AM 10/29.  Stated that he was feeling fine last night, but woke up feeling weak and chill. Has significant chest pain with coughing, but chest-pain free otherwise.

## 2020-11-01 NOTE — PHYSICAL THERAPY INITIAL EVALUATION ADULT - PLANNED THERAPY INTERVENTIONS, PT EVAL
gait training/strengthening/balance training/bed mobility training/1. GOAL: Pt will be able to negotiate 3 steps +HR independently with reciprocal pattern in 3 weeks./transfer training

## 2020-11-01 NOTE — PHYSICAL THERAPY INITIAL EVALUATION ADULT - STRENGTHENING, PT EVAL
6. GOAL: Pt will increase strength in BLE by at least 1/2 grade within 3 weeks to improve functional mobility.

## 2020-11-01 NOTE — PHYSICAL THERAPY INITIAL EVALUATION ADULT - ADDITIONAL COMMENTS
Pt reports living in a private house with his spouse and step son. There are +3 steps to enter with use of handrail, none once inside. PTA, pt was independent with most functional mobility with use of a rolling walker. Required some assistance with ADL's.

## 2020-11-01 NOTE — PROGRESS NOTE ADULT - ASSESSMENT
74M w/ b/l carotid artery stenosis s/p L endarterectomy (9/20), CAD s/p stents x 2 (most recent 9/20 to OM2), PAD s/p BLE stents, afib on warfarin, HLD, HTN, DMII, recently hospitalized (10/22-27) for severe AS s/p Direct Aortic TAVR on 10/22, p/w weakness/fatigue/chills in AM 10/29. Met SIRS criteria c/b transient hypotension. Admitted to CCU for sepsis workup.     10/29 Blood cultures x2 sent in ED, pending, IV Zosyn initiated / IV Vanco. s/p IVF resuscitation for hypotension. CCU admission.    10/30 Hemodynamics stable, no fevers overnight, WBC 12. Better BP. Stable for transfer to floor. Echo: Grossly normal LV function, minimal bioproesthetic aortic valve regurgitation. RV enlargement , normal RV function. trace pericardial effusion. Holding coumadin for supra-therapeutic INR. Check INR in AM  CT surgery [id number]  10/31 VSS; afebrile, WBC trending down, Ucx negative, Bcx NGTD. Abx d'cd as per Dr. Perea. Coumadin Held for INR drifting up.   11/1 Dose coumadin Afib PT eval for home dispo  Observe off abx>monitor temps

## 2020-11-02 LAB
ANION GAP SERPL CALC-SCNC: 12 MMOL/L — SIGNIFICANT CHANGE UP (ref 5–17)
BUN SERPL-MCNC: 14 MG/DL — SIGNIFICANT CHANGE UP (ref 7–23)
CALCIUM SERPL-MCNC: 9.9 MG/DL — SIGNIFICANT CHANGE UP (ref 8.4–10.5)
CHLORIDE SERPL-SCNC: 100 MMOL/L — SIGNIFICANT CHANGE UP (ref 96–108)
CO2 SERPL-SCNC: 23 MMOL/L — SIGNIFICANT CHANGE UP (ref 22–31)
CREAT SERPL-MCNC: 0.95 MG/DL — SIGNIFICANT CHANGE UP (ref 0.5–1.3)
GLUCOSE BLDC GLUCOMTR-MCNC: 132 MG/DL — HIGH (ref 70–99)
GLUCOSE BLDC GLUCOMTR-MCNC: 170 MG/DL — HIGH (ref 70–99)
GLUCOSE BLDC GLUCOMTR-MCNC: 184 MG/DL — HIGH (ref 70–99)
GLUCOSE BLDC GLUCOMTR-MCNC: 186 MG/DL — HIGH (ref 70–99)
GLUCOSE SERPL-MCNC: 120 MG/DL — HIGH (ref 70–99)
HCT VFR BLD CALC: 29.8 % — LOW (ref 39–50)
HGB BLD-MCNC: 9.2 G/DL — LOW (ref 13–17)
INR BLD: 2.2 RATIO — HIGH (ref 0.88–1.16)
MCHC RBC-ENTMCNC: 26.8 PG — LOW (ref 27–34)
MCHC RBC-ENTMCNC: 30.9 GM/DL — LOW (ref 32–36)
MCV RBC AUTO: 86.9 FL — SIGNIFICANT CHANGE UP (ref 80–100)
NRBC # BLD: 0 /100 WBCS — SIGNIFICANT CHANGE UP (ref 0–0)
PLATELET # BLD AUTO: 349 K/UL — SIGNIFICANT CHANGE UP (ref 150–400)
POTASSIUM SERPL-MCNC: 4.2 MMOL/L — SIGNIFICANT CHANGE UP (ref 3.5–5.3)
POTASSIUM SERPL-SCNC: 4.2 MMOL/L — SIGNIFICANT CHANGE UP (ref 3.5–5.3)
PROTHROM AB SERPL-ACNC: 25.4 SEC — HIGH (ref 10.6–13.6)
RBC # BLD: 3.43 M/UL — LOW (ref 4.2–5.8)
RBC # FLD: 16.8 % — HIGH (ref 10.3–14.5)
SODIUM SERPL-SCNC: 135 MMOL/L — SIGNIFICANT CHANGE UP (ref 135–145)
WBC # BLD: 10.17 K/UL — SIGNIFICANT CHANGE UP (ref 3.8–10.5)
WBC # FLD AUTO: 10.17 K/UL — SIGNIFICANT CHANGE UP (ref 3.8–10.5)

## 2020-11-02 PROCEDURE — 99232 SBSQ HOSP IP/OBS MODERATE 35: CPT

## 2020-11-02 RX ORDER — WARFARIN SODIUM 2.5 MG/1
2.5 TABLET ORAL ONCE
Refills: 0 | Status: COMPLETED | OUTPATIENT
Start: 2020-11-02 | End: 2020-11-02

## 2020-11-02 RX ADMIN — PANTOPRAZOLE SODIUM 40 MILLIGRAM(S): 20 TABLET, DELAYED RELEASE ORAL at 05:59

## 2020-11-02 RX ADMIN — Medication 1: at 17:15

## 2020-11-02 RX ADMIN — Medication 25 MILLIGRAM(S): at 05:59

## 2020-11-02 RX ADMIN — Medication 650 MILLIGRAM(S): at 21:10

## 2020-11-02 RX ADMIN — CLOPIDOGREL BISULFATE 75 MILLIGRAM(S): 75 TABLET, FILM COATED ORAL at 11:01

## 2020-11-02 RX ADMIN — ATORVASTATIN CALCIUM 80 MILLIGRAM(S): 80 TABLET, FILM COATED ORAL at 20:11

## 2020-11-02 RX ADMIN — Medication 25 MILLIGRAM(S): at 17:14

## 2020-11-02 RX ADMIN — Medication 1 MILLIGRAM(S): at 11:01

## 2020-11-02 RX ADMIN — Medication 500 MILLIGRAM(S): at 11:01

## 2020-11-02 RX ADMIN — WARFARIN SODIUM 2.5 MILLIGRAM(S): 2.5 TABLET ORAL at 20:11

## 2020-11-02 RX ADMIN — Medication 1: at 12:07

## 2020-11-02 RX ADMIN — Medication 325 MILLIGRAM(S): at 11:01

## 2020-11-02 RX ADMIN — Medication 650 MILLIGRAM(S): at 20:39

## 2020-11-02 NOTE — PROGRESS NOTE ADULT - ASSESSMENT
74M w/ b/l carotid artery stenosis s/p L endarterectomy (9/20), CAD s/p stents x 2 (most recent 9/20 to OM2), PAD s/p BLE stents, afib on warfarin, HLD, HTN, DMII, recently hospitalized (10/22-27) for severe AS s/p Direct Aortic TAVR on 10/22, p/w weakness/fatigue/chills in AM 10/29. Met SIRS criteria c/b transient hypotension. Admitted to CCU for sepsis workup.   Hospital Course:   10/29 Blood cultures x2 sent in ED, pending, IV Zosyn initiated / IV Vanco. s/p IVF resuscitation for hypotension. CCU admission.    10/30 Hemodynamics stable, no fevers overnight, WBC 12. Better BP. Stable for transfer to floor. Echo: Grossly normal LV function, minimal bioprosthetic aortic valve regurgitation. RV enlargement , normal RV function. trace pericardial effusion. Holding coumadin for supra-therapeutic INR. Check INR in AM  CT surgery [id number]  10/31 VSS; afebrile, WBC trending down, Ucx negative, Bcx NGTD. Abx d'cd as per Dr. Perea. Coumadin Held for INR drifting up.   11/1 Dose coumadin Afib PT eval for home dispo  Observe off abx>monitor temps  11/2 VVS; INR 2.25.  Coumadin 2.5 mg PO tonight.  Patient is requesting rehab.  PT called to re-evaluate.  Continue with current medication regimen.

## 2020-11-02 NOTE — PROGRESS NOTE ADULT - PROBLEM SELECTOR PLAN 3
Daily INR levels - Coumadin 2.5 mg PO tobignt. for a therapeutic INR goal 2-3 Daily INR levels - Coumadin 2.5 mg PO tonight. for a therapeutic INR goal 2-3

## 2020-11-02 NOTE — PROGRESS NOTE ADULT - PROBLEM SELECTOR PLAN 1
Cont monitor tele - remains afib rate controlled  TTE results noted - good bioprosthetic aortic valve function  up-titrate BB as BP permits

## 2020-11-02 NOTE — PROGRESS NOTE ADULT - SUBJECTIVE AND OBJECTIVE BOX
VITAL SIGNS    Subjective: "I'm feeling ok. Denies CP, palpitation, SOB, MAX, HA, dizziness, N/V/D, fever or chills.  No acute event noted overnight.     Telemetry: Afib 60-90     Vital Signs Last 24 Hrs  T(C): 36.7 (20 @ 08:20), Max: 37.1 (20 @ 12:41)  T(F): 98 (20 @ 08:20), Max: 98.8 (20 @ 20:46)  HR: 88 (20 @ 08:20) (76 - 97)  BP: 151/85 (20 @ 08:20) (121/76 - 168/85)  RR: 16 (20 @ 08:20) (16 - 17)  SpO2: 95% (20 @ 08:20) (93% - 98%)            @ 07:01  -   @ 07:00  --------------------------------------------------------  IN: 600 mL / OUT: 1185 mL / NET: -585 mL     @ 07:01  -   @ 11:48  --------------------------------------------------------  IN: 240 mL / OUT: 0 mL / NET: 240 mL    Daily     Daily Weight in k.7 (2020 08:28)    CAPILLARY BLOOD GLUCOSE    POCT Blood Glucose.: 132 mg/dL (2020 07:34)  POCT Blood Glucose.: 201 mg/dL (2020 21:20)  POCT Blood Glucose.: 111 mg/dL (2020 16:55)  POCT Blood Glucose.: 326 mg/dL (2020 11:54)        PHYSICAL EXAM    Neurology: alert and oriented x 3, nonfocal, no gross deficits    CV: (+) Irregular S1 and S2, No murmurs, rubs, gallops or clicks     Sternal Wound: MSI --> healed --> CDI , sternum stable    Lungs: CTA B/L     Abdomen: soft, nontender, nondistended, positive bowel sounds, (+) Flatus; (+) BM     :  Voiding               Extremities: B/L LE (+) trace edema; negative calf tenderness; (+) 2 DP palpable        acetaminophen Tablet .. 650 milliGRAM(s) Oral every 6 hours PRN  ascorbic acid 500 milliGRAM(s) Oral daily  atorvastatin 80 milliGRAM(s) Oral at bedtime  chlorhexidine 2% Cloths 1 Application(s) Topical <User Schedule>  clopidogrel Tablet 75 milliGRAM(s) Oral daily  ferrous sulfate 325 milliGRAM(s) Oral daily  folic acid 1 milliGRAM(s) Oral daily  glucagon  Injectable 1 milliGRAM(s) IntraMuscular once PRN  influenza  Vaccine 0.5 milliLiter(s) IntraMuscular once  insulin lispro (ADMELOG) corrective regimen sliding scale SubCutaneous three times a day before meals  insulin lispro (ADMELOG) corrective regimen sliding scale SubCutaneous at bedtime  metoprolol tartrate 25 milliGRAM(s) Oral two times a day  pantoprazole Tablet 40 milliGRAM(s) Oral before breakfast  senna 2 Tablet(s) Oral at bedtime    Physical Therapy Rec:   Home  [  ]   Home w/ PT  [ X ]  Rehab  [  ]    Discussed with Cardiothoracic Team at AM rounds.

## 2020-11-03 LAB
ANION GAP SERPL CALC-SCNC: 12 MMOL/L — SIGNIFICANT CHANGE UP (ref 5–17)
BUN SERPL-MCNC: 16 MG/DL — SIGNIFICANT CHANGE UP (ref 7–23)
CALCIUM SERPL-MCNC: 9.9 MG/DL — SIGNIFICANT CHANGE UP (ref 8.4–10.5)
CHLORIDE SERPL-SCNC: 100 MMOL/L — SIGNIFICANT CHANGE UP (ref 96–108)
CO2 SERPL-SCNC: 21 MMOL/L — LOW (ref 22–31)
CREAT SERPL-MCNC: 1.06 MG/DL — SIGNIFICANT CHANGE UP (ref 0.5–1.3)
CULTURE RESULTS: SIGNIFICANT CHANGE UP
CULTURE RESULTS: SIGNIFICANT CHANGE UP
GLUCOSE BLDC GLUCOMTR-MCNC: 118 MG/DL — HIGH (ref 70–99)
GLUCOSE BLDC GLUCOMTR-MCNC: 160 MG/DL — HIGH (ref 70–99)
GLUCOSE BLDC GLUCOMTR-MCNC: 192 MG/DL — HIGH (ref 70–99)
GLUCOSE BLDC GLUCOMTR-MCNC: 231 MG/DL — HIGH (ref 70–99)
GLUCOSE SERPL-MCNC: 147 MG/DL — HIGH (ref 70–99)
HCT VFR BLD CALC: 31.7 % — LOW (ref 39–50)
HGB BLD-MCNC: 9.6 G/DL — LOW (ref 13–17)
INR BLD: 2.08 RATIO — HIGH (ref 0.88–1.16)
MCHC RBC-ENTMCNC: 26.8 PG — LOW (ref 27–34)
MCHC RBC-ENTMCNC: 30.3 GM/DL — LOW (ref 32–36)
MCV RBC AUTO: 88.5 FL — SIGNIFICANT CHANGE UP (ref 80–100)
NRBC # BLD: 0 /100 WBCS — SIGNIFICANT CHANGE UP (ref 0–0)
PLATELET # BLD AUTO: 339 K/UL — SIGNIFICANT CHANGE UP (ref 150–400)
POTASSIUM SERPL-MCNC: 4.8 MMOL/L — SIGNIFICANT CHANGE UP (ref 3.5–5.3)
POTASSIUM SERPL-SCNC: 4.8 MMOL/L — SIGNIFICANT CHANGE UP (ref 3.5–5.3)
PROTHROM AB SERPL-ACNC: 24.1 SEC — HIGH (ref 10.6–13.6)
RBC # BLD: 3.58 M/UL — LOW (ref 4.2–5.8)
RBC # FLD: 16.8 % — HIGH (ref 10.3–14.5)
SARS-COV-2 RNA SPEC QL NAA+PROBE: SIGNIFICANT CHANGE UP
SODIUM SERPL-SCNC: 133 MMOL/L — LOW (ref 135–145)
SPECIMEN SOURCE: SIGNIFICANT CHANGE UP
SPECIMEN SOURCE: SIGNIFICANT CHANGE UP
WBC # BLD: 11.1 K/UL — HIGH (ref 3.8–10.5)
WBC # FLD AUTO: 11.1 K/UL — HIGH (ref 3.8–10.5)

## 2020-11-03 PROCEDURE — 99231 SBSQ HOSP IP/OBS SF/LOW 25: CPT

## 2020-11-03 RX ORDER — WARFARIN SODIUM 2.5 MG/1
3 TABLET ORAL ONCE
Refills: 0 | Status: COMPLETED | OUTPATIENT
Start: 2020-11-03 | End: 2020-11-03

## 2020-11-03 RX ADMIN — Medication 500 MILLIGRAM(S): at 12:17

## 2020-11-03 RX ADMIN — Medication 25 MILLIGRAM(S): at 17:05

## 2020-11-03 RX ADMIN — Medication 1: at 17:05

## 2020-11-03 RX ADMIN — CLOPIDOGREL BISULFATE 75 MILLIGRAM(S): 75 TABLET, FILM COATED ORAL at 12:16

## 2020-11-03 RX ADMIN — Medication 650 MILLIGRAM(S): at 20:35

## 2020-11-03 RX ADMIN — Medication 325 MILLIGRAM(S): at 12:16

## 2020-11-03 RX ADMIN — ATORVASTATIN CALCIUM 80 MILLIGRAM(S): 80 TABLET, FILM COATED ORAL at 20:35

## 2020-11-03 RX ADMIN — Medication 25 MILLIGRAM(S): at 05:40

## 2020-11-03 RX ADMIN — PANTOPRAZOLE SODIUM 40 MILLIGRAM(S): 20 TABLET, DELAYED RELEASE ORAL at 05:40

## 2020-11-03 RX ADMIN — Medication 1 MILLIGRAM(S): at 12:17

## 2020-11-03 RX ADMIN — WARFARIN SODIUM 3 MILLIGRAM(S): 2.5 TABLET ORAL at 20:35

## 2020-11-03 RX ADMIN — Medication 2: at 12:16

## 2020-11-03 RX ADMIN — Medication 650 MILLIGRAM(S): at 21:06

## 2020-11-03 NOTE — PROGRESS NOTE ADULT - SUBJECTIVE AND OBJECTIVE BOX
VITAL SIGNS    Telemetry:      afib  100  Vital Signs Last 24 Hrs  T(C): 36.5 (11-03-20 @ 05:32), Max: 36.9 (11-03-20 @ 00:16)  T(F): 97.7 (11-03-20 @ 05:32), Max: 98.5 (11-03-20 @ 00:16)  HR: 94 (11-03-20 @ 05:32) (81 - 100)  BP: 153/84 (11-03-20 @ 05:32) (130/80 - 167/81)  RR: 16 (11-03-20 @ 05:32) (16 - 17)  SpO2: 93% (11-03-20 @ 05:32) (93% - 95%)                   Daily     Daily         CAPILLARY BLOOD GLUCOSE      POCT Blood Glucose.: 118 mg/dL (03 Nov 2020 07:47)  POCT Blood Glucose.: 186 mg/dL (02 Nov 2020 21:10)  POCT Blood Glucose.: 184 mg/dL (02 Nov 2020 16:55)  POCT Blood Glucose.: 170 mg/dL (02 Nov 2020 11:49)        Coumadin    YES                        PHYSICAL EXAM  s   No chest pian  No palpatations"  Neurology: alert and oriented x 3, moves all extremities with no defecits  CV :  RRR  Lungs:   CTA B/L  Abdomen: soft, nontender, nondistended, positive bowel sounds, last bowel movement   Extremities:     no edema   no calf tenderness

## 2020-11-03 NOTE — PROGRESS NOTE ADULT - PROBLEM SELECTOR PLAN 1
Cont monitor tele - remains afib rate controlled  TTE results noted - good bioprosthetic aortic valve function  rehab plan when approved

## 2020-11-03 NOTE — PROGRESS NOTE ADULT - ASSESSMENT
74M w/ b/l carotid artery stenosis s/p L endarterectomy (9/20), CAD s/p stents x 2 (most recent 9/20 to OM2), PAD s/p BLE stents, afib on warfarin, HLD, HTN, DMII, recently hospitalized (10/22-27) for severe AS s/p Direct Aortic TAVR on 10/22, p/w weakness/fatigue/chills in AM 10/29. Met SIRS criteria c/b transient hypotension. Admitted to CCU for sepsis workup.   Hospital Course:   10/29 Blood cultures x2 sent in ED, pending, IV Zosyn initiated / IV Vanco. s/p IVF resuscitation for hypotension. CCU admission.    10/30 Hemodynamics stable, no fevers overnight, WBC 12. Better BP. Stable for transfer to floor. Echo: Grossly normal LV function, minimal bioprosthetic aortic valve regurgitation. RV enlargement , normal RV function. trace pericardial effusion. Holding coumadin for supra-therapeutic INR. Check INR in AM  CT surgery [id number]  10/31 VSS; afebrile, WBC trending down, Ucx negative, Bcx NGTD. Abx d'cd as per Dr. Perea. Coumadin Held for INR drifting up.   11/1 Dose coumadin Afib PT eval for home dispo  Observe off abx>monitor temps  11/2 VVS; INR 2.25.  Coumadin 2.5 mg PO tonight.  Patient is requesting rehab.  PT called to re-evaluate.  Continue with current medication regimen.   11/3   OOB  ambulating w/ PT

## 2020-11-03 NOTE — PROGRESS NOTE ADULT - REASON FOR ADMISSION
Fever and weakness
Fever and weakness
Fever and weakness, recent TAVR
Fever and weakness
Fever and weakness TAVR 10/22
Fever and weakness
Fever and weakness

## 2020-11-04 ENCOUNTER — TRANSCRIPTION ENCOUNTER (OUTPATIENT)
Age: 75
End: 2020-11-04

## 2020-11-04 VITALS
SYSTOLIC BLOOD PRESSURE: 120 MMHG | OXYGEN SATURATION: 95 % | TEMPERATURE: 98 F | RESPIRATION RATE: 17 BRPM | HEART RATE: 96 BPM | DIASTOLIC BLOOD PRESSURE: 69 MMHG

## 2020-11-04 LAB
ANION GAP SERPL CALC-SCNC: 11 MMOL/L — SIGNIFICANT CHANGE UP (ref 5–17)
APTT BLD: 41.2 SEC — HIGH (ref 27.5–35.5)
BUN SERPL-MCNC: 19 MG/DL — SIGNIFICANT CHANGE UP (ref 7–23)
CALCIUM SERPL-MCNC: 9.9 MG/DL — SIGNIFICANT CHANGE UP (ref 8.4–10.5)
CHLORIDE SERPL-SCNC: 100 MMOL/L — SIGNIFICANT CHANGE UP (ref 96–108)
CO2 SERPL-SCNC: 25 MMOL/L — SIGNIFICANT CHANGE UP (ref 22–31)
CREAT SERPL-MCNC: 1.14 MG/DL — SIGNIFICANT CHANGE UP (ref 0.5–1.3)
GLUCOSE BLDC GLUCOMTR-MCNC: 183 MG/DL — HIGH (ref 70–99)
GLUCOSE BLDC GLUCOMTR-MCNC: 197 MG/DL — HIGH (ref 70–99)
GLUCOSE SERPL-MCNC: 128 MG/DL — HIGH (ref 70–99)
HCT VFR BLD CALC: 31.1 % — LOW (ref 39–50)
HGB BLD-MCNC: 9.4 G/DL — LOW (ref 13–17)
INR BLD: 1.93 RATIO — HIGH (ref 0.88–1.16)
MCHC RBC-ENTMCNC: 26.5 PG — LOW (ref 27–34)
MCHC RBC-ENTMCNC: 30.2 GM/DL — LOW (ref 32–36)
MCV RBC AUTO: 87.6 FL — SIGNIFICANT CHANGE UP (ref 80–100)
NRBC # BLD: 0 /100 WBCS — SIGNIFICANT CHANGE UP (ref 0–0)
PLATELET # BLD AUTO: 378 K/UL — SIGNIFICANT CHANGE UP (ref 150–400)
POTASSIUM SERPL-MCNC: 3.8 MMOL/L — SIGNIFICANT CHANGE UP (ref 3.5–5.3)
POTASSIUM SERPL-SCNC: 3.8 MMOL/L — SIGNIFICANT CHANGE UP (ref 3.5–5.3)
PROTHROM AB SERPL-ACNC: 22.5 SEC — HIGH (ref 10.6–13.6)
RBC # BLD: 3.55 M/UL — LOW (ref 4.2–5.8)
RBC # FLD: 17 % — HIGH (ref 10.3–14.5)
SODIUM SERPL-SCNC: 136 MMOL/L — SIGNIFICANT CHANGE UP (ref 135–145)
WBC # BLD: 9.53 K/UL — SIGNIFICANT CHANGE UP (ref 3.8–10.5)
WBC # FLD AUTO: 9.53 K/UL — SIGNIFICANT CHANGE UP (ref 3.8–10.5)

## 2020-11-04 PROCEDURE — 83735 ASSAY OF MAGNESIUM: CPT

## 2020-11-04 PROCEDURE — 82550 ASSAY OF CK (CPK): CPT

## 2020-11-04 PROCEDURE — 82962 GLUCOSE BLOOD TEST: CPT

## 2020-11-04 PROCEDURE — 93005 ELECTROCARDIOGRAM TRACING: CPT

## 2020-11-04 PROCEDURE — 82947 ASSAY GLUCOSE BLOOD QUANT: CPT

## 2020-11-04 PROCEDURE — 80053 COMPREHEN METABOLIC PANEL: CPT

## 2020-11-04 PROCEDURE — 85610 PROTHROMBIN TIME: CPT

## 2020-11-04 PROCEDURE — 93308 TTE F-UP OR LMTD: CPT

## 2020-11-04 PROCEDURE — U0003: CPT

## 2020-11-04 PROCEDURE — 85014 HEMATOCRIT: CPT

## 2020-11-04 PROCEDURE — 82553 CREATINE MB FRACTION: CPT

## 2020-11-04 PROCEDURE — 86850 RBC ANTIBODY SCREEN: CPT

## 2020-11-04 PROCEDURE — 85730 THROMBOPLASTIN TIME PARTIAL: CPT

## 2020-11-04 PROCEDURE — 87040 BLOOD CULTURE FOR BACTERIA: CPT

## 2020-11-04 PROCEDURE — 99238 HOSP IP/OBS DSCHRG MGMT 30/<: CPT

## 2020-11-04 PROCEDURE — 84100 ASSAY OF PHOSPHORUS: CPT

## 2020-11-04 PROCEDURE — 82330 ASSAY OF CALCIUM: CPT

## 2020-11-04 PROCEDURE — 84145 PROCALCITONIN (PCT): CPT

## 2020-11-04 PROCEDURE — 84295 ASSAY OF SERUM SODIUM: CPT

## 2020-11-04 PROCEDURE — 82150 ASSAY OF AMYLASE: CPT

## 2020-11-04 PROCEDURE — 84443 ASSAY THYROID STIM HORMONE: CPT

## 2020-11-04 PROCEDURE — 82248 BILIRUBIN DIRECT: CPT

## 2020-11-04 PROCEDURE — 71045 X-RAY EXAM CHEST 1 VIEW: CPT

## 2020-11-04 PROCEDURE — 85027 COMPLETE CBC AUTOMATED: CPT

## 2020-11-04 PROCEDURE — 84484 ASSAY OF TROPONIN QUANT: CPT

## 2020-11-04 PROCEDURE — 80061 LIPID PANEL: CPT

## 2020-11-04 PROCEDURE — 96375 TX/PRO/DX INJ NEW DRUG ADDON: CPT | Mod: XU

## 2020-11-04 PROCEDURE — 86901 BLOOD TYPING SEROLOGIC RH(D): CPT

## 2020-11-04 PROCEDURE — 84132 ASSAY OF SERUM POTASSIUM: CPT

## 2020-11-04 PROCEDURE — 85018 HEMOGLOBIN: CPT

## 2020-11-04 PROCEDURE — 93306 TTE W/DOPPLER COMPLETE: CPT

## 2020-11-04 PROCEDURE — 83605 ASSAY OF LACTIC ACID: CPT

## 2020-11-04 PROCEDURE — 97161 PT EVAL LOW COMPLEX 20 MIN: CPT

## 2020-11-04 PROCEDURE — 86900 BLOOD TYPING SEROLOGIC ABO: CPT

## 2020-11-04 PROCEDURE — 99285 EMERGENCY DEPT VISIT HI MDM: CPT | Mod: 25

## 2020-11-04 PROCEDURE — 70450 CT HEAD/BRAIN W/O DYE: CPT

## 2020-11-04 PROCEDURE — 82803 BLOOD GASES ANY COMBINATION: CPT

## 2020-11-04 PROCEDURE — 82435 ASSAY OF BLOOD CHLORIDE: CPT

## 2020-11-04 PROCEDURE — 87086 URINE CULTURE/COLONY COUNT: CPT

## 2020-11-04 PROCEDURE — 96374 THER/PROPH/DIAG INJ IV PUSH: CPT | Mod: XU

## 2020-11-04 PROCEDURE — 83036 HEMOGLOBIN GLYCOSYLATED A1C: CPT

## 2020-11-04 PROCEDURE — 71275 CT ANGIOGRAPHY CHEST: CPT

## 2020-11-04 PROCEDURE — 51701 INSERT BLADDER CATHETER: CPT

## 2020-11-04 PROCEDURE — 97116 GAIT TRAINING THERAPY: CPT

## 2020-11-04 PROCEDURE — 85025 COMPLETE CBC W/AUTO DIFF WBC: CPT

## 2020-11-04 PROCEDURE — 80048 BASIC METABOLIC PNL TOTAL CA: CPT

## 2020-11-04 RX ORDER — WARFARIN SODIUM 2.5 MG/1
1 TABLET ORAL
Qty: 0 | Refills: 0 | DISCHARGE
Start: 2020-11-04

## 2020-11-04 RX ORDER — FOLIC ACID 0.8 MG
1 TABLET ORAL
Qty: 0 | Refills: 0 | DISCHARGE
Start: 2020-11-04

## 2020-11-04 RX ORDER — METOPROLOL TARTRATE 50 MG
1 TABLET ORAL
Qty: 0 | Refills: 0 | DISCHARGE
Start: 2020-11-04

## 2020-11-04 RX ORDER — ASCORBIC ACID 60 MG
1 TABLET,CHEWABLE ORAL
Qty: 0 | Refills: 0 | DISCHARGE
Start: 2020-11-04

## 2020-11-04 RX ORDER — ACETAMINOPHEN 500 MG
2 TABLET ORAL
Qty: 0 | Refills: 0 | DISCHARGE
Start: 2020-11-04

## 2020-11-04 RX ADMIN — Medication 1 MILLIGRAM(S): at 12:12

## 2020-11-04 RX ADMIN — PANTOPRAZOLE SODIUM 40 MILLIGRAM(S): 20 TABLET, DELAYED RELEASE ORAL at 05:11

## 2020-11-04 RX ADMIN — Medication 500 MILLIGRAM(S): at 12:12

## 2020-11-04 RX ADMIN — Medication 1: at 07:57

## 2020-11-04 RX ADMIN — Medication 1: at 12:11

## 2020-11-04 RX ADMIN — Medication 325 MILLIGRAM(S): at 12:12

## 2020-11-04 RX ADMIN — CLOPIDOGREL BISULFATE 75 MILLIGRAM(S): 75 TABLET, FILM COATED ORAL at 12:12

## 2020-11-04 RX ADMIN — Medication 25 MILLIGRAM(S): at 05:11

## 2020-11-04 RX ADMIN — CHLORHEXIDINE GLUCONATE 1 APPLICATION(S): 213 SOLUTION TOPICAL at 07:59

## 2020-11-04 NOTE — DISCHARGE NOTE PROVIDER - NSDCFUSCHEDAPPT_GEN_ALL_CORE_FT
PAIGE BYRNES ; 11/24/2020 ; N[facility] S[facility] [address] ; 11/24/2020 ; NPP Surg Vasc [address]

## 2020-11-04 NOTE — DISCHARGE NOTE PROVIDER - HOSPITAL COURSE
74M w/ b/l carotid artery stenosis s/p L endarterectomy (9/20), CAD s/p stents x 2 (most recent 9/20 to OM2), PAD s/p BLE stents, afib on warfarin, HLD, HTN, DMII, recently hospitalized (10/22-27) for severe AS s/p Direct Aortic TAVR on 10/22, p/w weakness/fatigue/chills in AM 10/29. Met SIRS criteria c/b transient hypotension. Admitted to CCU for sepsis workup.   Hospital Course:   10/29 Blood cultures x2 sent in ED, pending, IV Zosyn initiated / IV Vanco. s/p IVF resuscitation for hypotension. CCU admission.    10/30 Hemodynamics stable, no fevers overnight, WBC 12. Better BP. Stable for transfer to floor. Echo: Grossly normal LV function, minimal bioprosthetic aortic valve regurgitation. RV enlargement , normal RV function. trace pericardial effusion. Holding coumadin for supra-therapeutic INR. Check INR in AM  CT surgery [id number]  10/31 VSS; afebrile, WBC trending down, Ucx negative, Bcx NGTD. Abx d'cd as per Dr. Perea. Coumadin Held for INR drifting up.   11/1 Dose coumadin Afib PT eval for home dispo  Observe off abx>monitor temps  11/2 VVS; INR 2.25.  Coumadin 2.5 mg PO tonight.  Patient is requesting rehab.  PT called to re-evaluate.  Continue with current medication regime  11/4 HD stable.  WBC 9.5  INR 1.93.  Accepted ar rehab .  stable for transfer.  INR 1.93.  Dose coumadin to keep INR 2.0.

## 2020-11-04 NOTE — DISCHARGE NOTE PROVIDER - NSDCMRMEDTOKEN_GEN_ALL_CORE_FT
acetaminophen 325 mg oral tablet: 2 tab(s) orally every 6 hours, As needed, Mild Pain (1 - 3)  ascorbic acid 500 mg oral tablet: 1 tab(s) orally once a day  atorvastatin 80 mg oral tablet: 1 tab(s) orally once a day   clopidogrel 75 mg oral tablet: 1 tab(s) orally once a day  ferrous sulfate 325 mg (65 mg elemental iron) oral tablet: 1 tab(s) orally once a day  folic acid 1 mg oral tablet: 1 tab(s) orally once a day  magnesium oxide 400 mg (241.3 mg elemental magnesium) oral tablet: 1 tab(s) orally 2 times a day (with meals) x 2 days  metFORMIN 1000 mg oral tablet: 1 tab(s) orally 2 times a day  metoprolol tartrate 25 mg oral tablet: 1 tab(s) orally 2 times a day  senna oral tablet: 2 tab(s) orally once a day (at bedtime)  warfarin 2.5 mg oral tablet: 1 tab(s) orally once a day (at bedtime)

## 2020-11-04 NOTE — DISCHARGE NOTE PROVIDER - NSDCPNSUBOBJ_GEN_ALL_CORE
Vital Signs Last 24 Hrs  T(C): 36.3 (04 Nov 2020 09:09), Max: 37.1 (03 Nov 2020 20:28)  T(F): 97.3 (04 Nov 2020 09:09), Max: 98.8 (04 Nov 2020 04:07)  HR: 89 (04 Nov 2020 09:09) (79 - 90)  BP: 152/95 (04 Nov 2020 09:09) (125/70 - 152/95)  BP(mean): --  RR: 16 (04 Nov 2020 09:09) (16 - 17)  SpO2: 97% (04 Nov 2020 09:09) (96% - 97%)    General: WN/WD NAD  Respiratory: CTA B/L  CV: RRR, S1S2, no murmur  Abdominal: Soft, NT, ND no palpable mass  MSK: No edema, + peripheral pulses, FROM all 4 extremity

## 2020-11-04 NOTE — DISCHARGE NOTE NURSING/CASE MANAGEMENT/SOCIAL WORK - NSDCPEEMAIL_GEN_ALL_CORE
[facility] email [email or web address]

## 2020-11-04 NOTE — DISCHARGE NOTE PROVIDER - NSDCFUADDAPPT_GEN_ALL_CORE_FT
NATHANIEL Perea.  Call [phone] with questions or concerns and to schedule appointment after dc from rehab

## 2020-11-04 NOTE — DISCHARGE NOTE NURSING/CASE MANAGEMENT/SOCIAL WORK - NSDCFUADDAPPT_GEN_ALL_CORE_FT
NATHANIEL Perea.  Call [phone] with questions or concerns and to schedule appointment after dc from rehab

## 2020-11-04 NOTE — DISCHARGE NOTE NURSING/CASE MANAGEMENT/SOCIAL WORK - PATIENT PORTAL LINK FT
You can access the FollowMyHealth Patient Portal offered by [facility] by registering at the following website: [email or web address]. By joining [facility]’s FollowMyHealth portal, you will also be able to view your health information using other applications (apps) compatible with our system.

## 2020-11-05 ENCOUNTER — TRANSCRIPTION ENCOUNTER (OUTPATIENT)
Age: 75
End: 2020-11-05

## 2020-11-05 NOTE — CHART NOTE - NSCHARTNOTEFT_GEN_A_CORE
This patient had hypovolemic shock post procedure, now resolved. This patient had hypovolemic shock post procedure, now resolved.  This patient was in acute on chronic diastolic heart failure received Lasix.

## 2020-11-12 ENCOUNTER — TRANSCRIPTION ENCOUNTER (OUTPATIENT)
Age: 75
End: 2020-11-12

## 2020-11-18 ENCOUNTER — TRANSCRIPTION ENCOUNTER (OUTPATIENT)
Age: 75
End: 2020-11-18

## 2020-11-24 ENCOUNTER — TRANSCRIPTION ENCOUNTER (OUTPATIENT)
Age: 75
End: 2020-11-24

## 2020-11-24 ENCOUNTER — APPOINTMENT (OUTPATIENT)
Dept: VASCULAR SURGERY | Facility: CLINIC | Age: 75
End: 2020-11-24

## 2020-11-27 ENCOUNTER — TRANSCRIPTION ENCOUNTER (OUTPATIENT)
Age: 75
End: 2020-11-27

## 2020-12-08 ENCOUNTER — APPOINTMENT (OUTPATIENT)
Dept: VASCULAR SURGERY | Facility: CLINIC | Age: 75
End: 2020-12-08
Payer: MEDICARE

## 2020-12-08 VITALS — TEMPERATURE: 96.4 F

## 2020-12-08 PROCEDURE — 99072 ADDL SUPL MATRL&STAF TM PHE: CPT

## 2020-12-08 PROCEDURE — 93880 EXTRACRANIAL BILAT STUDY: CPT

## 2020-12-08 PROCEDURE — 99024 POSTOP FOLLOW-UP VISIT: CPT

## 2020-12-08 NOTE — DISCUSSION/SUMMARY
[FreeTextEntry1] : Patient underwent a carotid duplex study which shows a patent left endarterectomy site.  There is an 80 to 99% right internal carotid artery stenosis. \par patient for right carotid endarterectomy 4-6 weeks

## 2020-12-08 NOTE — PHYSICAL EXAM
[Normal Breath Sounds] : Normal breath sounds [2+] : left 2+ [No Rash or Lesion] : No rash or lesion [de-identified] : Appears well [de-identified] : Well-healed scar

## 2020-12-08 NOTE — REASON FOR VISIT
[de-identified] : Left carotid endarterectomy [de-identified] : 74-year-old gentleman status post left carotid endarterectomy which was done urgently for symptomatic left carotid stenosis.  Surgery was conducted under left cervical block.  At that admission patient was also found to have a high-grade right internal carotid artery stenosis and aortic stenosis which will require TAVR.  Patient returns for his first postoperative visit.  He has no complaints referable to his neck.

## 2020-12-11 NOTE — DISCHARGE NOTE PROVIDER - NSDCDCMDCOMP_GEN_ALL_CORE
Previously Declined (within the last year)
This document is complete and the patient is ready for discharge.

## 2021-01-12 ENCOUNTER — APPOINTMENT (OUTPATIENT)
Dept: VASCULAR SURGERY | Facility: CLINIC | Age: 76
End: 2021-01-12

## 2021-02-19 ENCOUNTER — OUTPATIENT (OUTPATIENT)
Dept: OUTPATIENT SERVICES | Facility: HOSPITAL | Age: 76
LOS: 1 days | End: 2021-02-19
Payer: COMMERCIAL

## 2021-02-19 VITALS
RESPIRATION RATE: 16 BRPM | TEMPERATURE: 98 F | HEIGHT: 71 IN | DIASTOLIC BLOOD PRESSURE: 80 MMHG | WEIGHT: 158.07 LBS | OXYGEN SATURATION: 96 % | HEART RATE: 98 BPM | SYSTOLIC BLOOD PRESSURE: 136 MMHG

## 2021-02-19 DIAGNOSIS — Z95.2 PRESENCE OF PROSTHETIC HEART VALVE: Chronic | ICD-10-CM

## 2021-02-19 DIAGNOSIS — Z98.890 OTHER SPECIFIED POSTPROCEDURAL STATES: Chronic | ICD-10-CM

## 2021-02-19 DIAGNOSIS — I65.29 OCCLUSION AND STENOSIS OF UNSPECIFIED CAROTID ARTERY: ICD-10-CM

## 2021-02-19 DIAGNOSIS — I48.91 UNSPECIFIED ATRIAL FIBRILLATION: ICD-10-CM

## 2021-02-19 DIAGNOSIS — Z95.5 PRESENCE OF CORONARY ANGIOPLASTY IMPLANT AND GRAFT: Chronic | ICD-10-CM

## 2021-02-19 DIAGNOSIS — I10 ESSENTIAL (PRIMARY) HYPERTENSION: ICD-10-CM

## 2021-02-19 DIAGNOSIS — Z29.9 ENCOUNTER FOR PROPHYLACTIC MEASURES, UNSPECIFIED: ICD-10-CM

## 2021-02-19 DIAGNOSIS — I25.10 ATHEROSCLEROTIC HEART DISEASE OF NATIVE CORONARY ARTERY WITHOUT ANGINA PECTORIS: ICD-10-CM

## 2021-02-19 DIAGNOSIS — Z01.818 ENCOUNTER FOR OTHER PREPROCEDURAL EXAMINATION: ICD-10-CM

## 2021-02-19 DIAGNOSIS — G47.33 OBSTRUCTIVE SLEEP APNEA (ADULT) (PEDIATRIC): ICD-10-CM

## 2021-02-19 DIAGNOSIS — I65.23 OCCLUSION AND STENOSIS OF BILATERAL CAROTID ARTERIES: ICD-10-CM

## 2021-02-19 DIAGNOSIS — E11.9 TYPE 2 DIABETES MELLITUS WITHOUT COMPLICATIONS: ICD-10-CM

## 2021-02-19 LAB
A1C WITH ESTIMATED AVERAGE GLUCOSE RESULT: 6.9 % — HIGH (ref 4–5.6)
ANION GAP SERPL CALC-SCNC: 9 MMOL/L — SIGNIFICANT CHANGE UP (ref 5–17)
BLD GP AB SCN SERPL QL: NEGATIVE — SIGNIFICANT CHANGE UP
BUN SERPL-MCNC: 15 MG/DL — SIGNIFICANT CHANGE UP (ref 7–23)
CALCIUM SERPL-MCNC: 9.6 MG/DL — SIGNIFICANT CHANGE UP (ref 8.4–10.5)
CHLORIDE SERPL-SCNC: 103 MMOL/L — SIGNIFICANT CHANGE UP (ref 96–108)
CO2 SERPL-SCNC: 27 MMOL/L — SIGNIFICANT CHANGE UP (ref 22–31)
CREAT SERPL-MCNC: 0.75 MG/DL — SIGNIFICANT CHANGE UP (ref 0.5–1.3)
ESTIMATED AVERAGE GLUCOSE: 151 MG/DL — HIGH (ref 68–114)
GLUCOSE SERPL-MCNC: 152 MG/DL — HIGH (ref 70–99)
HCT VFR BLD CALC: 38.6 % — LOW (ref 39–50)
HGB BLD-MCNC: 11.8 G/DL — LOW (ref 13–17)
MCHC RBC-ENTMCNC: 28.8 PG — SIGNIFICANT CHANGE UP (ref 27–34)
MCHC RBC-ENTMCNC: 30.6 GM/DL — LOW (ref 32–36)
MCV RBC AUTO: 94.1 FL — SIGNIFICANT CHANGE UP (ref 80–100)
NRBC # BLD: 0 /100 WBCS — SIGNIFICANT CHANGE UP (ref 0–0)
PLATELET # BLD AUTO: 187 K/UL — SIGNIFICANT CHANGE UP (ref 150–400)
POTASSIUM SERPL-MCNC: 3.9 MMOL/L — SIGNIFICANT CHANGE UP (ref 3.5–5.3)
POTASSIUM SERPL-SCNC: 3.9 MMOL/L — SIGNIFICANT CHANGE UP (ref 3.5–5.3)
RBC # BLD: 4.1 M/UL — LOW (ref 4.2–5.8)
RBC # FLD: 18 % — HIGH (ref 10.3–14.5)
RH IG SCN BLD-IMP: POSITIVE — SIGNIFICANT CHANGE UP
SODIUM SERPL-SCNC: 139 MMOL/L — SIGNIFICANT CHANGE UP (ref 135–145)
WBC # BLD: 9.29 K/UL — SIGNIFICANT CHANGE UP (ref 3.8–10.5)
WBC # FLD AUTO: 9.29 K/UL — SIGNIFICANT CHANGE UP (ref 3.8–10.5)

## 2021-02-19 PROCEDURE — 85027 COMPLETE CBC AUTOMATED: CPT

## 2021-02-19 PROCEDURE — 86900 BLOOD TYPING SEROLOGIC ABO: CPT

## 2021-02-19 PROCEDURE — 83036 HEMOGLOBIN GLYCOSYLATED A1C: CPT

## 2021-02-19 PROCEDURE — 80048 BASIC METABOLIC PNL TOTAL CA: CPT

## 2021-02-19 PROCEDURE — 86850 RBC ANTIBODY SCREEN: CPT

## 2021-02-19 PROCEDURE — G0463: CPT

## 2021-02-19 PROCEDURE — 86901 BLOOD TYPING SEROLOGIC RH(D): CPT

## 2021-02-19 NOTE — H&P PST ADULT - NSICDXPASTSURGICALHX_GEN_ALL_CORE_FT
PAST SURGICAL HISTORY:  History of coronary artery stent placement two coronary stents; most recent 9/21/2020 to OM2    S/P carotid endarterectomy left  9/2020    S/P TAVR (transcatheter aortic valve replacement) 10/22/2020    Status post angiography of extremity 1 stent in each leg

## 2021-02-19 NOTE — H&P PST ADULT - NSANTHOSAYNRD_GEN_A_CORE
No. REX screening performed.  STOP BANG Legend: 0-2 = LOW Risk; 3-4 = INTERMEDIATE Risk; 5-8 = HIGH Risk criteria/No. REX screening performed.  STOP BANG Legend: 0-2 = LOW Risk; 3-4 = INTERMEDIATE Risk; 5-8 = HIGH Risk

## 2021-02-19 NOTE — H&P PST ADULT - ASSESSMENT
CAPRINI SCORE [CLOT updated 18]    AGE RELATED RISK FACTORS                                                       MOBILITY RELATED FACTORS  [ ] Age 41-60 years                                            (1 Point)                    [ ] Bed rest                                                        (1 Point)  [ ] Age: 61-74 years                                           (2 Points)                  [ ] Plaster cast                                                   (2 Points)  [x ] Age= 75 years                                              (3 Points)                    [ ] Bed bound for more than 72 hours                 (2 Points)    DISEASE RELATED RISK FACTORS                                               GENDER SPECIFIC FACTORS  [ ] Edema in the lower extremities                       (1 Point)              [ ] Pregnancy                                                     (1 Point)  [ ] Varicose veins                                               (1 Point)                     [ ] Post-partum < 6 weeks                                   (1 Point)             [x ] BMI > 25 Kg/m2                                            (1 Point)                     [ ] Hormonal therapy  or oral contraception          (1 Point)                 [ ] Sepsis (in the previous month)                        (1 Point)               [ ] History of pregnancy complications                 (1 point)  [ ] Pneumonia or serious lung disease                                               [ ] Unexplained or recurrent                     (1 Point)           (in the previous month)                               (1 Point)  [ ] Abnormal pulmonary function test                     (1 Point)                 SURGERY RELATED RISK FACTORS  [ ] Acute myocardial infarction                              (1 Point)               [ ]  Section                                             (1 Point)  [ ] Congestive heart failure (in the previous month)  (1 Point)      [ ] Minor surgery                                                  (1 Point)   [ ] Inflammatory bowel disease                             (1 Point)               [ ] Arthroscopic surgery                                        (2 Points)  [ ] Central venous access                                      (2 Points)                [x ] General surgery lasting more than 45 minutes (2 points)  [ ] Present or previous malignancy                     (2 Points)                [ ] Elective arthroplasty                                         (5 points)    [ ] Stroke (in the previous month)                          (5 Points)                                                                                                                                                           HEMATOLOGY RELATED FACTORS                                                 TRAUMA RELATED RISK FACTORS  [ ] Prior episodes of VTE                                     (3 Points)                [ ] Fracture of the hip, pelvis, or leg                       (5 Points)  [ ] Positive family history for VTE                         (3 Points)             [ ] Acute spinal cord injury (in the previous month)  (5 Points)  [ ] Prothrombin 27359 A                                     (3 Points)               [ ] Paralysis  (less than 1 month)                             (5 Points)  [ ] Factor V Leiden                                             (3 Points)                  [ ] Multiple Trauma within 1 month                        (5 Points)  [ ] Lupus anticoagulants                                     (3 Points)                                                           [ ] Anticardiolipin antibodies                               (3 Points)                                                       [ ] High homocysteine in the blood                      (3 Points)                                             [ ] Other congenital or acquired thrombophilia      (3 Points)                                                [ ] Heparin induced thrombocytopenia                  (3 Points)                                     Total Score [    6      ] CAPRINI SCORE [CLOT updated 18]    AGE RELATED RISK FACTORS                                                       MOBILITY RELATED FACTORS  [ ] Age 41-60 years                                            (1 Point)                    [ ] Bed rest                                                        (1 Point)  [ ] Age: 61-74 years                                           (2 Points)                  [ ] Plaster cast                                                   (2 Points)  [x ] Age= 75 years                                              (3 Points)                    [ ] Bed bound for more than 72 hours                 (2 Points)    DISEASE RELATED RISK FACTORS                                               GENDER SPECIFIC FACTORS  [ ] Edema in the lower extremities                       (1 Point)              [ ] Pregnancy                                                     (1 Point)  [ ] Varicose veins                                               (1 Point)                     [ ] Post-partum < 6 weeks                                   (1 Point)             [ ] BMI > 25 Kg/m2                                            ()                     [ ] Hormonal therapy  or oral contraception          (1 Point)                 [ ] Sepsis (in the previous month)                        (1 Point)               [ ] History of pregnancy complications                 (1 point)  [ ] Pneumonia or serious lung disease                                               [ ] Unexplained or recurrent                     (1 Point)           (in the previous month)                               (1 Point)  [ ] Abnormal pulmonary function test                     (1 Point)                 SURGERY RELATED RISK FACTORS  [ ] Acute myocardial infarction                              (1 Point)               [ ]  Section                                             (1 Point)  [ ] Congestive heart failure (in the previous month)  (1 Point)      [ ] Minor surgery                                                  (1 Point)   [ ] Inflammatory bowel disease                             (1 Point)               [ ] Arthroscopic surgery                                        (2 Points)  [ ] Central venous access                                      (2 Points)                [x ] General surgery lasting more than 45 minutes (2 points)  [ ] Present or previous malignancy                     (2 Points)                [ ] Elective arthroplasty                                         (5 points)    [ ] Stroke (in the previous month)                          (5 Points)                                                                                                                                                           HEMATOLOGY RELATED FACTORS                                                 TRAUMA RELATED RISK FACTORS  [ ] Prior episodes of VTE                                     (3 Points)                [ ] Fracture of the hip, pelvis, or leg                       (5 Points)  [ ] Positive family history for VTE                         (3 Points)             [ ] Acute spinal cord injury (in the previous month)  (5 Points)  [ ] Prothrombin 00497 A                                     (3 Points)               [ ] Paralysis  (less than 1 month)                             (5 Points)  [ ] Factor V Leiden                                             (3 Points)                  [ ] Multiple Trauma within 1 month                        (5 Points)  [ ] Lupus anticoagulants                                     (3 Points)                                                           [ ] Anticardiolipin antibodies                               (3 Points)                                                       [ ] High homocysteine in the blood                      (3 Points)                                             [ ] Other congenital or acquired thrombophilia      (3 Points)                                                [ ] Heparin induced thrombocytopenia                  (3 Points)                                     Total Score [    5     ]

## 2021-02-19 NOTE — H&P PST ADULT - NSICDXPROBLEM_GEN_ALL_CORE_FT
PROBLEM DIAGNOSES  Problem: Carotid stenosis  Assessment and Plan: right carotid Endareterectomy with EEG Monitoring 2/24/2021    Problem: Hypertension  Assessment and Plan: continue antihypertensive medication as directed on DOS     Problem: Type 2 diabetes mellitus  Assessment and Plan: hold metformin 24 hours prior to surgery, A1c sent, FS DOS     Problem: CAD (coronary artery disease)  Assessment and Plan: continue plavix, recent cardiology workup on file/ sunrise     Problem: Afib  Assessment and Plan: patient to stop warfarin 4 days prior to surgery, advised to confirm with cardiologist     Problem: Need for prophylactic measure  Assessment and Plan: The Caprini score indicates that this patient is at high risk for a VTE event (score 6 or greater). Surgical patients in this group will benefit from both pharmacologic prophylaxis and intermittent compression devices.  The surgical team will determine the balance between VTE risk and bleeding risk, and other clinical considerations         PROBLEM DIAGNOSES  Problem: Carotid stenosis  Assessment and Plan: Right carotid Endareterectomy with EEG Monitoring 2/24/2021  Pre- op Instructions discussed - all questions answered   Labs sent  Covid test 2/21/21 at [facility]     Problem: Afib  Assessment and Plan: patient to stop warfarin 4 days prior to surgery, advised to confirm with cardiologist   recent echo and EKG on file     Problem: Type 2 diabetes mellitus  Assessment and Plan: hold metformin 24 hours prior to surgery, A1c sent, FS DOS     Problem: REX (obstructive sleep apnea)  Assessment and Plan: OR booking notified   REX precautions     Problem: Hypertension  Assessment and Plan: continue antihypertensive medication as directed on DOS     Problem: CAD (coronary artery disease)  Assessment and Plan: continue plavix, recent cardiology workup on file/ sunrise     Problem: Need for prophylactic measure  Assessment and Plan: The Caprini score indicates this patient is at risk for a VTE event (score 3-5).  Most surgical patients in this group would benefit from pharmacologic prophylaxis.  The surgical team will determine the balance between VTE risk and bleeding risk

## 2021-02-19 NOTE — H&P PST ADULT - LAST ECHOCARDIOGRAM
9/2020   severe AS with normal LVEF 60%, PILO 0.5 cm2, had additional echo On October 30 2020 9/2020   severe AS  s/p TAVR 10/22/20  recent echo with normal LVEF 61%

## 2021-02-19 NOTE — H&P PST ADULT - HISTORY OF PRESENT ILLNESS
74y Male with a PMH of Bilateral Carotid Artery Stenosis, ICA Stenosis, Severe Aortic Stenosis requiring TAVR on 10/22/2020, HLD, HTN, CAD s/p two coronary artery stents (most recent 9/2020), left Carotid endarterectomy (9/2020), Angiography of extremity s/p B/L LE stents, Atrial Fibrillation on warfarin, PVD on plavix, DM type 2, here at [facility] for a Right Carotid Endareterectomy with EEG Monitoring on 2/24/2021 2/21/2020 covid test     74y Male with a PMH of Bilateral Carotid Artery Stenosis , ICA Stenosis left Carotid endarterectomy (9/2020) on Plavix , Severe Aortic Stenosis requiring urgent TAVR on 10/22/2020, HLD, HTN, CAD s/p two coronary artery stents (most recent 9/2020), Angiography of extremity s/p B/L LE stents, Atrial Fibrillation on warfarin, DM type 2, here at [facility] for a Right Carotid Endarterectomy with EEG Monitoring on 2/24/2021. He denies any chest pain, sob, palpitations, fever, cough, chills, n/v/d or headache at this time. He denies any sick exposure, denies covid symptoms and denies any recent travel.     covid test scheduled on 2/21/2020     Advised patient to stop Warfarin 4 days prior to date of surgery and to continue taking Plavix as directed.

## 2021-02-19 NOTE — H&P PST ADULT - NSICDXPASTMEDICALHX_GEN_ALL_CORE_FT
PAST MEDICAL HISTORY:  Atrial fibrillation     Bilateral carotid artery stenosis ICA stenosis    Coronary artery disease     Hyperlipidemia     Hypertension     Peripheral vascular disease     Stented coronary artery 2 stent placements -    T2DM (type 2 diabetes mellitus)     TIA (transient ischemic attack) 2020 - recent hospitalization and received s/p TAVR     PAST MEDICAL HISTORY:  Atrial fibrillation on AC    Bilateral carotid artery stenosis ICA stenosis    Coronary artery disease     Hyperlipidemia     Hypertension     Peripheral vascular disease     Stented coronary artery 2 stent placements - 9/2020    T2DM (type 2 diabetes mellitus)     TIA (transient ischemic attack) 2020 - recent hospitalization and received s/p TAVR

## 2021-02-21 ENCOUNTER — OUTPATIENT (OUTPATIENT)
Dept: OUTPATIENT SERVICES | Facility: HOSPITAL | Age: 76
LOS: 1 days | End: 2021-02-21
Payer: COMMERCIAL

## 2021-02-21 DIAGNOSIS — Z98.890 OTHER SPECIFIED POSTPROCEDURAL STATES: Chronic | ICD-10-CM

## 2021-02-21 DIAGNOSIS — Z95.2 PRESENCE OF PROSTHETIC HEART VALVE: Chronic | ICD-10-CM

## 2021-02-21 DIAGNOSIS — Z11.52 ENCOUNTER FOR SCREENING FOR COVID-19: ICD-10-CM

## 2021-02-21 DIAGNOSIS — Z95.5 PRESENCE OF CORONARY ANGIOPLASTY IMPLANT AND GRAFT: Chronic | ICD-10-CM

## 2021-02-21 LAB — SARS-COV-2 RNA SPEC QL NAA+PROBE: SIGNIFICANT CHANGE UP

## 2021-02-21 PROCEDURE — U0005: CPT

## 2021-02-21 PROCEDURE — C9803: CPT

## 2021-02-21 PROCEDURE — U0003: CPT

## 2021-02-23 ENCOUNTER — TRANSCRIPTION ENCOUNTER (OUTPATIENT)
Age: 76
End: 2021-02-23

## 2021-02-24 ENCOUNTER — TRANSCRIPTION ENCOUNTER (OUTPATIENT)
Age: 76
End: 2021-02-24

## 2021-02-24 ENCOUNTER — APPOINTMENT (OUTPATIENT)
Dept: VASCULAR SURGERY | Facility: HOSPITAL | Age: 76
End: 2021-02-24
Payer: MEDICARE

## 2021-02-24 ENCOUNTER — INPATIENT (INPATIENT)
Facility: HOSPITAL | Age: 76
LOS: 1 days | Discharge: HOME | DRG: 38 | End: 2021-02-26
Attending: SURGERY | Admitting: SURGERY
Payer: COMMERCIAL

## 2021-02-24 ENCOUNTER — RESULT REVIEW (OUTPATIENT)
Age: 76
End: 2021-02-24

## 2021-02-24 VITALS
RESPIRATION RATE: 18 BRPM | HEART RATE: 99 BPM | TEMPERATURE: 99 F | SYSTOLIC BLOOD PRESSURE: 146 MMHG | DIASTOLIC BLOOD PRESSURE: 84 MMHG | WEIGHT: 158.07 LBS | HEIGHT: 71 IN | OXYGEN SATURATION: 98 %

## 2021-02-24 DIAGNOSIS — Z95.2 PRESENCE OF PROSTHETIC HEART VALVE: Chronic | ICD-10-CM

## 2021-02-24 DIAGNOSIS — Z98.890 OTHER SPECIFIED POSTPROCEDURAL STATES: Chronic | ICD-10-CM

## 2021-02-24 DIAGNOSIS — I65.23 OCCLUSION AND STENOSIS OF BILATERAL CAROTID ARTERIES: ICD-10-CM

## 2021-02-24 DIAGNOSIS — Z95.5 PRESENCE OF CORONARY ANGIOPLASTY IMPLANT AND GRAFT: Chronic | ICD-10-CM

## 2021-02-24 LAB
ANION GAP SERPL CALC-SCNC: 9 MMOL/L — SIGNIFICANT CHANGE UP (ref 5–17)
APTT BLD: 41.5 SEC — HIGH (ref 27.5–35.5)
APTT BLD: 43.1 SEC — HIGH (ref 27.5–35.5)
BUN SERPL-MCNC: 17 MG/DL — SIGNIFICANT CHANGE UP (ref 7–23)
CALCIUM SERPL-MCNC: 8.7 MG/DL — SIGNIFICANT CHANGE UP (ref 8.4–10.5)
CHLORIDE SERPL-SCNC: 101 MMOL/L — SIGNIFICANT CHANGE UP (ref 96–108)
CO2 SERPL-SCNC: 26 MMOL/L — SIGNIFICANT CHANGE UP (ref 22–31)
CREAT SERPL-MCNC: 0.84 MG/DL — SIGNIFICANT CHANGE UP (ref 0.5–1.3)
GLUCOSE BLDC GLUCOMTR-MCNC: 112 MG/DL — HIGH (ref 70–99)
GLUCOSE BLDC GLUCOMTR-MCNC: 197 MG/DL — HIGH (ref 70–99)
GLUCOSE BLDC GLUCOMTR-MCNC: 206 MG/DL — HIGH (ref 70–99)
GLUCOSE SERPL-MCNC: 147 MG/DL — HIGH (ref 70–99)
HCT VFR BLD CALC: 35.3 % — LOW (ref 39–50)
HGB BLD-MCNC: 11.1 G/DL — LOW (ref 13–17)
INR BLD: 1.62 RATIO — HIGH (ref 0.88–1.16)
INR BLD: 1.84 RATIO — HIGH (ref 0.88–1.16)
MAGNESIUM SERPL-MCNC: 1.1 MG/DL — LOW (ref 1.6–2.6)
MCHC RBC-ENTMCNC: 29 PG — SIGNIFICANT CHANGE UP (ref 27–34)
MCHC RBC-ENTMCNC: 31.4 GM/DL — LOW (ref 32–36)
MCV RBC AUTO: 92.2 FL — SIGNIFICANT CHANGE UP (ref 80–100)
NRBC # BLD: 0 /100 WBCS — SIGNIFICANT CHANGE UP (ref 0–0)
PHOSPHATE SERPL-MCNC: 3.6 MG/DL — SIGNIFICANT CHANGE UP (ref 2.5–4.5)
PLATELET # BLD AUTO: 169 K/UL — SIGNIFICANT CHANGE UP (ref 150–400)
POTASSIUM SERPL-MCNC: 3.8 MMOL/L — SIGNIFICANT CHANGE UP (ref 3.5–5.3)
POTASSIUM SERPL-SCNC: 3.8 MMOL/L — SIGNIFICANT CHANGE UP (ref 3.5–5.3)
PROTHROM AB SERPL-ACNC: 19 SEC — HIGH (ref 10.6–13.6)
PROTHROM AB SERPL-ACNC: 21.5 SEC — HIGH (ref 10.6–13.6)
RBC # BLD: 3.83 M/UL — LOW (ref 4.2–5.8)
RBC # FLD: 17.3 % — HIGH (ref 10.3–14.5)
SODIUM SERPL-SCNC: 136 MMOL/L — SIGNIFICANT CHANGE UP (ref 135–145)
WBC # BLD: 7.97 K/UL — SIGNIFICANT CHANGE UP (ref 3.8–10.5)
WBC # FLD AUTO: 7.97 K/UL — SIGNIFICANT CHANGE UP (ref 3.8–10.5)

## 2021-02-24 PROCEDURE — 35301 RECHANNELING OF ARTERY: CPT | Mod: RT

## 2021-02-24 PROCEDURE — 88304 TISSUE EXAM BY PATHOLOGIST: CPT | Mod: 26

## 2021-02-24 PROCEDURE — 93010 ELECTROCARDIOGRAM REPORT: CPT

## 2021-02-24 PROCEDURE — 88311 DECALCIFY TISSUE: CPT | Mod: 26

## 2021-02-24 RX ORDER — ACETAMINOPHEN 500 MG
650 TABLET ORAL EVERY 6 HOURS
Refills: 0 | Status: DISCONTINUED | OUTPATIENT
Start: 2021-02-24 | End: 2021-02-26

## 2021-02-24 RX ORDER — CHLORHEXIDINE GLUCONATE 213 G/1000ML
1 SOLUTION TOPICAL ONCE
Refills: 0 | Status: DISCONTINUED | OUTPATIENT
Start: 2021-02-24 | End: 2021-02-24

## 2021-02-24 RX ORDER — LABETALOL HCL 100 MG
10 TABLET ORAL ONCE
Refills: 0 | Status: COMPLETED | OUTPATIENT
Start: 2021-02-24 | End: 2021-02-24

## 2021-02-24 RX ORDER — ATORVASTATIN CALCIUM 80 MG/1
80 TABLET, FILM COATED ORAL AT BEDTIME
Refills: 0 | Status: DISCONTINUED | OUTPATIENT
Start: 2021-02-24 | End: 2021-02-26

## 2021-02-24 RX ORDER — SODIUM CHLORIDE 9 MG/ML
1000 INJECTION, SOLUTION INTRAVENOUS
Refills: 0 | Status: DISCONTINUED | OUTPATIENT
Start: 2021-02-24 | End: 2021-02-26

## 2021-02-24 RX ORDER — OXYCODONE HYDROCHLORIDE 5 MG/1
5 TABLET ORAL EVERY 4 HOURS
Refills: 0 | Status: DISCONTINUED | OUTPATIENT
Start: 2021-02-24 | End: 2021-02-26

## 2021-02-24 RX ORDER — ACETAMINOPHEN 500 MG
2 TABLET ORAL
Qty: 0 | Refills: 0 | DISCHARGE
Start: 2021-02-24

## 2021-02-24 RX ORDER — LABETALOL HCL 100 MG
20 TABLET ORAL ONCE
Refills: 0 | Status: COMPLETED | OUTPATIENT
Start: 2021-02-24 | End: 2021-02-24

## 2021-02-24 RX ORDER — ATORVASTATIN CALCIUM 80 MG/1
1 TABLET, FILM COATED ORAL
Qty: 0 | Refills: 0 | DISCHARGE
Start: 2021-02-24

## 2021-02-24 RX ORDER — LIDOCAINE HCL 20 MG/ML
0.2 VIAL (ML) INJECTION ONCE
Refills: 0 | Status: DISCONTINUED | OUTPATIENT
Start: 2021-02-24 | End: 2021-02-24

## 2021-02-24 RX ORDER — CLOPIDOGREL BISULFATE 75 MG/1
75 TABLET, FILM COATED ORAL DAILY
Refills: 0 | Status: DISCONTINUED | OUTPATIENT
Start: 2021-02-25 | End: 2021-02-26

## 2021-02-24 RX ORDER — HYDROMORPHONE HYDROCHLORIDE 2 MG/ML
0.25 INJECTION INTRAMUSCULAR; INTRAVENOUS; SUBCUTANEOUS
Refills: 0 | Status: DISCONTINUED | OUTPATIENT
Start: 2021-02-24 | End: 2021-02-25

## 2021-02-24 RX ORDER — HYDROMORPHONE HYDROCHLORIDE 2 MG/ML
0.5 INJECTION INTRAMUSCULAR; INTRAVENOUS; SUBCUTANEOUS
Refills: 0 | Status: DISCONTINUED | OUTPATIENT
Start: 2021-02-24 | End: 2021-02-25

## 2021-02-24 RX ORDER — CLOPIDOGREL BISULFATE 75 MG/1
75 TABLET, FILM COATED ORAL DAILY
Refills: 0 | Status: DISCONTINUED | OUTPATIENT
Start: 2021-02-24 | End: 2021-02-24

## 2021-02-24 RX ORDER — CEFAZOLIN SODIUM 1 G
2000 VIAL (EA) INJECTION ONCE
Refills: 0 | Status: DISCONTINUED | OUTPATIENT
Start: 2021-02-24 | End: 2021-02-24

## 2021-02-24 RX ORDER — SODIUM CHLORIDE 9 MG/ML
3 INJECTION INTRAMUSCULAR; INTRAVENOUS; SUBCUTANEOUS EVERY 8 HOURS
Refills: 0 | Status: DISCONTINUED | OUTPATIENT
Start: 2021-02-24 | End: 2021-02-24

## 2021-02-24 RX ORDER — METOPROLOL TARTRATE 50 MG
25 TABLET ORAL DAILY
Refills: 0 | Status: DISCONTINUED | OUTPATIENT
Start: 2021-02-24 | End: 2021-02-26

## 2021-02-24 RX ORDER — MAGNESIUM SULFATE 500 MG/ML
2 VIAL (ML) INJECTION ONCE
Refills: 0 | Status: COMPLETED | OUTPATIENT
Start: 2021-02-24 | End: 2021-02-24

## 2021-02-24 RX ORDER — INSULIN LISPRO 100/ML
VIAL (ML) SUBCUTANEOUS
Refills: 0 | Status: DISCONTINUED | OUTPATIENT
Start: 2021-02-24 | End: 2021-02-26

## 2021-02-24 RX ORDER — ONDANSETRON 8 MG/1
4 TABLET, FILM COATED ORAL ONCE
Refills: 0 | Status: DISCONTINUED | OUTPATIENT
Start: 2021-02-24 | End: 2021-02-25

## 2021-02-24 RX ORDER — INSULIN LISPRO 100/ML
VIAL (ML) SUBCUTANEOUS AT BEDTIME
Refills: 0 | Status: DISCONTINUED | OUTPATIENT
Start: 2021-02-24 | End: 2021-02-26

## 2021-02-24 RX ORDER — WARFARIN SODIUM 2.5 MG/1
5 TABLET ORAL AT BEDTIME
Refills: 0 | Status: DISCONTINUED | OUTPATIENT
Start: 2021-02-24 | End: 2021-02-26

## 2021-02-24 RX ADMIN — Medication 20 MILLIGRAM(S): at 15:06

## 2021-02-24 RX ADMIN — Medication 10 MILLIGRAM(S): at 12:33

## 2021-02-24 RX ADMIN — Medication 20 MILLIGRAM(S): at 15:49

## 2021-02-24 RX ADMIN — WARFARIN SODIUM 5 MILLIGRAM(S): 2.5 TABLET ORAL at 21:07

## 2021-02-24 RX ADMIN — SODIUM CHLORIDE 50 MILLILITER(S): 9 INJECTION, SOLUTION INTRAVENOUS at 22:00

## 2021-02-24 RX ADMIN — Medication 10 MILLIGRAM(S): at 11:38

## 2021-02-24 RX ADMIN — HYDROMORPHONE HYDROCHLORIDE 0.25 MILLIGRAM(S): 2 INJECTION INTRAMUSCULAR; INTRAVENOUS; SUBCUTANEOUS at 11:42

## 2021-02-24 RX ADMIN — CLOPIDOGREL BISULFATE 75 MILLIGRAM(S): 75 TABLET, FILM COATED ORAL at 07:17

## 2021-02-24 RX ADMIN — Medication 50 GRAM(S): at 18:26

## 2021-02-24 RX ADMIN — Medication 25 MILLIGRAM(S): at 14:03

## 2021-02-24 RX ADMIN — Medication 10 MILLIGRAM(S): at 22:39

## 2021-02-24 RX ADMIN — Medication 1: at 16:22

## 2021-02-24 RX ADMIN — SODIUM CHLORIDE 50 MILLILITER(S): 9 INJECTION, SOLUTION INTRAVENOUS at 11:24

## 2021-02-24 RX ADMIN — ATORVASTATIN CALCIUM 80 MILLIGRAM(S): 80 TABLET, FILM COATED ORAL at 21:09

## 2021-02-24 RX ADMIN — Medication 10 MILLIGRAM(S): at 19:27

## 2021-02-24 NOTE — DISCHARGE NOTE PROVIDER - HOSPITAL COURSE
74y Male with a PMH of Bilateral Carotid Artery Stenosis , ICA Stenosis left Carotid endarterectomy (9/2020) on Plavix , Severe Aortic Stenosis requiring urgent TAVR on 10/22/2020, HLD, HTN, CAD s/p two coronary artery stents (most recent 9/2020), Angiography of extremity s/p B/L LE stents, Atrial Fibrillation on warfarin, DM type 2, presented to [facility] for a Right Carotid Endarterectomy with EEG Monitoring on 2/24/2021.   In the PACU, the patients' pain was controlled, vitals stable, tolerating PO, and voiding appropriately.  The patient was transferred to the surgical floor in stable condition.   On day of discharge, the patient was tolerating diet, ambulating well and pain controlled. All questions were answered and patient safely discharged. He was advised to follow up with  in one week outpatient.

## 2021-02-24 NOTE — PRE-ANESTHESIA EVALUATION ADULT - NSANTHOSAYNRD_GEN_A_CORE
criteria/No. REX screening performed.  STOP BANG Legend: 0-2 = LOW Risk; 3-4 = INTERMEDIATE Risk; 5-8 = HIGH Risk

## 2021-02-24 NOTE — DISCHARGE NOTE PROVIDER - CARE PROVIDER_API CALL
Ok Rodriguez)  Vascular Surgery  [address], [address]  [place] [address]  Phone: [phone]  Fax: [phone]  Established Patient  Follow Up Time: 1 week

## 2021-02-24 NOTE — PRE-ANESTHESIA EVALUATION ADULT - NSANTHPMHFT_GEN_ALL_CORE
CAD (s/p stents, most recently in september to OM2, has been on Plavix/coumadin, not ASA)  Carotid Artery Disease (s/p endarterectomy on contralateral side, had a TIA)  T2DM (FS today 112)  HTN, HLD  Afib (on coumadin, was d/c by Dr. Rodriguez)  Severe AS, s/p TAVR now (urgent)  Denies CP or SOB today    Echo:  Nml EF and nml RV on recent echo  Mean gradient of 12 on bioprosthetic aortic valve

## 2021-02-24 NOTE — CHART NOTE - NSCHARTNOTEFT_GEN_A_CORE
VASCULAR Surgery Post-Op Note    Pre-Op Dx: Carotid stenosis   Procedure: Right carotid endarterectomy  Surgeon:     Subjective:   Pt seen and examined at the bedside. Pt w/ no complaints. Denies F/C/N/V. Pain controlled with medication. Blood pressure wnl, patient noted to have elevated HR- hx of a fib.     Vital Signs Last 24 Hrs  T(C): 36 (24 Feb 2021 17:00), Max: 37 (24 Feb 2021 05:52)  T(F): 96.8 (24 Feb 2021 17:00), Max: 98.6 (24 Feb 2021 05:52)  HR: 92 (24 Feb 2021 17:30) (92 - 122)  BP: 154/82 (24 Feb 2021 17:30) (125/60 - 206/90)  BP(mean): 110 (24 Feb 2021 16:30) (86 - 129)  RR: 18 (24 Feb 2021 17:30) (18 - 18)  SpO2: 99% (24 Feb 2021 17:30) (95% - 100%)    Physical Exam:  General: NAD, resting comfortably in bed  Neuro: A/O x 3, no focal deficits  Pulmonary: Nonlabored breathing, no respiratory distress  Cardiovascular: Irregular rate, irregullar rhythm   Abdominal: soft  Incision: Intact dressing, clean, minimal strike through  Drains: 1 martin approx 20cc upon pacu post op check   Extremities: WWP    LABS:                        11.1   7.97  )-----------( 169      ( 24 Feb 2021 11:07 )             35.3     02-24    136  |  101  |  17  ----------------------------<  147<H>  3.8   |  26  |  0.84    Ca    8.7      24 Feb 2021 11:07  Phos  3.6     02-24  Mg     1.1     02-24      PT/INR - ( 24 Feb 2021 11:18 )   PT: 21.5 sec;   INR: 1.84 ratio      PTT - ( 24 Feb 2021 11:18 )  PTT:41.5 sec  CAPILLARY BLOOD GLUCOSE  POCT Blood Glucose.: 197 mg/dL (24 Feb 2021 16:14)  POCT Blood Glucose.: 112 mg/dL (24 Feb 2021 06:09)        Radiology and Additional Studies:    Assessment:75y Male s/p right carotid endarterectomy for carotid stenosis.    Plan:  IVF, Diet: regular diet when on floor  Received plavix today, next dose tomorrow AM.  COUMADIN to be given tonight after stat lab-INR drawn in pacu  Pain control  Close blood pressure monitoring     [phone]

## 2021-02-24 NOTE — DISCHARGE NOTE PROVIDER - NSDCCPCAREPLAN_GEN_ALL_CORE_FT
PRINCIPAL DISCHARGE DIAGNOSIS  Diagnosis: Carotid stenosis  Assessment and Plan of Treatment: WOUND CARE:  Dry gauze dressing changes daily  BATHING: You may shower and/or sponge bathe.  ACTIVITY: No heavy lifting anything more than 10-15lbs or straining, until your follow up with . If you are taking narcotic pain medication (such as Percocet), do NOT drive a car, operate machinery or make important decisions.  NOTIFY YOUR SURGEON IF: You have any bleeding that does not stop, any fever (over 100.4 F) or chills, weakness, numbness, tingling, or if your pain is not controlled on your discharge pain medications.  FOLLOW-UP:  Please follow up with  n one week regarding your hospitalization.

## 2021-02-24 NOTE — PRE-ANESTHESIA EVALUATION ADULT - NSANTHADDINFOFT_GEN_ALL_CORE
No plavix in 3 days, will give plavix pre surgery (ok with Dr. Rodriguez) No plavix in 3 days, Dr. Rodriguez is aware, and would like to give plavix pre surgery and proceed No plavix in 3 days, Dr. Rodriguez is aware, and would like to give plavix pre surgery and proceed.  I cleared this on the telephone with Dr. Velazco (his cardiologist) who is on with this plan ([phone])

## 2021-02-24 NOTE — DISCHARGE NOTE PROVIDER - NSDCMRMEDTOKEN_GEN_ALL_CORE_FT
acetaminophen 325 mg oral tablet: 2 tab(s) orally every 6 hours, As needed, Mild Pain (1 - 3), Moderate Pain (4 - 6)  atorvastatin 80 mg oral tablet: 1 tab(s) orally once a day (at bedtime)  clopidogrel 75 mg oral tablet: 1 tab(s) orally once a day  lisinopril-hydrochlorothiazide 20 mg-12.5 mg oral tablet: 1 tab(s) orally once a day  metFORMIN 1000 mg oral tablet: 1 tab(s) orally 2 times a day  Metoprolol Succinate ER 25 mg oral tablet, extended release: 1 tab(s) orally once a day  simvastatin 10 mg oral tablet: 1 tab(s) orally once a day (at bedtime)  warfarin 5 mg oral tablet: 1 tab(s) orally once a day (at bedtime)   acetaminophen 325 mg oral tablet: 2 tab(s) orally every 6 hours, As needed, Mild Pain (1 - 3), Moderate Pain (4 - 6)  atorvastatin 80 mg oral tablet: 1 tab(s) orally once a day (at bedtime)  clopidogrel 75 mg oral tablet: 1 tab(s) orally once a day  hydroCHLOROthiazide 12.5 mg oral capsule: 1 cap(s) orally once a day  lisinopril 20 mg oral tablet: 1 tab(s) orally once a day  lisinopril-hydrochlorothiazide 20 mg-12.5 mg oral tablet: 1 tab(s) orally once a day  metFORMIN 1000 mg oral tablet: 1 tab(s) orally 2 times a day  Metoprolol Succinate ER 25 mg oral tablet, extended release: 1 tab(s) orally once a day  oxyCODONE 5 mg oral tablet: 1 tab(s) orally every 6 hours, As Needed -moderate to severe pain MDD:4  simvastatin 10 mg oral tablet: 1 tab(s) orally once a day (at bedtime)  warfarin 5 mg oral tablet: 1 tab(s) orally once a day (at bedtime)

## 2021-02-25 LAB
ANION GAP SERPL CALC-SCNC: 16 MMOL/L — SIGNIFICANT CHANGE UP (ref 5–17)
APTT BLD: 38.1 SEC — HIGH (ref 27.5–35.5)
BUN SERPL-MCNC: 17 MG/DL — SIGNIFICANT CHANGE UP (ref 7–23)
CALCIUM SERPL-MCNC: 9.1 MG/DL — SIGNIFICANT CHANGE UP (ref 8.4–10.5)
CHLORIDE SERPL-SCNC: 100 MMOL/L — SIGNIFICANT CHANGE UP (ref 96–108)
CO2 SERPL-SCNC: 21 MMOL/L — LOW (ref 22–31)
CREAT SERPL-MCNC: 0.75 MG/DL — SIGNIFICANT CHANGE UP (ref 0.5–1.3)
GLUCOSE BLDC GLUCOMTR-MCNC: 151 MG/DL — HIGH (ref 70–99)
GLUCOSE BLDC GLUCOMTR-MCNC: 222 MG/DL — HIGH (ref 70–99)
GLUCOSE BLDC GLUCOMTR-MCNC: 268 MG/DL — HIGH (ref 70–99)
GLUCOSE BLDC GLUCOMTR-MCNC: 318 MG/DL — HIGH (ref 70–99)
GLUCOSE SERPL-MCNC: 166 MG/DL — HIGH (ref 70–99)
HCT VFR BLD CALC: 35.1 % — LOW (ref 39–50)
HGB BLD-MCNC: 11.3 G/DL — LOW (ref 13–17)
INR BLD: 1.36 RATIO — HIGH (ref 0.88–1.16)
MAGNESIUM SERPL-MCNC: 1.5 MG/DL — LOW (ref 1.6–2.6)
MCHC RBC-ENTMCNC: 29.4 PG — SIGNIFICANT CHANGE UP (ref 27–34)
MCHC RBC-ENTMCNC: 32.2 GM/DL — SIGNIFICANT CHANGE UP (ref 32–36)
MCV RBC AUTO: 91.2 FL — SIGNIFICANT CHANGE UP (ref 80–100)
NRBC # BLD: 0 /100 WBCS — SIGNIFICANT CHANGE UP (ref 0–0)
PHOSPHATE SERPL-MCNC: 3.9 MG/DL — SIGNIFICANT CHANGE UP (ref 2.5–4.5)
PLATELET # BLD AUTO: 159 K/UL — SIGNIFICANT CHANGE UP (ref 150–400)
POTASSIUM SERPL-MCNC: 4.7 MMOL/L — SIGNIFICANT CHANGE UP (ref 3.5–5.3)
POTASSIUM SERPL-SCNC: 4.7 MMOL/L — SIGNIFICANT CHANGE UP (ref 3.5–5.3)
PROTHROM AB SERPL-ACNC: 16.1 SEC — HIGH (ref 10.6–13.6)
RBC # BLD: 3.85 M/UL — LOW (ref 4.2–5.8)
RBC # FLD: 17 % — HIGH (ref 10.3–14.5)
SODIUM SERPL-SCNC: 137 MMOL/L — SIGNIFICANT CHANGE UP (ref 135–145)
WBC # BLD: 12.51 K/UL — HIGH (ref 3.8–10.5)
WBC # FLD AUTO: 12.51 K/UL — HIGH (ref 3.8–10.5)

## 2021-02-25 RX ORDER — LISINOPRIL 2.5 MG/1
20 TABLET ORAL DAILY
Refills: 0 | Status: DISCONTINUED | OUTPATIENT
Start: 2021-02-25 | End: 2021-02-26

## 2021-02-25 RX ORDER — HYDROCHLOROTHIAZIDE 25 MG
12.5 TABLET ORAL DAILY
Refills: 0 | Status: DISCONTINUED | OUTPATIENT
Start: 2021-02-25 | End: 2021-02-26

## 2021-02-25 RX ORDER — MAGNESIUM SULFATE 500 MG/ML
1 VIAL (ML) INJECTION ONCE
Refills: 0 | Status: COMPLETED | OUTPATIENT
Start: 2021-02-25 | End: 2021-02-25

## 2021-02-25 RX ORDER — LISINOPRIL 2.5 MG/1
20 TABLET ORAL DAILY
Refills: 0 | Status: DISCONTINUED | OUTPATIENT
Start: 2021-02-25 | End: 2021-02-25

## 2021-02-25 RX ADMIN — Medication 1: at 06:04

## 2021-02-25 RX ADMIN — SODIUM CHLORIDE 50 MILLILITER(S): 9 INJECTION, SOLUTION INTRAVENOUS at 08:54

## 2021-02-25 RX ADMIN — ATORVASTATIN CALCIUM 80 MILLIGRAM(S): 80 TABLET, FILM COATED ORAL at 22:05

## 2021-02-25 RX ADMIN — Medication 650 MILLIGRAM(S): at 03:15

## 2021-02-25 RX ADMIN — Medication 3: at 11:22

## 2021-02-25 RX ADMIN — CLOPIDOGREL BISULFATE 75 MILLIGRAM(S): 75 TABLET, FILM COATED ORAL at 11:39

## 2021-02-25 RX ADMIN — Medication 25 MILLIGRAM(S): at 05:29

## 2021-02-25 RX ADMIN — OXYCODONE HYDROCHLORIDE 5 MILLIGRAM(S): 5 TABLET ORAL at 16:40

## 2021-02-25 RX ADMIN — Medication 2: at 17:41

## 2021-02-25 RX ADMIN — Medication 2: at 22:04

## 2021-02-25 RX ADMIN — LISINOPRIL 20 MILLIGRAM(S): 2.5 TABLET ORAL at 08:46

## 2021-02-25 RX ADMIN — SODIUM CHLORIDE 50 MILLILITER(S): 9 INJECTION, SOLUTION INTRAVENOUS at 16:44

## 2021-02-25 RX ADMIN — Medication 100 GRAM(S): at 08:48

## 2021-02-25 RX ADMIN — Medication 12.5 MILLIGRAM(S): at 16:40

## 2021-02-25 RX ADMIN — WARFARIN SODIUM 5 MILLIGRAM(S): 2.5 TABLET ORAL at 22:05

## 2021-02-25 NOTE — PROGRESS NOTE ADULT - SUBJECTIVE AND OBJECTIVE BOX
Trauma Surgery Daily Progress Note  =====================================================    SUBJECTIVE: Patient seen and examined at bedside on AM rounds. Patient reports that they're feeling well. Tolerating diet, denies nausea, vomiting. OOB/Ambulating as tolerated. Denies fever, chills.     24 HOUR EVENTS: s/p L CEA    MEDICATIONS  (STANDING):  atorvastatin 80 milliGRAM(s) Oral at bedtime  clopidogrel Tablet 75 milliGRAM(s) Oral daily  insulin lispro (ADMELOG) corrective regimen sliding scale   SubCutaneous three times a day before meals  insulin lispro (ADMELOG) corrective regimen sliding scale   SubCutaneous at bedtime  lactated ringers. 1000 milliLiter(s) (50 mL/Hr) IV Continuous <Continuous>  metoprolol succinate ER 25 milliGRAM(s) Oral daily  warfarin 5 milliGRAM(s) Oral at bedtime    MEDICATIONS  (PRN):  acetaminophen   Tablet .. 650 milliGRAM(s) Oral every 6 hours PRN Mild Pain (1 - 3), Moderate Pain (4 - 6)  HYDROmorphone  Injectable 0.25 milliGRAM(s) IV Push every 10 minutes PRN Moderate Pain (4 - 6)  HYDROmorphone  Injectable 0.5 milliGRAM(s) IV Push every 10 minutes PRN Severe Pain (7 - 10)  ondansetron Injectable 4 milliGRAM(s) IV Push once PRN Nausea and/or Vomiting  oxyCODONE    IR 5 milliGRAM(s) Oral every 4 hours PRN moderate to severe pain      OBJECTIVE:    Vital Signs Last 24 Hrs  T(C): 36.5 (24 Feb 2021 22:00), Max: 37 (24 Feb 2021 05:52)  T(F): 97.7 (24 Feb 2021 22:00), Max: 98.6 (24 Feb 2021 05:52)  HR: 101 (25 Feb 2021 00:00) (92 - 122)  BP: 154/92 (25 Feb 2021 00:00) (125/60 - 206/90)  BP(mean): 117 (25 Feb 2021 00:00) (86 - 129)  RR: 16 (25 Feb 2021 00:00) (16 - 18)  SpO2: 100% (25 Feb 2021 00:00) (95% - 100%)        I&O's Detail    24 Feb 2021 07:01  -  25 Feb 2021 01:32  --------------------------------------------------------  IN:    Lactated Ringers: 700 mL    Oral Fluid: 240 mL  Total IN: 940 mL    OUT:    Bulb (mL): 35 mL    Voided (mL): 300 mL  Total OUT: 335 mL    Total NET: 605 mL          Daily Height in cm: 180.34 (24 Feb 2021 07:19)    Daily     PHYSICAL EXAM:  General: NAD, resting comfortably in bed  Neuro: A/O x 3, no focal deficits  Pulmonary: Nonlabored breathing, no respiratory distress  Cardiovascular: Irregular rate, irregular rhythm   Abdominal: soft  Incision: Intact dressing, clean, minimal strike through  Drains: 1 martin with SS output  Extremities: WWP    LABS:                        11.1   7.97  )-----------( 169      ( 24 Feb 2021 11:07 )             35.3     02-24    136  |  101  |  17  ----------------------------<  147<H>  3.8   |  26  |  0.84    Ca    8.7      24 Feb 2021 11:07  Phos  3.6     02-24  Mg     1.1     02-24      PT/INR - ( 24 Feb 2021 11:18 )   PT: 21.5 sec;   INR: 1.84 ratio         PTT - ( 24 Feb 2021 11:18 )  PTT:41.5 sec                       Trauma Surgery Daily Progress Note  =====================================================    SUBJECTIVE: Patient seen and examined at bedside on AM rounds. Patient reports that they're feeling well. Tolerating CLD, denies nausea, vomiting. Denies fever, chills, SOB.     24 HOUR EVENTS: s/p L CEA    MEDICATIONS  (STANDING):  atorvastatin 80 milliGRAM(s) Oral at bedtime  clopidogrel Tablet 75 milliGRAM(s) Oral daily  insulin lispro (ADMELOG) corrective regimen sliding scale   SubCutaneous three times a day before meals  insulin lispro (ADMELOG) corrective regimen sliding scale   SubCutaneous at bedtime  lactated ringers. 1000 milliLiter(s) (50 mL/Hr) IV Continuous <Continuous>  metoprolol succinate ER 25 milliGRAM(s) Oral daily  warfarin 5 milliGRAM(s) Oral at bedtime    MEDICATIONS  (PRN):  acetaminophen   Tablet .. 650 milliGRAM(s) Oral every 6 hours PRN Mild Pain (1 - 3), Moderate Pain (4 - 6)  HYDROmorphone  Injectable 0.25 milliGRAM(s) IV Push every 10 minutes PRN Moderate Pain (4 - 6)  HYDROmorphone  Injectable 0.5 milliGRAM(s) IV Push every 10 minutes PRN Severe Pain (7 - 10)  ondansetron Injectable 4 milliGRAM(s) IV Push once PRN Nausea and/or Vomiting  oxyCODONE    IR 5 milliGRAM(s) Oral every 4 hours PRN moderate to severe pain      OBJECTIVE:    Vital Signs Last 24 Hrs  T(C): 36.5 (25 Feb 2021 06:00), Max: 36.6 (24 Feb 2021 21:00)  T(F): 97.7 (25 Feb 2021 06:00), Max: 97.9 (24 Feb 2021 21:00)  HR: 96 (25 Feb 2021 06:00) (92 - 122)  BP: 153/99 (25 Feb 2021 06:00) (125/60 - 206/90)  BP(mean): 121 (25 Feb 2021 06:00) (86 - 129)  RR: 16 (25 Feb 2021 06:00) (16 - 18)  SpO2: 98% (25 Feb 2021 06:00) (95% - 100%)      I&O's Detail    24 Feb 2021 07:01  -  25 Feb 2021 07:00  --------------------------------------------------------  IN:    Lactated Ringers: 1000 mL    Oral Fluid: 240 mL  Total IN: 1240 mL    OUT:    Bulb (mL): 55 mL    Voided (mL): 1750 mL  Total OUT: 1805 mL    Total NET: -565 mL        Daily Height in cm: 180.34 (24 Feb 2021 07:19)    Daily     PHYSICAL EXAM:  General: NAD, resting comfortably in bed  Neuro: A/O x 3, no focal deficits  Pulmonary: Nonlabored breathing, no respiratory distress  Cardiovascular: Irregular rate, irregular rhythm   Abdominal: soft  Incision: Intact dressing, clean, minimal strike through  Drains: 1 martin with sanguinous output  Extremities: [facility]      LABS:                          11.3   12.51 )-----------( 159      ( 25 Feb 2021 02:48 )             35.1     02-25    137  |  100  |  17  ----------------------------<  166<H>  4.7   |  21<L>  |  0.75    Ca    9.1      25 Feb 2021 02:48  Phos  3.9     02-25  Mg     1.5     02-25      PT/INR - ( 24 Feb 2021 11:18 )   PT: 21.5 sec;   INR: 1.84 ratio         PTT - ( 24 Feb 2021 11:18 )  PTT:41.5 sec

## 2021-02-25 NOTE — PROGRESS NOTE ADULT - ASSESSMENT
75y Male POD 1 s/p right carotid endarterectomy for carotid stenosis.    Plan:  IVF, Diet: regular diet when on floor  Received plavix today, next dose tomorrow AM.  Warfarin given  Pain control  Close blood pressure monitoring   F/u AM labs    [id number]. 75y Male POD 1 s/p right carotid endarterectomy for carotid stenosis.    Plan:  Diet: NPO/IVF, advance today  Received plavix today, next dose tomorrow AM.  Warfarin given  Pain control  Close blood pressure monitoring   F/u AM labs  Anticipate d/c drain and d/c home today    Vascular Surgery  [phone].

## 2021-02-26 ENCOUNTER — TRANSCRIPTION ENCOUNTER (OUTPATIENT)
Age: 76
End: 2021-02-26

## 2021-02-26 VITALS
DIASTOLIC BLOOD PRESSURE: 89 MMHG | RESPIRATION RATE: 20 BRPM | TEMPERATURE: 98 F | HEART RATE: 112 BPM | OXYGEN SATURATION: 93 % | SYSTOLIC BLOOD PRESSURE: 144 MMHG

## 2021-02-26 LAB
APTT BLD: 37 SEC — HIGH (ref 27.5–35.5)
GLUCOSE BLDC GLUCOMTR-MCNC: 193 MG/DL — HIGH (ref 70–99)
GLUCOSE BLDC GLUCOMTR-MCNC: 301 MG/DL — HIGH (ref 70–99)
HCT VFR BLD CALC: 38.1 % — LOW (ref 39–50)
HGB BLD-MCNC: 11.9 G/DL — LOW (ref 13–17)
INR BLD: 1.6 RATIO — HIGH (ref 0.88–1.16)
MCHC RBC-ENTMCNC: 28.9 PG — SIGNIFICANT CHANGE UP (ref 27–34)
MCHC RBC-ENTMCNC: 31.2 GM/DL — LOW (ref 32–36)
MCV RBC AUTO: 92.5 FL — SIGNIFICANT CHANGE UP (ref 80–100)
NRBC # BLD: 0 /100 WBCS — SIGNIFICANT CHANGE UP (ref 0–0)
PLATELET # BLD AUTO: 168 K/UL — SIGNIFICANT CHANGE UP (ref 150–400)
PROTHROM AB SERPL-ACNC: 18.5 SEC — HIGH (ref 10.6–13.6)
RBC # BLD: 4.12 M/UL — LOW (ref 4.2–5.8)
RBC # FLD: 17 % — HIGH (ref 10.3–14.5)
SURGICAL PATHOLOGY STUDY: SIGNIFICANT CHANGE UP
WBC # BLD: 12.01 K/UL — HIGH (ref 3.8–10.5)
WBC # FLD AUTO: 12.01 K/UL — HIGH (ref 3.8–10.5)

## 2021-02-26 RX ORDER — LISINOPRIL 2.5 MG/1
1 TABLET ORAL
Qty: 0 | Refills: 0 | DISCHARGE
Start: 2021-02-26

## 2021-02-26 RX ORDER — OXYCODONE HYDROCHLORIDE 5 MG/1
1 TABLET ORAL
Qty: 8 | Refills: 0
Start: 2021-02-26 | End: 2021-02-27

## 2021-02-26 RX ORDER — CLOPIDOGREL BISULFATE 75 MG/1
1 TABLET, FILM COATED ORAL
Qty: 0 | Refills: 0 | DISCHARGE
Start: 2021-02-26

## 2021-02-26 RX ORDER — HYDROCHLOROTHIAZIDE 25 MG
1 TABLET ORAL
Qty: 0 | Refills: 0 | DISCHARGE
Start: 2021-02-26

## 2021-02-26 RX ORDER — WARFARIN SODIUM 2.5 MG/1
1 TABLET ORAL
Qty: 0 | Refills: 0 | DISCHARGE
Start: 2021-02-26

## 2021-02-26 RX ADMIN — CLOPIDOGREL BISULFATE 75 MILLIGRAM(S): 75 TABLET, FILM COATED ORAL at 12:30

## 2021-02-26 RX ADMIN — Medication 1: at 08:12

## 2021-02-26 RX ADMIN — Medication 4: at 12:27

## 2021-02-26 RX ADMIN — LISINOPRIL 20 MILLIGRAM(S): 2.5 TABLET ORAL at 05:51

## 2021-02-26 RX ADMIN — OXYCODONE HYDROCHLORIDE 5 MILLIGRAM(S): 5 TABLET ORAL at 17:43

## 2021-02-26 RX ADMIN — Medication 12.5 MILLIGRAM(S): at 05:51

## 2021-02-26 RX ADMIN — Medication 25 MILLIGRAM(S): at 05:51

## 2021-02-26 NOTE — PROGRESS NOTE ADULT - ASSESSMENT
75y Male POD 2 s/p right carotid endarterectomy for carotid stenosis.    Plan:  Diet: Diabetic diet  next plavix dose AM  Warfarin given  Pain control  Close blood pressure monitoring, home HTN meds started  F/u AM labs  Monitor drain  Anticipate d/c drain and d/c home today    Vascular Surgery  [phone].

## 2021-02-26 NOTE — PROGRESS NOTE ADULT - SUBJECTIVE AND OBJECTIVE BOX
Vascular  Surgery Daily Progress Note  =====================================================    SUBJECTIVE: Patient seen and examined at bedside on AM rounds. Patient reports that they're feeling well. Tolerating diet, denies nausea, vomiting.  OOB/Ambulating as tolerated. Denies fever, chills.      ALLERGIES:  No Known Allergies      --------------------------------------------------------------------------------------    MEDICATIONS:    Neurologic Medications  acetaminophen   Tablet .. 650 milliGRAM(s) Oral every 6 hours PRN Mild Pain (1 - 3), Moderate Pain (4 - 6)  oxyCODONE    IR 5 milliGRAM(s) Oral every 4 hours PRN moderate to severe pain    Respiratory Medications    Cardiovascular Medications  hydrochlorothiazide 12.5 milliGRAM(s) Oral daily  lisinopril 20 milliGRAM(s) Oral daily  metoprolol succinate ER 25 milliGRAM(s) Oral daily    Gastrointestinal Medications  lactated ringers. 1000 milliLiter(s) IV Continuous <Continuous>    Genitourinary Medications    Hematologic/Oncologic Medications  clopidogrel Tablet 75 milliGRAM(s) Oral daily  warfarin 5 milliGRAM(s) Oral at bedtime    Antimicrobial/Immunologic Medications    Endocrine/Metabolic Medications  atorvastatin 80 milliGRAM(s) Oral at bedtime  insulin lispro (ADMELOG) corrective regimen sliding scale   SubCutaneous three times a day before meals  insulin lispro (ADMELOG) corrective regimen sliding scale   SubCutaneous at bedtime    Topical/Other Medications    --------------------------------------------------------------------------------------    VITAL SIGNS:    --------------------------------------------------------------------------------------    EXAM  General: NAD, resting comfortably in bed  Neuro: A/O x 3, no focal deficits  Pulmonary: Nonlabored breathing, no respiratory distress  Cardiovascular: Irregular rate, irregular rhythm   Abdominal: soft  Incision: Intact dressing, clean, minimal strike through  Drains: 1 martin with sanguinous output  Extremities: WWP    --------------------------------------------------------------------------------------    LABS  .  LABS:                         11.3   12.51 )-----------( 159      ( 25 Feb 2021 02:48 )             35.1     02-25    137  |  100  |  17  ----------------------------<  166<H>  4.7   |  21<L>  |  0.75    Ca    9.1      25 Feb 2021 02:48  Phos  3.9     02-25  Mg     1.5     02-25      PT/INR - ( 25 Feb 2021 08:11 )   PT: 16.1 sec;   INR: 1.36 ratio         PTT - ( 25 Feb 2021 08:11 )  PTT:38.1 sec          RADIOLOGY, EKG & ADDITIONAL TESTS: Reviewed.       --------------------------------------------------------------------------------------    INS AND OUTS:      I&O's Detail    24 Feb 2021 07:01  -  25 Feb 2021 07:00  --------------------------------------------------------  IN:    Lactated Ringers: 1000 mL    Oral Fluid: 240 mL  Total IN: 1240 mL    OUT:    Bulb (mL): 55 mL    Voided (mL): 1750 mL  Total OUT: 1805 mL    Total NET: -565 mL      25 Feb 2021 07:01  -  26 Feb 2021 00:21  --------------------------------------------------------  IN:    Lactated Ringers: 300 mL    Oral Fluid: 880 mL  Total IN: 1180 mL    OUT:    Bulb (mL): 10 mL    Voided (mL): 2450 mL  Total OUT: 2460 mL    Total NET: -1280 mL        --------------------------------------------------------------------------------------

## 2021-03-09 ENCOUNTER — APPOINTMENT (OUTPATIENT)
Dept: VASCULAR SURGERY | Facility: CLINIC | Age: 76
End: 2021-03-09
Payer: MEDICARE

## 2021-03-09 VITALS
DIASTOLIC BLOOD PRESSURE: 66 MMHG | WEIGHT: 154 LBS | SYSTOLIC BLOOD PRESSURE: 112 MMHG | BODY MASS INDEX: 21.56 KG/M2 | HEIGHT: 71 IN | HEART RATE: 77 BPM

## 2021-03-09 VITALS — TEMPERATURE: 97.1 F

## 2021-03-09 PROBLEM — I48.91 UNSPECIFIED ATRIAL FIBRILLATION: Chronic | Status: ACTIVE | Noted: 2020-09-10

## 2021-03-09 PROBLEM — Z95.5 PRESENCE OF CORONARY ANGIOPLASTY IMPLANT AND GRAFT: Chronic | Status: ACTIVE | Noted: 2020-10-14

## 2021-03-09 PROBLEM — G45.9 TRANSIENT CEREBRAL ISCHEMIC ATTACK, UNSPECIFIED: Chronic | Status: ACTIVE | Noted: 2021-02-19

## 2021-03-09 PROCEDURE — 99024 POSTOP FOLLOW-UP VISIT: CPT

## 2021-03-09 NOTE — DISCUSSION/SUMMARY
[FreeTextEntry1] : 74 yo male with history of carotid stenosis s/p right cea presents for follow up \par \par incision clean and dry \par pt doing well, pt to follow up in 6 weeks with repeat duplex

## 2021-03-09 NOTE — REASON FOR VISIT
[de-identified] : right carotid endarterectomy [de-identified] : 2/24/21 [de-identified] : pt doing well \par pt reports some swelling and pain surrounding incision site\par pt also reports a headache for the past 3 days\par \par pt does report some difficulty swallowing thin liquids.  pt states that he had similar symptoms after his left cea that had resolved independently

## 2021-03-09 NOTE — PHYSICAL EXAM
[Alert] : alert [Calm] : calm [JVD] : no jugular venous distention  [de-identified] : appears well  [de-identified] : incision clean and dry, no surrounding erythema or discharge, tongue is midline

## 2021-03-15 NOTE — PROGRESS NOTE ADULT - REASON FOR ADMISSION
Symptomatic Anemia
2 = difficulty swallowing foods
Symptomatic Anemia

## 2021-03-16 PROCEDURE — 88311 DECALCIFY TISSUE: CPT

## 2021-03-16 PROCEDURE — C1768: CPT

## 2021-03-16 PROCEDURE — 85027 COMPLETE CBC AUTOMATED: CPT

## 2021-03-16 PROCEDURE — 93005 ELECTROCARDIOGRAM TRACING: CPT

## 2021-03-16 PROCEDURE — C1889: CPT

## 2021-03-16 PROCEDURE — 80048 BASIC METABOLIC PNL TOTAL CA: CPT

## 2021-03-16 PROCEDURE — 88304 TISSUE EXAM BY PATHOLOGIST: CPT

## 2021-03-16 PROCEDURE — 82962 GLUCOSE BLOOD TEST: CPT

## 2021-03-16 PROCEDURE — 85610 PROTHROMBIN TIME: CPT

## 2021-03-16 PROCEDURE — 83735 ASSAY OF MAGNESIUM: CPT

## 2021-03-16 PROCEDURE — 85730 THROMBOPLASTIN TIME PARTIAL: CPT

## 2021-03-16 PROCEDURE — 84100 ASSAY OF PHOSPHORUS: CPT

## 2021-04-22 ENCOUNTER — APPOINTMENT (OUTPATIENT)
Dept: VASCULAR SURGERY | Facility: CLINIC | Age: 76
End: 2021-04-22
Payer: MEDICARE

## 2021-04-22 VITALS — TEMPERATURE: 97.1 F

## 2021-04-22 PROCEDURE — 99072 ADDL SUPL MATRL&STAF TM PHE: CPT

## 2021-04-22 PROCEDURE — 93880 EXTRACRANIAL BILAT STUDY: CPT

## 2021-04-22 PROCEDURE — 99024 POSTOP FOLLOW-UP VISIT: CPT

## 2021-04-22 NOTE — DISCUSSION/SUMMARY
[FreeTextEntry1] : 74 yo male active daily smoker with history of carotid stenosis s/p b/l cea, pad s/p fem-fem bypass presents for follow up \par \par incision healed \par duplex shows patent cea b/l \par pt to follow up in 6 months with carotid duplex, duplex of fem-fem bypass and aortic

## 2021-04-22 NOTE — PHYSICAL EXAM
[Varicose Veins Of Lower Extremities] : bilaterally [Ankle Swelling On The Left] : moderate [] : bilaterally [Ankle Swelling Bilaterally] : severe [No Rash or Lesion] : No rash or lesion [Alert] : alert [Calm] : calm [JVD] : no jugular venous distention  [Ankle Swelling (On Exam)] : not present [Skin Ulcer] : no ulcer [de-identified] : appears well  [de-identified] : incisions clean and dry healed nicely

## 2021-04-22 NOTE — REASON FOR VISIT
[de-identified] : right CEA  [de-identified] : 2/24/21 [de-identified] : pt doing well without any complaints denies any stroke like symptoms\par pt states that he is walking about 10 blocks without stopping denies any lower extremity pain, ulcers or wounds.

## 2021-10-21 ENCOUNTER — APPOINTMENT (OUTPATIENT)
Dept: VASCULAR SURGERY | Facility: CLINIC | Age: 76
End: 2021-10-21
Payer: MEDICARE

## 2021-10-21 PROCEDURE — 93880 EXTRACRANIAL BILAT STUDY: CPT

## 2021-10-21 PROCEDURE — 99213 OFFICE O/P EST LOW 20 MIN: CPT

## 2021-10-21 PROCEDURE — 93926 LOWER EXTREMITY STUDY: CPT

## 2021-10-21 PROCEDURE — 93978 VASCULAR STUDY: CPT

## 2021-10-21 RX ORDER — LISINOPRIL AND HYDROCHLOROTHIAZIDE TABLETS 20; 12.5 MG/1; MG/1
20-12.5 TABLET ORAL
Refills: 0 | Status: ACTIVE | COMMUNITY

## 2021-10-21 RX ORDER — SIMVASTATIN 10 MG/1
10 TABLET, FILM COATED ORAL
Refills: 0 | Status: ACTIVE | COMMUNITY

## 2021-10-21 RX ORDER — ATORVASTATIN CALCIUM 80 MG/1
80 TABLET, FILM COATED ORAL
Qty: 30 | Refills: 3 | Status: DISCONTINUED | COMMUNITY
Start: 2020-10-09 | End: 2021-10-21

## 2021-10-21 RX ORDER — RIVAROXABAN 20 MG/1
20 TABLET, FILM COATED ORAL
Refills: 0 | Status: ACTIVE | COMMUNITY

## 2021-10-21 RX ORDER — ASPIRIN ENTERIC COATED TABLETS 81 MG 81 MG/1
81 TABLET, DELAYED RELEASE ORAL DAILY
Qty: 90 | Refills: 1 | Status: ACTIVE | COMMUNITY
Start: 2021-10-21 | End: 1900-01-01

## 2021-10-21 RX ORDER — WARFARIN 5 MG/1
5 TABLET ORAL DAILY
Refills: 0 | Status: DISCONTINUED | COMMUNITY
Start: 2020-10-09 | End: 2021-10-21

## 2021-10-21 RX ORDER — CLOPIDOGREL BISULFATE 75 MG/1
75 TABLET, FILM COATED ORAL
Refills: 0 | Status: DISCONTINUED | COMMUNITY
Start: 2020-10-09 | End: 2021-10-21

## 2021-10-21 NOTE — PHYSICAL EXAM
[2+] : left 2+ [Varicose Veins Of Lower Extremities] : bilaterally [Ankle Swelling On The Left] : moderate [No Rash or Lesion] : No rash or lesion [Alert] : alert [Calm] : calm [JVD] : no jugular venous distention  [] : not present [Ankle Swelling (On Exam)] : not present [Skin Ulcer] : no ulcer [de-identified] : appears well

## 2021-10-21 NOTE — HISTORY OF PRESENT ILLNESS
[FreeTextEntry1] : 74 yo male with history of pad s/p fem fem bypass and right yousif and eia stent, carotid stenosis s/p b/l cea presents for follow up.  pt states that he occasionally experiences cramping pain in the calf when he is sleeping but denies any claudications and can walk about 2-10 blocks without stopping.  pt denies any history of wounds or ulcers or stroke like symptoms

## 2021-10-21 NOTE — ASSESSMENT
[FreeTextEntry1] : 74 yo male with history of pad s/p fem fem bypass and right yousif and eia stent, carotid stenosis s/p b/l cea presents for follow up.\par \par \par duplex shows bilateral patent cea's \par right to left fem fem bypass with 50-75% stenosis at the distal anastomosis \par aorta noted to have atherosclerotic changes throughout measuring 2.3 cm with patent right yousif and eia stents\par \par will schedule for teddy/pvr in 3 months and repeat duplex in 6 months \par pt currently on xarelto and has not been taking plavix will start asa

## 2022-01-05 NOTE — PATIENT PROFILE ADULT - NSPROEDALEARNPREF_GEN_A_NUR
Impression: Vitreous degeneration, left eye: H43.158. Plan: PVD accounts for pt's complaint. There is no evidence of retinal pathology. All signs and risks of retinal detachment or tears were discussed in detail. If pt. notices any symptoms discussed, contact office ASAP. Recommend pt. return for recheck 3-4 weeks. verbal instruction

## 2022-01-20 ENCOUNTER — APPOINTMENT (OUTPATIENT)
Dept: VASCULAR SURGERY | Facility: CLINIC | Age: 77
End: 2022-01-20
Payer: MEDICARE

## 2022-01-20 VITALS
WEIGHT: 154 LBS | BODY MASS INDEX: 21.56 KG/M2 | HEIGHT: 71 IN | SYSTOLIC BLOOD PRESSURE: 139 MMHG | HEART RATE: 74 BPM | DIASTOLIC BLOOD PRESSURE: 83 MMHG

## 2022-01-20 PROCEDURE — 93923 UPR/LXTR ART STDY 3+ LVLS: CPT

## 2022-01-20 PROCEDURE — 99213 OFFICE O/P EST LOW 20 MIN: CPT

## 2022-01-20 NOTE — ASSESSMENT
[FreeTextEntry1] : 74 yo male with history of pad s/p fem fem bypass and right yousif and eia stent, carotid stenosis s/p b/l cea presents for follow up.\par \par \par dopplers show no change\par cont with ambulation

## 2022-01-20 NOTE — PHYSICAL EXAM
[JVD] : no jugular venous distention  [2+] : left 2+ [Ankle Swelling (On Exam)] : not present [Varicose Veins Of Lower Extremities] : bilaterally [Ankle Swelling On The Left] : moderate [] : not present [No Rash or Lesion] : No rash or lesion [Skin Ulcer] : no ulcer [Alert] : alert [Calm] : calm [de-identified] : appears well

## 2022-01-20 NOTE — HISTORY OF PRESENT ILLNESS
[FreeTextEntry1] : 75 yo male with history of pad s/p fem fem bypass and right yousif and eia stent, carotid stenosis s/p b/l cea presents for follow up.  pt states that he occasionally experiences cramping pain in the calf when he is sleeping but denies any claudications and can walk about 2-10 blocks without stopping.  pt denies any history of wounds or ulcers or stroke like symptoms

## 2022-04-21 ENCOUNTER — APPOINTMENT (OUTPATIENT)
Dept: VASCULAR SURGERY | Facility: CLINIC | Age: 77
End: 2022-04-21
Payer: MEDICARE

## 2022-04-21 PROCEDURE — 99214 OFFICE O/P EST MOD 30 MIN: CPT

## 2022-04-21 PROCEDURE — 93880 EXTRACRANIAL BILAT STUDY: CPT

## 2022-04-21 PROCEDURE — 93926 LOWER EXTREMITY STUDY: CPT

## 2022-04-21 PROCEDURE — 93978 VASCULAR STUDY: CPT

## 2022-04-21 NOTE — PHYSICAL EXAM
[2+] : left 2+ [Varicose Veins Of Lower Extremities] : bilaterally [] : bilaterally [Ankle Swelling On The Left] : moderate [No Rash or Lesion] : No rash or lesion [Alert] : alert [Calm] : calm [JVD] : no jugular venous distention  [Normal Breath Sounds] : Normal breath sounds [Normal Rate and Rhythm] : normal rate and rhythm [Ankle Swelling (On Exam)] : not present [Skin Ulcer] : no ulcer [de-identified] : appears well

## 2022-04-21 NOTE — ASSESSMENT
[FreeTextEntry1] : 77 yo male with history of TAVR,  pad s/p fem fem bypass and right yousif and eia stent, carotid stenosis s/p b/l cea presents for follow up.  \par \par duplex of the carotid arteries shows patent cea's bilaterally \par \par aortic duplex shows patent right yousif to eia stent with 50% stenosis of the proximal yousif and distal native eia with right to left fem-fem bypass with a 50-75% stenosis of the distal anastomosis \par \par pt currently on coumadin \par pt to follow up in 6 months

## 2022-04-21 NOTE — HISTORY OF PRESENT ILLNESS
[FreeTextEntry1] : 75 yo male with history of AS s/p TAVR pad s/p fem fem bypass and right yousif and eia stent, carotid stenosis s/p b/l cea presents for follow up.  pt states that he occasionally experiences cramping pain in the calf when he is sleeping but denies any claudications.  pt denies any history of wounds or ulcers or stroke like symptoms

## 2022-04-22 NOTE — PHYSICAL THERAPY INITIAL EVALUATION ADULT - ADDITIONAL COMMENTS
Pt lives in a  With his spouse, has 3 steps to enter with HR. All needs met on first floor. Pt was ambulating (I) without an AD and was independent with all ADLS PTA
LMP 3/27/2022

## 2022-08-23 ENCOUNTER — APPOINTMENT (OUTPATIENT)
Dept: VASCULAR SURGERY | Facility: CLINIC | Age: 77
End: 2022-08-23

## 2022-08-23 VITALS
HEIGHT: 71 IN | WEIGHT: 154 LBS | HEART RATE: 69 BPM | BODY MASS INDEX: 21.56 KG/M2 | SYSTOLIC BLOOD PRESSURE: 154 MMHG | DIASTOLIC BLOOD PRESSURE: 79 MMHG

## 2022-08-23 PROCEDURE — 99214 OFFICE O/P EST MOD 30 MIN: CPT

## 2022-08-23 PROCEDURE — 93925 LOWER EXTREMITY STUDY: CPT

## 2022-08-23 PROCEDURE — 93979 VASCULAR STUDY: CPT

## 2022-08-23 NOTE — PHYSICAL EXAM
[2+] : left 2+ [Varicose Veins Of Lower Extremities] : bilaterally [] : bilaterally [Ankle Swelling On The Left] : moderate [No Rash or Lesion] : No rash or lesion [Alert] : alert [Calm] : calm [JVD] : no jugular venous distention  [Ankle Swelling (On Exam)] : not present [Skin Ulcer] : no ulcer [de-identified] : appears well

## 2022-08-23 NOTE — HISTORY OF PRESENT ILLNESS
[FreeTextEntry1] : 75 yo male with history of pad s/p fem fem bypass and right yousif and eia stent, carotid stenosis s/p b/l cea presents for follow up.  pt states that he is able to walk about 2 blocks prior to having to stop and rest.  pt denies any history of wounds or ulcers

## 2022-08-23 NOTE — ASSESSMENT
[FreeTextEntry1] : 75 yo male with history of pad s/p fem fem bypass and right yousif and eia stent, carotid stenosis s/p b/l cea presents for follow up\par \par Duplex shows stable 50% in-stent stenosis of the right proximal common iliac artery with a 50 to 75% stenosis of the right native distal external iliac artery\par Femorofemoral bypass with 50 to 75% stenosis noted in the distal anastomosis wall unchanged from previous duplex\par \par Patient to continue with Coumadin and Plavix in addition to Zocor we will repeat duplex in 6 months

## 2023-02-28 ENCOUNTER — APPOINTMENT (OUTPATIENT)
Dept: VASCULAR SURGERY | Facility: CLINIC | Age: 78
End: 2023-02-28
Payer: MEDICARE

## 2023-02-28 PROCEDURE — 99214 OFFICE O/P EST MOD 30 MIN: CPT

## 2023-02-28 PROCEDURE — 93880 EXTRACRANIAL BILAT STUDY: CPT

## 2023-02-28 PROCEDURE — 93978 VASCULAR STUDY: CPT

## 2023-02-28 PROCEDURE — 93926 LOWER EXTREMITY STUDY: CPT

## 2023-03-12 NOTE — PHYSICAL EXAM
[JVD] : no jugular venous distention  [2+] : left 2+ [Ankle Swelling (On Exam)] : not present [Varicose Veins Of Lower Extremities] : bilaterally [] : bilaterally [Ankle Swelling On The Left] : moderate [No Rash or Lesion] : No rash or lesion [Skin Ulcer] : no ulcer [Alert] : alert [Calm] : calm [de-identified] : appears well

## 2023-03-12 NOTE — HISTORY OF PRESENT ILLNESS
[FreeTextEntry1] : 78 yo male with history of pad s/p fem fem bypass and right yousif and eia stent, carotid stenosis s/p b/l cea presents for follow up.  pt states that he is able to walk about 2 blocks prior to having to stop and rest.  pt denies any history of wounds or ulcers

## 2023-03-12 NOTE — ASSESSMENT
[FreeTextEntry1] : 78 yo male with history of pad s/p fem fem bypass and right yousif and eia stent, carotid stenosis s/p b/l cea presents for follow up\par \par Duplex shows stable 50% in-stent stenosis of the right proximal common iliac artery with a 50 to 75% stenosis of the right native distal external iliac artery\par Femorofemoral bypass with 50 to 75% stenosis noted in the distal anastomosis wall unchanged from previous duplex\par \par Patient to continue with Coumadin and Plavix in addition to Zocor we will repeat duplex in 6 months

## 2023-10-16 NOTE — PATIENT PROFILE ADULT - FUNCTIONAL SCREEN CURRENT LEVEL: SWALLOWING (IF SCORE 2 OR MORE FOR ANY ITEM, CONSULT REHAB SERVICES), MLM)
Patient: Emanuel Landeros    Procedure: Procedure(s):  Esophagoscopy, gastroscopy, duodenoscopy (EGD), combined       Anesthesia Type:  General    Note:  Disposition: Outpatient   Postop Pain Control: Uneventful            Sign Out: Well controlled pain   PONV: No   Neuro/Psych: Uneventful            Sign Out: Acceptable/Baseline neuro status   Airway/Respiratory: Uneventful            Sign Out: Acceptable/Baseline resp. status   CV/Hemodynamics: Uneventful            Sign Out: Acceptable CV status; No obvious hypovolemia; No obvious fluid overload   Other NRE: NONE   DID A NON-ROUTINE EVENT OCCUR? No           Last vitals:  Vitals Value Taken Time   /75 10/16/23 1200   Temp 35.7  C (96.3  F) 10/16/23 1155   Pulse 42 10/16/23 1200   Resp     SpO2 98 % 10/16/23 1203   Vitals shown include unfiled device data.    Electronically Signed By: MARY Nash CRNA  October 16, 2023  12:05 PM   0 = swallows foods/liquids without difficulty

## 2023-11-06 NOTE — ED ADULT NURSE NOTE - DOES PATIENT HAVE ADVANCE DIRECTIVE
[address] [facility]  [address] [address]  Dept: [phone]  Dept Fax: [phone]    Shabana Cross is a 27 y.o. male who presentstoday for his medical conditions/complaints as noted below.   Shabana Cross is c/o of  Chief Complaint   Patient presents with    Sinus Problem     Denies fever, history of sinus infections           HPI:     Presents today via video virtual visit for sx of illness  Developed sinus infection sx about 3 weeks ago  Coughing up green/yellow mucous  Sinus congestion with mild pressure in cheek bones  Mild headaches  Taking dayquil, mucinex, vitamin C but sx persist        Hemoglobin A1C (%)   Date Value   08/29/2023 5.5             ( goal A1C is < 7)   No components found for: \"LABMICR\"  LDL Cholesterol (mg/dL)   Date Value   08/29/2023 107   08/18/2022 117   02/20/2019 109     LDL Calculated (mg/dL)   Date Value   12/15/2015 74       (goal LDL is <100)   AST (U/L)   Date Value   08/29/2023 31     ALT (U/L)   Date Value   08/29/2023 34     BUN (mg/dL)   Date Value   08/29/2023 11     BP Readings from Last 3 Encounters:   08/29/23 122/74   09/27/22 125/77   08/18/22 118/82          ([id number])    Past Medical History:   Diagnosis Date    Acid reflux     ADHD (attention deficit hyperactivity disorder)     Anemia     Anxiety     Asperger's disorder     Asthma     Chronic insomnia     Chronic kidney disease     stones    Eczema     Fibromyalgia     Inguinal adenopathy     Insomnia     Leukopenia     Mononucleosis     Syncope and collapse     once    Thrombocytopenia (HCC)       Past Surgical History:   Procedure Laterality Date    LITHOTRIPSY      TONSILLECTOMY      TYMPANOSTOMY TUBE PLACEMENT         Family History   Problem Relation Age of Onset    Diabetes Maternal Grandmother     Dementia Maternal Grandmother     Dementia Paternal Grandmother     Hypertension Mother     Hypertension Father     Alzheimer's
No

## 2024-03-17 ENCOUNTER — INPATIENT (INPATIENT)
Facility: HOSPITAL | Age: 79
LOS: 8 days | Discharge: NURSING, REHAB OR LONG-TERM CARE | End: 2024-03-26
Attending: INTERNAL MEDICINE | Admitting: INTERNAL MEDICINE
Payer: MEDICARE

## 2024-03-17 VITALS
SYSTOLIC BLOOD PRESSURE: 158 MMHG | WEIGHT: 154.98 LBS | TEMPERATURE: 98 F | OXYGEN SATURATION: 72 % | DIASTOLIC BLOOD PRESSURE: 85 MMHG | HEIGHT: 72 IN | RESPIRATION RATE: 24 BRPM | HEART RATE: 89 BPM

## 2024-03-17 DIAGNOSIS — Z95.5 PRESENCE OF CORONARY ANGIOPLASTY IMPLANT AND GRAFT: Chronic | ICD-10-CM

## 2024-03-17 DIAGNOSIS — Z95.2 PRESENCE OF PROSTHETIC HEART VALVE: Chronic | ICD-10-CM

## 2024-03-17 DIAGNOSIS — Z98.890 OTHER SPECIFIED POSTPROCEDURAL STATES: Chronic | ICD-10-CM

## 2024-03-17 LAB
ALBUMIN SERPL ELPH-MCNC: 2.7 G/DL — LOW (ref 3.3–5)
ALBUMIN SERPL ELPH-MCNC: 2.7 G/DL — LOW (ref 3.3–5)
ALP SERPL-CCNC: 79 U/L — SIGNIFICANT CHANGE UP (ref 40–120)
ALP SERPL-CCNC: 80 U/L — SIGNIFICANT CHANGE UP (ref 40–120)
ALT FLD-CCNC: 22 U/L — SIGNIFICANT CHANGE UP (ref 12–78)
ALT FLD-CCNC: 26 U/L — SIGNIFICANT CHANGE UP (ref 12–78)
ANION GAP SERPL CALC-SCNC: 12 MMOL/L — SIGNIFICANT CHANGE UP (ref 5–17)
ANION GAP SERPL CALC-SCNC: 13 MMOL/L — SIGNIFICANT CHANGE UP (ref 5–17)
APPEARANCE UR: CLEAR — SIGNIFICANT CHANGE UP
APTT BLD: 55.6 SEC — HIGH (ref 24.5–35.6)
AST SERPL-CCNC: 27 U/L — SIGNIFICANT CHANGE UP (ref 15–37)
AST SERPL-CCNC: 52 U/L — HIGH (ref 15–37)
BACTERIA # UR AUTO: ABNORMAL /HPF
BASOPHILS # BLD AUTO: 0.07 K/UL — SIGNIFICANT CHANGE UP (ref 0–0.2)
BASOPHILS NFR BLD AUTO: 0.7 % — SIGNIFICANT CHANGE UP (ref 0–2)
BILIRUB SERPL-MCNC: 1.1 MG/DL — SIGNIFICANT CHANGE UP (ref 0.2–1.2)
BILIRUB SERPL-MCNC: 1.1 MG/DL — SIGNIFICANT CHANGE UP (ref 0.2–1.2)
BILIRUB UR-MCNC: NEGATIVE — SIGNIFICANT CHANGE UP
BUN SERPL-MCNC: 40 MG/DL — HIGH (ref 7–23)
BUN SERPL-MCNC: 41 MG/DL — HIGH (ref 7–23)
CALCIUM SERPL-MCNC: 8.9 MG/DL — SIGNIFICANT CHANGE UP (ref 8.5–10.1)
CALCIUM SERPL-MCNC: 9.3 MG/DL — SIGNIFICANT CHANGE UP (ref 8.5–10.1)
CHLORIDE SERPL-SCNC: 106 MMOL/L — SIGNIFICANT CHANGE UP (ref 96–108)
CHLORIDE SERPL-SCNC: 110 MMOL/L — HIGH (ref 96–108)
CO2 SERPL-SCNC: 20 MMOL/L — LOW (ref 22–31)
CO2 SERPL-SCNC: 22 MMOL/L — SIGNIFICANT CHANGE UP (ref 22–31)
COLOR SPEC: YELLOW — SIGNIFICANT CHANGE UP
CREAT SERPL-MCNC: 1.33 MG/DL — HIGH (ref 0.5–1.3)
CREAT SERPL-MCNC: 1.43 MG/DL — HIGH (ref 0.5–1.3)
D DIMER BLD IA.RAPID-MCNC: 297 NG/ML DDU — HIGH
DIFF PNL FLD: NEGATIVE — SIGNIFICANT CHANGE UP
EGFR: 50 ML/MIN/1.73M2 — LOW
EGFR: 54 ML/MIN/1.73M2 — LOW
EOSINOPHIL # BLD AUTO: 0.37 K/UL — SIGNIFICANT CHANGE UP (ref 0–0.5)
EOSINOPHIL NFR BLD AUTO: 3.5 % — SIGNIFICANT CHANGE UP (ref 0–6)
EPI CELLS # UR: PRESENT
FLUAV AG NPH QL: SIGNIFICANT CHANGE UP
FLUBV AG NPH QL: SIGNIFICANT CHANGE UP
GAS PNL BLDA: SIGNIFICANT CHANGE UP
GAS PNL BLDA: SIGNIFICANT CHANGE UP
GLUCOSE BLDC GLUCOMTR-MCNC: 213 MG/DL — HIGH (ref 70–99)
GLUCOSE BLDC GLUCOMTR-MCNC: 269 MG/DL — HIGH (ref 70–99)
GLUCOSE SERPL-MCNC: 222 MG/DL — HIGH (ref 70–99)
GLUCOSE SERPL-MCNC: 265 MG/DL — HIGH (ref 70–99)
GLUCOSE UR QL: NEGATIVE MG/DL — SIGNIFICANT CHANGE UP
HCOV PNL SPEC NAA+PROBE: DETECTED
HCT VFR BLD CALC: 40.4 % — SIGNIFICANT CHANGE UP (ref 39–50)
HGB BLD-MCNC: 12.6 G/DL — LOW (ref 13–17)
IMM GRANULOCYTES NFR BLD AUTO: 0.3 % — SIGNIFICANT CHANGE UP (ref 0–0.9)
INR BLD: 4.06 RATIO — HIGH (ref 0.85–1.18)
KETONES UR-MCNC: NEGATIVE MG/DL — SIGNIFICANT CHANGE UP
LACTATE SERPL-SCNC: 4 MMOL/L — CRITICAL HIGH (ref 0.7–2)
LEUKOCYTE ESTERASE UR-ACNC: NEGATIVE — SIGNIFICANT CHANGE UP
LIDOCAIN IGE QN: 47 U/L — SIGNIFICANT CHANGE UP (ref 13–75)
LYMPHOCYTES # BLD AUTO: 0.87 K/UL — LOW (ref 1–3.3)
LYMPHOCYTES # BLD AUTO: 8.2 % — LOW (ref 13–44)
MAGNESIUM SERPL-MCNC: 1.3 MG/DL — LOW (ref 1.6–2.6)
MAGNESIUM SERPL-MCNC: 1.7 MG/DL — SIGNIFICANT CHANGE UP (ref 1.6–2.6)
MCHC RBC-ENTMCNC: 31.2 G/DL — LOW (ref 32–36)
MCHC RBC-ENTMCNC: 31.7 PG — SIGNIFICANT CHANGE UP (ref 27–34)
MCV RBC AUTO: 101.5 FL — HIGH (ref 80–100)
MONOCYTES # BLD AUTO: 0.82 K/UL — SIGNIFICANT CHANGE UP (ref 0–0.9)
MONOCYTES NFR BLD AUTO: 7.7 % — SIGNIFICANT CHANGE UP (ref 2–14)
NEUTROPHILS # BLD AUTO: 8.43 K/UL — HIGH (ref 1.8–7.4)
NEUTROPHILS NFR BLD AUTO: 79.6 % — HIGH (ref 43–77)
NITRITE UR-MCNC: NEGATIVE — SIGNIFICANT CHANGE UP
NRBC # BLD: 0 /100 WBCS — SIGNIFICANT CHANGE UP (ref 0–0)
NT-PROBNP SERPL-SCNC: 1524 PG/ML — HIGH (ref 0–450)
PH UR: 5.5 — SIGNIFICANT CHANGE UP (ref 5–8)
PHOSPHATE SERPL-MCNC: 4 MG/DL — SIGNIFICANT CHANGE UP (ref 2.5–4.5)
PLATELET # BLD AUTO: 176 K/UL — SIGNIFICANT CHANGE UP (ref 150–400)
POTASSIUM SERPL-MCNC: 4.5 MMOL/L — SIGNIFICANT CHANGE UP (ref 3.5–5.3)
POTASSIUM SERPL-MCNC: 5.3 MMOL/L — SIGNIFICANT CHANGE UP (ref 3.5–5.3)
POTASSIUM SERPL-SCNC: 4.5 MMOL/L — SIGNIFICANT CHANGE UP (ref 3.5–5.3)
POTASSIUM SERPL-SCNC: 5.3 MMOL/L — SIGNIFICANT CHANGE UP (ref 3.5–5.3)
PROT SERPL-MCNC: 8.1 GM/DL — SIGNIFICANT CHANGE UP (ref 6–8.3)
PROT SERPL-MCNC: 8.9 GM/DL — HIGH (ref 6–8.3)
PROT UR-MCNC: 100 MG/DL
PROTHROM AB SERPL-ACNC: 46.8 SEC — HIGH (ref 9.5–13)
RAPID RVP RESULT: DETECTED
RBC # BLD: 3.98 M/UL — LOW (ref 4.2–5.8)
RBC # FLD: 14.9 % — HIGH (ref 10.3–14.5)
RBC CASTS # UR COMP ASSIST: 2 /HPF — SIGNIFICANT CHANGE UP (ref 0–4)
SARS-COV-2 RNA SPEC QL NAA+PROBE: SIGNIFICANT CHANGE UP
SARS-COV-2 RNA SPEC QL NAA+PROBE: SIGNIFICANT CHANGE UP
SODIUM SERPL-SCNC: 141 MMOL/L — SIGNIFICANT CHANGE UP (ref 135–145)
SODIUM SERPL-SCNC: 142 MMOL/L — SIGNIFICANT CHANGE UP (ref 135–145)
SP GR SPEC: 1.01 — SIGNIFICANT CHANGE UP (ref 1–1.03)
TROPONIN I, HIGH SENSITIVITY RESULT: 11.1 NG/L — SIGNIFICANT CHANGE UP
TROPONIN I, HIGH SENSITIVITY RESULT: 13.2 NG/L — SIGNIFICANT CHANGE UP
UROBILINOGEN FLD QL: 0.2 MG/DL — SIGNIFICANT CHANGE UP (ref 0.2–1)
WBC # BLD: 10.59 K/UL — HIGH (ref 3.8–10.5)
WBC # FLD AUTO: 10.59 K/UL — HIGH (ref 3.8–10.5)
WBC UR QL: 2 /HPF — SIGNIFICANT CHANGE UP (ref 0–5)

## 2024-03-17 PROCEDURE — 71045 X-RAY EXAM CHEST 1 VIEW: CPT | Mod: 26,59

## 2024-03-17 PROCEDURE — 99291 CRITICAL CARE FIRST HOUR: CPT

## 2024-03-17 PROCEDURE — 93010 ELECTROCARDIOGRAM REPORT: CPT

## 2024-03-17 RX ORDER — ACETAZOLAMIDE 250 MG/1
500 TABLET ORAL DAILY
Refills: 0 | Status: DISCONTINUED | OUTPATIENT
Start: 2024-03-17 | End: 2024-03-17

## 2024-03-17 RX ORDER — AMLODIPINE BESYLATE 2.5 MG/1
5 TABLET ORAL DAILY
Refills: 0 | Status: DISCONTINUED | OUTPATIENT
Start: 2024-03-17 | End: 2024-03-17

## 2024-03-17 RX ORDER — ALBUTEROL 90 UG/1
2 AEROSOL, METERED ORAL
Refills: 0 | DISCHARGE

## 2024-03-17 RX ORDER — GABAPENTIN 400 MG/1
100 CAPSULE ORAL
Refills: 0 | DISCHARGE

## 2024-03-17 RX ORDER — FUROSEMIDE 40 MG
40 TABLET ORAL ONCE
Refills: 0 | Status: COMPLETED | OUTPATIENT
Start: 2024-03-17 | End: 2024-03-17

## 2024-03-17 RX ORDER — METOPROLOL TARTRATE 50 MG
1 TABLET ORAL
Qty: 0 | Refills: 0 | DISCHARGE

## 2024-03-17 RX ORDER — SIMVASTATIN 20 MG/1
10 TABLET, FILM COATED ORAL AT BEDTIME
Refills: 0 | Status: DISCONTINUED | OUTPATIENT
Start: 2024-03-17 | End: 2024-03-26

## 2024-03-17 RX ORDER — SIMVASTATIN 20 MG/1
1 TABLET, FILM COATED ORAL
Qty: 0 | Refills: 0 | DISCHARGE

## 2024-03-17 RX ORDER — MAGNESIUM SULFATE 500 MG/ML
2 VIAL (ML) INJECTION ONCE
Refills: 0 | Status: COMPLETED | OUTPATIENT
Start: 2024-03-17 | End: 2024-03-17

## 2024-03-17 RX ORDER — FUROSEMIDE 40 MG
20 TABLET ORAL ONCE
Refills: 0 | Status: COMPLETED | OUTPATIENT
Start: 2024-03-17 | End: 2024-03-17

## 2024-03-17 RX ORDER — FUROSEMIDE 40 MG
80 TABLET ORAL
Refills: 0 | Status: DISCONTINUED | OUTPATIENT
Start: 2024-03-17 | End: 2024-03-18

## 2024-03-17 RX ORDER — HYDRALAZINE HCL 50 MG
10 TABLET ORAL EVERY 6 HOURS
Refills: 0 | Status: DISCONTINUED | OUTPATIENT
Start: 2024-03-17 | End: 2024-03-26

## 2024-03-17 RX ORDER — ACETAZOLAMIDE 250 MG/1
500 TABLET ORAL
Refills: 0 | Status: DISCONTINUED | OUTPATIENT
Start: 2024-03-17 | End: 2024-03-18

## 2024-03-17 RX ORDER — CEFTRIAXONE 500 MG/1
1000 INJECTION, POWDER, FOR SOLUTION INTRAMUSCULAR; INTRAVENOUS ONCE
Refills: 0 | Status: COMPLETED | OUTPATIENT
Start: 2024-03-17 | End: 2024-03-17

## 2024-03-17 RX ORDER — ATORVASTATIN CALCIUM 80 MG/1
80 TABLET, FILM COATED ORAL AT BEDTIME
Refills: 0 | Status: DISCONTINUED | OUTPATIENT
Start: 2024-03-17 | End: 2024-03-17

## 2024-03-17 RX ORDER — WARFARIN SODIUM 2.5 MG/1
1 TABLET ORAL
Refills: 0 | DISCHARGE

## 2024-03-17 RX ORDER — ALBUTEROL 90 UG/1
2.5 AEROSOL, METERED ORAL EVERY 6 HOURS
Refills: 0 | Status: DISCONTINUED | OUTPATIENT
Start: 2024-03-17 | End: 2024-03-26

## 2024-03-17 RX ORDER — INSULIN LISPRO 100/ML
VIAL (ML) SUBCUTANEOUS EVERY 6 HOURS
Refills: 0 | Status: DISCONTINUED | OUTPATIENT
Start: 2024-03-17 | End: 2024-03-18

## 2024-03-17 RX ORDER — CHLORHEXIDINE GLUCONATE 213 G/1000ML
1 SOLUTION TOPICAL
Refills: 0 | Status: DISCONTINUED | OUTPATIENT
Start: 2024-03-17 | End: 2024-03-20

## 2024-03-17 RX ORDER — INFLUENZA VIRUS VACCINE 15; 15; 15; 15 UG/.5ML; UG/.5ML; UG/.5ML; UG/.5ML
0.7 SUSPENSION INTRAMUSCULAR ONCE
Refills: 0 | Status: DISCONTINUED | OUTPATIENT
Start: 2024-03-17 | End: 2024-03-26

## 2024-03-17 RX ORDER — IPRATROPIUM/ALBUTEROL SULFATE 18-103MCG
3 AEROSOL WITH ADAPTER (GRAM) INHALATION ONCE
Refills: 0 | Status: COMPLETED | OUTPATIENT
Start: 2024-03-17 | End: 2024-03-17

## 2024-03-17 RX ORDER — LISINOPRIL/HYDROCHLOROTHIAZIDE 10-12.5 MG
1 TABLET ORAL
Qty: 0 | Refills: 0 | DISCHARGE

## 2024-03-17 RX ORDER — CLOPIDOGREL BISULFATE 75 MG/1
75 TABLET, FILM COATED ORAL DAILY
Refills: 0 | Status: DISCONTINUED | OUTPATIENT
Start: 2024-03-17 | End: 2024-03-26

## 2024-03-17 RX ORDER — NICOTINE POLACRILEX 2 MG
1 GUM BUCCAL DAILY
Refills: 0 | Status: DISCONTINUED | OUTPATIENT
Start: 2024-03-17 | End: 2024-03-26

## 2024-03-17 RX ADMIN — Medication 80 MILLIGRAM(S): at 23:39

## 2024-03-17 RX ADMIN — Medication 20 MILLIGRAM(S): at 15:41

## 2024-03-17 RX ADMIN — CEFTRIAXONE 1000 MILLIGRAM(S): 500 INJECTION, POWDER, FOR SOLUTION INTRAMUSCULAR; INTRAVENOUS at 17:03

## 2024-03-17 RX ADMIN — SIMVASTATIN 10 MILLIGRAM(S): 20 TABLET, FILM COATED ORAL at 21:12

## 2024-03-17 RX ADMIN — Medication 125 MILLIGRAM(S): at 15:04

## 2024-03-17 RX ADMIN — CEFTRIAXONE 100 MILLIGRAM(S): 500 INJECTION, POWDER, FOR SOLUTION INTRAMUSCULAR; INTRAVENOUS at 15:04

## 2024-03-17 RX ADMIN — Medication 6: at 21:13

## 2024-03-17 RX ADMIN — Medication 25 GRAM(S): at 15:41

## 2024-03-17 RX ADMIN — Medication 4: at 23:39

## 2024-03-17 RX ADMIN — Medication 3 MILLILITER(S): at 14:39

## 2024-03-17 RX ADMIN — Medication 2 GRAM(S): at 17:44

## 2024-03-17 RX ADMIN — CLOPIDOGREL BISULFATE 75 MILLIGRAM(S): 75 TABLET, FILM COATED ORAL at 21:12

## 2024-03-17 RX ADMIN — ACETAZOLAMIDE 500 MILLIGRAM(S): 250 TABLET ORAL at 21:13

## 2024-03-17 RX ADMIN — Medication 1 PATCH: at 21:13

## 2024-03-17 RX ADMIN — Medication 40 MILLIGRAM(S): at 17:15

## 2024-03-17 NOTE — PATIENT PROFILE ADULT - FALL HARM RISK - HARM RISK INTERVENTIONS
Assistance with ambulation/Assistance OOB with selected safe patient handling equipment/Communicate Risk of Fall with Harm to all staff/Discuss with provider need for PT consult/Monitor gait and stability/Provide patient with walking aids - walker, cane, crutches/Reinforce activity limits and safety measures with patient and family/Review medications for side effects contributing to fall risk/Sit up slowly, dangle for a short time, stand at bedside before walking/Tailored Fall Risk Interventions/Toileting schedule using arm’s reach rule for commode and bathroom/Visual Cue: Yellow wristband and red socks/Bed in lowest position, wheels locked, appropriate side rails in place/Call bell, personal items and telephone in reach/Instruct patient to call for assistance before getting out of bed or chair/Non-slip footwear when patient is out of bed/[place] to call system/Physically safe environment - no spills, clutter or unnecessary equipment/Purposeful Proactive Rounding/Room/bathroom lighting operational, light cord in reach

## 2024-03-17 NOTE — ED PROVIDER NOTE - OBJECTIVE STATEMENT
78-year-old male past medical history of open heart surgery for valve replacement many years ago, chronic smoker, diabetes, hypertension, currently on Coumadin but for unknown reason, who presents for cough, chills, shortness of breath and chest pain.  Chest pain started earlier today which prompted ambulance call.  Denies any abdominal pain nausea vomiting or active pain at this time.  Patient reports shortness of breath with exertion.  Patient accompanied by wife and son 78-year-old male past medical history of open heart surgery for valve replacement many years ago, chronic smoker, diabetes, hypertension, currently on Coumadin but for unknown reason, who presents for cough, chills, shortness of breath and chest pain.  Chest pain described as brief tightness which started earlier today which prompted ambulance call.  Denies any abdominal pain, nausea, vomiting, or active chest pain at this time.  Patient reports shortness of breath with exertion.  Patient accompanied by wife and son

## 2024-03-17 NOTE — ED ADULT NURSE NOTE - OBJECTIVE STATEMENT
Pt BIBA- from home, wife and son at bedside, AOx3, responsive, ambulatory at baseline. Per EMS pt was 70% oxygen sat on RA and placed on NRB. Pt having difficulty completing sentence and c/o SOB. Per wife pt c/o SOB, CP, chills, and wet cough for "couple of days." Pt states CP has resolved at this time; denies n/v/d,  symptoms. ecg and fs completed. placed on cardiac monitor AFib noted, and placed on NRB with oxygen saturation at 93% (MD made aware). 20g iv placed to RAC. Pt BIBA- from home, wife and son at bedside, AOx3, responsive, ambulatory at baseline. Per EMS pt was 70% oxygen sat on RA and placed on NRB. Pt having difficulty completing sentence and c/o SOB. Per wife pt c/o SOB, CP, chills, and wet cough for "couple of days." Pt states CP has resolved at this time; denies n/v/d,  symptoms. ecg and fs completed. placed on cardiac monitor AFib noted, and placed on NRB with oxygen saturation at 93% (MD made aware). 20g iv placed to RAC. states he is on warfarin. NKDA. PMH Atrial fibrillation on AC, Bilateral carotid artery stenosis ICA stenosisCoronary artery disease Hyperlipidemia Hypertension Peripheral vascular disease Stented coronary artery 2 stent placements - [id number] (type 2 diabetes mellitus) TIA (transient ischemic attack) 2020 - recent hospitalization and received s/p TAVR

## 2024-03-17 NOTE — ED PROVIDER NOTE - PHYSICAL EXAMINATION
Gen: aox3, nad,   Head: NCAT  ENT: Airway patent, moist mucous membranes, nasal passageways clear   Cardiac: Normal rate, irregular l rhythm   Respiratory: bibasilar crackles, otherwise clear, with normal work of breathing   Gastrointestinal: Abdomen soft, nontender, nondistended, no rebound, no guarding  MSK: No gross abnormalities, FROM of all four extremities, mild b/l lower edema  HEME: Extremities warm, pulses intact and symmetrical in all four extremities  Skin: No rashes, no lesions  Neuro: No gross neurologic deficits Gen: aox3, nad,   Head: NCAT  ENT: Airway patent, moist mucous membranes, nasal passageways clear   Cardiac: Normal rate, irregular l rhythm   Respiratory: bibasilar crackles, otherwise clear, with normal work of breathing at rest but tachypnea with exertional activity   Gastrointestinal: Abdomen soft, nontender, nondistended, no rebound, no guarding  MSK: No gross abnormalities, FROM of all four extremities, mild b/l lower edema  HEME: Extremities warm, pulses intact and symmetrical in all four extremities  Skin: No rashes, no lesions  Neuro: No gross neurologic deficits

## 2024-03-17 NOTE — ED PROVIDER NOTE - NSICDXPASTMEDICALHX_GEN_ALL_CORE_FT
PAST MEDICAL HISTORY:  Atrial fibrillation on AC    Bilateral carotid artery stenosis ICA stenosis    Coronary artery disease     Hyperlipidemia     Hypertension     Peripheral vascular disease     Stented coronary artery 2 stent placements - 9/2020    T2DM (type 2 diabetes mellitus)     TIA (transient ischemic attack) 2020 - recent hospitalization and received s/p TAVR

## 2024-03-17 NOTE — ED PROVIDER NOTE - CARE PLAN
Principal Discharge DX:	CHF exacerbation   1 Principal Discharge DX:	CHF exacerbation  Secondary Diagnosis:	Acute hypoxic respiratory failure

## 2024-03-17 NOTE — ED PROVIDER NOTE - CLINICAL SUMMARY MEDICAL DECISION MAKING FREE TEXT BOX
78-year-old male past medical history of open heart surgery for valve replacement many years ago, chronic smoker, diabetes, hypertension, currently on Coumadin but for unknown reason, who presents for cough, chills, shortness of breath and chest pain.  Chest pain started earlier today which prompted ambulance call.  Denies any abdominal pain nausea vomiting or active pain at this time.  Patient reports shortness of breath with exertion.  Patient accompanied by wife and son    - hypoxia to 55% on room air, 91% on %, initial tx for pna/ covid/ copd exacerbation without improvemetn, cxr with pulm edema - lasix ordered with good urine output, trop wnl, no acute stemi on ekg, icu consulted for hypoxia, admission 78-year-old male past medical history of TAVR, CAD, ICA stenosis sp endarterectomy, LE stents, chronic smoker, diabetes, hypertension, currently on Coumadin but for unknown reason, who presents for cough, chills, shortness of breath and chest pain.  Chest pain started earlier today which prompted ambulance call.  Denies any abdominal pain nausea vomiting or active pain at this time.  Patient reports shortness of breath with exertion.  Patient accompanied by wife and son  - hypoxia to 55% on room air, 91% on %, initial tx for pna/ covid/ copd exacerbation with nebs, steroids, abx,  without improvement, cxr with pulm edema - - pt with signs of hypervolemia, crackles at lung bases, peripheral edema, but extremities warm, no signs of cardiogenic shock - suspected acute CHF exacerbation, lasix ordered with good urine output, trop wnl, no acute stemi on ekg, initial troponin wnl, afib rate controlled on monitor, PE unlikely as pt with supratherapeutic INR and ddimer age adjusted makes VTE unlikley,  icu consulted for hypoxia, admission

## 2024-03-17 NOTE — ED PROVIDER NOTE - PROGRESS NOTE DETAILS
Lockhart DO: pt stable, started on hi flow after ABG results, seen by ICU, put out 400 cc urine, recommending continued diuresis and admission to ICU, repeat trop ordered

## 2024-03-17 NOTE — ED ADULT TRIAGE NOTE - CHIEF COMPLAINT QUOTE
BIBA- from home  SOB, CP, chills and cough for a few days. Wife noted he is more lethargic  EMS POX 72%RA  HX DM, HTN, CAD

## 2024-03-17 NOTE — H&P ADULT - ASSESSMENT
77yo M hx of Severe AS s/p TAVR, afib on coumadin, carotid endarterectomy ('21) on plavix, DM, active smoker, PAD, CAD s/p PCI presents to ED with weakness and SOB, found to be severely hypoxemic requiring Hiflo NC. Admitted to ICU for mngmt of ADHF    # Neuro:  -A/Ox3  - pain control: prn Tylenol   -mobilize oob to chair as able     #Resp:  //HRF  -CXR with evidence of pulm edema  -POCUS with diffuse B lines bilat and bilat effusions   -ABG with: pH, Arterial: 7.39  pH /  pCO2: 36    /  pO2: 63    / HCO3: 22    / Base Excess: -2.7  /  SaO2: 89.8  on 100% FiO2  - pt currently without dyspnia satting 94% on NRB  -switched to Hiflo 100%/50L   -diuresis as below   -duonebs prn with hx of active smoking   -monitor respiratory status closely       #CV:  //ADHF  - no reported hx of CHF but hx of TAVR for severe AS and CAD s/p pci  -ECHO 2020 with moderate LVH  -POCUS with preserved LV systolic function but with RV dilation and possible septal bowing  - RV dilation likely from RV dysfx from CHF, PE less likely given pt anticoagulated on coumadin and with pulm edema on imaging   - responded to 20 lasix in ED; give another 40 stat and maintain on 80 IV BID +diamox   -will cont home amlodipine for afterload reduction and prn hydral (will confirm other BP meds with pharmacy)  -f/u repeat TTE  - chest pain likely from ADHF and less likely primary ACS as EKG without significant ST changes and trops wnl   -HD stable without vasopressor requirements  - MAP goal>65  -cards consult in AM    #GI:  -Diet: npo for now on 100% fio2  - abd soft       #Renal:  //DOUG without unknown baseline   -likely from adhf; cont to trend   - diuresis as above   - jordi cont to monitor strict I/Os  - replete lytes as appropriate     #ID:  - afebrile, wbc ct of 10  -s/p abx in ED  - cont to monitor off abx for now as no real infectious sx and f/u cultures and urine studies     #Heme:  //A/C  - cbc stable  -INR 4 on coumadin   - will follow INR in AM and transition to heparin drip/lovenox for afib when level acceptable   -SCDs    #Endo:  //sliding scale   - maintaining goal glucose<180 with ISS  - cont to monitor FS    d/w dr Quesada and family at bedside  77yo M hx of Severe AS s/p TAVR, afib on coumadin, carotid endarterectomy ('21) on plavix, DM, active smoker, PAD, CAD s/p PCI presents to ED with weakness and SOB, found to be severely hypoxemic requiring Hiflo NC. Admitted to ICU for mngmt of ADHF    # Neuro:  -A/Ox3  - pain control: prn Tylenol   -mobilize oob to chair as able     #Resp:  //HRF  -CXR with evidence of pulm edema  -POCUS with diffuse B lines bilat and bilat effusions   -ABG with: pH, Arterial: 7.39  pH /  pCO2: 36    /  pO2: 63    / HCO3: 22    / Base Excess: -2.7  /  SaO2: 89.8  on 100% FiO2  - pt currently without dyspnia satting 94% on NRB  -switched to Hiflo 100%/50L   -diuresis as below   -duonebs prn with hx of active smoking   -monitor respiratory status closely       #CV:  //ADHF  - no reported hx of CHF but hx of TAVR for severe AS and CAD s/p pci  -ECHO 2020 with moderate LVH  -POCUS with preserved LV systolic function but with RV dilation and possible septal bowing  - RV dilation likely from RV dysfx from CHF, PE less likely given pt anticoagulated on coumadin and with pulm edema on imaging   - responded to 20 lasix in ED; give another 40 stat and maintain on 80 IV BID +diamox   -will cont home amlodipine for afterload reduction and prn hydral (will confirm other BP meds with pharmacy)  -f/u repeat TTE  - chest pain likely from ADHF and less likely primary ACS as EKG without significant ST changes and trops wnl   -HD stable without vasopressor requirements  - MAP goal>65  -cards consult in AM    #GI:  -Diet: npo for now on 100% fio2  - abd soft       #Renal:  //DOUG without unknown baseline   -likely from adhf; cont to trend   - diuresis as above   - jordi cont to monitor strict I/Os  - replete lytes as appropriate     #ID:  - afebrile, wbc ct of 10  -s/p abx in ED  - cont to monitor off abx for now as no real infectious sx and f/u cultures and urine studies     #Heme:  //A/C  - cbc stable  -INR 4 on coumadin   - will follow INR in AM and transition to heparin drip/lovenox for afib when level acceptable   -SCDs    #Endo:  //sliding scale   - maintaining goal glucose<180 with ISS  - cont to monitor FS    d/w dr Quesada and family at bedside     cct: i spent 45 minutes assessing presenting problems of acute illness, which pose high probability of life threatening deterioration or end organ damage/dysfunction, as well as medical decision making including initiating plan of care, reviewing data, reviewing radiologic exams, discussing with multidisciplinary team,  discussing goals of care with patient/family, and writing this note.  Non-inclusive of procedures performed,

## 2024-03-17 NOTE — H&P ADULT - CRITICAL CARE ATTENDING COMMENT
Pt is a 79 yo M with h/o severe AS s/p TAVR, CAD s/p PCI, A fib on Coumadin, DM, PAD, CEA ('21) on Plavix, DM and active smoker presented 2 to weakness and SOB. Pt states that he has been feeling weak, dizzy, and SOB with cough with white phlegm for last 4 days. Pt states he recently started having left sided chest pain and worsening SOB and so he called EMS.  In the ER pt was HD stable and hypoxemic to 50s and eventually required  NRB. Pt is a 77 yo M with h/o severe AS s/p TAVR, CAD s/p PCI, A fib on Coumadin, DM, PAD, CEA ('21) on Plavix, DM and active smoker presented 2 to weakness and SOB. Pt states that he has been feeling weak, dizzy, and SOB with cough with white phlegm for last 4 days. Pt states he recently started having left sided chest pain and worsening SOB and so he called EMS.  In the ER pt was HD stable and hypoxemic to 50s and eventually required  NRB. CXR: CM with vascular congestion. ICU dx: Acute hypoxic resp failure 2 to pulm edema    Resp: Placed on HiFlo nc with decreasing FiO2/ Pt with 50 pk yr smoker ? component of COPD  CVS/Renal: May decrease diuresis/ Echo bubbles study/ Recommendation as per Cardio evaluation  Heme: Pt coagulopathic cont to trend INR/ DVT prophylaxis with Venodynes  FEN: Po diet  Endo: Follow FS and cover with Lispro  Social: Son at the bedside and updated    CCT: 45 min not in conjunction with the PA

## 2024-03-17 NOTE — ED ADULT NURSE NOTE - NS ED NURSE TRANSPORT WITH
NRB per respiratory, and hiflow oxygen transported with respiratory tech/Cardiac Monitor/Defib/ACLS/Rescue Kit/O2/BVM/oxygen/IV pump NRB per respiratory, and hiflow oxygen transported with respiratory tech/Cardiac Monitor/Defib/ACLS/Rescue Kit/O2/BVM/oxygen/IV pump/ACLS Rescue Kit

## 2024-03-17 NOTE — H&P ADULT - HISTORY OF PRESENT ILLNESS
77yo M hx of Severe AS s/p TAVR, afib on coumadin, carotid endarterectomy ('21) on plavix, DM, active smoker, PAD, CAD s/p PCI presents to ED with weakness and SOB. Pt states that he has been feeling weak, dizzy, and SOB with cough with white phlegm for last 4 days. Pt states he recently started having left sided chest pain and worsening SOB and so he called EMS. Pt denies sick contacts, denies hx of CHF, COPD, or any such episode ever happening to him before. He doesnt see a cardiologist. He also doesnt know exactly what meds he is on besides coumadin, plavix and "BP meds". In the ED pt was HD stable and hypoxemic to 50s refractive to NC and was upgraded to NRB. Labs with INR 4, mild leukocytosis and elevated BNP. CXR with pulm edema. ICU consulted for hypoxemia.    Subjective: Pt seen and examined at the bedside. Pt satting 94% on NRB breathing comfortably with /80 and HR 92  Pt endorses feeling better since coming to the ED and is currently in no distress    POCUS with RV dilation and diffuse B lines bilat     Pt accepted to icu for further mngmt  79yo M hx of Severe AS s/p TAVR, afib on coumadin, carotid endarterectomy ('21) on plavix, DM, active smoker, PAD, CAD s/p PCI presents to ED with weakness and SOB. Pt states that he has been feeling weak, dizzy, and SOB with cough with white phlegm for last 4 days. Pt states he recently started having left sided chest pain and worsening SOB and so he called EMS. Pt denies sick contacts, denies hx of CHF, COPD, or any such episode ever happening to him before. Denies fevers, chills, n/v/d or abd pain. He doesnt see a cardiologist. He also doesnt know exactly what meds he is on besides coumadin, plavix and "BP meds". In the ED pt was HD stable and hypoxemic to 50s refractive to NC and was upgraded to NRB. Labs with INR 4, mild leukocytosis and elevated BNP. CXR with pulm edema. ICU consulted for hypoxemia.    Subjective: Pt seen and examined at the bedside. Pt satting 94% on NRB breathing comfortably with /80 and HR 92  Pt endorses feeling better since coming to the ED and is currently in no distress    POCUS with RV dilation and diffuse B lines bilat     Pt accepted to icu for further mngmt

## 2024-03-17 NOTE — ED ADULT NURSE NOTE - NSFALLHARMRISKINTERV_ED_ALL_ED

## 2024-03-17 NOTE — H&P ADULT - NSHPPHYSICALEXAM_GEN_ALL_CORE
GENERAL: NAD, lying in bed comfortably  HEAD:  Atraumatic, normocephalic  EYES: EOMI, PERRL  NECK: Supple, trachea midline  HEART: irregular rate and rhythm  LUNGS: Unlabored respirations.  crackles bilat at bases with faint expiratory wheezing  ABDOMEN: Soft, nontender, nondistended  EXTREMITIES: warm with 1+ bilat LE edema   NERVOUS SYSTEM:  A&Ox3, moving all extremities, no focal deficits

## 2024-03-17 NOTE — ED ADULT NURSE REASSESSMENT NOTE - NS ED NURSE REASSESS COMMENT FT1
MD Childress infomed that per pt wife. Pt was taken of amlodipine by PCP, because of swollen lower extremities when said medication is taken.

## 2024-03-17 NOTE — PATIENT PROFILE ADULT - HEALTH LITERACY UNDERSTANDING DOCTOR- DETAILS
difficult to understand at times, and has difficulty understanding some basic profile questions.  Also needs"bigger print" - as per patient request.

## 2024-03-18 LAB
A1C WITH ESTIMATED AVERAGE GLUCOSE RESULT: 6.8 % — HIGH (ref 4–5.6)
ALBUMIN SERPL ELPH-MCNC: 2.7 G/DL — LOW (ref 3.3–5)
ALP SERPL-CCNC: 78 U/L — SIGNIFICANT CHANGE UP (ref 40–120)
ALT FLD-CCNC: 22 U/L — SIGNIFICANT CHANGE UP (ref 12–78)
ANION GAP SERPL CALC-SCNC: 11 MMOL/L — SIGNIFICANT CHANGE UP (ref 5–17)
APTT BLD: 53 SEC — HIGH (ref 24.5–35.6)
AST SERPL-CCNC: 30 U/L — SIGNIFICANT CHANGE UP (ref 15–37)
BASOPHILS # BLD AUTO: 0.02 K/UL — SIGNIFICANT CHANGE UP (ref 0–0.2)
BASOPHILS NFR BLD AUTO: 0.3 % — SIGNIFICANT CHANGE UP (ref 0–2)
BILIRUB SERPL-MCNC: 1 MG/DL — SIGNIFICANT CHANGE UP (ref 0.2–1.2)
BUN SERPL-MCNC: 37 MG/DL — HIGH (ref 7–23)
CALCIUM SERPL-MCNC: 9.2 MG/DL — SIGNIFICANT CHANGE UP (ref 8.5–10.1)
CHLORIDE SERPL-SCNC: 105 MMOL/L — SIGNIFICANT CHANGE UP (ref 96–108)
CHOLEST SERPL-MCNC: 86 MG/DL — SIGNIFICANT CHANGE UP
CO2 SERPL-SCNC: 25 MMOL/L — SIGNIFICANT CHANGE UP (ref 22–31)
CREAT SERPL-MCNC: 1.46 MG/DL — HIGH (ref 0.5–1.3)
CULTURE RESULTS: NO GROWTH — SIGNIFICANT CHANGE UP
EGFR: 49 ML/MIN/1.73M2 — LOW
EOSINOPHIL # BLD AUTO: 0 K/UL — SIGNIFICANT CHANGE UP (ref 0–0.5)
EOSINOPHIL NFR BLD AUTO: 0 % — SIGNIFICANT CHANGE UP (ref 0–6)
ESTIMATED AVERAGE GLUCOSE: 148 MG/DL — HIGH (ref 68–114)
GAS PNL BLDA: SIGNIFICANT CHANGE UP
GLUCOSE BLDC GLUCOMTR-MCNC: 207 MG/DL — HIGH (ref 70–99)
GLUCOSE BLDC GLUCOMTR-MCNC: 207 MG/DL — HIGH (ref 70–99)
GLUCOSE BLDC GLUCOMTR-MCNC: 251 MG/DL — HIGH (ref 70–99)
GLUCOSE BLDC GLUCOMTR-MCNC: 280 MG/DL — HIGH (ref 70–99)
GLUCOSE SERPL-MCNC: 173 MG/DL — HIGH (ref 70–99)
HCT VFR BLD CALC: 40.9 % — SIGNIFICANT CHANGE UP (ref 39–50)
HDLC SERPL-MCNC: 30 MG/DL — LOW
HGB BLD-MCNC: 13.3 G/DL — SIGNIFICANT CHANGE UP (ref 13–17)
IMM GRANULOCYTES NFR BLD AUTO: 0.3 % — SIGNIFICANT CHANGE UP (ref 0–0.9)
INR BLD: 3.05 RATIO — HIGH (ref 0.85–1.18)
LACTATE SERPL-SCNC: 3.6 MMOL/L — HIGH (ref 0.7–2)
LEGIONELLA AG UR QL: NEGATIVE — SIGNIFICANT CHANGE UP
LIPID PNL WITH DIRECT LDL SERPL: 43 MG/DL — SIGNIFICANT CHANGE UP
LYMPHOCYTES # BLD AUTO: 0.79 K/UL — LOW (ref 1–3.3)
LYMPHOCYTES # BLD AUTO: 13.2 % — SIGNIFICANT CHANGE UP (ref 13–44)
MAGNESIUM SERPL-MCNC: 1.6 MG/DL — SIGNIFICANT CHANGE UP (ref 1.6–2.6)
MCHC RBC-ENTMCNC: 32.2 PG — SIGNIFICANT CHANGE UP (ref 27–34)
MCHC RBC-ENTMCNC: 32.5 G/DL — SIGNIFICANT CHANGE UP (ref 32–36)
MCV RBC AUTO: 99 FL — SIGNIFICANT CHANGE UP (ref 80–100)
MONOCYTES # BLD AUTO: 0.37 K/UL — SIGNIFICANT CHANGE UP (ref 0–0.9)
MONOCYTES NFR BLD AUTO: 6.2 % — SIGNIFICANT CHANGE UP (ref 2–14)
MRSA PCR RESULT.: SIGNIFICANT CHANGE UP
NEUTROPHILS # BLD AUTO: 4.77 K/UL — SIGNIFICANT CHANGE UP (ref 1.8–7.4)
NEUTROPHILS NFR BLD AUTO: 80 % — HIGH (ref 43–77)
NON HDL CHOLESTEROL: 57 MG/DL — SIGNIFICANT CHANGE UP
NRBC # BLD: 0 /100 WBCS — SIGNIFICANT CHANGE UP (ref 0–0)
NT-PROBNP SERPL-SCNC: 2111 PG/ML — HIGH (ref 0–450)
PHOSPHATE SERPL-MCNC: 3.6 MG/DL — SIGNIFICANT CHANGE UP (ref 2.5–4.5)
PLATELET # BLD AUTO: 172 K/UL — SIGNIFICANT CHANGE UP (ref 150–400)
POTASSIUM SERPL-MCNC: 4.2 MMOL/L — SIGNIFICANT CHANGE UP (ref 3.5–5.3)
POTASSIUM SERPL-SCNC: 4.2 MMOL/L — SIGNIFICANT CHANGE UP (ref 3.5–5.3)
PROT SERPL-MCNC: 8.8 GM/DL — HIGH (ref 6–8.3)
PROTHROM AB SERPL-ACNC: 35.4 SEC — HIGH (ref 9.5–13)
RBC # BLD: 4.13 M/UL — LOW (ref 4.2–5.8)
RBC # FLD: 14.7 % — HIGH (ref 10.3–14.5)
S AUREUS DNA NOSE QL NAA+PROBE: SIGNIFICANT CHANGE UP
S PNEUM AG UR QL: NEGATIVE — SIGNIFICANT CHANGE UP
SODIUM SERPL-SCNC: 141 MMOL/L — SIGNIFICANT CHANGE UP (ref 135–145)
SPECIMEN SOURCE: SIGNIFICANT CHANGE UP
TRIGL SERPL-MCNC: 61 MG/DL — SIGNIFICANT CHANGE UP
TSH SERPL-MCNC: 1.16 UIU/ML — SIGNIFICANT CHANGE UP (ref 0.36–3.74)
WBC # BLD: 5.97 K/UL — SIGNIFICANT CHANGE UP (ref 3.8–10.5)
WBC # FLD AUTO: 5.97 K/UL — SIGNIFICANT CHANGE UP (ref 3.8–10.5)

## 2024-03-18 PROCEDURE — 99291 CRITICAL CARE FIRST HOUR: CPT

## 2024-03-18 RX ORDER — GABAPENTIN 400 MG/1
100 CAPSULE ORAL
Refills: 0 | Status: DISCONTINUED | OUTPATIENT
Start: 2024-03-18 | End: 2024-03-26

## 2024-03-18 RX ORDER — INSULIN LISPRO 100/ML
VIAL (ML) SUBCUTANEOUS
Refills: 0 | Status: DISCONTINUED | OUTPATIENT
Start: 2024-03-18 | End: 2024-03-26

## 2024-03-18 RX ORDER — MAGNESIUM SULFATE 500 MG/ML
2 VIAL (ML) INJECTION ONCE
Refills: 0 | Status: COMPLETED | OUTPATIENT
Start: 2024-03-18 | End: 2024-03-18

## 2024-03-18 RX ORDER — FUROSEMIDE 40 MG
40 TABLET ORAL DAILY
Refills: 0 | Status: DISCONTINUED | OUTPATIENT
Start: 2024-03-18 | End: 2024-03-18

## 2024-03-18 RX ORDER — FUROSEMIDE 40 MG
40 TABLET ORAL
Refills: 0 | Status: DISCONTINUED | OUTPATIENT
Start: 2024-03-18 | End: 2024-03-18

## 2024-03-18 RX ORDER — HYDROMORPHONE HYDROCHLORIDE 2 MG/ML
0.25 INJECTION INTRAMUSCULAR; INTRAVENOUS; SUBCUTANEOUS ONCE
Refills: 0 | Status: DISCONTINUED | OUTPATIENT
Start: 2024-03-18 | End: 2024-03-18

## 2024-03-18 RX ORDER — FUROSEMIDE 40 MG
40 TABLET ORAL DAILY
Refills: 0 | Status: DISCONTINUED | OUTPATIENT
Start: 2024-03-19 | End: 2024-03-23

## 2024-03-18 RX ORDER — ACETAMINOPHEN 500 MG
650 TABLET ORAL EVERY 6 HOURS
Refills: 0 | Status: DISCONTINUED | OUTPATIENT
Start: 2024-03-18 | End: 2024-03-26

## 2024-03-18 RX ORDER — GABAPENTIN 400 MG/1
100 CAPSULE ORAL
Refills: 0 | Status: DISCONTINUED | OUTPATIENT
Start: 2024-03-18 | End: 2024-03-18

## 2024-03-18 RX ADMIN — CHLORHEXIDINE GLUCONATE 1 APPLICATION(S): 213 SOLUTION TOPICAL at 05:26

## 2024-03-18 RX ADMIN — Medication 4: at 05:26

## 2024-03-18 RX ADMIN — Medication 650 MILLIGRAM(S): at 17:00

## 2024-03-18 RX ADMIN — Medication 650 MILLIGRAM(S): at 09:00

## 2024-03-18 RX ADMIN — Medication 1 PATCH: at 11:52

## 2024-03-18 RX ADMIN — SIMVASTATIN 10 MILLIGRAM(S): 20 TABLET, FILM COATED ORAL at 22:10

## 2024-03-18 RX ADMIN — Medication 650 MILLIGRAM(S): at 16:03

## 2024-03-18 RX ADMIN — Medication 4: at 22:33

## 2024-03-18 RX ADMIN — CLOPIDOGREL BISULFATE 75 MILLIGRAM(S): 75 TABLET, FILM COATED ORAL at 11:51

## 2024-03-18 RX ADMIN — Medication 1 PATCH: at 19:20

## 2024-03-18 RX ADMIN — GABAPENTIN 100 MILLIGRAM(S): 400 CAPSULE ORAL at 08:59

## 2024-03-18 RX ADMIN — Medication 6: at 17:13

## 2024-03-18 RX ADMIN — HYDROMORPHONE HYDROCHLORIDE 0.25 MILLIGRAM(S): 2 INJECTION INTRAMUSCULAR; INTRAVENOUS; SUBCUTANEOUS at 12:14

## 2024-03-18 RX ADMIN — Medication 1 PATCH: at 07:30

## 2024-03-18 RX ADMIN — Medication 650 MILLIGRAM(S): at 10:00

## 2024-03-18 RX ADMIN — Medication 6: at 11:48

## 2024-03-18 RX ADMIN — HYDROMORPHONE HYDROCHLORIDE 0.25 MILLIGRAM(S): 2 INJECTION INTRAMUSCULAR; INTRAVENOUS; SUBCUTANEOUS at 13:00

## 2024-03-18 RX ADMIN — GABAPENTIN 100 MILLIGRAM(S): 400 CAPSULE ORAL at 17:12

## 2024-03-18 RX ADMIN — Medication 25 GRAM(S): at 08:20

## 2024-03-18 RX ADMIN — Medication 80 MILLIGRAM(S): at 05:26

## 2024-03-18 NOTE — CONSULT NOTE ADULT - SUBJECTIVE AND OBJECTIVE BOX
PAIGE Duckwater  [id number]    Date of Consult:  3/18/24  CHIEF COMPLAINT:  sob  HISTORY OF PRESENT ILLNESS:  78M hx of Severe AS s/p TAVR, afib on coumadin, carotid endarterectomy ('21) on plavix, DM, active smoker, PAD, CAD s/p PCI presents to ED with weakness and SOB. poor historian. states symptoms for a few days. does not follow with cardio as outpt. unclear on home meds. pt endorsing sob, and edema over past few days. denies change in meds, diet. denies pain in chest.  in ED, pt hypoxic to 50s per chart, placed in high flow, then NRB s/p IV lasix.  CXR with pulm edema. sent to ICU ICU. inr 4. per icu team, bedside echo showing RV dilation.     home med list obtained from wife via telphone: lisinopril 20 toprol 100 plavix metformin zocor coumadin 4     currently pt comfortable appearing, on high flow NC, speaking full sentences. states sob improved from admission, edema improved from admission      Allergies    No Known Allergies    Intolerances    	    MEDICATIONS:  acetaZOLAMIDE Injectable 500 milliGRAM(s) IV Push <User Schedule>  clopidogrel Tablet 75 milliGRAM(s) Oral daily  furosemide   Injectable 80 milliGRAM(s) IV Push two times a day  hydrALAZINE Injectable 10 milliGRAM(s) IV Push every 6 hours PRN      albuterol    0.083% 2.5 milliGRAM(s) Nebulizer every 6 hours PRN    acetaminophen     Tablet .. 650 milliGRAM(s) Oral every 6 hours PRN  gabapentin 100 milliGRAM(s) Oral two times a day  HYDROmorphone  Injectable 0.25 milliGRAM(s) IV Push once      insulin lispro (ADMELOG) corrective regimen sliding scale   SubCutaneous every 6 hours  simvastatin 10 milliGRAM(s) Oral at bedtime    chlorhexidine 2% Cloths 1 Application(s) Topical <User Schedule>  influenza  Vaccine (HIGH DOSE) 0.7 milliLiter(s) IntraMuscular once      PAST MEDICAL & SURGICAL HISTORY:  Coronary artery disease      Hyperlipidemia      Hypertension      Atrial fibrillation  on AC      Peripheral vascular disease      T2DM (type 2 diabetes mellitus)      Stented coronary artery  2 stent placements - 9/2020      Bilateral carotid artery stenosis  ICA stenosis      TIA (transient ischemic attack)  2020 - recent hospitalization and received s/p TAVR      History of coronary artery stent placement  two coronary stents; most recent 9/21/2020 to OM2      Status post angiography of extremity  1 stent in each leg      S/P carotid endarterectomy  left  9/2020      S/P TAVR (transcatheter aortic valve replacement)  10/22/2020          FAMILY HISTORY:  FH: diabetes mellitus  mother    FH: type 1 diabetes  son    FH: seizures  son    FH: CAD (coronary artery disease)  son        SOCIAL HISTORY:    +TOB daily.   no etoh, drugs      REVIEW OF SYSTEMS:  See HPI. Otherwise, 10 point ROS done and otherwise negative.    PHYSICAL EXAM:  T(C): 36.2 (03-18-24 @ 08:00), Max: 36.8 (03-17-24 @ 14:56)  HR: 88 (03-18-24 @ 10:00) (73 - 108)  BP: 162/80 (03-18-24 @ 10:00) (121/69 - 178/131)  RR: 21 (03-18-24 @ 10:00) (18 - 29)  SpO2: 100% (03-18-24 @ 10:00) (72% - 100%)  Wt(kg): --  I&O's Summary    17 Mar 2024 07:01  -  18 Mar 2024 07:00  --------------------------------------------------------  IN: 0 mL / OUT: 3220 mL / NET: -3220 mL    18 Mar 2024 07:01  -  18 Mar 2024 11:55  --------------------------------------------------------  IN: 50 mL / OUT: 400 mL / NET: -350 mL        Appearance: Normal	  HEENT:   Normal oral mucosa, PERRL, EOMI	  Lymphatic: No lymphadenopathy  Cardiovascular: Normal S1 S2, No JVD, No murmurs, No edema  Respiratory: b/l crackles	  Psychiatry: A & O x 3, Mood & affect appropriate  Gastrointestinal:  Soft, Non-tender, + BS	  Skin: No rashes, No ecchymoses, No cyanosis	  Neurologic: Non-focal  Extremities: Normal range of motion, No clubbing, cyanosis      LABS:	 	    CBC Full  -  ( 18 Mar 2024 02:40 )  WBC Count : 5.97 K/uL  Hemoglobin : 13.3 g/dL  Hematocrit : 40.9 %  Platelet Count - Automated : 172 K/uL  Mean Cell Volume : 99.0 fl  Mean Cell Hemoglobin : 32.2 pg  Mean Cell Hemoglobin Concentration : 32.5 g/dL  Auto Neutrophil # : 4.77 K/uL  Auto Lymphocyte # : 0.79 K/uL  Auto Monocyte # : 0.37 K/uL  Auto Eosinophil # : 0.00 K/uL  Auto Basophil # : 0.02 K/uL  Auto Neutrophil % : 80.0 %  Auto Lymphocyte % : 13.2 %  Auto Monocyte % : 6.2 %  Auto Eosinophil % : 0.0 %  Auto Basophil % : 0.3 %    03-18    141  |  105  |  37<H>  ----------------------------<  173<H>  4.2   |  25  |  1.46<H>  03-17    141  |  106  |  40<H>  ----------------------------<  265<H>  5.3   |  22  |  1.43<H>    Ca    9.2      18 Mar 2024 02:40  Ca    9.3      17 Mar 2024 20:45  Phos  3.6     03-18  Phos  4.0     03-17  Mg     1.6     03-18  Mg     1.7     03-17    TPro  8.8<H>  /  Alb  2.7<L>  /  TBili  1.0  /  DBili  x   /  AST  30  /  ALT  22  /  AlkPhos  78  03-18  TPro  8.9<H>  /  Alb  2.7<L>  /  TBili  1.1  /  DBili  x   /  AST  52<H>  /  ALT  26  /  AlkPhos  79  03-17      proBNP:   Lipid Profile:   HgA1c:   TSH: Thyroid Stimulating Hormone, Serum: 1.160 uIU/mL (03-18 @ 02:40)        CARDIAC MARKERS:            TELEMETRY: 	    ECG:  	  RADIOLOGY:  OTHER: 	    PREVIOUS DIAGNOSTIC TESTING:    [ ] Echocardiogram:  [ ]  Catheterization:  [ ] Stress Test:  	  	  ASSESSMENT/PLAN: 	    Héctor Pepper MD

## 2024-03-18 NOTE — CONSULT NOTE ADULT - ASSESSMENT
78M hx of Severe AS s/p TAVR, afib on coumadin, carotid endarterectomy ('21) on plavix, DM, active smoker, PAD, CAD s/p PCI presents to ED with weakness and SOB.     poor historian.   states symptoms for a few days. does not follow with cardio as outpt.   last tte in chart from 2020 with preserved LV systolic function, dilated RV  remains in hypoxic resp failure, on high flow  crackles on exam, although edema resolved.   change lasix to 40 iv qd  coumadin on hold for elevated INR  repeat tte pending   tele showing afib with pvc's  also with +RVP, cont supportive care per primary team  will follow with you  critically ill

## 2024-03-19 ENCOUNTER — RESULT REVIEW (OUTPATIENT)
Age: 79
End: 2024-03-19

## 2024-03-19 LAB
ALBUMIN SERPL ELPH-MCNC: 2.6 G/DL — LOW (ref 3.3–5)
ALP SERPL-CCNC: 79 U/L — SIGNIFICANT CHANGE UP (ref 40–120)
ALT FLD-CCNC: 23 U/L — SIGNIFICANT CHANGE UP (ref 12–78)
ANION GAP SERPL CALC-SCNC: 6 MMOL/L — SIGNIFICANT CHANGE UP (ref 5–17)
ANION GAP SERPL CALC-SCNC: 7 MMOL/L — SIGNIFICANT CHANGE UP (ref 5–17)
ANION GAP SERPL CALC-SCNC: 8 MMOL/L — SIGNIFICANT CHANGE UP (ref 5–17)
APTT BLD: 42.2 SEC — HIGH (ref 24.5–35.6)
AST SERPL-CCNC: 26 U/L — SIGNIFICANT CHANGE UP (ref 15–37)
BASOPHILS # BLD AUTO: 0.06 K/UL — SIGNIFICANT CHANGE UP (ref 0–0.2)
BASOPHILS NFR BLD AUTO: 0.4 % — SIGNIFICANT CHANGE UP (ref 0–2)
BILIRUB SERPL-MCNC: 0.8 MG/DL — SIGNIFICANT CHANGE UP (ref 0.2–1.2)
BUN SERPL-MCNC: 37 MG/DL — HIGH (ref 7–23)
BUN SERPL-MCNC: 40 MG/DL — HIGH (ref 7–23)
BUN SERPL-MCNC: 41 MG/DL — HIGH (ref 7–23)
CALCIUM SERPL-MCNC: 9 MG/DL — SIGNIFICANT CHANGE UP (ref 8.5–10.1)
CALCIUM SERPL-MCNC: 9.1 MG/DL — SIGNIFICANT CHANGE UP (ref 8.5–10.1)
CALCIUM SERPL-MCNC: 9.1 MG/DL — SIGNIFICANT CHANGE UP (ref 8.5–10.1)
CHLORIDE SERPL-SCNC: 103 MMOL/L — SIGNIFICANT CHANGE UP (ref 96–108)
CHLORIDE SERPL-SCNC: 105 MMOL/L — SIGNIFICANT CHANGE UP (ref 96–108)
CHLORIDE SERPL-SCNC: 105 MMOL/L — SIGNIFICANT CHANGE UP (ref 96–108)
CO2 SERPL-SCNC: 28 MMOL/L — SIGNIFICANT CHANGE UP (ref 22–31)
CO2 SERPL-SCNC: 28 MMOL/L — SIGNIFICANT CHANGE UP (ref 22–31)
CO2 SERPL-SCNC: 29 MMOL/L — SIGNIFICANT CHANGE UP (ref 22–31)
CREAT SERPL-MCNC: 1.05 MG/DL — SIGNIFICANT CHANGE UP (ref 0.5–1.3)
CREAT SERPL-MCNC: 1.26 MG/DL — SIGNIFICANT CHANGE UP (ref 0.5–1.3)
CREAT SERPL-MCNC: 1.32 MG/DL — HIGH (ref 0.5–1.3)
EGFR: 55 ML/MIN/1.73M2 — LOW
EGFR: 58 ML/MIN/1.73M2 — LOW
EGFR: 72 ML/MIN/1.73M2 — SIGNIFICANT CHANGE UP
EOSINOPHIL # BLD AUTO: 0.02 K/UL — SIGNIFICANT CHANGE UP (ref 0–0.5)
EOSINOPHIL NFR BLD AUTO: 0.1 % — SIGNIFICANT CHANGE UP (ref 0–6)
GLUCOSE BLDC GLUCOMTR-MCNC: 113 MG/DL — HIGH (ref 70–99)
GLUCOSE BLDC GLUCOMTR-MCNC: 142 MG/DL — HIGH (ref 70–99)
GLUCOSE BLDC GLUCOMTR-MCNC: 216 MG/DL — HIGH (ref 70–99)
GLUCOSE BLDC GLUCOMTR-MCNC: 262 MG/DL — HIGH (ref 70–99)
GLUCOSE SERPL-MCNC: 117 MG/DL — HIGH (ref 70–99)
GLUCOSE SERPL-MCNC: 129 MG/DL — HIGH (ref 70–99)
GLUCOSE SERPL-MCNC: 187 MG/DL — HIGH (ref 70–99)
HCT VFR BLD CALC: 37.2 % — LOW (ref 39–50)
HGB BLD-MCNC: 11.9 G/DL — LOW (ref 13–17)
IMM GRANULOCYTES NFR BLD AUTO: 0.5 % — SIGNIFICANT CHANGE UP (ref 0–0.9)
INR BLD: 1.98 RATIO — HIGH (ref 0.85–1.18)
LACTATE SERPL-SCNC: 1.5 MMOL/L — SIGNIFICANT CHANGE UP (ref 0.7–2)
LYMPHOCYTES # BLD AUTO: 13.1 % — SIGNIFICANT CHANGE UP (ref 13–44)
LYMPHOCYTES # BLD AUTO: 2.19 K/UL — SIGNIFICANT CHANGE UP (ref 1–3.3)
M PNEUMO IGM SER-ACNC: 1.15 INDEX — HIGH (ref 0–0.9)
MAGNESIUM SERPL-MCNC: 1.5 MG/DL — LOW (ref 1.6–2.6)
MAGNESIUM SERPL-MCNC: 1.6 MG/DL — SIGNIFICANT CHANGE UP (ref 1.6–2.6)
MAGNESIUM SERPL-MCNC: 2.1 MG/DL — SIGNIFICANT CHANGE UP (ref 1.6–2.6)
MCHC RBC-ENTMCNC: 31.7 PG — SIGNIFICANT CHANGE UP (ref 27–34)
MCHC RBC-ENTMCNC: 32 G/DL — SIGNIFICANT CHANGE UP (ref 32–36)
MCV RBC AUTO: 99.2 FL — SIGNIFICANT CHANGE UP (ref 80–100)
MONOCYTES # BLD AUTO: 1.41 K/UL — HIGH (ref 0–0.9)
MONOCYTES NFR BLD AUTO: 8.5 % — SIGNIFICANT CHANGE UP (ref 2–14)
MYCOPLASMA AG SPEC QL: POSITIVE
NEUTROPHILS # BLD AUTO: 12.89 K/UL — HIGH (ref 1.8–7.4)
NEUTROPHILS NFR BLD AUTO: 77.4 % — HIGH (ref 43–77)
NRBC # BLD: 0 /100 WBCS — SIGNIFICANT CHANGE UP (ref 0–0)
PHOSPHATE SERPL-MCNC: 3.6 MG/DL — SIGNIFICANT CHANGE UP (ref 2.5–4.5)
PHOSPHATE SERPL-MCNC: 3.9 MG/DL — SIGNIFICANT CHANGE UP (ref 2.5–4.5)
PHOSPHATE SERPL-MCNC: 4 MG/DL — SIGNIFICANT CHANGE UP (ref 2.5–4.5)
PLATELET # BLD AUTO: 167 K/UL — SIGNIFICANT CHANGE UP (ref 150–400)
POTASSIUM SERPL-MCNC: 3.6 MMOL/L — SIGNIFICANT CHANGE UP (ref 3.5–5.3)
POTASSIUM SERPL-MCNC: 3.7 MMOL/L — SIGNIFICANT CHANGE UP (ref 3.5–5.3)
POTASSIUM SERPL-MCNC: 3.9 MMOL/L — SIGNIFICANT CHANGE UP (ref 3.5–5.3)
POTASSIUM SERPL-SCNC: 3.6 MMOL/L — SIGNIFICANT CHANGE UP (ref 3.5–5.3)
POTASSIUM SERPL-SCNC: 3.7 MMOL/L — SIGNIFICANT CHANGE UP (ref 3.5–5.3)
POTASSIUM SERPL-SCNC: 3.9 MMOL/L — SIGNIFICANT CHANGE UP (ref 3.5–5.3)
PROT SERPL-MCNC: 7.8 GM/DL — SIGNIFICANT CHANGE UP (ref 6–8.3)
PROTHROM AB SERPL-ACNC: 23.3 SEC — HIGH (ref 9.5–13)
RBC # BLD: 3.75 M/UL — LOW (ref 4.2–5.8)
RBC # FLD: 14.7 % — HIGH (ref 10.3–14.5)
SODIUM SERPL-SCNC: 138 MMOL/L — SIGNIFICANT CHANGE UP (ref 135–145)
SODIUM SERPL-SCNC: 140 MMOL/L — SIGNIFICANT CHANGE UP (ref 135–145)
SODIUM SERPL-SCNC: 141 MMOL/L — SIGNIFICANT CHANGE UP (ref 135–145)
WBC # BLD: 16.66 K/UL — HIGH (ref 3.8–10.5)
WBC # FLD AUTO: 16.66 K/UL — HIGH (ref 3.8–10.5)

## 2024-03-19 PROCEDURE — 99291 CRITICAL CARE FIRST HOUR: CPT

## 2024-03-19 PROCEDURE — 93306 TTE W/DOPPLER COMPLETE: CPT | Mod: 26

## 2024-03-19 PROCEDURE — 99233 SBSQ HOSP IP/OBS HIGH 50: CPT

## 2024-03-19 RX ORDER — MAGNESIUM SULFATE 500 MG/ML
2 VIAL (ML) INJECTION ONCE
Refills: 0 | Status: COMPLETED | OUTPATIENT
Start: 2024-03-19 | End: 2024-03-19

## 2024-03-19 RX ORDER — POTASSIUM CHLORIDE 20 MEQ
20 PACKET (EA) ORAL ONCE
Refills: 0 | Status: COMPLETED | OUTPATIENT
Start: 2024-03-19 | End: 2024-03-19

## 2024-03-19 RX ADMIN — Medication 6: at 21:14

## 2024-03-19 RX ADMIN — Medication 4: at 12:05

## 2024-03-19 RX ADMIN — GABAPENTIN 100 MILLIGRAM(S): 400 CAPSULE ORAL at 18:04

## 2024-03-19 RX ADMIN — Medication 20 MILLIEQUIVALENT(S): at 16:26

## 2024-03-19 RX ADMIN — Medication 1 PATCH: at 08:24

## 2024-03-19 RX ADMIN — GABAPENTIN 100 MILLIGRAM(S): 400 CAPSULE ORAL at 05:24

## 2024-03-19 RX ADMIN — Medication 25 GRAM(S): at 16:21

## 2024-03-19 RX ADMIN — CHLORHEXIDINE GLUCONATE 1 APPLICATION(S): 213 SOLUTION TOPICAL at 05:24

## 2024-03-19 RX ADMIN — Medication 1 PATCH: at 19:30

## 2024-03-19 RX ADMIN — Medication 1 PATCH: at 11:30

## 2024-03-19 RX ADMIN — Medication 1 PATCH: at 12:08

## 2024-03-19 RX ADMIN — SIMVASTATIN 10 MILLIGRAM(S): 20 TABLET, FILM COATED ORAL at 21:14

## 2024-03-19 RX ADMIN — CLOPIDOGREL BISULFATE 75 MILLIGRAM(S): 75 TABLET, FILM COATED ORAL at 12:06

## 2024-03-19 RX ADMIN — Medication 40 MILLIGRAM(S): at 05:24

## 2024-03-19 NOTE — PROGRESS NOTE ADULT - ASSESSMENT
78M hx of Severe AS s/p TAVR, afib on coumadin, carotid endarterectomy ('21) on plavix, DM, active smoker, PAD, CAD s/p PCI presents to ED with weakness and SOB.     states sob improving  remains in hypoxic resp failure, on high flow  mild crackles on exam, edema not appreciated    cont lasix 40 iv qd  coumadin on hold for elevated INR  repeat tte pending   tele showing afib with pvc's  also with +RVP, cont supportive care per primary team  critically ill

## 2024-03-19 NOTE — PROGRESS NOTE ADULT - SUBJECTIVE AND OBJECTIVE BOX
Date of service  is equal to the date of entry    Patient is a 78y old  Male who presents with a chief complaint of ADHF (18 Mar 2024 11:54)    PAST MEDICAL & SURGICAL HISTORY:  Coronary artery disease      Hyperlipidemia      Hypertension      Atrial fibrillation  on AC      Peripheral vascular disease      T2DM (type 2 diabetes mellitus)      Stented coronary artery  2 stent placements - 9/2020      Bilateral carotid artery stenosis  ICA stenosis      TIA (transient ischemic attack)  2020 - recent hospitalization and received s/p TAVR      History of coronary artery stent placement  two coronary stents; most recent 9/21/2020 to OM2      Status post angiography of extremity  1 stent in each leg      S/P carotid endarterectomy  left  9/2020      S/P TAVR (transcatheter aortic valve replacement)  10/22/2020        PAIGE BYRNES   78y    Male    BRIEF HOSPITAL COURSE:    Review of Systems:                       All other ROS are negative.    Allergies    No Known Allergies    Intolerances          ICU Vital Signs Last 24 Hrs  T(C): 36.2 (19 Mar 2024 04:00), Max: 36.8 (19 Mar 2024 00:00)  T(F): 97.1 (19 Mar 2024 04:00), Max: 98.2 (19 Mar 2024 00:00)  HR: 81 (19 Mar 2024 04:00) (75 - 126)  BP: 128/76 (19 Mar 2024 04:00) (115/73 - 170/99)  BP(mean): 93 (19 Mar 2024 04:00) (80 - 120)  ABP: --  ABP(mean): --  RR: 20 (19 Mar 2024 04:00) (13 - 31)  SpO2: 100% (19 Mar 2024 04:00) (92% - 100%)    O2 Parameters below as of 19 Mar 2024 00:31  Patient On (Oxygen Delivery Method): nasal cannula, high flow  O2 Flow (L/min): 45  O2 Concentration (%): 60      Physical Examination:    General:     HEENT:     PULM:     CVS:     ABD:     EXT:     SKIN:     Neuro:    ABG - ( 18 Mar 2024 08:50 )  pH, Arterial: 7.47  pH, Blood: x     /  pCO2: 37    /  pO2: 53    / HCO3: 27    / Base Excess: 3.2   /  SaO2: 85.6                  LABS:                        11.9   16.66 )-----------( 167      ( 19 Mar 2024 02:50 )             37.2     03-19    141  |  105  |  41<H>  ----------------------------<  117<H>  3.7   |  28  |  1.26    Ca    9.1      19 Mar 2024 02:50  Phos  3.6     03-19  Mg     1.6     03-19    TPro  7.8  /  Alb  2.6<L>  /  TBili  0.8  /  DBili  x   /  AST  26  /  ALT  23  /  AlkPhos  79  03-19          CAPILLARY BLOOD GLUCOSE      POCT Blood Glucose.: 207 mg/dL (18 Mar 2024 22:16)  POCT Blood Glucose.: 251 mg/dL (18 Mar 2024 17:10)  POCT Blood Glucose.: 280 mg/dL (18 Mar 2024 11:43)  POCT Blood Glucose.: 207 mg/dL (18 Mar 2024 05:25)    PT/INR - ( 18 Mar 2024 02:40 )   PT: 35.4 sec;   INR: 3.05 ratio         PTT - ( 18 Mar 2024 02:40 )  PTT:53.0 sec  Urinalysis Basic - ( 19 Mar 2024 02:50 )    Color: x / Appearance: x / SG: x / pH: x  Gluc: 117 mg/dL / Ketone: x  / Bili: x / Urobili: x   Blood: x / Protein: x / Nitrite: x   Leuk Esterase: x / RBC: x / WBC x   Sq Epi: x / Non Sq Epi: x / Bacteria: x      CULTURES:  Rapid RVP Result: Detected (03-17 @ 20:45)  Culture Results:   No growth (03-17 @ 16:20)  Culture Results:   No growth at 24 hours (03-17 @ 14:25)  Culture Results:   No growth at 24 hours (03-17 @ 14:25)      Medications:  MEDICATIONS  (STANDING):  chlorhexidine 2% Cloths 1 Application(s) Topical <User Schedule>  clopidogrel Tablet 75 milliGRAM(s) Oral daily  furosemide   Injectable 40 milliGRAM(s) IV Push daily  gabapentin 100 milliGRAM(s) Oral two times a day  influenza  Vaccine (HIGH DOSE) 0.7 milliLiter(s) IntraMuscular once  insulin lispro (ADMELOG) corrective regimen sliding scale   SubCutaneous Before meals and at bedtime  nicotine -  14 mG/24Hr(s) Patch 1 Patch Transdermal daily  simvastatin 10 milliGRAM(s) Oral at bedtime    MEDICATIONS  (PRN):  acetaminophen     Tablet .. 650 milliGRAM(s) Oral every 6 hours PRN Temp greater or equal to 38C (100.4F), Mild Pain (1 - 3)  albuterol    0.083% 2.5 milliGRAM(s) Nebulizer every 6 hours PRN Shortness of Breath and/or Wheezing  hydrALAZINE Injectable 10 milliGRAM(s) IV Push every 6 hours PRN SBP >160        03-17 @ 07:01  -  03-18 @ 07:00  --------------------------------------------------------  IN: 0 mL / OUT: 3220 mL / NET: -3220 mL    03-18 @ 07:01  -  03-19 @ 04:26  --------------------------------------------------------  IN: 800 mL / OUT: 1800 mL / NET: -1000 mL        RADIOLOGY/IMAGING/ECHO      < from: Xray Chest 1 View- PORTABLE-Urgent (03.17.24 @ 14:46) >  IMPRESSION:  Enlarged cardiac silhouette.    Findings suggesting interstitial pulmonary edema.    New small right pleural effusion.        Assessment/Plan:    78M  HTN HLD s/p TAVR (2020) for AS  afib on warfarin  CAD s/p PCI  carotid endarterectomy on clopidogrel  DM (2), active smoker, PAD,    Admit 3/17 with CP  cough with SOB Type 1 resp failure  High INR and DOUG   Garden variey Covid     CE's negative  RX for   Acute pulm edema  with diuretics    Oxygenation improved  DOUG resolving     Still on HFNC 45LPM 65%     WOB OK    CXR not terrible     Lasix 40 IV q12 >  diuresis check 2D echo with contrast r/o PFO       Resume warfarin based in INR       CRITICAL CARE TIME SPENT:    37 minutes assessing presenting problems of acute illness, which pose high probability of life threatening deterioration or end organ damage/dysfunction, as well as medical decision making including initiating plan of care, reviewing data, reviewing radiologic exams, discussing with multidisciplinary team,  discussing goals of care with patient/family, and writing this note.  Non-inclusive of procedures performed.  The date of service for this encounter is the entered date.

## 2024-03-19 NOTE — DIETITIAN INITIAL EVALUATION ADULT - PERTINENT LABORATORY DATA
03-19    140  |  105  |  37<H>  ----------------------------<  129<H>  3.6   |  28  |  1.05    Ca    9.0      19 Mar 2024 14:15  Phos  3.9     03-19  Mg     1.5     03-19    TPro  7.8  /  Alb  2.6<L>  /  TBili  0.8  /  DBili  x   /  AST  26  /  ALT  23  /  AlkPhos  79  03-19  POCT Blood Glucose.: 142 mg/dL (03-19-24 @ 16:17)  A1C with Estimated Average Glucose Result: 6.8 % (03-18-24 @ 02:40)

## 2024-03-19 NOTE — PROGRESS NOTE ADULT - SUBJECTIVE AND OBJECTIVE BOX
HPI:  Pt is a 79 yo M with h/o severe AS s/p TAVR, CAD s/p PCI, A fib on Coumadin, DM, PAD, CEA ('21) on Plavix, DM and active smoker presented 2 to weakness and SOB. Pt states that he has been feeling weak, dizzy, and SOB with cough with white phlegm for last 4 days. Pt states he recently started having left sided chest pain and worsening SOB and so he called EMS.  In the ER pt was HD stable and hypoxemic to 50s and eventually required  NRB. CXR: CM with vascular congestion. ICU dx: Acute hypoxic resp failure 2 to pulm edema      ## Labs:  CBC:                        11.9   16.66 )-----------( 167      ( 19 Mar 2024 02:50 )             37.2     Chem:  03-19    138  |  103  |  40<H>  ----------------------------<  187<H>  3.9   |  29  |  1.32<H>    Ca    9.1      19 Mar 2024 19:32  Phos  4.0     03-19  Mg     2.1     03-19    TPro  7.8  /  Alb  2.6<L>  /  TBili  0.8  /  DBili  x   /  AST  26  /  ALT  23  /  AlkPhos  79  03-19    Coags:  PT/INR - ( 19 Mar 2024 14:15 )   PT: 23.3 sec;   INR: 1.98 ratio         PTT - ( 19 Mar 2024 14:15 )  PTT:42.2 sec        ## Imaging:    ## Medications:    furosemide   Injectable 40 milliGRAM(s) IV Push daily  hydrALAZINE Injectable 10 milliGRAM(s) IV Push every 6 hours PRN    albuterol    0.083% 2.5 milliGRAM(s) Nebulizer every 6 hours PRN    insulin lispro (ADMELOG) corrective regimen sliding scale   SubCutaneous Before meals and at bedtime  simvastatin 10 milliGRAM(s) Oral at bedtime    clopidogrel Tablet 75 milliGRAM(s) Oral daily      acetaminophen     Tablet .. 650 milliGRAM(s) Oral every 6 hours PRN  gabapentin 100 milliGRAM(s) Oral two times a day      ## Vitals:  T(C): 36.2 (03-19-24 @ 19:30), Max: 36.8 (03-19-24 @ 00:00)  HR: 89 (03-19-24 @ 23:00) (74 - 98)  BP: 113/66 (03-19-24 @ 23:00) (98/67 - 146/91)  BP(mean): 81 (03-19-24 @ 23:00) (66 - 126)  RR: 17 (03-19-24 @ 23:00) (15 - 27)  SpO2: 98% (03-19-24 @ 23:00) (89% - 100%)  Wt(kg): --  Vent:   ABG: ABG - ( 18 Mar 2024 08:50 )  pH, Arterial: 7.47  pH, Blood: x     /  pCO2: 37    /  pO2: 53    / HCO3: 27    / Base Excess: 3.2   /  SaO2: 85.6                  03-18 @ 07:01  -  03-19 @ 07:00  --------------------------------------------------------  IN: 800 mL / OUT: 2550 mL / NET: -1750 mL    03-19 @ 07:01  -  03-19 @ 23:37  --------------------------------------------------------  IN: 50 mL / OUT: 1850 mL / NET: -1800 mL          ## P/E:  Gen: lying comfortably in bed in no apparent distress  Face: HiFlo nc O2  Lungs: CTA   Heart: Irregular  Abd: Soft/+BS/Non-tender  Ext: LE chronic venous stasis skin changes  Neuro: AAO x3    CENTRAL LINE: [ ] YES [ ] NO  LOCATION:   DATE INSERTED:  REMOVE: [ ] YES [ ] NO      CHIU: [ ] YES [ ] NO    DATE INSERTED:  REMOVE:  [ ] YES [ ] NO      A-LINE:  [ ] YES [ ] NO  LOCATION:   DATE INSERTED:  REMOVE:  [ ] YES [ ] NO  EXPLAIN:      CODE STATUS: [x ] full code  [ ] DNR  [ ] DNI  [ ] MOLST  Goals of care discussion: [ ] yes

## 2024-03-19 NOTE — PROGRESS NOTE ADULT - ASSESSMENT
Pt is a 79 yo M with h/o severe AS s/p TAVR, CAD s/p PCI, A fib on Coumadin, DM, PAD, CEA ('21) on Plavix, DM and active smoker presented 2 to weakness and SOB. Pt states that he has been feeling weak, dizzy, and SOB with cough with white phlegm for last 4 days. Pt states he recently started having left sided chest pain and worsening SOB and so he called EMS.  In the ER pt was HD stable and hypoxemic to 50s and eventually required  NRB. CXR: CM with vascular congestion. ICU dx: Acute hypoxic resp failure 2 to pulm edema    Resp: On HiFlo nc FiO2 requirement cont to decrease ? trial of nc O2/ Pt with 50 pk yr smoker ? component of COPD  CVS/Renal: Cont diuresis/ Echo bubbles study/ Cardio f/u noted  Heme: May resume Coumadin  FEN: Severe protein-calorie malnutrition; po diet  Endo: Follow FS and cover with Lispro  Social: Son at the bedside and updated/ OOB -> chair

## 2024-03-19 NOTE — DIETITIAN INITIAL EVALUATION ADULT - ETIOLOGY
inadequate protein-energy intake in setting of cardiac dysfunction  inadequate protein-energy intake in setting of cardiac dysfunction, DOUG

## 2024-03-19 NOTE — DIETITIAN INITIAL EVALUATION ADULT - OTHER INFO
Pt was on metformin for diabetes & was not regularly self monitoring blood glucose.  Per chart review, DOUG resolving.

## 2024-03-19 NOTE — DIETITIAN INITIAL EVALUATION ADULT - OTHER CALCULATIONS
03/17, 72.4 kg(drug dose/adm wt)  03/19, 68.5 kg(daily wt.) wt. decrease of 3.9 kg since adm noted(on furosemide)

## 2024-03-19 NOTE — DIETITIAN INITIAL EVALUATION ADULT - DIET TYPE
1.5->2 liter fluid restriction as per medical discretion/low sodium/consistent carbohydrate (evening snack)/easy to chew

## 2024-03-19 NOTE — DIETITIAN INITIAL EVALUATION ADULT - ORAL INTAKE PTA/DIET HISTORY
Pt seen on high flow NC, asleep, pt's wife was @ beside and provided diet history.  Pt c poor oral intake for >1 week PTA.  Pt's wife did the shopping/cooking, was using salt in cooking, pt was eating high salty foods ie. processed meats.  Per wife, pt was falling asleep during eating while he was @ home x 1 month, cough reported while eating, ? swallowing difficulty.  Per RN, pt tolerated current diet without altered swallow.  Pt without food allergies/intolerances.  Pt

## 2024-03-19 NOTE — PROGRESS NOTE ADULT - SUBJECTIVE AND OBJECTIVE BOX
24H hour events:   pt resting  bp stable  remains on nasal cannula  on heparin drip  MEDICATIONS:  clopidogrel Tablet 75 milliGRAM(s) Oral daily  furosemide   Injectable 40 milliGRAM(s) IV Push daily  hydrALAZINE Injectable 10 milliGRAM(s) IV Push every 6 hours PRN      albuterol    0.083% 2.5 milliGRAM(s) Nebulizer every 6 hours PRN    acetaminophen     Tablet .. 650 milliGRAM(s) Oral every 6 hours PRN  gabapentin 100 milliGRAM(s) Oral two times a day      insulin lispro (ADMELOG) corrective regimen sliding scale   SubCutaneous Before meals and at bedtime  simvastatin 10 milliGRAM(s) Oral at bedtime    chlorhexidine 2% Cloths 1 Application(s) Topical <User Schedule>  influenza  Vaccine (HIGH DOSE) 0.7 milliLiter(s) IntraMuscular once          PHYSICAL EXAM:  T(C): 36.2 (03-19-24 @ 04:00), Max: 36.8 (03-19-24 @ 00:00)  HR: 81 (03-19-24 @ 13:01) (74 - 126)  BP: 136/119 (03-19-24 @ 08:00) (115/73 - 167/102)  RR: 16 (03-19-24 @ 13:01) (13 - 31)  SpO2: 100% (03-19-24 @ 13:01) (97% - 100%)  Wt(kg): --  I&O's Summary    18 Mar 2024 07:01  -  19 Mar 2024 07:00  --------------------------------------------------------  IN: 800 mL / OUT: 2550 mL / NET: -1750 mL        Appearance: Normal	  HEENT:   Normal oral mucosa, PERRL, EOMI	  Lymphatic: No lymphadenopathy  Cardiovascular: Normal S1 S2, No JVD, No murmurs, No edema  Respiratory: coarse bs  Psychiatry:  Mood & affect appropriate  Gastrointestinal:  Soft, Non-tender, + BS	  Skin: No rashes, No ecchymoses, No cyanosis	  Neurologic: Non-focal  Extremities: Normal range of motion, No clubbing, cyanosis       LABS:	 	    CBC Full  -  ( 19 Mar 2024 02:50 )  WBC Count : 16.66 K/uL  Hemoglobin : 11.9 g/dL  Hematocrit : 37.2 %  Platelet Count - Automated : 167 K/uL  Mean Cell Volume : 99.2 fl  Mean Cell Hemoglobin : 31.7 pg  Mean Cell Hemoglobin Concentration : 32.0 g/dL  Auto Neutrophil # : 12.89 K/uL  Auto Lymphocyte # : 2.19 K/uL  Auto Monocyte # : 1.41 K/uL  Auto Eosinophil # : 0.02 K/uL  Auto Basophil # : 0.06 K/uL  Auto Neutrophil % : 77.4 %  Auto Lymphocyte % : 13.1 %  Auto Monocyte % : 8.5 %  Auto Eosinophil % : 0.1 %  Auto Basophil % : 0.4 %    03-19    141  |  105  |  41<H>  ----------------------------<  117<H>  3.7   |  28  |  1.26  03-18    141  |  105  |  37<H>  ----------------------------<  173<H>  4.2   |  25  |  1.46<H>    Ca    9.1      19 Mar 2024 02:50  Ca    9.2      18 Mar 2024 02:40  Phos  3.6     03-19  Phos  3.6     03-18  Mg     1.6     03-19  Mg     1.6     03-18    TPro  7.8  /  Alb  2.6<L>  /  TBili  0.8  /  DBili  x   /  AST  26  /  ALT  23  /  AlkPhos  79  03-19  TPro  8.8<H>  /  Alb  2.7<L>  /  TBili  1.0  /  DBili  x   /  AST  30  /  ALT  22  /  AlkPhos  78  03-18      proBNP:   Lipid Profile:   HgA1c:   TSH:       CARDIAC MARKERS:            TELEMETRY: 	    ECG:  	  RADIOLOGY:  OTHER: 	    PREVIOUS DIAGNOSTIC TESTING:    [ ] Echocardiogram:  [ ]  Catheterization:  [ ] Stress Test:  	  	  ASSESSMENT/PLAN:

## 2024-03-19 NOTE — DIETITIAN INITIAL EVALUATION ADULT - PERTINENT MEDS FT
MEDICATIONS  (STANDING):  chlorhexidine 2% Cloths 1 Application(s) Topical <User Schedule>  clopidogrel Tablet 75 milliGRAM(s) Oral daily  furosemide   Injectable 40 milliGRAM(s) IV Push daily  gabapentin 100 milliGRAM(s) Oral two times a day  influenza  Vaccine (HIGH DOSE) 0.7 milliLiter(s) IntraMuscular once  insulin lispro (ADMELOG) corrective regimen sliding scale   SubCutaneous Before meals and at bedtime  magnesium sulfate  IVPB 2 Gram(s) IV Intermittent once  nicotine -  14 mG/24Hr(s) Patch 1 Patch Transdermal daily  potassium chloride    Tablet ER 20 milliEquivalent(s) Oral once  simvastatin 10 milliGRAM(s) Oral at bedtime    MEDICATIONS  (PRN):  acetaminophen     Tablet .. 650 milliGRAM(s) Oral every 6 hours PRN Temp greater or equal to 38C (100.4F), Mild Pain (1 - 3)  albuterol    0.083% 2.5 milliGRAM(s) Nebulizer every 6 hours PRN Shortness of Breath and/or Wheezing  hydrALAZINE Injectable 10 milliGRAM(s) IV Push every 6 hours PRN SBP >160

## 2024-03-19 NOTE — DIETITIAN NUTRITION RISK NOTIFICATION - TREATMENT: THE FOLLOWING DIET HAS BEEN RECOMMENDED
Diet, Easy to Chew:   Consistent Carbohydrate {Evening Snack}  Low Sodium  Supplement Feeding Modality:  Oral  Glucerna Shake Cans or Servings Per Day:  1       Frequency:  Daily (03-19-24 @ 16:49) [Pending Verification By Attending]  Diet, Regular:   DASH/TLC {Sodium & Cholesterol Restricted} (03-18-24 @ 09:10) [Active]

## 2024-03-20 LAB
ALBUMIN SERPL ELPH-MCNC: 2.4 G/DL — LOW (ref 3.3–5)
ALP SERPL-CCNC: 98 U/L — SIGNIFICANT CHANGE UP (ref 40–120)
ALT FLD-CCNC: 25 U/L — SIGNIFICANT CHANGE UP (ref 12–78)
ANION GAP SERPL CALC-SCNC: 6 MMOL/L — SIGNIFICANT CHANGE UP (ref 5–17)
AST SERPL-CCNC: 29 U/L — SIGNIFICANT CHANGE UP (ref 15–37)
BASOPHILS # BLD AUTO: 0.09 K/UL — SIGNIFICANT CHANGE UP (ref 0–0.2)
BASOPHILS NFR BLD AUTO: 0.8 % — SIGNIFICANT CHANGE UP (ref 0–2)
BILIRUB SERPL-MCNC: 0.9 MG/DL — SIGNIFICANT CHANGE UP (ref 0.2–1.2)
BUN SERPL-MCNC: 34 MG/DL — HIGH (ref 7–23)
CALCIUM SERPL-MCNC: 8.7 MG/DL — SIGNIFICANT CHANGE UP (ref 8.5–10.1)
CHLORIDE SERPL-SCNC: 105 MMOL/L — SIGNIFICANT CHANGE UP (ref 96–108)
CO2 SERPL-SCNC: 28 MMOL/L — SIGNIFICANT CHANGE UP (ref 22–31)
CREAT SERPL-MCNC: 1.18 MG/DL — SIGNIFICANT CHANGE UP (ref 0.5–1.3)
EGFR: 63 ML/MIN/1.73M2 — SIGNIFICANT CHANGE UP
EOSINOPHIL # BLD AUTO: 0.11 K/UL — SIGNIFICANT CHANGE UP (ref 0–0.5)
EOSINOPHIL NFR BLD AUTO: 1 % — SIGNIFICANT CHANGE UP (ref 0–6)
GLUCOSE BLDC GLUCOMTR-MCNC: 127 MG/DL — HIGH (ref 70–99)
GLUCOSE BLDC GLUCOMTR-MCNC: 159 MG/DL — HIGH (ref 70–99)
GLUCOSE BLDC GLUCOMTR-MCNC: 239 MG/DL — HIGH (ref 70–99)
GLUCOSE BLDC GLUCOMTR-MCNC: 254 MG/DL — HIGH (ref 70–99)
GLUCOSE SERPL-MCNC: 127 MG/DL — HIGH (ref 70–99)
HCT VFR BLD CALC: 36.7 % — LOW (ref 39–50)
HGB BLD-MCNC: 11.9 G/DL — LOW (ref 13–17)
IMM GRANULOCYTES NFR BLD AUTO: 0.4 % — SIGNIFICANT CHANGE UP (ref 0–0.9)
LYMPHOCYTES # BLD AUTO: 19.6 % — SIGNIFICANT CHANGE UP (ref 13–44)
LYMPHOCYTES # BLD AUTO: 2.22 K/UL — SIGNIFICANT CHANGE UP (ref 1–3.3)
MAGNESIUM SERPL-MCNC: 1.7 MG/DL — SIGNIFICANT CHANGE UP (ref 1.6–2.6)
MCHC RBC-ENTMCNC: 32.1 PG — SIGNIFICANT CHANGE UP (ref 27–34)
MCHC RBC-ENTMCNC: 32.4 G/DL — SIGNIFICANT CHANGE UP (ref 32–36)
MCV RBC AUTO: 98.9 FL — SIGNIFICANT CHANGE UP (ref 80–100)
MONOCYTES # BLD AUTO: 1.27 K/UL — HIGH (ref 0–0.9)
MONOCYTES NFR BLD AUTO: 11.2 % — SIGNIFICANT CHANGE UP (ref 2–14)
NEUTROPHILS # BLD AUTO: 7.59 K/UL — HIGH (ref 1.8–7.4)
NEUTROPHILS NFR BLD AUTO: 67 % — SIGNIFICANT CHANGE UP (ref 43–77)
NRBC # BLD: 0 /100 WBCS — SIGNIFICANT CHANGE UP (ref 0–0)
PHOSPHATE SERPL-MCNC: 3.6 MG/DL — SIGNIFICANT CHANGE UP (ref 2.5–4.5)
PLATELET # BLD AUTO: 160 K/UL — SIGNIFICANT CHANGE UP (ref 150–400)
POTASSIUM SERPL-MCNC: 4 MMOL/L — SIGNIFICANT CHANGE UP (ref 3.5–5.3)
POTASSIUM SERPL-SCNC: 4 MMOL/L — SIGNIFICANT CHANGE UP (ref 3.5–5.3)
PROT SERPL-MCNC: 7.3 GM/DL — SIGNIFICANT CHANGE UP (ref 6–8.3)
RBC # BLD: 3.71 M/UL — LOW (ref 4.2–5.8)
RBC # FLD: 14.3 % — SIGNIFICANT CHANGE UP (ref 10.3–14.5)
SODIUM SERPL-SCNC: 139 MMOL/L — SIGNIFICANT CHANGE UP (ref 135–145)
WBC # BLD: 11.33 K/UL — HIGH (ref 3.8–10.5)
WBC # FLD AUTO: 11.33 K/UL — HIGH (ref 3.8–10.5)

## 2024-03-20 RX ORDER — MAGNESIUM SULFATE 500 MG/ML
2 VIAL (ML) INJECTION ONCE
Refills: 0 | Status: COMPLETED | OUTPATIENT
Start: 2024-03-20 | End: 2024-03-20

## 2024-03-20 RX ORDER — AZITHROMYCIN 500 MG/1
500 TABLET, FILM COATED ORAL EVERY 24 HOURS
Refills: 0 | Status: DISCONTINUED | OUTPATIENT
Start: 2024-03-21 | End: 2024-03-25

## 2024-03-20 RX ORDER — AZITHROMYCIN 500 MG/1
TABLET, FILM COATED ORAL
Refills: 0 | Status: DISCONTINUED | OUTPATIENT
Start: 2024-03-20 | End: 2024-03-25

## 2024-03-20 RX ORDER — WARFARIN SODIUM 2.5 MG/1
4 TABLET ORAL ONCE
Refills: 0 | Status: DISCONTINUED | OUTPATIENT
Start: 2024-03-20 | End: 2024-03-21

## 2024-03-20 RX ORDER — AZITHROMYCIN 500 MG/1
500 TABLET, FILM COATED ORAL ONCE
Refills: 0 | Status: COMPLETED | OUTPATIENT
Start: 2024-03-20 | End: 2024-03-20

## 2024-03-20 RX ADMIN — Medication 1 PATCH: at 19:25

## 2024-03-20 RX ADMIN — AZITHROMYCIN 500 MILLIGRAM(S): 500 TABLET, FILM COATED ORAL at 08:49

## 2024-03-20 RX ADMIN — WARFARIN SODIUM 4 MILLIGRAM(S): 2.5 TABLET ORAL at 21:56

## 2024-03-20 RX ADMIN — CHLORHEXIDINE GLUCONATE 1 APPLICATION(S): 213 SOLUTION TOPICAL at 05:33

## 2024-03-20 RX ADMIN — Medication 6: at 21:56

## 2024-03-20 RX ADMIN — GABAPENTIN 100 MILLIGRAM(S): 400 CAPSULE ORAL at 05:33

## 2024-03-20 RX ADMIN — Medication 650 MILLIGRAM(S): at 16:45

## 2024-03-20 RX ADMIN — Medication 2: at 15:45

## 2024-03-20 RX ADMIN — Medication 650 MILLIGRAM(S): at 15:43

## 2024-03-20 RX ADMIN — SIMVASTATIN 10 MILLIGRAM(S): 20 TABLET, FILM COATED ORAL at 21:56

## 2024-03-20 RX ADMIN — Medication 40 MILLIGRAM(S): at 05:33

## 2024-03-20 RX ADMIN — Medication 1 PATCH: at 14:30

## 2024-03-20 RX ADMIN — GABAPENTIN 100 MILLIGRAM(S): 400 CAPSULE ORAL at 17:36

## 2024-03-20 RX ADMIN — Medication 1 PATCH: at 14:45

## 2024-03-20 RX ADMIN — CLOPIDOGREL BISULFATE 75 MILLIGRAM(S): 75 TABLET, FILM COATED ORAL at 11:41

## 2024-03-20 RX ADMIN — Medication 25 GRAM(S): at 04:24

## 2024-03-20 RX ADMIN — Medication 1 PATCH: at 07:50

## 2024-03-20 RX ADMIN — Medication 4: at 11:40

## 2024-03-21 LAB
GLUCOSE BLDC GLUCOMTR-MCNC: 189 MG/DL — HIGH (ref 70–99)
GLUCOSE BLDC GLUCOMTR-MCNC: 201 MG/DL — HIGH (ref 70–99)
GLUCOSE BLDC GLUCOMTR-MCNC: 289 MG/DL — HIGH (ref 70–99)
GLUCOSE BLDC GLUCOMTR-MCNC: 337 MG/DL — HIGH (ref 70–99)
INR BLD: 1.28 RATIO — HIGH (ref 0.85–1.18)
PROTHROM AB SERPL-ACNC: 15.2 SEC — HIGH (ref 9.5–13)

## 2024-03-21 PROCEDURE — 99233 SBSQ HOSP IP/OBS HIGH 50: CPT

## 2024-03-21 PROCEDURE — 99223 1ST HOSP IP/OBS HIGH 75: CPT

## 2024-03-21 RX ORDER — WARFARIN SODIUM 2.5 MG/1
4 TABLET ORAL ONCE
Refills: 0 | Status: COMPLETED | OUTPATIENT
Start: 2024-03-21 | End: 2024-03-21

## 2024-03-21 RX ADMIN — Medication 1 PATCH: at 08:25

## 2024-03-21 RX ADMIN — Medication 1 PATCH: at 19:00

## 2024-03-21 RX ADMIN — Medication 4: at 16:37

## 2024-03-21 RX ADMIN — Medication 6: at 21:57

## 2024-03-21 RX ADMIN — Medication 2: at 08:20

## 2024-03-21 RX ADMIN — CLOPIDOGREL BISULFATE 75 MILLIGRAM(S): 75 TABLET, FILM COATED ORAL at 11:52

## 2024-03-21 RX ADMIN — GABAPENTIN 100 MILLIGRAM(S): 400 CAPSULE ORAL at 05:50

## 2024-03-21 RX ADMIN — GABAPENTIN 100 MILLIGRAM(S): 400 CAPSULE ORAL at 17:12

## 2024-03-21 RX ADMIN — Medication 8: at 11:53

## 2024-03-21 RX ADMIN — Medication 40 MILLIGRAM(S): at 05:50

## 2024-03-21 RX ADMIN — AZITHROMYCIN 255 MILLIGRAM(S): 500 TABLET, FILM COATED ORAL at 08:21

## 2024-03-21 RX ADMIN — WARFARIN SODIUM 4 MILLIGRAM(S): 2.5 TABLET ORAL at 21:58

## 2024-03-21 RX ADMIN — SIMVASTATIN 10 MILLIGRAM(S): 20 TABLET, FILM COATED ORAL at 21:57

## 2024-03-21 RX ADMIN — Medication 1 PATCH: at 11:53

## 2024-03-21 NOTE — CONSULT NOTE ADULT - REASON FOR ADMISSION
ADHF
ADHF
Hydroxyzine Counseling: Patient advised that the medication is sedating and not to drive a car after taking this medication.  Patient informed of potential adverse effects including but not limited to dry mouth, urinary retention, and blurry vision.  The patient verbalized understanding of the proper use and possible adverse effects of hydroxyzine.  All of the patient's questions and concerns were addressed.

## 2024-03-21 NOTE — CONSULT NOTE ADULT - SUBJECTIVE AND OBJECTIVE BOX
Patient is a 78y old  Male who presents with a chief complaint of ADHF (21 Mar 2024 17:02)      HPI:  77yo M hx of Severe AS s/p TAVR, afib on coumadin, carotid endarterectomy ('21) on plavix, DM, active smoker, PAD, CAD s/p PCI presents to ED with weakness and SOB. Pt states that he has been feeling weak, dizzy, and SOB with cough with white phlegm for last 4 days. Pt states he recently started having left sided chest pain and worsening SOB and so he called EMS. Pt denies sick contacts, denies hx of CHF, COPD, or any such episode ever happening to him before. Denies fevers, chills, n/v/d or abd pain. He doesnt see a cardiologist. He also doesnt know exactly what meds he is on besides coumadin, plavix and "BP meds". In the ED pt was HD stable and hypoxemic to 50s refractive to NC and was upgraded to NRB. Labs with INR 4, mild leukocytosis and elevated BNP. CXR with pulm edema. ICU consulted for hypoxemia.    Subjective: Pt seen and examined at the bedside. Pt satting 94% on NRB breathing comfortably with /80 and HR 92  Pt endorses feeling better since coming to the ED and is currently in no distress    POCUS with RV dilation and diffuse B lines bilat     Pt accepted to icu for further mngmt  (17 Mar 2024 17:11)    =============      Allergies  No Known Allergies            MEDICATIONS  (STANDING):  azithromycin  IVPB 500 milliGRAM(s) IV Intermittent every 24 hours  azithromycin  IVPB      clopidogrel Tablet 75 milliGRAM(s) Oral daily  furosemide   Injectable 40 milliGRAM(s) IV Push daily  gabapentin 100 milliGRAM(s) Oral two times a day  influenza  Vaccine (HIGH DOSE) 0.7 milliLiter(s) IntraMuscular once  insulin lispro (ADMELOG) corrective regimen sliding scale   SubCutaneous Before meals and at bedtime  nicotine -  14 mG/24Hr(s) Patch 1 Patch Transdermal daily  simvastatin 10 milliGRAM(s) Oral at bedtime    MEDICATIONS  (PRN):  acetaminophen     Tablet .. 650 milliGRAM(s) Oral every 6 hours PRN Temp greater or equal to 38C (100.4F), Mild Pain (1 - 3)  albuterol    0.083% 2.5 milliGRAM(s) Nebulizer every 6 hours PRN Shortness of Breath and/or Wheezing  hydrALAZINE Injectable 10 milliGRAM(s) IV Push every 6 hours PRN SBP >160        Drug Dosing Weight  Height (cm): 182.9 (17 Mar 2024 13:58)  Weight (kg): 72.4 (17 Mar 2024 18:49)  BMI (kg/m2): 21.6 (17 Mar 2024 18:49)  BSA (m2): 1.94 (17 Mar 2024 18:49)    PAST MEDICAL & SURGICAL HISTORY:  Coronary artery disease      Hyperlipidemia      Hypertension      Atrial fibrillation  on AC      Peripheral vascular disease      T2DM (type 2 diabetes mellitus)      Stented coronary artery  2 stent placements - 9/2020      Bilateral carotid artery stenosis  ICA stenosis      TIA (transient ischemic attack)  2020 - recent hospitalization and received s/p TAVR      History of coronary artery stent placement  two coronary stents; most recent 9/21/2020 to OM2      Status post angiography of extremity  1 stent in each leg      S/P carotid endarterectomy  left  9/2020      S/P TAVR (transcatheter aortic valve replacement)  10/22/2020          FAMILY HISTORY:  FH: diabetes mellitus  mother    FH: type 1 diabetes  son    FH: seizures  son    FH: CAD (coronary artery disease)  son        SOCIAL HISTORY:    ADVANCE DIRECTIVES:    REVIEW OF SYSTEMS:    CONSTITUTIONAL: No fever, weight loss, or fatigue  EYES: No eye pain, visual disturbances, or discharge  ENMT:  No difficulty hearing, tinnitus, vertigo; No sinus or throat pain  NECK: No pain or stiffness  BREASTS: No pain, masses, or nipple discharge  RESPIRATORY: No cough, wheezing, chills or hemoptysis; No shortness of breath  CARDIOVASCULAR: No chest pain, palpitations, dizziness, or leg swelling  GASTROINTESTINAL: No abdominal or epigastric pain. No nausea, vomiting, or hematemesis; No diarrhea or constipation. No melena or hematochezia.  GENITOURINARY: No dysuria, frequency, hematuria, or incontinence  NEUROLOGICAL: No headaches, memory loss, loss of strength, numbness, or tremors  SKIN: No itching, burning, rashes, or lesions   LYMPH NODES: No enlarged glands  ENDOCRINE: No heat or cold intolerance; No hair loss  MUSCULOSKELETAL: No joint pain or swelling; No muscle, back, or extremity pain  PSYCHIATRIC: No depression, anxiety, mood swings, or difficulty sleeping  HEME/LYMPH: No easy bruising, or bleeding gums  ALLERGY AND IMMUNOLOGIC: No hives or eczema          Vital Signs Last 24 Hrs  T(C): 36.7 (21 Mar 2024 16:43), Max: 37.1 (21 Mar 2024 05:10)  T(F): 98.1 (21 Mar 2024 16:43), Max: 98.7 (21 Mar 2024 05:10)  HR: 92 (21 Mar 2024 16:43) (76 - 92)  BP: 138/76 (21 Mar 2024 16:43) (106/60 - 147/76)  RR: 17 (21 Mar 2024 16:43) (17 - 20)  SpO2: 94% (21 Mar 2024 16:43) (86% - 98%)    Parameters below as of 21 Mar 2024 11:41  Patient On (Oxygen Delivery Method): nasal cannula  O2 Flow (L/min): 2        PHYSICAL EXAM:  GENERAL: NAD, well-groomed, well-developed  HEAD:  Atraumatic, Normocephalic  EYES: EOMI, PERRLA, conjunctiva and sclera clear  ENMT: No tonsillar erythema, exudates, or enlargement; Moist mucous membranes, Good dentition, No lesions  NECK: Supple, No JVD, Normal thyroid  NERVOUS SYSTEM:  Alert & Oriented X3, Good concentration; Motor Strength 5/5 B/L upper and lower extremities; DTRs 2+ intact and symmetric  CHEST/LUNG: Clear to percussion bilaterally; No rales, rhonchi, wheezing, or rubs  HEART: Regular rate and rhythm; No murmurs, rubs, or gallops  ABDOMEN: Soft, Nontender, Nondistended; Bowel sounds present  EXTREMITIES:  2+ Peripheral Pulses, No clubbing, cyanosis, or edema  LYMPH: No lymphadenopathy noted  SKIN: No rashes or lesions    LABS:               11.9   11.33 )-----------( 160      ( 20 Mar 2024 02:45 )             36.7     03-20    139  |  105  |  34<H>  ----------------------------<  127<H>  4.0   |  28  |  1.18    Ca    8.7      20 Mar 2024 02:45  Phos  3.6     03-20  Mg     1.7     03-20    TPro  7.3  /  Alb  2.4<L>  /  TBili  0.9  /  DBili  x   /  AST  29  /  ALT  25  /  AlkPhos  98  03-20    Prothrombin Time and INR, Plasma (03.21.24 @ 04:39)    Prothrombin Time, Plasma: 15.2 sec    INR: 1.28ratio        Pro-Brain Natriuretic Peptide (03.18.24 @ 02:40)    Pro-Brain Natriuretic Peptide: 2111 pg/mL      Mycoplasma Pneumoniae IgM Antibody (03.18.24 @ 02:40)    Mycoplasma IgM AB: 1.15 Index   Mycoplasma: Positive: Method PHONG           Interpretation:                                             Index                  Negative              <=0.90                  Equivocal            0.91-1.09                  Positive                >=1.10  The reference range has been updated as of 11/16/2020 for this test due  to an upgrade in the testing methodology (EIA to PHONG). Result will be  flagged based upon the updated reference range.      Legionella pneumophila Antigen, Urine (03.17.24 @ 17:30)    Legionella Antigen, Urine: Negative      Streptococcus pneumoniae Ag, Ur (03.17.24 @ 17:30)    Streptococcus pneumoniae Ag, Ur Result: Negative      Respiratory Viral Panel with COVID-19 by LAYO (03.17.24 @ 20:45)    Rapid RVP Result: Detected   SARS-CoV-2: NotDetec   Coronavirus (229E,HKU1,NL63,OC43): Detected      Culture - Urine (03.17.24 @ 16:20)    Specimen Source: Clean Catch Clean Catch (Midstream)   Culture Results: No growth      Culture - Blood (03.17.24 @ 14:25)    Specimen Source: .Blood Blood-Peripheral   Culture Results: No growth at 4 days        ECHO, US:  < from: TTE Echo Complete w/o Contrast w/ Doppler (03.19.24 @ 17:00) >  Global LV systolic function was normal. Left ventricular ejection   fraction, by visual estimation, is 55 to 60%.  Right Ventricle: RV systolic function is normal.  Left Atrium: Severely enlarged left atrium. Bubble study completed   without evidence of inter-atrial flow.  Right Atrium: Severely enlarged right atrium.  Pericardium: There is no evidence of pericardial effusion.  Mitral Valve: There is mild to moderate mitral annular calcification.  Tricuspid Valve: Structurally normal tricuspid valve, with normal leaflet   excursion. Moderate tricuspid regurgitation is visualized. Estimated   pulmonary artery systolic pressure is 45.6 mmHg assuming a right atrial   pressure of 12 mmHg, which is consistent with mild pulmonary hypertension.  Aortic Valve: Mild aortic valve regurgitation is seen.  Pulmonic Valve: Structurally normal pulmonic valve, with normal leaflet   excursion. Mild pulmonic valve regurgitation.  Aorta: The aortic root is normal in size and structure.  Venous: The inferior vena cava was dilated, with respiratory size   variation greater than 50%.      Summary:  1. Technically difficult study.   2. Normal global left ventricular systolic function.   3. Left ventricular ejection fraction, by visual estimation, is 55 to   60%.   4. Severely enlarged left atrium.   5. Bubble study completed without evidence of inter-atrial flow.   6. Mild to moderate mitral annular calcification.   7. Bioprosthesis in the aortic position.   8. Mild aortic regurgitation.   9. Severely enlarged right atrium.  10. Structurally normal tricuspid valve, with normal leaflet excursion.  11. Moderate tricuspid regurgitation.  12. Structurally normal pulmonic valve, with normal leaflet excursion.  13. Mild pulmonic valve regurgitation.  14. Estimated pulmonary artery systolic pressure is 45.6 mmHg assuming a   right atrial pressure of 12 mmHg, which is consistent with mild pulmonary   hypertension.        RADIOLOGY:  < from: Xray Chest 1 View- PORTABLE-Urgent (03.17.24 @ 14:46) >  The cardiac silhouette is enlarged. The mediastinum is not accurately   evaluated on this projection. Status post TAVR. Median sternotomy sutures   are seen.  Surgical clips project over the neck.  There is new accentuation and indistinctness of the pulmonary vascular   markings suggesting interstitial pulmonary edema.  There is a new small right pleural effusion.  No left pleural effusion is seen.  No pneumothorax is seen.  There is osteoarthritic degenerative change of the spine.      IMPRESSION:  Enlarged cardiac silhouette.    Findings suggesting interstitial pulmonary edema.    New small right pleural effusion.             Patient is a 78y old  Male who presents with a chief complaint of ADHF (21 Mar 2024 17:02)      HPI:  79yo M hx of Severe AS s/p TAVR, afib on coumadin, carotid endarterectomy ('21) on plavix, DM, active smoker, PAD, CAD s/p PCI presents to ED with weakness and SOB. Pt states that he has been feeling weak, dizzy, and SOB with cough with white phlegm for last 4 days. Pt states he recently started having left sided chest pain and worsening SOB and so he called EMS. Pt denies sick contacts, denies hx of CHF, COPD, or any such episode ever happening to him before. Denies fevers, chills, n/v/d or abd pain. He doesnt see a cardiologist. He also doesnt know exactly what meds he is on besides coumadin, plavix and "BP meds". In the ED pt was HD stable and hypoxemic to 50s refractive to NC and was upgraded to NRB. Labs with INR 4, mild leukocytosis and elevated BNP. CXR with pulm edema. ICU consulted for hypoxemia.    Subjective: Pt seen and examined at the bedside. Pt satting 94% on NRB breathing comfortably with /80 and HR 92  Pt endorses feeling better since coming to the ED and is currently in no distress    POCUS with RV dilation and diffuse B lines bilat     Pt accepted to icu for further mngmt  (17 Mar 2024 17:11)    =============  Admitted to ICU for acute hypoxic respiratory failure requiring high flow O2 supplementation.  Treated with diuretics for pulmonary edema/heart failure  RVP + coronavirus (not covid)  Mycoplasma IgM +  Weaned off high flow to 6L NC  Downgraded to medical service 3/20  Now weaned to room air        Allergies  No Known Allergies            MEDICATIONS  (STANDING):  azithromycin  IVPB 500 milliGRAM(s) IV Intermittent every 24 hours  azithromycin  IVPB      clopidogrel Tablet 75 milliGRAM(s) Oral daily  furosemide   Injectable 40 milliGRAM(s) IV Push daily  gabapentin 100 milliGRAM(s) Oral two times a day  influenza  Vaccine (HIGH DOSE) 0.7 milliLiter(s) IntraMuscular once  insulin lispro (ADMELOG) corrective regimen sliding scale   SubCutaneous Before meals and at bedtime  nicotine -  14 mG/24Hr(s) Patch 1 Patch Transdermal daily  simvastatin 10 milliGRAM(s) Oral at bedtime    MEDICATIONS  (PRN):  acetaminophen     Tablet .. 650 milliGRAM(s) Oral every 6 hours PRN Temp greater or equal to 38C (100.4F), Mild Pain (1 - 3)  albuterol    0.083% 2.5 milliGRAM(s) Nebulizer every 6 hours PRN Shortness of Breath and/or Wheezing  hydrALAZINE Injectable 10 milliGRAM(s) IV Push every 6 hours PRN SBP >160        Drug Dosing Weight  Height (cm): 182.9 (17 Mar 2024 13:58)  Weight (kg): 72.4 (17 Mar 2024 18:49)  BMI (kg/m2): 21.6 (17 Mar 2024 18:49)  BSA (m2): 1.94 (17 Mar 2024 18:49)    PAST MEDICAL & SURGICAL HISTORY:  Coronary artery disease      Hyperlipidemia      Hypertension      Atrial fibrillation  on AC      Peripheral vascular disease      T2DM (type 2 diabetes mellitus)      Stented coronary artery  2 stent placements - 9/2020      Bilateral carotid artery stenosis  ICA stenosis      TIA (transient ischemic attack)  2020 - recent hospitalization and received s/p TAVR      History of coronary artery stent placement  two coronary stents; most recent 9/21/2020 to OM2      Status post angiography of extremity  1 stent in each leg      S/P carotid endarterectomy  left  9/2020      S/P TAVR (transcatheter aortic valve replacement)  10/22/2020          FAMILY HISTORY:  FH: diabetes mellitus  mother    FH: type 1 diabetes  son    FH: seizures  son    FH: CAD (coronary artery disease)  son        SOCIAL HISTORY:  smoker x50 years, 1/2 PPD  no drug use      REVIEW OF SYSTEMS:  CONSTITUTIONAL: No fever, weight loss, or fatigue  EYES: No eye pain, visual disturbances, or discharge  ENMT:  No difficulty hearing, tinnitus, vertigo; No sinus or throat pain  NECK: No pain or stiffness  RESPIRATORY: No cough, wheezing, hemoptysis; No shortness of breath  CARDIOVASCULAR: No chest pain, palpitations, dizziness, or leg swelling  GASTROINTESTINAL: No abdominal or epigastric pain. No nausea, vomiting, or hematemesis; No diarrhea or constipation. No melena or hematochezia.  GENITOURINARY: No dysuria, frequency, hematuria, or incontinence  NEUROLOGICAL: No headaches, memory loss, loss of strength, numbness, or tremors  SKIN: No itching, burning, rashes, or lesions   LYMPH NODES: No enlarged glands  ENDOCRINE: No heat or cold intolerance; No hair loss  MUSCULOSKELETAL: No joint pain or swelling; No muscle, back, or extremity pain  PSYCHIATRIC: No depression, anxiety, mood swings, or difficulty sleeping  HEME/LYMPH: No easy bruising, or bleeding gums  ALLERGY AND IMMUNOLOGIC: No hives or eczema          Vital Signs Last 24 Hrs  T(C): 36.7 (21 Mar 2024 16:43), Max: 37.1 (21 Mar 2024 05:10)  T(F): 98.1 (21 Mar 2024 16:43), Max: 98.7 (21 Mar 2024 05:10)  HR: 92 (21 Mar 2024 16:43) (76 - 92)  BP: 138/76 (21 Mar 2024 16:43) (106/60 - 147/76)  RR: 17 (21 Mar 2024 16:43) (17 - 20)  SpO2: 94% (21 Mar 2024 16:43) (86% - 98%)    Parameters below as of 21 Mar 2024 11:41  Patient On (Oxygen Delivery Method): nasal cannula  O2 Flow (L/min): 2        PHYSICAL EXAM:  GENERAL: NAD, well-groomed, well-developed  HEAD:  Atraumatic, Normocephalic  EYES: EOMI, PERRLA, conjunctiva and sclera clear  ENMT: No tonsillar erythema, exudates, or enlargement; Moist mucous membranes, Good dentition, No lesions  NECK: Supple, No JVD, Normal thyroid  NERVOUS SYSTEM:  Alert & Oriented X3, Good concentration; Motor Strength 5/5 B/L upper and lower extremities; DTRs 2+ intact and symmetric  CHEST/LUNG: Clear to percussion bilaterally; No rales, rhonchi, wheezing, or rubs  HEART: Regular rate and rhythm; No murmurs, rubs, or gallops  ABDOMEN: Soft, Nontender, Nondistended; Bowel sounds present  EXTREMITIES:  2+ Peripheral Pulses, No clubbing, cyanosis, or edema  LYMPH: No lymphadenopathy noted  SKIN: No rashes or lesions    LABS:               11.9   11.33 )-----------( 160      ( 20 Mar 2024 02:45 )             36.7     03-20    139  |  105  |  34<H>  ----------------------------<  127<H>  4.0   |  28  |  1.18    Ca    8.7      20 Mar 2024 02:45  Phos  3.6     03-20  Mg     1.7     03-20    TPro  7.3  /  Alb  2.4<L>  /  TBili  0.9  /  DBili  x   /  AST  29  /  ALT  25  /  AlkPhos  98  03-20    Prothrombin Time and INR, Plasma (03.21.24 @ 04:39)    Prothrombin Time, Plasma: 15.2 sec    INR: 1.28ratio        Pro-Brain Natriuretic Peptide (03.18.24 @ 02:40)    Pro-Brain Natriuretic Peptide: 2111 pg/mL      Mycoplasma Pneumoniae IgM Antibody (03.18.24 @ 02:40)    Mycoplasma IgM AB: 1.15 Index   Mycoplasma: Positive: Method PHONG           Interpretation:                                             Index                  Negative              <=0.90                  Equivocal            0.91-1.09                  Positive                >=1.10  The reference range has been updated as of 11/16/2020 for this test due  to an upgrade in the testing methodology (EIA to PHONG). Result will be  flagged based upon the updated reference range.      Legionella pneumophila Antigen, Urine (03.17.24 @ 17:30)    Legionella Antigen, Urine: Negative      Streptococcus pneumoniae Ag, Ur (03.17.24 @ 17:30)    Streptococcus pneumoniae Ag, Ur Result: Negative      Respiratory Viral Panel with COVID-19 by [facility]AYO (03.17.24 @ 20:45)    Rapid RVP Result: Detected   SARS-CoV-2: NotDetec   Coronavirus (229E,HKU1,NL63,OC43): Detected      Culture - Urine (03.17.24 @ 16:20)    Specimen Source: Clean Catch Clean Catch (Midstream)   Culture Results: No growth      Culture - Blood (03.17.24 @ 14:25)    Specimen Source: .Blood Blood-Peripheral   Culture Results: No growth at 4 days        ECHO, US:  < from: TTE Echo Complete w/o Contrast w/ Doppler (03.19.24 @ 17:00) >  Global LV systolic function was normal. Left ventricular ejection   fraction, by visual estimation, is 55 to 60%.  Right Ventricle: RV systolic function is normal.  Left Atrium: Severely enlarged left atrium. Bubble study completed   without evidence of inter-atrial flow.  Right Atrium: Severely enlarged right atrium.  Pericardium: There is no evidence of pericardial effusion.  Mitral Valve: There is mild to moderate mitral annular calcification.  Tricuspid Valve: Structurally normal tricuspid valve, with normal leaflet   excursion. Moderate tricuspid regurgitation is visualized. Estimated   pulmonary artery systolic pressure is 45.6 mmHg assuming a right atrial   pressure of 12 mmHg, which is consistent with mild pulmonary hypertension.  Aortic Valve: Mild aortic valve regurgitation is seen.  Pulmonic Valve: Structurally normal pulmonic valve, with normal leaflet   excursion. Mild pulmonic valve regurgitation.  Aorta: The aortic root is normal in size and structure.  Venous: The inferior vena cava was dilated, with respiratory size   variation greater than 50%.      Summary:  1. Technically difficult study.   2. Normal global left ventricular systolic function.   3. Left ventricular ejection fraction, by visual estimation, is 55 to   60%.   4. Severely enlarged left atrium.   5. Bubble study completed without evidence of inter-atrial flow.   6. Mild to moderate mitral annular calcification.   7. Bioprosthesis in the aortic position.   8. Mild aortic regurgitation.   9. Severely enlarged right atrium.  10. Structurally normal tricuspid valve, with normal leaflet excursion.  11. Moderate tricuspid regurgitation.  12. Structurally normal pulmonic valve, with normal leaflet excursion.  13. Mild pulmonic valve regurgitation.  14. Estimated pulmonary artery systolic pressure is 45.6 mmHg assuming a   right atrial pressure of 12 mmHg, which is consistent with mild pulmonary   hypertension.        RADIOLOGY:  < from: Xray Chest 1 View- PORTABLE-Urgent (03.17.24 @ 14:46) >  The cardiac silhouette is enlarged. The mediastinum is not accurately   evaluated on this projection. Status post TAVR. Median sternotomy sutures   are seen.  Surgical clips project over the neck.  There is new accentuation and indistinctness of the pulmonary vascular   markings suggesting interstitial pulmonary edema.  There is a new small right pleural effusion.  No left pleural effusion is seen.  No pneumothorax is seen.  There is osteoarthritic degenerative change of the spine.      IMPRESSION:  Enlarged cardiac silhouette.    Findings suggesting interstitial pulmonary edema.    New small right pleural effusion.             Patient is a 78y old  Male who presents with a chief complaint of ADHF (21 Mar 2024 17:02)      HPI:  79yo M hx of Severe AS s/p TAVR, afib on coumadin, carotid endarterectomy ('21) on plavix, DM, active smoker, PAD, CAD s/p PCI presents to ED with weakness and SOB. Pt states that he has been feeling weak, dizzy, and SOB with cough with white phlegm for last 4 days. Pt states he recently started having left sided chest pain and worsening SOB and so he called EMS. Pt denies sick contacts, denies hx of CHF, COPD, or any such episode ever happening to him before. Denies fevers, chills, n/v/d or abd pain. He doesnt see a cardiologist. He also doesnt know exactly what meds he is on besides coumadin, plavix and "BP meds". In the ED pt was HD stable and hypoxemic to 50s refractive to NC and was upgraded to NRB. Labs with INR 4, mild leukocytosis and elevated BNP. CXR with pulm edema. ICU consulted for hypoxemia.    Subjective: Pt seen and examined at the bedside. Pt satting 94% on NRB breathing comfortably with /80 and HR 92  Pt endorses feeling better since coming to the ED and is currently in no distress    POCUS with RV dilation and diffuse B lines bilat     Pt accepted to icu for further mngmt  (17 Mar 2024 17:11)    =============  Admitted to ICU for acute hypoxic respiratory failure requiring high flow O2 supplementation.  Treated with diuretics for pulmonary edema/heart failure  RVP + coronavirus (not covid)  Mycoplasma IgM +  Weaned off high flow to 6L NC  Downgraded to medical service 3/20  Now weaned to room air        Allergies  No Known Allergies            MEDICATIONS  (STANDING):  azithromycin  IVPB 500 milliGRAM(s) IV Intermittent every 24 hours  azithromycin  IVPB      clopidogrel Tablet 75 milliGRAM(s) Oral daily  furosemide   Injectable 40 milliGRAM(s) IV Push daily  gabapentin 100 milliGRAM(s) Oral two times a day  influenza  Vaccine (HIGH DOSE) 0.7 milliLiter(s) IntraMuscular once  insulin lispro (ADMELOG) corrective regimen sliding scale   SubCutaneous Before meals and at bedtime  nicotine -  14 mG/24Hr(s) Patch 1 Patch Transdermal daily  simvastatin 10 milliGRAM(s) Oral at bedtime    MEDICATIONS  (PRN):  acetaminophen     Tablet .. 650 milliGRAM(s) Oral every 6 hours PRN Temp greater or equal to 38C (100.4F), Mild Pain (1 - 3)  albuterol    0.083% 2.5 milliGRAM(s) Nebulizer every 6 hours PRN Shortness of Breath and/or Wheezing  hydrALAZINE Injectable 10 milliGRAM(s) IV Push every 6 hours PRN SBP >160        Drug Dosing Weight  Height (cm): 182.9 (17 Mar 2024 13:58)  Weight (kg): 72.4 (17 Mar 2024 18:49)  BMI (kg/m2): 21.6 (17 Mar 2024 18:49)  BSA (m2): 1.94 (17 Mar 2024 18:49)    PAST MEDICAL & SURGICAL HISTORY:  Coronary artery disease      Hyperlipidemia      Hypertension      Atrial fibrillation  on AC      Peripheral vascular disease      T2DM (type 2 diabetes mellitus)      Stented coronary artery  2 stent placements - 9/2020      Bilateral carotid artery stenosis  ICA stenosis      TIA (transient ischemic attack)  2020 - recent hospitalization and received s/p TAVR      History of coronary artery stent placement  two coronary stents; most recent 9/21/2020 to OM2      Status post angiography of extremity  1 stent in each leg      S/P carotid endarterectomy  left  9/2020      S/P TAVR (transcatheter aortic valve replacement)  10/22/2020          FAMILY HISTORY:  FH: diabetes mellitus  mother    FH: type 1 diabetes  son    FH: seizures  son    FH: CAD (coronary artery disease)  son        SOCIAL HISTORY:  smoker x50 years, 1/2 PPD  no drug use      REVIEW OF SYSTEMS:  CONSTITUTIONAL: No fever, weight loss, or fatigue  EYES: No eye pain, visual disturbances, or discharge  ENMT:  No difficulty hearing, tinnitus, vertigo; No sinus or throat pain  NECK: No pain or stiffness  RESPIRATORY: No cough, wheezing, hemoptysis; No shortness of breath  CARDIOVASCULAR: No chest pain, palpitations, dizziness, or leg swelling  GASTROINTESTINAL: No abdominal or epigastric pain. No nausea, vomiting, or hematemesis; No diarrhea or constipation. No melena or hematochezia.  GENITOURINARY: No dysuria, frequency, hematuria, or incontinence  NEUROLOGICAL: No headaches, memory loss, loss of strength, numbness, or tremors  SKIN: No itching, burning, rashes, or lesions   LYMPH NODES: No enlarged glands  ENDOCRINE: No heat or cold intolerance; No hair loss  MUSCULOSKELETAL: No joint pain or swelling; No muscle, back, or extremity pain  PSYCHIATRIC: No depression, anxiety, mood swings, or difficulty sleeping  HEME/LYMPH: No easy bruising, or bleeding gums  ALLERGY AND IMMUNOLOGIC: No hives or eczema          Vital Signs Last 24 Hrs  T(C): 36.7 (21 Mar 2024 16:43), Max: 37.1 (21 Mar 2024 05:10)  T(F): 98.1 (21 Mar 2024 16:43), Max: 98.7 (21 Mar 2024 05:10)  HR: 92 (21 Mar 2024 16:43) (76 - 92)  BP: 138/76 (21 Mar 2024 16:43) (106/60 - 147/76)  RR: 17 (21 Mar 2024 16:43) (17 - 20)  SpO2: 94% (21 Mar 2024 16:43) (86% - 98%)    Parameters below as of 21 Mar 2024 11:41  Patient On (Oxygen Delivery Method): nasal cannula  O2 Flow (L/min): 2        PHYSICAL EXAM:  GENERAL: elderly male, NAD, comfortable in bed  HEAD:  Atraumatic, Normocephalic  EYES: EOMI, PERRLA, conjunctiva and sclera clear  ENMT: No tonsillar erythema, exudates, Moist mucous membranes, no lesions  NECK: Supple, No JVD  NERVOUS SYSTEM:  Alert & Oriented X3, Good concentration; Motor Strength upper and lower extremities symmetric  CHEST/LUNG: Clear to auscultation bilaterally; No rales, rhonchi, wheezing  HEART: Regular rate and rhythm  ABDOMEN: Soft, Nontender, Nondistended; Bowel sounds present  EXTREMITIES:  2+ Peripheral Pulses, No clubbing, cyanosis, or edema  LYMPH: No lymphadenopathy noted  SKIN: No rashes or lesions    LABS:               11.9   11.33 )-----------( 160      ( 20 Mar 2024 02:45 )             36.7     03-20    139  |  105  |  34<H>  ----------------------------<  127<H>  4.0   |  28  |  1.18    Ca    8.7      20 Mar 2024 02:45  Phos  3.6     03-20  Mg     1.7     03-20    TPro  7.3  /  Alb  2.4<L>  /  TBili  0.9  /  DBili  x   /  AST  29  /  ALT  25  /  AlkPhos  98  03-20    Prothrombin Time and INR, Plasma (03.21.24 @ 04:39)    Prothrombin Time, Plasma: 15.2 sec    INR: 1.28ratio        Pro-Brain Natriuretic Peptide (03.18.24 @ 02:40)    Pro-Brain Natriuretic Peptide: 2111 pg/mL      Mycoplasma Pneumoniae IgM Antibody (03.18.24 @ 02:40)    Mycoplasma IgM AB: 1.15 Index   Mycoplasma: Positive: Method PHONG           Interpretation:                                             Index                  Negative              <=0.90                  Equivocal            0.91-1.09                  Positive                >=1.10  The reference range has been updated as of 11/16/2020 for this test due  to an upgrade in the testing methodology (EIA to PHONG). Result will be  flagged based upon the updated reference range.      Legionella pneumophila Antigen, Urine (03.17.24 @ 17:30)    Legionella Antigen, Urine: Negative      Streptococcus pneumoniae Ag, Ur (03.17.24 @ 17:30)    Streptococcus pneumoniae Ag, Ur Result: Negative      Respiratory Viral Panel with COVID-19 by LAYO (03.17.24 @ 20:45)    Rapid RVP Result: Detected   SARS-CoV-2: NotDetec   Coronavirus (229E,HKU1,NL63,OC43): Detected      Culture - Urine (03.17.24 @ 16:20)    Specimen Source: Clean Catch Clean Catch (Midstream)   Culture Results: No growth      Culture - Blood (03.17.24 @ 14:25)    Specimen Source: .Blood Blood-Peripheral   Culture Results: No growth at 4 days        ECHO, US:  < from: TTE Echo Complete w/o Contrast w/ Doppler (03.19.24 @ 17:00) >  Global LV systolic function was normal. Left ventricular ejection   fraction, by visual estimation, is 55 to 60%.  Right Ventricle: RV systolic function is normal.  Left Atrium: Severely enlarged left atrium. Bubble study completed   without evidence of inter-atrial flow.  Right Atrium: Severely enlarged right atrium.  Pericardium: There is no evidence of pericardial effusion.  Mitral Valve: There is mild to moderate mitral annular calcification.  Tricuspid Valve: Structurally normal tricuspid valve, with normal leaflet   excursion. Moderate tricuspid regurgitation is visualized. Estimated   pulmonary artery systolic pressure is 45.6 mmHg assuming a right atrial   pressure of 12 mmHg, which is consistent with mild pulmonary hypertension.  Aortic Valve: Mild aortic valve regurgitation is seen.  Pulmonic Valve: Structurally normal pulmonic valve, with normal leaflet   excursion. Mild pulmonic valve regurgitation.  Aorta: The aortic root is normal in size and structure.  Venous: The inferior vena cava was dilated, with respiratory size   variation greater than 50%.      Summary:  1. Technically difficult study.   2. Normal global left ventricular systolic function.   3. Left ventricular ejection fraction, by visual estimation, is 55 to 60%.   4. Severely enlarged left atrium.   5. Bubble study completed without evidence of inter-atrial flow.   6. Mild to moderate mitral annular calcification.   7. Bioprosthesis in the aortic position.   8. Mild aortic regurgitation.   9. Severely enlarged right atrium.  10. Structurally normal tricuspid valve, with normal leaflet excursion.  11. Moderate tricuspid regurgitation.  12. Structurally normal pulmonic valve, with normal leaflet excursion.  13. Mild pulmonic valve regurgitation.  14. Estimated pulmonary artery systolic pressure is 45.6 mmHg assuming a   right atrial pressure of 12 mmHg, which is consistent with mild pulmonary   hypertension.        RADIOLOGY:  < from: Xray Chest 1 View- PORTABLE-Urgent (03.17.24 @ 14:46) >  The cardiac silhouette is enlarged. The mediastinum is not accurately   evaluated on this projection. Status post TAVR. Median sternotomy sutures   are seen.  Surgical clips project over the neck.  There is new accentuation and indistinctness of the pulmonary vascular   markings suggesting interstitial pulmonary edema.  There is a new small right pleural effusion.  No left pleural effusion is seen.  No pneumothorax is seen.  There is osteoarthritic degenerative change of the spine.      IMPRESSION:  Enlarged cardiac silhouette.    Findings suggesting interstitial pulmonary edema.    New small right pleural effusion.

## 2024-03-21 NOTE — CONSULT NOTE ADULT - NSCONSULTADDITIONALINFOA_GEN_ALL_CORE
b/l carotid artery stenosis s/p L endarterectomy (9/20), CAD s/p stents x 2 (most recent 9/20 to OM2), PAD s/p BLE stents, afib on warfarin, HLD, HTN, DMII, recently hospitalized (10/22-27) for severe AS s/p TAVR on 10/22

## 2024-03-21 NOTE — PHYSICAL THERAPY INITIAL EVALUATION ADULT - LIVES WITH, PROFILE
Pt states he lives in a pvt house with wife, has 3 steps with rails at main  entrance, 2 steps with rails at the back entrance./spouse

## 2024-03-21 NOTE — PROGRESS NOTE ADULT - SUBJECTIVE AND OBJECTIVE BOX
"[facility]    Reason for visit: post-op visit    HPI: Janeth Parra is a 22 year old  who presents to clinic today for a postoperative visit following a reversal of FGM on 23.  Since surgery she has had increased itching for two weeks that resolved with healing. Used topical estrogen for the first 2 weeks. She never had any drainage or fevers during that time. She has noticed that her urine stream is not straight anymore and urine goes in various directions when she has her legs open during urination. She does not have any urgency, burning or blood with urination. Also has noticed there is an area near what remains of the clitoris that was not entirely resected.     Pap NILM, HPV neg    Objective:  /74   Pulse 80   Ht 1.676 m (5' 6\")   Wt 59.3 kg (130 lb 12.8 oz)   LMP 2023 (Exact Date)   BMI 21.11 kg/m    General: Well-appearing  Pelvic: Bilateral labia with edges well healed. Adequate vaginal introitus with no obstruction of urethra. No urethra caruncles or masses. Approximately 3mm of skin bridge remaining inferior to remnant of clitoral glans.     Assessment and plan:  -Normal postoperative recovery  -Discussed that remaining bridge of tissue is likely not contributing to changes in urinary stream. Patient was advised to retract anterior labia superiorly during urination to see if this helps. If this resolves urinary stream issues, discussed that this tissue could possibly be further  in clinic or in the OR. Discussed that tissue could also be  for cosmesis, however will need to be done carefully as not to harm remaining clitoris or blood supply. Patient declines at this time and will see how things go with continued healing.   -May return to full activity including intercourse  -Follow-up: Return to annual healthcare maintenance  -S/P flu vaccine today    Jennifer Oneal, MS3    I was present with the medical student who participated in the " service and in the documentation of the note. I have verified the history and personally performed the physical exam and medical decision making. I agree with the assessment and plan of care as documented in the note.    Marjorie Cavazos MD       Patient is a 78y old  Male who presents with a chief complaint of ADHF (19 Mar 2024 23:37)    INTERVAL HPI/OVERNIGHT EVENTS: Patients seen and examined at bedside this morning. No acute events overnight.    MEDICATIONS  (STANDING):  azithromycin  IVPB 500 milliGRAM(s) IV Intermittent every 24 hours  azithromycin  IVPB      clopidogrel Tablet 75 milliGRAM(s) Oral daily  furosemide   Injectable 40 milliGRAM(s) IV Push daily  gabapentin 100 milliGRAM(s) Oral two times a day  influenza  Vaccine (HIGH DOSE) 0.7 milliLiter(s) IntraMuscular once  insulin lispro (ADMELOG) corrective regimen sliding scale   SubCutaneous Before meals and at bedtime  nicotine -  14 mG/24Hr(s) Patch 1 Patch Transdermal daily  simvastatin 10 milliGRAM(s) Oral at bedtime  warfarin 4 milliGRAM(s) Oral once    MEDICATIONS  (PRN):  acetaminophen     Tablet .. 650 milliGRAM(s) Oral every 6 hours PRN Temp greater or equal to 38C (100.4F), Mild Pain (1 - 3)  albuterol    0.083% 2.5 milliGRAM(s) Nebulizer every 6 hours PRN Shortness of Breath and/or Wheezing  hydrALAZINE Injectable 10 milliGRAM(s) IV Push every 6 hours PRN SBP >160    Allergies    No Known Allergies    Intolerances      REVIEW OF SYSTEMS:  All other systems reviewed and are negative    Vital Signs Last 24 Hrs  T(C): 36.7 (21 Mar 2024 16:43), Max: 37.1 (21 Mar 2024 05:10)  T(F): 98.1 (21 Mar 2024 16:43), Max: 98.7 (21 Mar 2024 05:10)  HR: 92 (21 Mar 2024 16:43) (76 - 92)  BP: 138/76 (21 Mar 2024 16:43) (106/60 - 147/76)  BP(mean): --  RR: 17 (21 Mar 2024 16:43) (17 - 20)  SpO2: 94% (21 Mar 2024 16:43) (86% - 98%)    Parameters below as of 21 Mar 2024 11:41  Patient On (Oxygen Delivery Method): nasal cannula  O2 Flow (L/min): 2    Daily     Daily   I&O's Summary    20 Mar 2024 07:01  -  21 Mar 2024 07:00  --------------------------------------------------------  IN: 250 mL / OUT: 1800 mL / NET: -1550 mL      CAPILLARY BLOOD GLUCOSE      POCT Blood Glucose.: 201 mg/dL (21 Mar 2024 16:34)  POCT Blood Glucose.: 337 mg/dL (21 Mar 2024 11:14)  POCT Blood Glucose.: 189 mg/dL (21 Mar 2024 07:54)  POCT Blood Glucose.: 254 mg/dL (20 Mar 2024 21:29)    PHYSICAL EXAM:  GENERAL: NAD, well-groomed, well-developed  HEAD:  Atraumatic, Normocephalic  EYES: EOMI, PERRLA, conjunctiva and sclera clear  ENMT: No tonsillar erythema, exudates, or enlargement; Moist mucous membranes, Good dentition, No lesions  NECK: Supple, No JVD, Normal thyroid  NERVOUS SYSTEM:  Alert & Oriented X3, Good concentration; Motor Strength 5/5 B/L upper and lower extremities; DTRs 2+ intact and symmetric  CHEST/LUNG: Clear to percussion bilaterally; No rales, rhonchi, wheezing, or rubs  HEART: Regular rate and rhythm; No murmurs, rubs, or gallops  ABDOMEN: Soft, Nontender, Nondistended; Bowel sounds present  EXTREMITIES:  2+ Peripheral Pulses, No clubbing, cyanosis, or edema  LYMPH: No lymphadenopathy noted  SKIN: No rashes or lesions    Labs                          11.9   11.33 )-----------( 160      ( 20 Mar 2024 02:45 )             36.7     03-20    139  |  105  |  34<H>  ----------------------------<  127<H>  4.0   |  28  |  1.18    Ca    8.7      20 Mar 2024 02:45  Phos  3.6     03-20  Mg     1.7     03-20    TPro  7.3  /  Alb  2.4<L>  /  TBili  0.9  /  DBili  x   /  AST  29  /  ALT  25  /  AlkPhos  98  03-20    PT/INR - ( 21 Mar 2024 04:39 )   PT: 15.2 sec;   INR: 1.28 ratio               Urinalysis Basic - ( 20 Mar 2024 02:45 )    Color: x / Appearance: x / SG: x / pH: x  Gluc: 127 mg/dL / Ketone: x  / Bili: x / Urobili: x   Blood: x / Protein: x / Nitrite: x   Leuk Esterase: x / RBC: x / WBC x   Sq Epi: x / Non Sq Epi: x / Bacteria: x                      Radiology and Imaging reviewed.

## 2024-03-21 NOTE — PHYSICAL THERAPY INITIAL EVALUATION ADULT - STRENGTHENING, PT EVAL
Improve strength in the UE and LE to 5/5, improve general endurance to good and be able to perform functional tasks-bed mobility, sitting, standing, transfers and ambulate in a safe manner with or without  assistive device and prevent falls.

## 2024-03-21 NOTE — PHYSICAL THERAPY INITIAL EVALUATION ADULT - PERTINENT HX OF CURRENT PROBLEM, REHAB EVAL
Pt is admitted with dx CHF exacerbation. c/o shortness of breath and weakness. Pt states prior to this admission he is independent in ADLs and ambulates without using any walking device.

## 2024-03-21 NOTE — PROGRESS NOTE ADULT - ASSESSMENT
79yo M hx of Severe AS s/p TAVR, afib on coumadin, carotid endarterectomy ('21) on plavix, DM, active smoker, PAD, CAD s/p PCI presents to ED with weakness and SOB. Pt states that he has been feeling weak, dizzy, and SOB with cough with white phlegm for last 4 days. Pt states he recently started having left sided chest pain and worsening SOB and so he called EMS. In the ED pt was HD stable and hypoxemic to 50s refractive to NC and was upgraded to NRB. Labs with INR 4, mild leukocytosis and elevated BNP. CXR with pulm edema. ICU consulted for hypoxemia. Patient admitted to ICU for hypoxemic RF, requiring HFNC. Patient found to be coronvirus + & mycoplasma +, imaging consistent with pulmonary edema. Patient now on RA and maintaining Spo2 >92%. Patient is HD stable, awake & alert.       Plan:   -  Weaned off supplemental O2 today. Continue with duonebs.   -- Continue lasix 40 QD for acute decompensated HF.    - Continue azithromycin for 3 day course for mycoplasma PNA   - Restarted patients home warfarin. INR subtherapuetic. Coumadin tonight. PT consult for disposition  - Continue home plavix & statin.   - FU cardiology recommendations.

## 2024-03-21 NOTE — PHYSICAL THERAPY INITIAL EVALUATION ADULT - DID THE PATIENT HAVE SURGERY?
Pt seen and examined at bedside. Please refer to the H&P attending note signed today for details.    Await pulm eval.  urine legionella Ag.  clinical improvement.  sputum cultures.     d/w pt and the son at bedside. d/w MERVAT Rice.     - Dr. BEENA Rogers ([facility])  - [phone] n/a

## 2024-03-21 NOTE — CONSULT NOTE ADULT - ASSESSMENT
79yo M hx of Severe AS s/p TAVR, afib on coumadin, carotid endarterectomy ('21) on plavix, DM, active smoker, PAD, CAD s/p PCI presents to ED with weakness and SOB. Pt states that he has been feeling weak, dizzy, and SOB with cough with white phlegm for last 4 days. Pt states he recently started having left sided chest pain and worsening SOB and so he called EMS. In the ED pt was HD stable and hypoxemic to 50s refractive to NC and was upgraded to NRB. Labs with INR 4, mild leukocytosis and elevated BNP. CXR with pulm edema. ICU consulted for hypoxemia. Patient admitted to ICU for hypoxemic RF, requiring HFNC. Patient found to be coronvirus + & mycoplasma +, imaging consistent with pulmonary edema. Patient now on RA and maintaining Spo2 >92%. Patient is HD stable, awake & alert.     DX: acute hypoxic respiratory failure, acute pulmonary edema, acute HFpEF, mycoplasma PNA, coronavirus (not COVID-19) infection    - doing well on RA with O2 sat 92-94%  - denies SOB  - feeling better  - on azithromycin for mycoplasma PNA to complete 5 day course  - supportive care for coronavirus infection  - ceftriaxone d/aguilar  - on lasix 40mg IV daily for diuresis  - PT  - OOB to chair     78M smoker PMH HTN, HLD, CAD s/p PCI stent x2, b/l carotid artery stenosis s/p L endarterectomy (9/20), severe AS s/p TAVR 10/22, a-fib on warfarin, PAD s/p Bilateral LE stents    yo M hx of Severe AS s/p TAVR, afib on coumadin, carotid endarterectomy ('21) on plavix, DM, active smoker, PAD, CAD s/p PCI presents to ED with weakness and SOB. Pt states that he has been feeling weak, dizzy, and SOB with cough with white phlegm for last 4 days. Pt states he recently started having left sided chest pain and worsening SOB and so he called EMS. In the ED pt was HD stable and hypoxemic to 50s refractive to NC and was upgraded to NRB. Labs with INR 4, mild leukocytosis and elevated BNP. CXR with pulm edema. ICU consulted for hypoxemia. Patient admitted to ICU for hypoxemic RF, requiring HFNC. Patient found to be coronvirus + & mycoplasma +, imaging consistent with pulmonary edema. Patient now on RA and maintaining Spo2 >92%. Patient is HD stable, awake & alert.     DX: acute hypoxic respiratory failure, acute pulmonary edema, acute HFpEF, mycoplasma PNA, coronavirus (not COVID-19) infection    - doing well on RA with O2 sat 92-94%  - denies SOB  - feeling better  - on azithromycin for mycoplasma PNA to complete 5 day course  - supportive care for coronavirus infection  - ceftriaxone d/aguilar  - on lasix 40mg IV daily for diuresis  - PT  - OOB to chair     78M smoker PMH HTN, HLD, CAD s/p PCI stent x2, b/l carotid artery stenosis s/p L endarterectomy (9/20), severe AS s/p TAVR 10/22, a-fib on warfarin, PAD s/p Bilateral LE stents, DM presents to ED with weakness and SOB with cough with white phlegm for 4 days. Left sided chest pain and worsening SOB prompted call to EMS. In the ED pt hypoxic to 50s and placed on NRB. Labs with INR 4, mild leukocytosis and elevated BNP. CXR with pulm edema. Admitted to ICU for hypoxemic respiratory failure requiring HFNC. Patient found to be coronavirus + & mycoplasma +. Weaned off high flow to 6L NC. Downgraded to medical service.    DX: acute hypoxic respiratory failure, acute pulmonary edema, acute HFpEF, mycoplasma PNA, coronavirus (not COVID-19) infection    - doing well on RA with O2 sat 92-94%  - denies SOB  - feeling better  - on azithromycin for mycoplasma PNA to complete 5 day course  - supportive care for coronavirus infection  - ceftriaxone d/aguilar  - on lasix 40mg IV daily for diuresis  - PT  - OOB to chair  - nicotine patch for smoking hx  - needs smoking cessation  - AC for a-fib per primary team, on warfarin, goal INR 2-3

## 2024-03-22 LAB
ALBUMIN SERPL ELPH-MCNC: 2.4 G/DL — LOW (ref 3.3–5)
ALP SERPL-CCNC: 99 U/L — SIGNIFICANT CHANGE UP (ref 40–120)
ALT FLD-CCNC: 36 U/L — SIGNIFICANT CHANGE UP (ref 12–78)
ANION GAP SERPL CALC-SCNC: 7 MMOL/L — SIGNIFICANT CHANGE UP (ref 5–17)
AST SERPL-CCNC: 41 U/L — HIGH (ref 15–37)
BILIRUB SERPL-MCNC: 0.9 MG/DL — SIGNIFICANT CHANGE UP (ref 0.2–1.2)
BUN SERPL-MCNC: 28 MG/DL — HIGH (ref 7–23)
CALCIUM SERPL-MCNC: 9.3 MG/DL — SIGNIFICANT CHANGE UP (ref 8.5–10.1)
CHLORIDE SERPL-SCNC: 102 MMOL/L — SIGNIFICANT CHANGE UP (ref 96–108)
CO2 SERPL-SCNC: 27 MMOL/L — SIGNIFICANT CHANGE UP (ref 22–31)
CREAT SERPL-MCNC: 1.04 MG/DL — SIGNIFICANT CHANGE UP (ref 0.5–1.3)
CULTURE RESULTS: SIGNIFICANT CHANGE UP
CULTURE RESULTS: SIGNIFICANT CHANGE UP
EGFR: 73 ML/MIN/1.73M2 — SIGNIFICANT CHANGE UP
GLUCOSE BLDC GLUCOMTR-MCNC: 161 MG/DL — HIGH (ref 70–99)
GLUCOSE BLDC GLUCOMTR-MCNC: 171 MG/DL — HIGH (ref 70–99)
GLUCOSE BLDC GLUCOMTR-MCNC: 250 MG/DL — HIGH (ref 70–99)
GLUCOSE BLDC GLUCOMTR-MCNC: 278 MG/DL — HIGH (ref 70–99)
GLUCOSE SERPL-MCNC: 156 MG/DL — HIGH (ref 70–99)
HCT VFR BLD CALC: 43.1 % — SIGNIFICANT CHANGE UP (ref 39–50)
HGB BLD-MCNC: 13.6 G/DL — SIGNIFICANT CHANGE UP (ref 13–17)
INR BLD: 1.16 RATIO — SIGNIFICANT CHANGE UP (ref 0.85–1.18)
MAGNESIUM SERPL-MCNC: 1.3 MG/DL — LOW (ref 1.6–2.6)
MCHC RBC-ENTMCNC: 30.9 PG — SIGNIFICANT CHANGE UP (ref 27–34)
MCHC RBC-ENTMCNC: 31.6 G/DL — LOW (ref 32–36)
MCV RBC AUTO: 98 FL — SIGNIFICANT CHANGE UP (ref 80–100)
NRBC # BLD: 0 /100 WBCS — SIGNIFICANT CHANGE UP (ref 0–0)
PHOSPHATE SERPL-MCNC: 2.7 MG/DL — SIGNIFICANT CHANGE UP (ref 2.5–4.5)
PLATELET # BLD AUTO: 190 K/UL — SIGNIFICANT CHANGE UP (ref 150–400)
POTASSIUM SERPL-MCNC: 3.7 MMOL/L — SIGNIFICANT CHANGE UP (ref 3.5–5.3)
POTASSIUM SERPL-SCNC: 3.7 MMOL/L — SIGNIFICANT CHANGE UP (ref 3.5–5.3)
PROT SERPL-MCNC: 8.5 GM/DL — HIGH (ref 6–8.3)
PROTHROM AB SERPL-ACNC: 13.8 SEC — HIGH (ref 9.5–13)
RBC # BLD: 4.4 M/UL — SIGNIFICANT CHANGE UP (ref 4.2–5.8)
RBC # FLD: 13.9 % — SIGNIFICANT CHANGE UP (ref 10.3–14.5)
SODIUM SERPL-SCNC: 136 MMOL/L — SIGNIFICANT CHANGE UP (ref 135–145)
SPECIMEN SOURCE: SIGNIFICANT CHANGE UP
SPECIMEN SOURCE: SIGNIFICANT CHANGE UP
WBC # BLD: 12.11 K/UL — HIGH (ref 3.8–10.5)
WBC # FLD AUTO: 12.11 K/UL — HIGH (ref 3.8–10.5)

## 2024-03-22 PROCEDURE — 99233 SBSQ HOSP IP/OBS HIGH 50: CPT

## 2024-03-22 RX ORDER — WARFARIN SODIUM 2.5 MG/1
7.5 TABLET ORAL ONCE
Refills: 0 | Status: COMPLETED | OUTPATIENT
Start: 2024-03-22 | End: 2024-03-22

## 2024-03-22 RX ORDER — POLYETHYLENE GLYCOL 3350 17 G/17G
17 POWDER, FOR SOLUTION ORAL ONCE
Refills: 0 | Status: COMPLETED | OUTPATIENT
Start: 2024-03-22 | End: 2024-03-22

## 2024-03-22 RX ORDER — MAGNESIUM SULFATE 500 MG/ML
2 VIAL (ML) INJECTION ONCE
Refills: 0 | Status: COMPLETED | OUTPATIENT
Start: 2024-03-22 | End: 2024-03-22

## 2024-03-22 RX ADMIN — POLYETHYLENE GLYCOL 3350 17 GRAM(S): 17 POWDER, FOR SOLUTION ORAL at 21:50

## 2024-03-22 RX ADMIN — AZITHROMYCIN 255 MILLIGRAM(S): 500 TABLET, FILM COATED ORAL at 05:53

## 2024-03-22 RX ADMIN — CLOPIDOGREL BISULFATE 75 MILLIGRAM(S): 75 TABLET, FILM COATED ORAL at 12:24

## 2024-03-22 RX ADMIN — GABAPENTIN 100 MILLIGRAM(S): 400 CAPSULE ORAL at 17:11

## 2024-03-22 RX ADMIN — Medication 6: at 17:00

## 2024-03-22 RX ADMIN — SIMVASTATIN 10 MILLIGRAM(S): 20 TABLET, FILM COATED ORAL at 22:30

## 2024-03-22 RX ADMIN — Medication 1 PATCH: at 16:59

## 2024-03-22 RX ADMIN — GABAPENTIN 100 MILLIGRAM(S): 400 CAPSULE ORAL at 05:35

## 2024-03-22 RX ADMIN — Medication 40 MILLIGRAM(S): at 05:35

## 2024-03-22 RX ADMIN — Medication 2: at 09:30

## 2024-03-22 RX ADMIN — WARFARIN SODIUM 7.5 MILLIGRAM(S): 2.5 TABLET ORAL at 21:50

## 2024-03-22 RX ADMIN — Medication 25 GRAM(S): at 10:45

## 2024-03-22 RX ADMIN — Medication 2: at 21:47

## 2024-03-22 RX ADMIN — Medication 4: at 12:18

## 2024-03-22 NOTE — PROGRESS NOTE ADULT - ASSESSMENT
77yo M hx of Severe AS s/p TAVR, afib on coumadin, carotid endarterectomy ('21) on plavix, DM, active smoker, PAD, CAD s/p PCI presents to ED with weakness and SOB. Pt states that he has been feeling weak, dizzy, and SOB with cough with white phlegm for last 4 days. Pt states he recently started having left sided chest pain and worsening SOB and so he called EMS. In the ED pt was HD stable and hypoxemic to 50s refractive to NC and was upgraded to NRB. Labs with INR 4, mild leukocytosis and elevated BNP. CXR with pulm edema. ICU consulted for hypoxemia. Patient admitted to ICU for hypoxemic RF, requiring HFNC. Patient found to be coronvirus + & mycoplasma +, imaging consistent with pulmonary edema. Patient now on RA and maintaining Spo2 >92%. Patient is HD stable, awake & alert.       Plan:   -  Weaned off supplemental O2 today. Continue with duonebs.   -- Continue lasix 40 QD for acute decompensated HF.    - Continue azithromycin for 3 day course for mycoplasma PNA   - Restarted patients home warfarin. INR subtherapuetic. Coumadin tonight. PT consult for disposition  - Continue home plavix & statin.   - FU cardiology recommendations.  (3/22) Increase      79yo M hx of Severe AS s/p TAVR, afib on coumadin, carotid endarterectomy ('21) on plavix, DM, active smoker, PAD, CAD s/p PCI presents to ED with weakness and SOB. Pt states that he has been feeling weak, dizzy, and SOB with cough with white phlegm for last 4 days. Pt states he recently started having left sided chest pain and worsening SOB and so he called EMS. In the ED pt was HD stable and hypoxemic to 50s refractive to NC and was upgraded to NRB. Labs with INR 4, mild leukocytosis and elevated BNP. CXR with pulm edema. ICU consulted for hypoxemia. Patient admitted to ICU for hypoxemic RF, requiring HFNC. Patient found to be coronvirus + & mycoplasma +, imaging consistent with pulmonary edema. Patient now on RA and maintaining Spo2 >92%. Patient is HD stable, awake & alert.       Plan:   -  Weaned off supplemental O2 today. Continue with duonebs.   -- Continue lasix 40 QD for acute decompensated HF.    - Continue azithromycin for 3 day course for mycoplasma PNA   - Restarted patients home warfarin. INR subtherapeutic Coumadin tonight. PT consult for disposition  - Continue home plavix & statin.   - FU cardiology recommendations.  (3/22) Increase couamdin to 7.5 mg po X1. Check INR kit.

## 2024-03-22 NOTE — PROGRESS NOTE ADULT - SUBJECTIVE AND OBJECTIVE BOX
Patient is a 78y old  Male who presents with a chief complaint of ADHF (22 Mar 2024 17:20)    INTERVAL HPI/OVERNIGHT EVENTS: Patients seen and examined at bedside this morning. No acute events overnight.     MEDICATIONS  (STANDING):  azithromycin  IVPB 500 milliGRAM(s) IV Intermittent every 24 hours  azithromycin  IVPB      clopidogrel Tablet 75 milliGRAM(s) Oral daily  furosemide   Injectable 40 milliGRAM(s) IV Push daily  gabapentin 100 milliGRAM(s) Oral two times a day  influenza  Vaccine (HIGH DOSE) 0.7 milliLiter(s) IntraMuscular once  insulin lispro (ADMELOG) corrective regimen sliding scale   SubCutaneous Before meals and at bedtime  nicotine -  14 mG/24Hr(s) Patch 1 Patch Transdermal daily  simvastatin 10 milliGRAM(s) Oral at bedtime    MEDICATIONS  (PRN):  acetaminophen     Tablet .. 650 milliGRAM(s) Oral every 6 hours PRN Temp greater or equal to 38C (100.4F), Mild Pain (1 - 3)  albuterol    0.083% 2.5 milliGRAM(s) Nebulizer every 6 hours PRN Shortness of Breath and/or Wheezing  hydrALAZINE Injectable 10 milliGRAM(s) IV Push every 6 hours PRN SBP >160    Allergies    No Known Allergies    Intolerances      REVIEW OF SYSTEMS:  All other systems reviewed and are negative    Vital Signs Last 24 Hrs  T(C): 37.2 (22 Mar 2024 12:00), Max: 37.2 (22 Mar 2024 12:00)  T(F): 98.9 (22 Mar 2024 12:00), Max: 98.9 (22 Mar 2024 12:00)  HR: 76 (22 Mar 2024 12:00) (60 - 85)  BP: 132/84 (22 Mar 2024 12:00) (132/84 - 144/82)  BP(mean): --  RR: 17 (22 Mar 2024 12:00) (17 - 18)  SpO2: 96% (22 Mar 2024 12:00) (94% - 96%)    Parameters below as of 22 Mar 2024 12:00  Patient On (Oxygen Delivery Method): room air      Daily     Daily   I&O's Summary    21 Mar 2024 07:01  -  22 Mar 2024 07:00  --------------------------------------------------------  IN: 0 mL / OUT: 1300 mL / NET: -1300 mL    22 Mar 2024 07:01  -  22 Mar 2024 18:02  --------------------------------------------------------  IN: 540 mL / OUT: 1000 mL / NET: -460 mL      CAPILLARY BLOOD GLUCOSE      POCT Blood Glucose.: 278 mg/dL (22 Mar 2024 16:18)  POCT Blood Glucose.: 250 mg/dL (22 Mar 2024 11:47)  POCT Blood Glucose.: 161 mg/dL (22 Mar 2024 08:38)  POCT Blood Glucose.: 289 mg/dL (21 Mar 2024 21:09)    PHYSICAL EXAM:  GENERAL: NAD, well-groomed, well-developed  HEAD:  Atraumatic, Normocephalic  EYES:  PERRLA, conjunctiva and sclera clear  ENMT:Moist mucous membranes  NECK: Supple, No JVD,   NERVOUS SYSTEM:  Alert & Oriented X3, non focal  CHEST/LUNG: CTA B/L, no wheeze  HEART: Regular rate and rhythm; No murmurs, rubs, or gallops  ABDOMEN: Soft, Nontender, Nondistended; Bowel sounds present  EXTREMITIES:  2+ Peripheral Pulses, No clubbing, cyanosis, or edema  SKIN: No rashes or lesions:      Labs                          13.6   12.11 )-----------( 190      ( 22 Mar 2024 06:03 )             43.1     03-22    136  |  102  |  28<H>  ----------------------------<  156<H>  3.7   |  27  |  1.04    Ca    9.3      22 Mar 2024 06:03  Phos  2.7     03-22  Mg     1.3     03-22    TPro  8.5<H>  /  Alb  2.4<L>  /  TBili  0.9  /  DBili  x   /  AST  41<H>  /  ALT  36  /  AlkPhos  99  03-22    PT/INR - ( 22 Mar 2024 06:03 )   PT: 13.8 sec;   INR: 1.16 ratio               Urinalysis Basic - ( 22 Mar 2024 06:03 )    Color: x / Appearance: x / SG: x / pH: x  Gluc: 156 mg/dL / Ketone: x  / Bili: x / Urobili: x   Blood: x / Protein: x / Nitrite: x   Leuk Esterase: x / RBC: x / WBC x   Sq Epi: x / Non Sq Epi: x / Bacteria: x                      Radiology and Imaging reviewed.

## 2024-03-22 NOTE — PROGRESS NOTE ADULT - ASSESSMENT
77yo M hx of Severe AS s/p TAVR, afib on coumadin, carotid endarterectomy ('21) on plavix, DM, active smoker, PAD, CAD s/p PCI presents to ED with weakness and SOB. Pt states that he has been feeling weak, dizzy, and SOB with cough with white phlegm for last 4 days. Pt states he recently started having left sided chest pain and worsening SOB and so he called EMS. In the ED pt was HD stable and hypoxemic to 50s refractive to NC and was upgraded to NRB. Labs with INR 4, mild leukocytosis and elevated BNP. CXR with pulm edema. ICU consulted for hypoxemia. Patient admitted to ICU for hypoxemic RF, requiring HFNC. Patient found to be coronvirus + & mycoplasma +, imaging consistent with pulmonary edema. Patient now on RA and maintaining Spo2 >92%. Patient is HD stable, awake & alert.     DX: acute hypoxic respiratory failure, acute pulmonary edema, acute HFpEF, mycoplasma PNA, coronavirus (not COVID-19) infection    - doing well on RA with SpO2 stable  - denies SOB  - feeling better  - on azithromycin for mycoplasma PNA to complete 5 day course  - supportive care for coronavirus infection  - ceftriaxone d/aguilar  - Mod pulm HTN on TTE- F/U cards reccs  - on lasix 40mg IV daily for diuresis  - PT  - OOB to chair    Plan discussed with pulm attending Dr. Jacinto

## 2024-03-22 NOTE — PROGRESS NOTE ADULT - SUBJECTIVE AND OBJECTIVE BOX
Interval Events: Patient seen and examined at bedside. No acute events. Reports overall improvement in symptoms. Remains on room air. Denies SOB, Cough, CP, ABd pain, N/V/D.     REVIEW OF SYSTEMS:  All negative unless listed within interval HPI       OBJECTIVE:  Vital Signs Last 24 Hrs  T(C): 37.2 (22 Mar 2024 12:00), Max: 37.2 (22 Mar 2024 12:00)  T(F): 98.9 (22 Mar 2024 12:00), Max: 98.9 (22 Mar 2024 12:00)  HR: 76 (22 Mar 2024 12:00) (60 - 85)  BP: 132/84 (22 Mar 2024 12:00) (132/84 - 144/82)  BP(mean): --  ABP: --  ABP(mean): --  RR: 17 (22 Mar 2024 12:00) (17 - 18)  SpO2: 96% (22 Mar 2024 12:00) (94% - 96%)    O2 Parameters below as of 22 Mar 2024 12:00  Patient On (Oxygen Delivery Method): room air              03-21 @ 07:01  -  03-22 @ 07:00  --------------------------------------------------------  IN: 0 mL / OUT: 1300 mL / NET: -1300 mL    03-22 @ 07:01  - 03-22 @ 17:21  --------------------------------------------------------  IN: 540 mL / OUT: 1000 mL / NET: -460 mL      CAPILLARY BLOOD GLUCOSE      POCT Blood Glucose.: 278 mg/dL (22 Mar 2024 16:18)      PHYSICAL EXAM:  GENERAL: NAD, well-groomed, well-developed  HEAD:  Atraumatic, Normocephalic  EYES:  PERRLA, conjunctiva and sclera clear  ENMT:Moist mucous membranes  NECK: Supple, No JVD,   NERVOUS SYSTEM:  Alert & Oriented X3, non focal  CHEST/LUNG: CTA B/L, no wheeze  HEART: Regular rate and rhythm; No murmurs, rubs, or gallops  ABDOMEN: Soft, Nontender, Nondistended; Bowel sounds present  EXTREMITIES:  2+ Peripheral Pulses, No clubbing, cyanosis, or edema  SKIN: No rashes or lesions:    HOSPITAL MEDICATIONS:  clopidogrel Tablet 75 milliGRAM(s) Oral daily    azithromycin  IVPB 500 milliGRAM(s) IV Intermittent every 24 hours  azithromycin  IVPB        furosemide   Injectable 40 milliGRAM(s) IV Push daily  hydrALAZINE Injectable 10 milliGRAM(s) IV Push every 6 hours PRN    insulin lispro (ADMELOG) corrective regimen sliding scale   SubCutaneous Before meals and at bedtime  simvastatin 10 milliGRAM(s) Oral at bedtime    albuterol    0.083% 2.5 milliGRAM(s) Nebulizer every 6 hours PRN    acetaminophen     Tablet .. 650 milliGRAM(s) Oral every 6 hours PRN  gabapentin 100 milliGRAM(s) Oral two times a day            influenza  Vaccine (HIGH DOSE) 0.7 milliLiter(s) IntraMuscular once      nicotine -  14 mG/24Hr(s) Patch 1 Patch Transdermal daily      LABS:                        13.6   12.11 )-----------( 190      ( 22 Mar 2024 06:03 )             43.1     Hgb Trend: 13.6<--, 11.9<--, 11.9<--, 13.3<--, 12.6<--  03-22    136  |  102  |  28<H>  ----------------------------<  156<H>  3.7   |  27  |  1.04    Ca    9.3      22 Mar 2024 06:03  Phos  2.7     03-22  Mg     1.3     03-22    TPro  8.5<H>  /  Alb  2.4<L>  /  TBili  0.9  /  DBili  x   /  AST  41<H>  /  ALT  36  /  AlkPhos  99  03-22    Creatinine Trend: 1.04<--, 1.18<--, 1.32<--, 1.05<--, 1.26<--, 1.46<--  PT/INR - ( 22 Mar 2024 06:03 )   PT: 13.8 sec;   INR: 1.16 ratio           Urinalysis Basic - ( 22 Mar 2024 06:03 )    Color: x / Appearance: x / SG: x / pH: x  Gluc: 156 mg/dL / Ketone: x  / Bili: x / Urobili: x   Blood: x / Protein: x / Nitrite: x   Leuk Esterase: x / RBC: x / WBC x   Sq Epi: x / Non Sq Epi: x / Bacteria: x            MICROBIOLOGY:     Mycoplasma Pneumoniae IgM Antibody (03.18.24 @ 02:40)    Mycoplasma IgM AB: 1.15 Index   Mycoplasma: Positive: Method PHONG           Interpretation:                                             Index                  Negative              <=0.90                  Equivocal            0.91-1.09                  Positive                >=1.10  The reference range has been updated as of 11/16/2020 for this test due  to an upgrade in the testing methodology (EIA to PHONG). Result will be  flagged based upon the updated reference range.      Legionella pneumophila Antigen, Urine (03.17.24 @ 17:30)    Legionella Antigen, Urine: Negative      Streptococcus pneumoniae Ag, Ur (03.17.24 @ 17:30)    Streptococcus pneumoniae Ag, Ur Result: Negative      Respiratory Viral Panel with COVID-19 by LAYO (03.17.24 @ 20:45)    Rapid RVP Result: Detected   SARS-CoV-2: NotDetec   Coronavirus (229E,HKU1,NL63,OC43): Detected      Culture - Urine (03.17.24 @ 16:20)    Specimen Source: Clean Catch Clean Catch (Midstream)   Culture Results: No growth      Culture - Blood (03.17.24 @ 14:25)    Specimen Source: .Blood Blood-Peripheral   Culture Results: No growth at 4 days            RADIOLOGY:  [x ] Reviewed

## 2024-03-23 LAB
ANION GAP SERPL CALC-SCNC: 6 MMOL/L — SIGNIFICANT CHANGE UP (ref 5–17)
BUN SERPL-MCNC: 30 MG/DL — HIGH (ref 7–23)
CALCIUM SERPL-MCNC: 9.2 MG/DL — SIGNIFICANT CHANGE UP (ref 8.5–10.1)
CHLORIDE SERPL-SCNC: 102 MMOL/L — SIGNIFICANT CHANGE UP (ref 96–108)
CO2 SERPL-SCNC: 27 MMOL/L — SIGNIFICANT CHANGE UP (ref 22–31)
CREAT SERPL-MCNC: 1 MG/DL — SIGNIFICANT CHANGE UP (ref 0.5–1.3)
EGFR: 77 ML/MIN/1.73M2 — SIGNIFICANT CHANGE UP
GLUCOSE BLDC GLUCOMTR-MCNC: 166 MG/DL — HIGH (ref 70–99)
GLUCOSE BLDC GLUCOMTR-MCNC: 236 MG/DL — HIGH (ref 70–99)
GLUCOSE BLDC GLUCOMTR-MCNC: 329 MG/DL — HIGH (ref 70–99)
GLUCOSE BLDC GLUCOMTR-MCNC: 333 MG/DL — HIGH (ref 70–99)
GLUCOSE SERPL-MCNC: 163 MG/DL — HIGH (ref 70–99)
INR BLD: 1.27 RATIO — HIGH (ref 0.85–1.18)
POTASSIUM SERPL-MCNC: 4 MMOL/L — SIGNIFICANT CHANGE UP (ref 3.5–5.3)
POTASSIUM SERPL-SCNC: 4 MMOL/L — SIGNIFICANT CHANGE UP (ref 3.5–5.3)
PROTHROM AB SERPL-ACNC: 15.1 SEC — HIGH (ref 9.5–13)
SODIUM SERPL-SCNC: 135 MMOL/L — SIGNIFICANT CHANGE UP (ref 135–145)

## 2024-03-23 PROCEDURE — 99232 SBSQ HOSP IP/OBS MODERATE 35: CPT

## 2024-03-23 RX ORDER — FUROSEMIDE 40 MG
40 TABLET ORAL DAILY
Refills: 0 | Status: DISCONTINUED | OUTPATIENT
Start: 2024-03-24 | End: 2024-03-26

## 2024-03-23 RX ADMIN — GABAPENTIN 100 MILLIGRAM(S): 400 CAPSULE ORAL at 05:48

## 2024-03-23 RX ADMIN — AZITHROMYCIN 255 MILLIGRAM(S): 500 TABLET, FILM COATED ORAL at 08:04

## 2024-03-23 RX ADMIN — SIMVASTATIN 10 MILLIGRAM(S): 20 TABLET, FILM COATED ORAL at 21:25

## 2024-03-23 RX ADMIN — GABAPENTIN 100 MILLIGRAM(S): 400 CAPSULE ORAL at 17:24

## 2024-03-23 RX ADMIN — Medication 2: at 08:04

## 2024-03-23 RX ADMIN — Medication 8: at 21:29

## 2024-03-23 RX ADMIN — Medication 40 MILLIGRAM(S): at 05:48

## 2024-03-23 RX ADMIN — CLOPIDOGREL BISULFATE 75 MILLIGRAM(S): 75 TABLET, FILM COATED ORAL at 11:44

## 2024-03-23 RX ADMIN — Medication 1 PATCH: at 08:07

## 2024-03-23 RX ADMIN — Medication 1 PATCH: at 20:24

## 2024-03-23 RX ADMIN — Medication 8: at 11:45

## 2024-03-23 RX ADMIN — Medication 4: at 17:22

## 2024-03-23 RX ADMIN — Medication 1 PATCH: at 11:42

## 2024-03-23 NOTE — PROGRESS NOTE ADULT - ASSESSMENT
77yo M hx of Severe AS s/p TAVR, afib on coumadin, carotid endarterectomy ('21) on plavix, DM, active smoker, PAD, CAD s/p PCI presents to ED with weakness and SOB. Pt states that he has been feeling weak, dizzy, and SOB with cough with white phlegm for last 4 days. Pt states he recently started having left sided chest pain and worsening SOB and so he called EMS. In the ED pt was HD stable and hypoxemic to 50s refractive to NC and was upgraded to NRB. Labs with INR 4, mild leukocytosis and elevated BNP. CXR with pulm edema. ICU consulted for hypoxemia. Patient admitted to ICU for hypoxemic RF, requiring HFNC. Patient found to be coronvirus + & mycoplasma +, imaging consistent with pulmonary edema. Patient now on RA and maintaining Spo2 >92%. Patient is HD stable, awake & alert.       Plan:   -  Weaned off supplemental O2 today. Continue with duonebs.   -- Continue lasix 40 QD for acute decompensated HF.    - Continue azithromycin for 3 day course for mycoplasma PNA   - Restarted patients home warfarin. INR subtherapeutic Coumadin tonight. PT consult for disposition  - Continue home plavix & statin.   - FU cardiology recommendations.  (3/22) Increase couamdin to 7.5 mg po X1. Check INR kit.  (3/23) INR subtherapetic. Coumadin 5 mg X1. Check INR. Discharge planning Lasix changed to PO.

## 2024-03-23 NOTE — PROGRESS NOTE ADULT - SUBJECTIVE AND OBJECTIVE BOX
Patient is a 78y old  Male who presents with a chief complaint of ADHF (22 Mar 2024 18:02)    INTERVAL HPI/OVERNIGHT EVENTS: Patients seen and examined at bedside this morning. No acute events overnight.     MEDICATIONS  (STANDING):  azithromycin  IVPB 500 milliGRAM(s) IV Intermittent every 24 hours  azithromycin  IVPB      clopidogrel Tablet 75 milliGRAM(s) Oral daily  gabapentin 100 milliGRAM(s) Oral two times a day  influenza  Vaccine (HIGH DOSE) 0.7 milliLiter(s) IntraMuscular once  insulin lispro (ADMELOG) corrective regimen sliding scale   SubCutaneous Before meals and at bedtime  nicotine -  14 mG/24Hr(s) Patch 1 Patch Transdermal daily  simvastatin 10 milliGRAM(s) Oral at bedtime    MEDICATIONS  (PRN):  acetaminophen     Tablet .. 650 milliGRAM(s) Oral every 6 hours PRN Temp greater or equal to 38C (100.4F), Mild Pain (1 - 3)  albuterol    0.083% 2.5 milliGRAM(s) Nebulizer every 6 hours PRN Shortness of Breath and/or Wheezing  hydrALAZINE Injectable 10 milliGRAM(s) IV Push every 6 hours PRN SBP >160    Allergies    No Known Allergies    Intolerances      REVIEW OF SYSTEMS:  All other systems reviewed and are negative    Vital Signs Last 24 Hrs  T(C): 37.1 (23 Mar 2024 05:23), Max: 37.1 (23 Mar 2024 05:23)  T(F): 98.8 (23 Mar 2024 05:23), Max: 98.8 (23 Mar 2024 05:23)  HR: 67 (23 Mar 2024 05:23) (67 - 87)  BP: 122/80 (23 Mar 2024 05:23) (110/70 - 122/80)  BP(mean): --  RR: 18 (23 Mar 2024 05:23) (18 - 18)  SpO2: 97% (23 Mar 2024 05:23) (94% - 97%)    Parameters below as of 23 Mar 2024 05:23  Patient On (Oxygen Delivery Method): room air      Daily     Daily   I&O's Summary    22 Mar 2024 07:01  -  23 Mar 2024 07:00  --------------------------------------------------------  IN: 540 mL / OUT: 2875 mL / NET: -2335 mL    23 Mar 2024 07:01  -  23 Mar 2024 12:22  --------------------------------------------------------  IN: 360 mL / OUT: 0 mL / NET: 360 mL      CAPILLARY BLOOD GLUCOSE      POCT Blood Glucose.: 329 mg/dL (23 Mar 2024 11:44)  POCT Blood Glucose.: 166 mg/dL (23 Mar 2024 07:53)  POCT Blood Glucose.: 171 mg/dL (22 Mar 2024 21:33)  POCT Blood Glucose.: 278 mg/dL (22 Mar 2024 16:18)    PHYSICAL EXAM:  GENERAL: NAD, well-groomed, well-developed  HEAD:  Atraumatic, Normocephalic  EYES:  PERRLA, conjunctiva and sclera clear  ENMT:Moist mucous membranes  NECK: Supple, No JVD,   NERVOUS SYSTEM:  Alert & Oriented X3, non focal  CHEST/LUNG: CTA B/L, no wheeze  HEART: Regular rate and rhythm; No murmurs, rubs, or gallops  ABDOMEN: Soft, Nontender, Nondistended; Bowel sounds present  EXTREMITIES:  2+ Peripheral Pulses, No clubbing, cyanosis, or edema  SKIN: No rashes or lesions:    Labs                          13.6   12.11 )-----------( 190      ( 22 Mar 2024 06:03 )             43.1     03-23    135  |  102  |  30<H>  ----------------------------<  163<H>  4.0   |  27  |  1.00    Ca    9.2      23 Mar 2024 07:39  Phos  2.7     03-22  Mg     1.3     03-22    TPro  8.5<H>  /  Alb  2.4<L>  /  TBili  0.9  /  DBili  x   /  AST  41<H>  /  ALT  36  /  AlkPhos  99  03-22    PT/INR - ( 23 Mar 2024 07:39 )   PT: 15.1 sec;   INR: 1.27 ratio               Urinalysis Basic - ( 23 Mar 2024 07:39 )    Color: x / Appearance: x / SG: x / pH: x  Gluc: 163 mg/dL / Ketone: x  / Bili: x / Urobili: x   Blood: x / Protein: x / Nitrite: x   Leuk Esterase: x / RBC: x / WBC x   Sq Epi: x / Non Sq Epi: x / Bacteria: x                      Radiology and Imaging reviewed.

## 2024-03-24 LAB
ALBUMIN SERPL ELPH-MCNC: 2.4 G/DL — LOW (ref 3.3–5)
ALP SERPL-CCNC: 98 U/L — SIGNIFICANT CHANGE UP (ref 40–120)
ALT FLD-CCNC: 45 U/L — SIGNIFICANT CHANGE UP (ref 12–78)
ANION GAP SERPL CALC-SCNC: 9 MMOL/L — SIGNIFICANT CHANGE UP (ref 5–17)
AST SERPL-CCNC: 38 U/L — HIGH (ref 15–37)
BILIRUB SERPL-MCNC: 1.1 MG/DL — SIGNIFICANT CHANGE UP (ref 0.2–1.2)
BUN SERPL-MCNC: 31 MG/DL — HIGH (ref 7–23)
CALCIUM SERPL-MCNC: 9.3 MG/DL — SIGNIFICANT CHANGE UP (ref 8.5–10.1)
CHLORIDE SERPL-SCNC: 98 MMOL/L — SIGNIFICANT CHANGE UP (ref 96–108)
CO2 SERPL-SCNC: 29 MMOL/L — SIGNIFICANT CHANGE UP (ref 22–31)
CREAT SERPL-MCNC: 0.99 MG/DL — SIGNIFICANT CHANGE UP (ref 0.5–1.3)
EGFR: 77 ML/MIN/1.73M2 — SIGNIFICANT CHANGE UP
GLUCOSE BLDC GLUCOMTR-MCNC: 159 MG/DL — HIGH (ref 70–99)
GLUCOSE BLDC GLUCOMTR-MCNC: 165 MG/DL — HIGH (ref 70–99)
GLUCOSE BLDC GLUCOMTR-MCNC: 313 MG/DL — HIGH (ref 70–99)
GLUCOSE BLDC GLUCOMTR-MCNC: 393 MG/DL — HIGH (ref 70–99)
GLUCOSE SERPL-MCNC: 252 MG/DL — HIGH (ref 70–99)
HCT VFR BLD CALC: 41.2 % — SIGNIFICANT CHANGE UP (ref 39–50)
HGB BLD-MCNC: 13.6 G/DL — SIGNIFICANT CHANGE UP (ref 13–17)
INR BLD: 1.22 RATIO — HIGH (ref 0.85–1.18)
MAGNESIUM SERPL-MCNC: 1.5 MG/DL — LOW (ref 1.6–2.6)
MCHC RBC-ENTMCNC: 31.7 PG — SIGNIFICANT CHANGE UP (ref 27–34)
MCHC RBC-ENTMCNC: 33 G/DL — SIGNIFICANT CHANGE UP (ref 32–36)
MCV RBC AUTO: 96 FL — SIGNIFICANT CHANGE UP (ref 80–100)
NRBC # BLD: 0 /100 WBCS — SIGNIFICANT CHANGE UP (ref 0–0)
PHOSPHATE SERPL-MCNC: 3.5 MG/DL — SIGNIFICANT CHANGE UP (ref 2.5–4.5)
PLATELET # BLD AUTO: 216 K/UL — SIGNIFICANT CHANGE UP (ref 150–400)
POTASSIUM SERPL-MCNC: 4.3 MMOL/L — SIGNIFICANT CHANGE UP (ref 3.5–5.3)
POTASSIUM SERPL-SCNC: 4.3 MMOL/L — SIGNIFICANT CHANGE UP (ref 3.5–5.3)
PROT SERPL-MCNC: 8.5 GM/DL — HIGH (ref 6–8.3)
PROTHROM AB SERPL-ACNC: 14.4 SEC — HIGH (ref 9.5–13)
RBC # BLD: 4.29 M/UL — SIGNIFICANT CHANGE UP (ref 4.2–5.8)
RBC # FLD: 13.9 % — SIGNIFICANT CHANGE UP (ref 10.3–14.5)
SODIUM SERPL-SCNC: 136 MMOL/L — SIGNIFICANT CHANGE UP (ref 135–145)
WBC # BLD: 11.11 K/UL — HIGH (ref 3.8–10.5)
WBC # FLD AUTO: 11.11 K/UL — HIGH (ref 3.8–10.5)

## 2024-03-24 PROCEDURE — 99232 SBSQ HOSP IP/OBS MODERATE 35: CPT

## 2024-03-24 RX ORDER — DEXTROSE 50 % IN WATER 50 %
25 SYRINGE (ML) INTRAVENOUS ONCE
Refills: 0 | Status: DISCONTINUED | OUTPATIENT
Start: 2024-03-24 | End: 2024-03-26

## 2024-03-24 RX ORDER — MAGNESIUM SULFATE 500 MG/ML
1 VIAL (ML) INJECTION ONCE
Refills: 0 | Status: COMPLETED | OUTPATIENT
Start: 2024-03-24 | End: 2024-03-24

## 2024-03-24 RX ORDER — INSULIN GLARGINE 100 [IU]/ML
10 INJECTION, SOLUTION SUBCUTANEOUS AT BEDTIME
Refills: 0 | Status: DISCONTINUED | OUTPATIENT
Start: 2024-03-24 | End: 2024-03-25

## 2024-03-24 RX ORDER — DEXTROSE 50 % IN WATER 50 %
12.5 SYRINGE (ML) INTRAVENOUS ONCE
Refills: 0 | Status: DISCONTINUED | OUTPATIENT
Start: 2024-03-24 | End: 2024-03-26

## 2024-03-24 RX ORDER — SODIUM CHLORIDE 9 MG/ML
1000 INJECTION, SOLUTION INTRAVENOUS
Refills: 0 | Status: DISCONTINUED | OUTPATIENT
Start: 2024-03-24 | End: 2024-03-26

## 2024-03-24 RX ORDER — GLUCAGON INJECTION, SOLUTION 0.5 MG/.1ML
1 INJECTION, SOLUTION SUBCUTANEOUS ONCE
Refills: 0 | Status: DISCONTINUED | OUTPATIENT
Start: 2024-03-24 | End: 2024-03-26

## 2024-03-24 RX ORDER — WARFARIN SODIUM 2.5 MG/1
7.5 TABLET ORAL ONCE
Refills: 0 | Status: COMPLETED | OUTPATIENT
Start: 2024-03-24 | End: 2024-03-24

## 2024-03-24 RX ORDER — DEXTROSE 50 % IN WATER 50 %
15 SYRINGE (ML) INTRAVENOUS ONCE
Refills: 0 | Status: DISCONTINUED | OUTPATIENT
Start: 2024-03-24 | End: 2024-03-26

## 2024-03-24 RX ADMIN — Medication 100 GRAM(S): at 11:42

## 2024-03-24 RX ADMIN — Medication 8: at 11:44

## 2024-03-24 RX ADMIN — SIMVASTATIN 10 MILLIGRAM(S): 20 TABLET, FILM COATED ORAL at 21:34

## 2024-03-24 RX ADMIN — Medication 2: at 16:15

## 2024-03-24 RX ADMIN — Medication 10: at 21:32

## 2024-03-24 RX ADMIN — Medication 1 PATCH: at 11:44

## 2024-03-24 RX ADMIN — Medication 1 PATCH: at 07:00

## 2024-03-24 RX ADMIN — INSULIN GLARGINE 10 UNIT(S): 100 INJECTION, SOLUTION SUBCUTANEOUS at 21:31

## 2024-03-24 RX ADMIN — CLOPIDOGREL BISULFATE 75 MILLIGRAM(S): 75 TABLET, FILM COATED ORAL at 12:12

## 2024-03-24 RX ADMIN — AZITHROMYCIN 255 MILLIGRAM(S): 500 TABLET, FILM COATED ORAL at 07:43

## 2024-03-24 RX ADMIN — GABAPENTIN 100 MILLIGRAM(S): 400 CAPSULE ORAL at 05:37

## 2024-03-24 RX ADMIN — WARFARIN SODIUM 7.5 MILLIGRAM(S): 2.5 TABLET ORAL at 21:34

## 2024-03-24 RX ADMIN — Medication 40 MILLIGRAM(S): at 05:37

## 2024-03-24 RX ADMIN — GABAPENTIN 100 MILLIGRAM(S): 400 CAPSULE ORAL at 17:42

## 2024-03-24 RX ADMIN — Medication 1 PATCH: at 12:12

## 2024-03-24 RX ADMIN — Medication 2: at 07:57

## 2024-03-24 NOTE — PROGRESS NOTE ADULT - SUBJECTIVE AND OBJECTIVE BOX
Patient is a 78y old  Male who presents with a chief complaint of ADHF (23 Mar 2024 12:22)    INTERVAL HPI/OVERNIGHT EVENTS: Patients seen and examined at bedside this morning. No acute events overnight. Pt reports    MEDICATIONS  (STANDING):  azithromycin  IVPB 500 milliGRAM(s) IV Intermittent every 24 hours  azithromycin  IVPB      clopidogrel Tablet 75 milliGRAM(s) Oral daily  dextrose 5%. 1000 milliLiter(s) (50 mL/Hr) IV Continuous <Continuous>  dextrose 5%. 1000 milliLiter(s) (100 mL/Hr) IV Continuous <Continuous>  dextrose 50% Injectable 25 Gram(s) IV Push once  dextrose 50% Injectable 12.5 Gram(s) IV Push once  dextrose 50% Injectable 25 Gram(s) IV Push once  furosemide    Tablet 40 milliGRAM(s) Oral daily  gabapentin 100 milliGRAM(s) Oral two times a day  glucagon  Injectable 1 milliGRAM(s) IntraMuscular once  influenza  Vaccine (HIGH DOSE) 0.7 milliLiter(s) IntraMuscular once  insulin glargine Injectable (LANTUS) 10 Unit(s) SubCutaneous at bedtime  insulin lispro (ADMELOG) corrective regimen sliding scale   SubCutaneous Before meals and at bedtime  nicotine -  14 mG/24Hr(s) Patch 1 Patch Transdermal daily  simvastatin 10 milliGRAM(s) Oral at bedtime    MEDICATIONS  (PRN):  acetaminophen     Tablet .. 650 milliGRAM(s) Oral every 6 hours PRN Temp greater or equal to 38C (100.4F), Mild Pain (1 - 3)  albuterol    0.083% 2.5 milliGRAM(s) Nebulizer every 6 hours PRN Shortness of Breath and/or Wheezing  dextrose Oral Gel 15 Gram(s) Oral once PRN Blood Glucose LESS THAN 70 milliGRAM(s)/deciliter  hydrALAZINE Injectable 10 milliGRAM(s) IV Push every 6 hours PRN SBP >160    Allergies    No Known Allergies    Intolerances      REVIEW OF SYSTEMS:  All other systems reviewed and are negative    Vital Signs Last 24 Hrs  T(C): 36.6 (24 Mar 2024 05:15), Max: 36.7 (23 Mar 2024 12:32)  T(F): 97.9 (24 Mar 2024 05:15), Max: 98 (23 Mar 2024 12:32)  HR: 81 (24 Mar 2024 05:15) (81 - 94)  BP: 126/67 (24 Mar 2024 05:15) (110/66 - 135/77)  BP(mean): --  RR: 18 (24 Mar 2024 05:15) (17 - 18)  SpO2: 95% (24 Mar 2024 05:15) (95% - 97%)    Parameters below as of 24 Mar 2024 05:15  Patient On (Oxygen Delivery Method): room air      Daily     Daily   I&O's Summary    23 Mar 2024 07:01  -  24 Mar 2024 07:00  --------------------------------------------------------  IN: 840 mL / OUT: 3100 mL / NET: -2260 mL      CAPILLARY BLOOD GLUCOSE      POCT Blood Glucose.: 159 mg/dL (24 Mar 2024 07:54)  POCT Blood Glucose.: 333 mg/dL (23 Mar 2024 21:07)  POCT Blood Glucose.: 236 mg/dL (23 Mar 2024 16:36)  POCT Blood Glucose.: 329 mg/dL (23 Mar 2024 11:44)    PHYSICAL EXAM:  GENERAL: NAD, well-groomed, well-developed  HEAD:  Atraumatic, Normocephalic  EYES:  PERRLA, conjunctiva and sclera clear  ENMT:Moist mucous membranes  NECK: Supple, No JVD,   NERVOUS SYSTEM:  Alert & Oriented X3, non focal  CHEST/LUNG: CTA B/L, no wheeze  HEART: Regular rate and rhythm; No murmurs, rubs, or gallops  ABDOMEN: Soft, Nontender, Nondistended; Bowel sounds present  EXTREMITIES:  2+ Peripheral Pulses, No clubbing, cyanosis, or edema  SKIN: No rashes or lesions      Labs      03-23    135  |  102  |  30<H>  ----------------------------<  163<H>  4.0   |  27  |  1.00    Ca    9.2      23 Mar 2024 07:39      PT/INR - ( 23 Mar 2024 07:39 )   PT: 15.1 sec;   INR: 1.27 ratio               Urinalysis Basic - ( 23 Mar 2024 07:39 )    Color: x / Appearance: x / SG: x / pH: x  Gluc: 163 mg/dL / Ketone: x  / Bili: x / Urobili: x   Blood: x / Protein: x / Nitrite: x   Leuk Esterase: x / RBC: x / WBC x   Sq Epi: x / Non Sq Epi: x / Bacteria: x                      Radiology and Imaging reviewed. Patient is a 78y old  Male who presents with a chief complaint of ADHF (23 Mar 2024 12:22)    INTERVAL HPI/OVERNIGHT EVENTS: Patients seen and examined at bedside this morning. No acute events overnight. Pt reports feeling better. Working with son to do with bedside stretches.     MEDICATIONS  (STANDING):  azithromycin  IVPB 500 milliGRAM(s) IV Intermittent every 24 hours  azithromycin  IVPB      clopidogrel Tablet 75 milliGRAM(s) Oral daily  dextrose 5%. 1000 milliLiter(s) (50 mL/Hr) IV Continuous <Continuous>  dextrose 5%. 1000 milliLiter(s) (100 mL/Hr) IV Continuous <Continuous>  dextrose 50% Injectable 25 Gram(s) IV Push once  dextrose 50% Injectable 12.5 Gram(s) IV Push once  dextrose 50% Injectable 25 Gram(s) IV Push once  furosemide    Tablet 40 milliGRAM(s) Oral daily  gabapentin 100 milliGRAM(s) Oral two times a day  glucagon  Injectable 1 milliGRAM(s) IntraMuscular once  influenza  Vaccine (HIGH DOSE) 0.7 milliLiter(s) IntraMuscular once  insulin glargine Injectable (LANTUS) 10 Unit(s) SubCutaneous at bedtime  insulin lispro (ADMELOG) corrective regimen sliding scale   SubCutaneous Before meals and at bedtime  nicotine -  14 mG/24Hr(s) Patch 1 Patch Transdermal daily  simvastatin 10 milliGRAM(s) Oral at bedtime    MEDICATIONS  (PRN):  acetaminophen     Tablet .. 650 milliGRAM(s) Oral every 6 hours PRN Temp greater or equal to 38C (100.4F), Mild Pain (1 - 3)  albuterol    0.083% 2.5 milliGRAM(s) Nebulizer every 6 hours PRN Shortness of Breath and/or Wheezing  dextrose Oral Gel 15 Gram(s) Oral once PRN Blood Glucose LESS THAN 70 milliGRAM(s)/deciliter  hydrALAZINE Injectable 10 milliGRAM(s) IV Push every 6 hours PRN SBP >160    Allergies    No Known Allergies    Intolerances      REVIEW OF SYSTEMS:  All other systems reviewed and are negative    Vital Signs Last 24 Hrs  T(C): 36.6 (24 Mar 2024 05:15), Max: 36.7 (23 Mar 2024 12:32)  T(F): 97.9 (24 Mar 2024 05:15), Max: 98 (23 Mar 2024 12:32)  HR: 81 (24 Mar 2024 05:15) (81 - 94)  BP: 126/67 (24 Mar 2024 05:15) (110/66 - 135/77)  BP(mean): --  RR: 18 (24 Mar 2024 05:15) (17 - 18)  SpO2: 95% (24 Mar 2024 05:15) (95% - 97%)    Parameters below as of 24 Mar 2024 05:15  Patient On (Oxygen Delivery Method): room air      Daily     Daily   I&O's Summary    23 Mar 2024 07:01  -  24 Mar 2024 07:00  --------------------------------------------------------  IN: 840 mL / OUT: 3100 mL / NET: -2260 mL      CAPILLARY BLOOD GLUCOSE      POCT Blood Glucose.: 159 mg/dL (24 Mar 2024 07:54)  POCT Blood Glucose.: 333 mg/dL (23 Mar 2024 21:07)  POCT Blood Glucose.: 236 mg/dL (23 Mar 2024 16:36)  POCT Blood Glucose.: 329 mg/dL (23 Mar 2024 11:44)    PHYSICAL EXAM:  GENERAL: NAD, well-groomed, well-developed  HEAD:  Atraumatic, Normocephalic  EYES:  PERRLA, conjunctiva and sclera clear  ENMT:Moist mucous membranes  NECK: Supple, No JVD,   NERVOUS SYSTEM:  Alert & Oriented X3, non focal  CHEST/LUNG: CTA B/L, no wheeze  HEART: Regular rate and rhythm; No murmurs, rubs, or gallops  ABDOMEN: Soft, Nontender, Nondistended; Bowel sounds present  EXTREMITIES:  2+ Peripheral Pulses, No clubbing, cyanosis, or edema  SKIN: No rashes or lesions      Labs      03-23    135  |  102  |  30<H>  ----------------------------<  163<H>  4.0   |  27  |  1.00    Ca    9.2      23 Mar 2024 07:39      PT/INR - ( 23 Mar 2024 07:39 )   PT: 15.1 sec;   INR: 1.27 ratio               Urinalysis Basic - ( 23 Mar 2024 07:39 )    Color: x / Appearance: x / SG: x / pH: x  Gluc: 163 mg/dL / Ketone: x  / Bili: x / Urobili: x   Blood: x / Protein: x / Nitrite: x   Leuk Esterase: x / RBC: x / WBC x   Sq Epi: x / Non Sq Epi: x / Bacteria: x                      Radiology and Imaging reviewed.

## 2024-03-24 NOTE — PROGRESS NOTE ADULT - ASSESSMENT
77yo M hx of Severe AS s/p TAVR, afib on coumadin, carotid endarterectomy ('21) on plavix, DM, active smoker, PAD, CAD s/p PCI presents to ED with weakness and SOB. Pt states that he has been feeling weak, dizzy, and SOB with cough with white phlegm for last 4 days. Pt states he recently started having left sided chest pain and worsening SOB and so he called EMS. In the ED pt was HD stable and hypoxemic to 50s refractive to NC and was upgraded to NRB. Labs with INR 4, mild leukocytosis and elevated BNP. CXR with pulm edema. ICU consulted for hypoxemia. Patient admitted to ICU for hypoxemic RF, requiring HFNC. Patient found to be coronvirus + & mycoplasma +, imaging consistent with pulmonary edema. Patient now on RA and maintaining Spo2 >92%. Patient is HD stable, awake & alert.       Plan:   -  Weaned off supplemental O2 today. Continue with duonebs.   -- Continue lasix 40 QD for acute decompensated HF.    - Continue azithromycin for 3 day course for mycoplasma PNA   - Restarted patients home warfarin. INR subtherapeutic Coumadin tonight. PT consult for disposition  - Continue home plavix & statin.   - FU cardiology recommendations.  (3/22) Increase couamdin to 7.5 mg po X1. Check INR kit.  (3/23) INR subtherapetic. Coumadin 5 mg X1. Check INR. Discharge planning Lasix changed to PO.  (3/24) INR:   . Discharge planning pending auth 77yo M hx of Severe AS s/p TAVR, afib on coumadin, carotid endarterectomy ('21) on plavix, DM, active smoker, PAD, CAD s/p PCI presents to ED with weakness and SOB. Pt states that he has been feeling weak, dizzy, and SOB with cough with white phlegm for last 4 days. Pt states he recently started having left sided chest pain and worsening SOB and so he called EMS. In the ED pt was HD stable and hypoxemic to 50s refractive to NC and was upgraded to NRB. Labs with INR 4, mild leukocytosis and elevated BNP. CXR with pulm edema. ICU consulted for hypoxemia. Patient admitted to ICU for hypoxemic RF, requiring HFNC. Patient found to be coronvirus + & mycoplasma +, imaging consistent with pulmonary edema. Patient now on RA and maintaining Spo2 >92%. Patient is HD stable, awake & alert.       Plan:   -  Weaned off supplemental O2 today. Continue with duonebs.   -- Continue lasix 40 QD for acute decompensated HF.    - Continue azithromycin for 3 day course for mycoplasma PNA   - Restarted patients home warfarin. INR subtherapeutic Coumadin tonight. PT consult for disposition  - Continue home plavix & statin.   - FU cardiology recommendations.  (3/22) Increase couamdin to 7.5 mg po X1. Check INR kit.  (3/23) INR subtherapetic. Coumadin 5 mg X1. Check INR. Discharge planning Lasix changed to PO.  (3/24) INR:  subtherapeutic. Coumdain 7.5 mg X1 . Discharge planning pending auth

## 2024-03-25 ENCOUNTER — TRANSCRIPTION ENCOUNTER (OUTPATIENT)
Age: 79
End: 2024-03-25

## 2024-03-25 LAB
GLUCOSE BLDC GLUCOMTR-MCNC: 167 MG/DL — HIGH (ref 70–99)
GLUCOSE BLDC GLUCOMTR-MCNC: 255 MG/DL — HIGH (ref 70–99)
GLUCOSE BLDC GLUCOMTR-MCNC: 272 MG/DL — HIGH (ref 70–99)
GLUCOSE BLDC GLUCOMTR-MCNC: 390 MG/DL — HIGH (ref 70–99)
GLUCOSE BLDC GLUCOMTR-MCNC: 400 MG/DL — HIGH (ref 70–99)
INR BLD: 1.11 RATIO — SIGNIFICANT CHANGE UP (ref 0.85–1.18)
PROTHROM AB SERPL-ACNC: 13.3 SEC — HIGH (ref 9.5–13)

## 2024-03-25 PROCEDURE — 99232 SBSQ HOSP IP/OBS MODERATE 35: CPT

## 2024-03-25 RX ORDER — FUROSEMIDE 40 MG
1 TABLET ORAL
Qty: 0 | Refills: 0 | DISCHARGE
Start: 2024-03-25

## 2024-03-25 RX ORDER — WARFARIN SODIUM 2.5 MG/1
7.5 TABLET ORAL ONCE
Refills: 0 | Status: DISCONTINUED | OUTPATIENT
Start: 2024-03-25 | End: 2024-03-25

## 2024-03-25 RX ORDER — WARFARIN SODIUM 2.5 MG/1
7.5 TABLET ORAL ONCE
Refills: 0 | Status: COMPLETED | OUTPATIENT
Start: 2024-03-25 | End: 2024-03-25

## 2024-03-25 RX ORDER — INSULIN GLARGINE 100 [IU]/ML
15 INJECTION, SOLUTION SUBCUTANEOUS AT BEDTIME
Refills: 0 | Status: DISCONTINUED | OUTPATIENT
Start: 2024-03-25 | End: 2024-03-26

## 2024-03-25 RX ORDER — NICOTINE POLACRILEX 2 MG
1 GUM BUCCAL
Qty: 0 | Refills: 0 | DISCHARGE
Start: 2024-03-25

## 2024-03-25 RX ADMIN — Medication 6: at 11:48

## 2024-03-25 RX ADMIN — INSULIN GLARGINE 15 UNIT(S): 100 INJECTION, SOLUTION SUBCUTANEOUS at 21:46

## 2024-03-25 RX ADMIN — Medication 2: at 08:39

## 2024-03-25 RX ADMIN — CLOPIDOGREL BISULFATE 75 MILLIGRAM(S): 75 TABLET, FILM COATED ORAL at 11:47

## 2024-03-25 RX ADMIN — Medication 1 PATCH: at 11:48

## 2024-03-25 RX ADMIN — SIMVASTATIN 10 MILLIGRAM(S): 20 TABLET, FILM COATED ORAL at 21:13

## 2024-03-25 RX ADMIN — Medication 6: at 21:45

## 2024-03-25 RX ADMIN — Medication 10: at 16:47

## 2024-03-25 RX ADMIN — GABAPENTIN 100 MILLIGRAM(S): 400 CAPSULE ORAL at 05:43

## 2024-03-25 RX ADMIN — Medication 40 MILLIGRAM(S): at 05:43

## 2024-03-25 RX ADMIN — GABAPENTIN 100 MILLIGRAM(S): 400 CAPSULE ORAL at 17:25

## 2024-03-25 RX ADMIN — Medication 1 PATCH: at 06:33

## 2024-03-25 RX ADMIN — AZITHROMYCIN 255 MILLIGRAM(S): 500 TABLET, FILM COATED ORAL at 08:39

## 2024-03-25 RX ADMIN — WARFARIN SODIUM 7.5 MILLIGRAM(S): 2.5 TABLET ORAL at 21:13

## 2024-03-25 NOTE — PROGRESS NOTE ADULT - NS ATTEND AMEND GEN_ALL_CORE FT
77yo M hx of Severe AS s/p TAVR, afib on coumadin, carotid endarterectomy ('21) on plavix, DM, active smoker, PAD, CAD s/p PCI p/w AHRF due to pulm edema and PNA due to coronvirus + & mycoplasma.   Clinically improving. AMbulating w/ Pt now. Off HFNC and now on ra     - doing well on RA with O2 sat 92-94%  - on azithromycin for mycoplasma PNA to complete 5 day course  - on lasix 40mg IV daily for diuresis, f/u cardiology reccs  - PT  - OOB to chair  - dvt ppx
pt seen and examined with NP    doing well  no acute events  afebrile  no SOB, no cough    78M smoker PMH HTN, HLD, CAD s/p PCI stent x2, b/l carotid artery stenosis s/p L endarterectomy (9/20), severe AS s/p TAVR 10/22, a-fib on warfarin, PAD s/p Bilateral LE stents, DM presents to ED with weakness and SOB with cough + white phlegm x 4 days. Left sided chest pain and worsening SOB prompted call to EMS. In the ED pt hypoxic to 50s and placed on NRB. Labs with INR 4, mild leukocytosis and elevated BNP. CXR with pulm edema. Admitted to ICU for hypoxemic respiratory failure requiring HFNC. Patient found to be coronavirus + & mycoplasma +. Downgraded to medical service. Now on RA.      DX: acute hypoxic respiratory failure, acute pulmonary edema, acute HFpEF, mycoplasma PNA, coronavirus (not COVID-19) infection    - doing well on RA  - maintaining oxygen sat >90%  - completed 5 day course of azithromycin for mycoplasma PNA  - supportive care for coronavirus infection  - on lasix 40mg daily po for diuresis  - nicotine patch for smoking hx  - smoking cessation  - PT  - OOB to chair  - dispo pending: [facility]  - d/c planning per primary team  - reconsult pulmonary with questions/concerns, signing off

## 2024-03-25 NOTE — PROGRESS NOTE ADULT - ASSESSMENT
77yo M hx of Severe AS s/p TAVR, afib on coumadin, carotid endarterectomy ('21) on plavix, DM, active smoker, PAD, CAD s/p PCI presents to ED with weakness and SOB. Pt states that he has been feeling weak, dizzy, and SOB with cough with white phlegm for last 4 days. Pt states he recently started having left sided chest pain and worsening SOB and so he called EMS. In the ED pt was HD stable and hypoxemic to 50s refractive to NC and was upgraded to NRB. Labs with INR 4, mild leukocytosis and elevated BNP. CXR with pulm edema. ICU consulted for hypoxemia. Patient admitted to ICU for hypoxemic RF, requiring HFNC. Patient found to be coronvirus + & mycoplasma +, imaging consistent with pulmonary edema. Patient now on RA and maintaining Spo2 >92%. Patient is HD stable, awake & alert.     DX: acute hypoxic respiratory failure, acute pulmonary edema, acute HFpEF, mycoplasma PNA, coronavirus (not COVID-19) infection    - doing well on RA with SpO2 stable  - denies SOB  - feeling better  - on azithromycin for mycoplasma PNA to complete 5 day course  - supportive care for coronavirus infection  - ceftriaxone d/aguilar  - Mod pulm HTN on TTE- F/U cards reccs  - on lasix 40mg IV daily for diuresis  - PT  - OOB to chair    Plan discussed with pulm attending     77yo M hx of Severe AS s/p TAVR, afib on coumadin, carotid endarterectomy ('21) on plavix, DM, active smoker, PAD, CAD s/p PCI presents to ED with weakness and SOB. Pt states that he has been feeling weak, dizzy, and SOB with cough with white phlegm for last 4 days. Pt states he recently started having left sided chest pain and worsening SOB and so he called EMS. In the ED pt was HD stable and hypoxemic to 50s refractive to NC and was upgraded to NRB. Labs with INR 4, mild leukocytosis and elevated BNP. CXR with pulm edema. ICU consulted for hypoxemia. Patient admitted to ICU for hypoxemic RF, requiring HFNC. Patient found to be coronvirus + & mycoplasma +, imaging consistent with pulmonary edema. Patient now on RA and maintaining Spo2 >92%. Patient is HD stable, awake & alert.     DX: acute hypoxic respiratory failure, acute pulmonary edema, acute HFpEF, mycoplasma PNA, coronavirus (not COVID-19) infection    - doing well on RA with SpO2 stable  - denies SOB  - feeling better  - on azithromycin for mycoplasma PNA to complete 5 day course  - supportive care for coronavirus infection  - ceftriaxone d/aguilar  - Mod pulm HTN on TTE- F/U cards reccs  - on lasix 40mg IV daily for diuresis- change to PO on discherge   - PT  - OOB to chair    Plan discussed with pulm attending Dr. Aponte    79yo M hx of Severe AS s/p TAVR, afib on coumadin, carotid endarterectomy ('21) on plavix, DM, active smoker, PAD, CAD s/p PCI presents to ED with weakness and SOB. Pt states that he has been feeling weak, dizzy, and SOB with cough with white phlegm for last 4 days. Pt states he recently started having left sided chest pain and worsening SOB and so he called EMS. In the ED pt was HD stable and hypoxemic to 50s refractive to NC and was upgraded to NRB. Labs with INR 4, mild leukocytosis and elevated BNP. CXR with pulm edema. ICU consulted for hypoxemia. Patient admitted to ICU for hypoxemic RF, requiring HFNC. Patient found to be coronvirus + & mycoplasma +, imaging consistent with pulmonary edema. Patient now on RA and maintaining Spo2 >92%. Patient is HD stable, awake & alert.     DX: acute hypoxic respiratory failure, acute pulmonary edema, acute HFpEF, mycoplasma PNA, coronavirus (not COVID-19) infection    - doing well on RA with SpO2 stable  - denies SOB  - feeling better  - on azithromycin for mycoplasma PNA to complete 5 day course  - supportive care for coronavirus infection  - ceftriaxone d/aguilar  - Mod pulm HTN on TTE- F/U cards reccs  - on lasix 40mg IV daily for diuresis- change to PO on discharge   - PT  - OOB to chair    Plan discussed with pulm attending Dr. Aponte

## 2024-03-25 NOTE — DISCHARGE NOTE PROVIDER - DETAILS OF MALNUTRITION DIAGNOSIS/DIAGNOSES
This patient has been assessed with a concern for Malnutrition and was treated during this hospitalization for the following Nutrition diagnosis/diagnoses:     -  03/19/2024: Severe protein-calorie malnutrition

## 2024-03-25 NOTE — PROGRESS NOTE ADULT - SUBJECTIVE AND OBJECTIVE BOX
CHIEF COMPLAINT:  ===============  RESPIRATORY DISTRESS AND CHEST PAIN  CONGESTIVE HEART FAILURE EXACERBATION    INTERVAL EVENTS:  ==================    - T(F): , Max: 98.1 (03-24-24 @ 23:20)  - SpO2:  (95% - 97%)    REVIEW OF SYSTEMS:  ==================  -NEGATIVE except for those listed above     HPI:  ======  79yo M hx of Severe AS s/p TAVR, afib on coumadin, carotid endarterectomy ('21) on plavix, DM, active smoker, PAD, CAD s/p PCI presents to ED with weakness and SOB. Pt states that he has been feeling weak, dizzy, and SOB with cough with white phlegm for last 4 days. Pt states he recently started having left sided chest pain and worsening SOB and so he called EMS. In the ED pt was HD stable and hypoxemic to 50s refractive to NC and was upgraded to NRB. Labs with INR 4, mild leukocytosis and elevated BNP. CXR with pulm edema. ICU consulted for hypoxemia. Patient admitted to ICU for hypoxemic RF, requiring HFNC. Patient found to be coronvirus + & mycoplasma +, imaging consistent with pulmonary edema. Patient now on RA and maintaining Spo2 >92%. Patient is HD stable, awake & alert.         OBJECTIVE:  ===========  ICU Vital Signs Last 24 Hrs  T(C): 36.5 (25 Mar 2024 05:17), Max: 36.7 (24 Mar 2024 23:20)  T(F): 97.7 (25 Mar 2024 05:17), Max: 98.1 (24 Mar 2024 23:20)  HR: 84 (25 Mar 2024 05:17) (74 - 94)  BP: 132/74 (25 Mar 2024 05:17) (118/65 - 159/82)  RR: 17 (25 Mar 2024 05:17) (17 - 18)  SpO2: 95% (25 Mar 2024 05:17) (95% - 97%)    O2 Parameters below as of 25 Mar 2024 05:17  Patient On (Oxygen Delivery Method): room air      03-24 @ 07:01 - 03-25 @ 07:00  --------------------------------------------------------  IN: 640 mL / OUT: 3700 mL / NET: -3060 mL      CAPILLARY BLOOD GLUCOSE  POCT Blood Glucose.: 167 mg/dL (25 Mar 2024 08:18)      PHYSICAL EXAM:  ==============  GENERAL: NAD, well-groomed, well-developed  HEAD:  Atraumatic, Normocephalic  EYES: EOMI, PERRLA, conjunctiva and sclera clear  ENMT: No tonsillar erythema, exudates, or enlargement; Moist mucous membranes, Good dentition, No lesions  NECK: Supple, No JVD, Normal thyroid  NERVOUS SYSTEM:  Alert & Oriented X3, Good concentration; Motor Strength 5/5 B/L upper and lower extremities; DTRs 2+ intact and symmetric  CHEST/LUNG: Clear to percussion bilaterally; No rales, rhonchi, wheezing, or rubs  HEART: Regular rate and rhythm; No murmurs, rubs, or gallops  ABDOMEN: Soft, Nontender, Nondistended; Bowel sounds present  EXTREMITIES:  2+ Peripheral Pulses, No clubbing, cyanosis, or edema  LYMPH: No lymphadenopathy noted  SKIN: No rashes or lesions      HOSPITAL MEDICATIONS:  ======================  clopidogrel Tablet 75 milliGRAM(s) Oral daily  azithromycin  IVPB 500 milliGRAM(s) IV Intermittent every 24 hours  azithromycin  IVPB      furosemide    Tablet 40 milliGRAM(s) Oral daily  hydrALAZINE Injectable 10 milliGRAM(s) IV Push every 6 hours PRN  dextrose 50% Injectable 25 Gram(s) IV Push once  dextrose 50% Injectable 12.5 Gram(s) IV Push once  dextrose 50% Injectable 25 Gram(s) IV Push once  dextrose Oral Gel 15 Gram(s) Oral once PRN  glucagon  Injectable 1 milliGRAM(s) IntraMuscular once  insulin glargine Injectable (LANTUS) 10 Unit(s) SubCutaneous at bedtime  insulin lispro (ADMELOG) corrective regimen sliding scale   SubCutaneous Before meals and at bedtime  simvastatin 10 milliGRAM(s) Oral at bedtime  albuterol    0.083% 2.5 milliGRAM(s) Nebulizer every 6 hours PRN  acetaminophen     Tablet .. 650 milliGRAM(s) Oral every 6 hours PRN  gabapentin 100 milliGRAM(s) Oral two times a day  dextrose 5%. 1000 milliLiter(s) IV Continuous <Continuous>  dextrose 5%. 1000 milliLiter(s) IV Continuous <Continuous>  influenza  Vaccine (HIGH DOSE) 0.7 milliLiter(s) IntraMuscular once  nicotine -  14 mG/24Hr(s) Patch 1 Patch Transdermal daily      LABS:  =====                          13.6   11.11 )-----------( 216      ( 24 Mar 2024 10:29 )             41.2     Hgb Trend: 13.6<--, 13.6<--, 11.9<--, 11.9<--  03-24    136  |  98  |  31<H>  ----------------------------<  252<H>  4.3   |  29  |  0.99    Ca    9.3      24 Mar 2024 10:29  Phos  3.5     03-24  Mg     1.5     03-24    TPro  8.5<H>  /  Alb  2.4<L>  /  TBili  1.1  /  DBili  x   /  AST  38<H>  /  ALT  45  /  AlkPhos  98  03-24    Creatinine Trend: 0.99<--, 1.00<--, 1.04<--, 1.18<--, 1.32<--, 1.05<--  PT/INR - ( 25 Mar 2024 06:33 )   PT: 13.3 sec;   INR: 1.11 ratio           Lactate, Blood: 4.0  Lactate, Blood: 3.6  Lactate, Blood: 1.5    Urinalysis Basic - ( 24 Mar 2024 10:29 )    Color: x / Appearance: x / SG: x / pH: x  Gluc: 252 mg/dL / Ketone: x  / Bili: x / Urobili: x   Blood: x / Protein: x / Nitrite: x   Leuk Esterase: x / RBC: x / WBC x   Sq Epi: x / Non Sq Epi: x / Bacteria: x      MICROBIOLOGY:     Culture - Urine  Source: Clean Catch Clean Catch (Midstream)  Final Report (03-18-24 @ 22:59):    No growth    Culture - Blood  Source: .Blood Blood-Peripheral  Final Report (03-22-24 @ 19:01):    No growth at 5 days    Culture - Blood  Source: .Blood Blood-Peripheral  Final Report (03-22-24 @ 19:01):    No growth at 5 days    azithromycin  IVPB 500 every 24 hours  azithromycin  IVPB        Legionella Antigen, Urine: Negative (03-17-24 @ 17:30)  MRSA PCR Result.: NotDetec (03-17-24 @ 17:45)  Rapid RVP Result: Detected (03-17-24 @ 20:45)      RADIOLOGY:  ==========      IMPRESSION:  Enlarged cardiac silhouette.  Findings suggesting interstitial pulmonary edema.  New small right pleural effusion.    PULMONARY:  ============  albuterol    0.083% 2.5 Nebulizer every 6 hours PRN      CARDIAC:  =======    Summary:  1. Technically difficult study.   2. Normal global left ventricular systolic function.   3. Left ventricular ejection fraction, by visual estimation, is 55 to   60%.   4. Severely enlarged left atrium.   5. Bubble study completed without evidence of inter-atrial flow.   6. Mild to moderate mitral annular calcification.   7. Bioprosthesis in the aortic position.   8. Mild aortic regurgitation.   9. Severely enlarged right atrium.  10. Structurally normal tricuspid valve, with normal leaflet excursion.  11. Moderate tricuspid regurgitation.  12. Structurally normal pulmonic valve, with normal leaflet excursion.  13. Mild pulmonic valve regurgitation.  14. Estimated pulmonary artery systolic pressure is 45.6 mmHg assuming a   right atrial pressure of 12 mmHg, which is consistent with mild pulmonary   hypertension.   CHIEF COMPLAINT:  ===============  RESPIRATORY DISTRESS AND CHEST PAIN  CONGESTIVE HEART FAILURE EXACERBATION    INTERVAL EVENTS:  ==================    - T(F): , Max: 98.1 (03-24-24 @ 23:20)  - SpO2:  (95% - 97%)    REVIEW OF SYSTEMS:  ==================  -NEGATIVE except for those listed above     HPI:  ======  77yo M hx of Severe AS s/p TAVR, afib on coumadin, carotid endarterectomy ('21) on plavix, DM, active smoker, PAD, CAD s/p PCI presents to ED with weakness and SOB. Pt states that he has been feeling weak, dizzy, and SOB with cough with white phlegm for last 4 days. Pt states he recently started having left sided chest pain and worsening SOB and so he called EMS. In the ED pt was HD stable and hypoxemic to 50s refractive to NC and was upgraded to NRB. Labs with INR 4, mild leukocytosis and elevated BNP. CXR with pulm edema. ICU consulted for hypoxemia. Patient admitted to ICU for hypoxemic RF, requiring HFNC. Patient found to be coronvirus + & mycoplasma +, imaging consistent with pulmonary edema. Patient now on RA and maintaining Spo2 >92%. Patient is HD stable, awake & alert.         OBJECTIVE:  ===========  Vital Signs Last 24 Hrs  T(C): 36.5 (25 Mar 2024 05:17), Max: 36.7 (24 Mar 2024 23:20)  T(F): 97.7 (25 Mar 2024 05:17), Max: 98.1 (24 Mar 2024 23:20)  HR: 84 (25 Mar 2024 05:17) (74 - 94)  BP: 132/74 (25 Mar 2024 05:17) (118/65 - 159/82)  RR: 17 (25 Mar 2024 05:17) (17 - 18)  SpO2: 95% (25 Mar 2024 05:17) (95% - 97%)    O2 Parameters below as of 25 Mar 2024 05:17  Patient On (Oxygen Delivery Method): room air      03-24 @ 07:01  -  03-25 @ 07:00  --------------------------------------------------------  IN: 640 mL / OUT: 3700 mL / NET: -3060 mL      CAPILLARY BLOOD GLUCOSE  POCT Blood Glucose.: 167 mg/dL (25 Mar 2024 08:18)      PHYSICAL EXAM:  ==============  GENERAL: elderly male, NAD, comfortable sitting in chair  HEAD:  Atraumatic, Normocephalic  EYES: EOMI, PERRLA, conjunctiva and sclera clear  ENMT: No tonsillar erythema, exudates, or enlargement; Moist mucous membranes, No lesions  NECK: Supple, No JVD, Normal thyroid  NERVOUS SYSTEM:  Alert & Oriented X3, Good concentration; Motor Strength 5/5 B/L upper and lower extremities; DTRs 2+ intact and symmetric  CHEST/LUNG: Clear to auscultation bilaterally; No rales, rhonchi, wheezing  HEART: Regular rate and rhythm  ABDOMEN: Soft, Nontender, Nondistended; Bowel sounds present  EXTREMITIES:  no clubbing, cyanosis, or edema  LYMPH: No lymphadenopathy noted  SKIN: No rashes or lesions      HOSPITAL MEDICATIONS:  ======================  clopidogrel Tablet 75 milliGRAM(s) Oral daily  azithromycin  IVPB 500 milliGRAM(s) IV Intermittent every 24 hours  furosemide    Tablet 40 milliGRAM(s) Oral daily  hydrALAZINE Injectable 10 milliGRAM(s) IV Push every 6 hours PRN  dextrose 50% Injectable 25 Gram(s) IV Push once  dextrose 50% Injectable 12.5 Gram(s) IV Push once  dextrose 50% Injectable 25 Gram(s) IV Push once  dextrose Oral Gel 15 Gram(s) Oral once PRN  glucagon  Injectable 1 milliGRAM(s) IntraMuscular once  insulin glargine Injectable (LANTUS) 10 Unit(s) SubCutaneous at bedtime  insulin lispro (ADMELOG) corrective regimen sliding scale   SubCutaneous Before meals and at bedtime  simvastatin 10 milliGRAM(s) Oral at bedtime  albuterol    0.083% 2.5 milliGRAM(s) Nebulizer every 6 hours PRN  acetaminophen     Tablet .. 650 milliGRAM(s) Oral every 6 hours PRN  gabapentin 100 milliGRAM(s) Oral two times a day  dextrose 5%. 1000 milliLiter(s) IV Continuous <Continuous>  dextrose 5%. 1000 milliLiter(s) IV Continuous <Continuous>  influenza  Vaccine (HIGH DOSE) 0.7 milliLiter(s) IntraMuscular once  nicotine -  14 mG/24Hr(s) Patch 1 Patch Transdermal daily      LABS:  =====                          13.6   11.11 )-----------( 216      ( 24 Mar 2024 10:29 )             41.2     Hgb Trend: 13.6<--, 13.6<--, 11.9<--, 11.9<--  03-24    136  |  98  |  31<H>  ----------------------------<  252<H>  4.3   |  29  |  0.99    Ca    9.3      24 Mar 2024 10:29  Phos  3.5     03-24  Mg     1.5     03-24    TPro  8.5<H>  /  Alb  2.4<L>  /  TBili  1.1  /  DBili  x   /  AST  38<H>  /  ALT  45  /  AlkPhos  98  03-24    Creatinine Trend: 0.99<--, 1.00<--, 1.04<--, 1.18<--, 1.32<--, 1.05<--  PT/INR - ( 25 Mar 2024 06:33 )   PT: 13.3 sec;   INR: 1.11 ratio           Lactate, Blood: 4.0  Lactate, Blood: 3.6  Lactate, Blood: 1.5      MICROBIOLOGY:     Culture - Urine  Source: Clean Catch Clean Catch (Midstream)  Final Report (03-18-24 @ 22:59):    No growth    Culture - Blood  Source: .Blood Blood-Peripheral  Final Report (03-22-24 @ 19:01):    No growth at 5 days    Culture - Blood  Source: .Blood Blood-Peripheral  Final Report (03-22-24 @ 19:01):    No growth at 5 days    azithromycin  IVPB 500 every 24 hours  azithromycin  IVPB        Legionella Antigen, Urine: Negative (03-17-24 @ 17:30)  MRSA PCR Result.: Heike (03-17-24 @ 17:45)  Rapid RVP Result: Detected (03-17-24 @ 20:45)      RADIOLOGY:  ==========      IMPRESSION:  Enlarged cardiac silhouette.  Findings suggesting interstitial pulmonary edema.  New small right pleural effusion.    PULMONARY:  ============  albuterol    0.083% 2.5 Nebulizer every 6 hours PRN      CARDIAC:  =======    Summary:  1. Technically difficult study.   2. Normal global left ventricular systolic function.   3. Left ventricular ejection fraction, by visual estimation, is 55 to 60%.   4. Severely enlarged left atrium.   5. Bubble study completed without evidence of inter-atrial flow.   6. Mild to moderate mitral annular calcification.   7. Bioprosthesis in the aortic position.   8. Mild aortic regurgitation.   9. Severely enlarged right atrium.  10. Structurally normal tricuspid valve, with normal leaflet excursion.  11. Moderate tricuspid regurgitation.  12. Structurally normal pulmonic valve, with normal leaflet excursion.  13. Mild pulmonic valve regurgitation.  14. Estimated pulmonary artery systolic pressure is 45.6 mmHg assuming a   right atrial pressure of 12 mmHg, which is consistent with mild pulmonary   hypertension.

## 2024-03-25 NOTE — DISCHARGE NOTE PROVIDER - NSDCFUSCHEDAPPT_GEN_ALL_CORE_FT
[facility]  VASCULAR [address]  Scheduled Appointment: 03/26/2024    [facility]  VASCULAR [address]  Scheduled Appointment: 03/26/2024    [facility]  VASCULAR [address]  Scheduled Appointment: 03/26/2024    Ok [facility]  [facility]  VASCULAR [address]  Scheduled Appointment: 03/26/2024

## 2024-03-25 NOTE — DISCHARGE NOTE PROVIDER - NSDCMRMEDTOKEN_GEN_ALL_CORE_FT
clopidogrel 75 mg oral tablet: 1 tab(s) orally once a day  Coumadin 4 mg oral tablet: 1 tab(s) orally once a day  furosemide 40 mg oral tablet: 1 tab(s) orally once a day  gabapentin 100 mg oral tablet: 100 milligram(s) orally 2 times a day  lisinopril-hydrochlorothiazide 20 mg-12.5 mg oral tablet: 1 tab(s) orally once a day  metFORMIN 1000 mg oral tablet: 1 tab(s) orally 2 times a day  Metoprolol Succinate ER 25 mg oral tablet, extended release: 1 tab(s) orally once a day  nicotine 14 mg/24 hr transdermal film, extended release: 1 patch transdermal once a day  Proventil HFA 90 mcg/inh inhalation aerosol: 2 puff(s) inhaled every 6 hours as needed for  bronchospasm  simvastatin 10 mg oral tablet: 1 tab(s) orally once a day (at bedtime)   clopidogrel 75 mg oral tablet: 1 tab(s) orally once a day  Coumadin 4 mg oral tablet: 1 tab(s) orally once a day  furosemide 40 mg oral tablet: 1 tab(s) orally once a day  gabapentin 100 mg oral tablet: 100 milligram(s) orally 2 times a day  lisinopril-hydrochlorothiazide 20 mg-12.5 mg oral tablet: 1 tab(s) orally once a day  Metoprolol Succinate ER 25 mg oral tablet, extended release: 1 tab(s) orally once a day  nicotine 14 mg/24 hr transdermal film, extended release: 1 patch transdermal once a day  Proventil HFA 90 mcg/inh inhalation aerosol: 2 puff(s) inhaled every 6 hours as needed for  bronchospasm  simvastatin 10 mg oral tablet: 1 tab(s) orally once a day (at bedtime)

## 2024-03-25 NOTE — PROGRESS NOTE ADULT - SUBJECTIVE AND OBJECTIVE BOX
Patient is a 78y old  Male who presents with a chief complaint of ADHF (25 Mar 2024 11:01)    INTERVAL HPI/OVERNIGHT EVENTS: no acute events  SUBJECTIVE: no complaints of pain/discomfort     MEDICATIONS  (STANDING):  clopidogrel Tablet 75 milliGRAM(s) Oral daily  dextrose 5%. 1000 milliLiter(s) (50 mL/Hr) IV Continuous <Continuous>  dextrose 5%. 1000 milliLiter(s) (100 mL/Hr) IV Continuous <Continuous>  dextrose 50% Injectable 25 Gram(s) IV Push once  dextrose 50% Injectable 12.5 Gram(s) IV Push once  dextrose 50% Injectable 25 Gram(s) IV Push once  furosemide    Tablet 40 milliGRAM(s) Oral daily  gabapentin 100 milliGRAM(s) Oral two times a day  glucagon  Injectable 1 milliGRAM(s) IntraMuscular once  influenza  Vaccine (HIGH DOSE) 0.7 milliLiter(s) IntraMuscular once  insulin glargine Injectable (LANTUS) 10 Unit(s) SubCutaneous at bedtime  insulin lispro (ADMELOG) corrective regimen sliding scale   SubCutaneous Before meals and at bedtime  nicotine -  14 mG/24Hr(s) Patch 1 Patch Transdermal daily  simvastatin 10 milliGRAM(s) Oral at bedtime    MEDICATIONS  (PRN):  acetaminophen     Tablet .. 650 milliGRAM(s) Oral every 6 hours PRN Temp greater or equal to 38C (100.4F), Mild Pain (1 - 3)  albuterol    0.083% 2.5 milliGRAM(s) Nebulizer every 6 hours PRN Shortness of Breath and/or Wheezing  dextrose Oral Gel 15 Gram(s) Oral once PRN Blood Glucose LESS THAN 70 milliGRAM(s)/deciliter  hydrALAZINE Injectable 10 milliGRAM(s) IV Push every 6 hours PRN SBP >160    Allergies    No Known Allergies    Intolerances      REVIEW OF SYSTEMS:  All other systems reviewed and are negative    Vital Signs Last 24 Hrs  T(C): 36.3 (25 Mar 2024 18:16), Max: 36.7 (24 Mar 2024 23:20)  T(F): 97.4 (25 Mar 2024 18:16), Max: 98.1 (24 Mar 2024 23:20)  HR: 93 (25 Mar 2024 18:16) (76 - 93)  BP: 129/74 (25 Mar 2024 18:16) (118/65 - 152/83)  BP(mean): --  RR: 18 (25 Mar 2024 18:16) (17 - 18)  SpO2: 94% (25 Mar 2024 18:16) (94% - 97%)    Parameters below as of 25 Mar 2024 12:10  Patient On (Oxygen Delivery Method): room air      Daily     Daily   I&O's Summary    24 Mar 2024 07:01  -  25 Mar 2024 07:00  --------------------------------------------------------  IN: 640 mL / OUT: 3700 mL / NET: -3060 mL    25 Mar 2024 07:01  -  25 Mar 2024 20:27  --------------------------------------------------------  IN: 780 mL / OUT: 1200 mL / NET: -420 mL      CAPILLARY BLOOD GLUCOSE      POCT Blood Glucose.: 400 mg/dL (25 Mar 2024 16:25)  POCT Blood Glucose.: 390 mg/dL (25 Mar 2024 16:25)  POCT Blood Glucose.: 255 mg/dL (25 Mar 2024 11:28)  POCT Blood Glucose.: 167 mg/dL (25 Mar 2024 08:18)  POCT Blood Glucose.: 393 mg/dL (24 Mar 2024 21:09)    PHYSICAL EXAM:  GENERAL: NAD, well-groomed, well-developed  HEAD:  Atraumatic, Normocephalic  EYES: EOMI, PERRLA, conjunctiva and sclera clear  ENMT: No tonsillar erythema, exudates, or enlargement; Moist mucous membranes, Good dentition, No lesions  NECK: Supple, No JVD, Normal thyroid  NERVOUS SYSTEM:  Alert & Oriented X4, very pleasant   CHEST/LUNG: CTABL, no rhonchi, no wheeze   HEART: Regular rate and rhythm; No murmurs, rubs, or gallops  ABDOMEN: Soft, Nontender, Nondistended; Bowel sounds present  EXTREMITIES:  2+ Peripheral Pulses, No clubbing, cyanosis, or edema    Labs                          13.6   11.11 )-----------( 216      ( 24 Mar 2024 10:29 )             41.2     03-24    136  |  98  |  31<H>  ----------------------------<  252<H>  4.3   |  29  |  0.99    Ca    9.3      24 Mar 2024 10:29  Phos  3.5     03-24  Mg     1.5     03-24    TPro  8.5<H>  /  Alb  2.4<L>  /  TBili  1.1  /  DBili  x   /  AST  38<H>  /  ALT  45  /  AlkPhos  98  03-24    PT/INR - ( 25 Mar 2024 06:33 )   PT: 13.3 sec;   INR: 1.11 ratio               Urinalysis Basic - ( 24 Mar 2024 10:29 )    Color: x / Appearance: x / SG: x / pH: x  Gluc: 252 mg/dL / Ketone: x  / Bili: x / Urobili: x   Blood: x / Protein: x / Nitrite: x   Leuk Esterase: x / RBC: x / WBC x   Sq Epi: x / Non Sq Epi: x / Bacteria: x

## 2024-03-25 NOTE — DISCHARGE NOTE PROVIDER - NSFOLLOWUPCLINICS_GEN_ALL_ED_FT
[facility]  Pulmonology  [address] [place] [address], [place] [address]  Phone: [phone]  Fax:

## 2024-03-25 NOTE — PROGRESS NOTE ADULT - ASSESSMENT
77 yo M hx of Severe AS s/p TAVR, afib on coumadin, carotid endarterectomy ('21) on plavix, DM, active smoker, PAD, CAD s/p PCI presents to ED with weakness and SOB. Pt states that he has been feeling weak, dizzy, and SOB with cough with white phlegm for last 4 days. Pt states he recently started having left sided chest pain and worsening SOB and so he called EMS. In the ED pt was HD stable and hypoxemic to 50s refractive to NC and was upgraded to NRB. Labs with INR 4, mild leukocytosis and elevated BNP. CXR with pulm edema. ICU consulted for hypoxemia. Patient admitted to ICU for hypoxemic RF, requiring HFNC. Patient found to be coronvirus + & mycoplasma +, imaging consistent with pulmonary edema. Patient now on RA and maintaining Spo2 >92%. Patient is HD stable, awake & alert.     Plan:   - c/w GMF  - discharge planning   - Continue home plavix & statin.   - daily coumadin/INR

## 2024-03-25 NOTE — DISCHARGE NOTE PROVIDER - HOSPITAL COURSE
77 yo M hx of Severe AS s/p TAVR, afib on coumadin, carotid endarterectomy ('21) on plavix, DM, active smoker, PAD, CAD s/p PCI presents to ED with weakness and SOB. Pt states that he has been feeling weak, dizzy, and SOB with cough with white phlegm for last 4 days. Pt states he recently started having left sided chest pain and worsening SOB and so he called EMS. In the ED pt was HD stable and hypoxemic to 50s refractive to NC and was upgraded to NRB. Labs with INR 4, mild leukocytosis and elevated BNP. CXR with pulm edema. ICU consulted for hypoxemia. Patient admitted to ICU for hypoxemic RF, requiring HFNC. Patient found to be coronvirus + & mycoplasma +, imaging consistent with pulmonary edema. Patient now on RA and maintaining Spo2 >92%. Patient is HD stable, awake & alert.

## 2024-03-25 NOTE — DISCHARGE NOTE PROVIDER - CARE PROVIDER_API CALL
Arias Thomason  Internal Medicine  [address] [place] [address]  Phone: [phone]  Fax: [phone]  Follow Up Time:

## 2024-03-26 ENCOUNTER — APPOINTMENT (OUTPATIENT)
Dept: VASCULAR SURGERY | Facility: CLINIC | Age: 79
End: 2024-03-26

## 2024-03-26 ENCOUNTER — TRANSCRIPTION ENCOUNTER (OUTPATIENT)
Age: 79
End: 2024-03-26

## 2024-03-26 VITALS
TEMPERATURE: 98 F | RESPIRATION RATE: 18 BRPM | DIASTOLIC BLOOD PRESSURE: 64 MMHG | OXYGEN SATURATION: 96 % | HEART RATE: 86 BPM | SYSTOLIC BLOOD PRESSURE: 120 MMHG

## 2024-03-26 LAB
A1C WITH ESTIMATED AVERAGE GLUCOSE RESULT: 6.8 % — HIGH (ref 4–5.6)
ALBUMIN SERPL ELPH-MCNC: 2.3 G/DL — LOW (ref 3.3–5)
ALP SERPL-CCNC: 108 U/L — SIGNIFICANT CHANGE UP (ref 40–120)
ALT FLD-CCNC: 55 U/L — SIGNIFICANT CHANGE UP (ref 12–78)
ANION GAP SERPL CALC-SCNC: 10 MMOL/L — SIGNIFICANT CHANGE UP (ref 5–17)
AST SERPL-CCNC: 58 U/L — HIGH (ref 15–37)
BILIRUB SERPL-MCNC: 0.9 MG/DL — SIGNIFICANT CHANGE UP (ref 0.2–1.2)
BUN SERPL-MCNC: 29 MG/DL — HIGH (ref 7–23)
CALCIUM SERPL-MCNC: 9.4 MG/DL — SIGNIFICANT CHANGE UP (ref 8.5–10.1)
CHLORIDE SERPL-SCNC: 97 MMOL/L — SIGNIFICANT CHANGE UP (ref 96–108)
CO2 SERPL-SCNC: 26 MMOL/L — SIGNIFICANT CHANGE UP (ref 22–31)
CREAT SERPL-MCNC: 1.18 MG/DL — SIGNIFICANT CHANGE UP (ref 0.5–1.3)
EGFR: 63 ML/MIN/1.73M2 — SIGNIFICANT CHANGE UP
ESTIMATED AVERAGE GLUCOSE: 148 MG/DL — HIGH (ref 68–114)
FLUAV AG NPH QL: SIGNIFICANT CHANGE UP
FLUBV AG NPH QL: SIGNIFICANT CHANGE UP
GLUCOSE BLDC GLUCOMTR-MCNC: 100 MG/DL — HIGH (ref 70–99)
GLUCOSE BLDC GLUCOMTR-MCNC: 163 MG/DL — HIGH (ref 70–99)
GLUCOSE BLDC GLUCOMTR-MCNC: 270 MG/DL — HIGH (ref 70–99)
GLUCOSE SERPL-MCNC: 164 MG/DL — HIGH (ref 70–99)
HCT VFR BLD CALC: 38.7 % — LOW (ref 39–50)
HGB BLD-MCNC: 12.6 G/DL — LOW (ref 13–17)
INR BLD: 1.3 RATIO — HIGH (ref 0.85–1.18)
MAGNESIUM SERPL-MCNC: 1.5 MG/DL — LOW (ref 1.6–2.6)
MCHC RBC-ENTMCNC: 31.7 PG — SIGNIFICANT CHANGE UP (ref 27–34)
MCHC RBC-ENTMCNC: 32.6 G/DL — SIGNIFICANT CHANGE UP (ref 32–36)
MCV RBC AUTO: 97.5 FL — SIGNIFICANT CHANGE UP (ref 80–100)
NRBC # BLD: 0 /100 WBCS — SIGNIFICANT CHANGE UP (ref 0–0)
PHOSPHATE SERPL-MCNC: 3.8 MG/DL — SIGNIFICANT CHANGE UP (ref 2.5–4.5)
PLATELET # BLD AUTO: 218 K/UL — SIGNIFICANT CHANGE UP (ref 150–400)
POTASSIUM SERPL-MCNC: 4.2 MMOL/L — SIGNIFICANT CHANGE UP (ref 3.5–5.3)
POTASSIUM SERPL-SCNC: 4.2 MMOL/L — SIGNIFICANT CHANGE UP (ref 3.5–5.3)
PROT SERPL-MCNC: 8.6 GM/DL — HIGH (ref 6–8.3)
PROTHROM AB SERPL-ACNC: 15.3 SEC — HIGH (ref 9.5–13)
RBC # BLD: 3.97 M/UL — LOW (ref 4.2–5.8)
RBC # FLD: 13.6 % — SIGNIFICANT CHANGE UP (ref 10.3–14.5)
SARS-COV-2 RNA SPEC QL NAA+PROBE: SIGNIFICANT CHANGE UP
SODIUM SERPL-SCNC: 133 MMOL/L — LOW (ref 135–145)
WBC # BLD: 13.71 K/UL — HIGH (ref 3.8–10.5)
WBC # FLD AUTO: 13.71 K/UL — HIGH (ref 3.8–10.5)

## 2024-03-26 PROCEDURE — 99238 HOSP IP/OBS DSCHRG MGMT 30/<: CPT

## 2024-03-26 RX ORDER — WARFARIN SODIUM 2.5 MG/1
7.5 TABLET ORAL ONCE
Refills: 0 | Status: DISCONTINUED | OUTPATIENT
Start: 2024-03-26 | End: 2024-03-26

## 2024-03-26 RX ORDER — METFORMIN HYDROCHLORIDE 850 MG/1
1 TABLET ORAL
Qty: 0 | Refills: 0 | DISCHARGE

## 2024-03-26 RX ORDER — MAGNESIUM OXIDE 400 MG ORAL TABLET 241.3 MG
400 TABLET ORAL DAILY
Refills: 0 | Status: DISCONTINUED | OUTPATIENT
Start: 2024-03-26 | End: 2024-03-26

## 2024-03-26 RX ORDER — WARFARIN SODIUM 2.5 MG/1
5 TABLET ORAL ONCE
Refills: 0 | Status: DISCONTINUED | OUTPATIENT
Start: 2024-03-26 | End: 2024-03-26

## 2024-03-26 RX ADMIN — Medication 6: at 11:57

## 2024-03-26 RX ADMIN — MAGNESIUM OXIDE 400 MG ORAL TABLET 400 MILLIGRAM(S): 241.3 TABLET ORAL at 18:32

## 2024-03-26 RX ADMIN — CLOPIDOGREL BISULFATE 75 MILLIGRAM(S): 75 TABLET, FILM COATED ORAL at 11:59

## 2024-03-26 RX ADMIN — Medication 1 PATCH: at 11:59

## 2024-03-26 RX ADMIN — GABAPENTIN 100 MILLIGRAM(S): 400 CAPSULE ORAL at 18:32

## 2024-03-26 RX ADMIN — GABAPENTIN 100 MILLIGRAM(S): 400 CAPSULE ORAL at 05:23

## 2024-03-26 RX ADMIN — Medication 40 MILLIGRAM(S): at 05:23

## 2024-03-26 NOTE — DISCHARGE NOTE NURSING/CASE MANAGEMENT/SOCIAL WORK - PATIENT PORTAL LINK FT
You can access the FollowMyHealth Patient Portal offered by [facility] by registering at the following website: [email or web address]. By joining [facility]’s FollowMyHealth portal, you will also be able to view your health information using other applications (apps) compatible with our system.

## 2024-03-26 NOTE — PROGRESS NOTE ADULT - ASSESSMENT
77 yo M hx of Severe AS s/p TAVR, afib on coumadin, carotid endarterectomy ('21) on plavix, DM, active smoker, PAD, CAD s/p PCI presents to ED with weakness and SOB. Pt states that he has been feeling weak, dizzy, and SOB with cough with white phlegm for last 4 days. Pt states he recently started having left sided chest pain and worsening SOB and so he called EMS. In the ED pt was HD stable and hypoxemic to 50s refractive to NC and was upgraded to NRB. Labs with INR 4, mild leukocytosis and elevated BNP. CXR with pulm edema. ICU consulted for hypoxemia. Patient admitted to ICU for hypoxemic RF, requiring HFNC. Patient found to be coronvirus + & mycoplasma +, imaging consistent with pulmonary edema. Patient now on RA and maintaining Spo2 >92%. Patient is HD stable, awake & alert.     - medically stable for DC    Plan:   - c/w GMF  - discharge planning   - Continue home plavix & statin.   - daily coumadin/INR

## 2024-03-26 NOTE — PROGRESS NOTE ADULT - NUTRITIONAL ASSESSMENT
This patient has been assessed with a concern for Malnutrition and has been determined to have a diagnosis/diagnoses of Severe protein-calorie malnutrition.    This patient is being managed with:   Diet Easy to Chew-  Consistent Carbohydrate {Evening Snack}  Low Sodium  Supplement Feeding Modality:  Oral  Glucerna Shake Cans or Servings Per Day:  1       Frequency:  Daily  Entered: Mar 19 2024  4:48PM  

## 2024-03-26 NOTE — CHART NOTE - NSCHARTNOTEFT_GEN_A_CORE
:  Raghu Oconnell PA-C    Indication:  hypoxemia     PROCEDURE:  [x ] LIMITED ECHO  [x ] LIMITED CHEST  [ ] LIMITED RETROPERITONEAL  [ ] LIMITED ABDOMINAL  [ ] LIMITED DVT  [ ] NEEDLE GUIDANCE VASCULAR  [ ] NEEDLE GUIDANCE THORACENTESIS  [ ] NEEDLE GUIDANCE PARACENTESIS  [ ] NEEDLE GUIDANCE PERICARDIOCENTESIS  [ ] OTHER    FINDINGS:  Chest: diffuse B lines bilat with moderate simple effusions bilat at bases     ECHO: RV=LV size with RV dilation, mild systolic septal bowing but with preserved LV systolic function  No pericardial effusion  IVC: plethoric    INTERPRETATION:  lung exam with pulm edema and bilat effusions  cardiac exam with RV dysfx with preserved LV systolic function       images uploaded to [employer]
79yo M hx of Severe AS s/p TAVR, afib on coumadin, carotid endarterectomy ('21) on plavix, DM, active smoker, PAD, CAD s/p PCI presents to ED with weakness and SOB. Pt states that he has been feeling weak, dizzy, and SOB with cough with white phlegm for last 4 days. Pt states he recently started having left sided chest pain and worsening SOB and so he called EMS. In the ED pt was HD stable and hypoxemic to 50s refractive to NC and was upgraded to NRB. Labs with INR 4, mild leukocytosis and elevated BNP. CXR with pulm edema. ICU consulted for hypoxemia. Patient admitted to ICU for hypoxemic RF, requiring HFNC. Patient found to be coronvirus + & mycoplasma +, imaging consistent with pulmonary edema. Patient now on RA and maintaining Spo2 >92%. Patient is HD stable, awake & alert.       Plan:   -  Weaned off supplemental O2 today. Continue with duonebs.   -- Continue lasix 40 QD for acute decompensated HF.    - Continue azithromycin for 3 day course for mycoplasma PNA   - Restarted patients home warfarin today. FU INR in AM for dosing.   - Continue home plavix & statin.   - FU cardiology recommendations.      Discussed with ICU attending Ophelia. Discussed with hospitalist.
Per chart, pt with PMH of DM, PAD, CAD s/p PCI, severe AS s/p TAVR, Afib, carotid endarterectomy (2021), active smoker presented with weakness and SOB. Admitted to ICU for hypoxemic respiratory failure requiring HFNC. Found to be coronavirus + and mycoplasma +, imaging consistent with pulmonary edema. Now on room air. s/p ICU.  Pt to be discharged to [facility] once authorization obtained.    Factors impacting intake: [ ] none [ ] nausea  [ ] vomiting [ ] diarrhea [ ] constipation  [ ]chewing problems [ ] swallowing issues  [x] other: acute illness    Diet Prescription: Diet, Easy to Chew:   Consistent Carbohydrate {Evening Snack}  Low Sodium  Supplement Feeding Modality:  Oral  Glucerna Shake Cans or Servings Per Day:  1       Frequency:  Daily (03-19-24 @ 16:49) [Active]    Intake: % of documented meals as per flow sheets; pt drinking and enjoying supplement    Current Weight: No wt x nearly 1 week; request current wt  % Weight Change: N/A    1+ generalized edema, 2+ edema b/l legs as per flow sheets    Pertinent Medications: MEDICATIONS  (STANDING):  clopidogrel Tablet 75 milliGRAM(s) Oral daily  dextrose 5%. 1000 milliLiter(s) (50 mL/Hr) IV Continuous <Continuous>  dextrose 5%. 1000 milliLiter(s) (100 mL/Hr) IV Continuous <Continuous>  dextrose 50% Injectable 25 Gram(s) IV Push once  dextrose 50% Injectable 12.5 Gram(s) IV Push once  dextrose 50% Injectable 25 Gram(s) IV Push once  furosemide    Tablet 40 milliGRAM(s) Oral daily  gabapentin 100 milliGRAM(s) Oral two times a day  glucagon  Injectable 1 milliGRAM(s) IntraMuscular once  influenza  Vaccine (HIGH DOSE) 0.7 milliLiter(s) IntraMuscular once  insulin glargine Injectable (LANTUS) 15 Unit(s) SubCutaneous at bedtime  insulin lispro (ADMELOG) corrective regimen sliding scale   SubCutaneous Before meals and at bedtime  nicotine -  14 mG/24Hr(s) Patch 1 Patch Transdermal daily  simvastatin 10 milliGRAM(s) Oral at bedtime    MEDICATIONS  (PRN):  acetaminophen     Tablet .. 650 milliGRAM(s) Oral every 6 hours PRN Temp greater or equal to 38C (100.4F), Mild Pain (1 - 3)  albuterol    0.083% 2.5 milliGRAM(s) Nebulizer every 6 hours PRN Shortness of Breath and/or Wheezing  dextrose Oral Gel 15 Gram(s) Oral once PRN Blood Glucose LESS THAN 70 milliGRAM(s)/deciliter  hydrALAZINE Injectable 10 milliGRAM(s) IV Push every 6 hours PRN SBP >160    Pertinent Labs: 03-26 Na133 mmol/L<L> Glu 164 mg/dL<H> K+ 4.2 mmol/L Cr  1.18 mg/dL BUN 29 mg/dL<H> 03-26 Phos 3.8 mg/dL 03-26 Alb 2.3 g/dL<L> 03-18 Chol 86 mg/dL LDL 43 mg/dL  HDL 30 mg/dL<L> Trig 61 mg/dL  HgbA1c 6.8%    POCT Blood Glucose.: 270 mg/dL (26 Mar 2024 11:28)  POCT Blood Glucose.: 100 mg/dL (26 Mar 2024 07:58)  POCT Blood Glucose.: 272 mg/dL (25 Mar 2024 21:12)  POCT Blood Glucose.: 400 mg/dL (25 Mar 2024 16:25)  POCT Blood Glucose.: 390 mg/dL (25 Mar 2024 16:25)    Skin: No pressure ulcers noted as per flow sheets    Estimated Needs:   [x] no change since previous assessment on 03/19  [ ] recalculated:     Previous Nutrition Diagnosis:   [x] severe malnutrition in context of acute illness  Etiology: inadequate protein-energy intake in setting of cardiac dysfunction, DOUG  Signs/Symptoms: <50% nutrition needs > 5 days, visible physical findings of moderate muscle/fat loss    Goal: pt to consume >75% of meals & supplement - not consistently met    Nutrition Diagnosis is [x] ongoing  [ ] resolved [ ] not applicable     New Nutrition Diagnosis: [x] not applicable       Interventions:   Recommend  [x] Continue with current diet rx as noted  [ ] Change Diet To:  [ ] Nutrition Supplement  [ ] Nutrition Support  [x] Other: 6235-9505 ml fluid restriction as per medical discretion    Monitoring and Evaluation:   [ x ] PO intake [ x ] Tolerance to diet prescription [ x ] weights [ x ] labs (glucose) [ x ] follow up per protocol  [ ] other:

## 2024-03-26 NOTE — PROGRESS NOTE ADULT - PROVIDER SPECIALTY LIST ADULT
Critical Care
Hospitalist
Hospitalist
Internal Medicine
Critical Care
Hospitalist
Cardiology
Hospitalist
Internal Medicine
Pulmonology
Pulmonology

## 2024-03-26 NOTE — DISCHARGE NOTE NURSING/CASE MANAGEMENT/SOCIAL WORK - NSDCPEFALRISK_GEN_ALL_CORE
Nutrition Care Plan    Nutrition Diagnosis:   Inadequate oral intake related to need for intubation with increased needs d/t critical care and dialysis as evidenced by pt NPO with order for TF.    Intervention:    Enteral formula/solution:  Enteral Nutrition Formula: Renal Formula  Current Rate: increased goal rate to 40 ml/hr  Access Site: NG/OG  Calories Provided by Tube Feedin kcal at goal  Protein Provided by Tube Feedin g at goal  Free Water Provided by Tube Feedin ml at goal   Other Provided by Tube Feeding fiber at goal  Goal Rate: 40 ml/hr     TF with goal of 40 ml/hr + 1 packet Prosource 2 times daily providing 1848 kcal, 108g protein, 12g fiber, 701 ml fluid. Propofol currently at 15 mcg/kg/min = 201 kcal. TF + propofol = 1959 kcal/day.     Feeding tube flush:    Minimal flushes of 30 ml q 4 hours and before/after meds currently ordered. Pt also receiving 15-30ml pre/post Prosource packets.  Defer water flush adjustments to MD.    Monitoring and Evaluation:  Digestive system (mouth to rectum):    Goal:  Pt to tolerate initiation and advancement of TF. Progressing - TF resumed today after hold for possible procedure.  Total energy intake:    Goal:  Pt to meet >80% estimated needs within 2-3 days. Progressing d/t recent TF hold.   For information on Fall & Injury Prevention, visit: [email or web address] OR  [email or web address] OR  https://www.cdc.gov/steadi/patient.html

## 2024-03-26 NOTE — PROGRESS NOTE ADULT - SUBJECTIVE AND OBJECTIVE BOX
Patient is a 78y old  Male who presents with a chief complaint of ADHF (25 Mar 2024 20:26)    INTERVAL HPI/OVERNIGHT EVENTS: no acute event   SUBJECTIVE: no fever/chills. breathing better     MEDICATIONS  (STANDING):  clopidogrel Tablet 75 milliGRAM(s) Oral daily  dextrose 5%. 1000 milliLiter(s) (50 mL/Hr) IV Continuous <Continuous>  dextrose 5%. 1000 milliLiter(s) (100 mL/Hr) IV Continuous <Continuous>  dextrose 50% Injectable 25 Gram(s) IV Push once  dextrose 50% Injectable 12.5 Gram(s) IV Push once  dextrose 50% Injectable 25 Gram(s) IV Push once  furosemide    Tablet 40 milliGRAM(s) Oral daily  gabapentin 100 milliGRAM(s) Oral two times a day  glucagon  Injectable 1 milliGRAM(s) IntraMuscular once  influenza  Vaccine (HIGH DOSE) 0.7 milliLiter(s) IntraMuscular once  insulin glargine Injectable (LANTUS) 15 Unit(s) SubCutaneous at bedtime  insulin lispro (ADMELOG) corrective regimen sliding scale   SubCutaneous Before meals and at bedtime  nicotine -  14 mG/24Hr(s) Patch 1 Patch Transdermal daily  simvastatin 10 milliGRAM(s) Oral at bedtime    MEDICATIONS  (PRN):  acetaminophen     Tablet .. 650 milliGRAM(s) Oral every 6 hours PRN Temp greater or equal to 38C (100.4F), Mild Pain (1 - 3)  albuterol    0.083% 2.5 milliGRAM(s) Nebulizer every 6 hours PRN Shortness of Breath and/or Wheezing  dextrose Oral Gel 15 Gram(s) Oral once PRN Blood Glucose LESS THAN 70 milliGRAM(s)/deciliter  hydrALAZINE Injectable 10 milliGRAM(s) IV Push every 6 hours PRN SBP >160    Allergies    No Known Allergies    Intolerances      REVIEW OF SYSTEMS:  All other systems reviewed and are negative    Vital Signs Last 24 Hrs  T(C): 36.5 (26 Mar 2024 11:22), Max: 37.1 (26 Mar 2024 05:23)  T(F): 97.7 (26 Mar 2024 11:22), Max: 98.7 (26 Mar 2024 05:23)  HR: 84 (26 Mar 2024 12:30) (78 - 102)  BP: 151/74 (26 Mar 2024 12:30) (128/72 - 151/74)  BP(mean): --  RR: 17 (26 Mar 2024 11:22) (17 - 19)  SpO2: 94% (26 Mar 2024 12:30) (92% - 96%)    Parameters below as of 26 Mar 2024 12:30  Patient On (Oxygen Delivery Method): room air      Daily     Daily   I&O's Summary    25 Mar 2024 07:01  -  26 Mar 2024 07:00  --------------------------------------------------------  IN: 780 mL / OUT: 2000 mL / NET: -1220 mL      CAPILLARY BLOOD GLUCOSE      POCT Blood Glucose.: 163 mg/dL (26 Mar 2024 15:33)  POCT Blood Glucose.: 270 mg/dL (26 Mar 2024 11:28)  POCT Blood Glucose.: 100 mg/dL (26 Mar 2024 07:58)  POCT Blood Glucose.: 272 mg/dL (25 Mar 2024 21:12)  POCT Blood Glucose.: 400 mg/dL (25 Mar 2024 16:25)  POCT Blood Glucose.: 390 mg/dL (25 Mar 2024 16:25)    PHYSICAL EXAM:  GENERAL: NAD, well-groomed, well-developed  HEAD:  Atraumatic, Normocephalic  EYES: EOMI, PERRLA, conjunctiva and sclera clear  ENMT: No tonsillar erythema, exudates, or enlargement; Moist mucous membranes, Good dentition, No lesions  NECK: Supple, No JVD, Normal thyroid  NERVOUS SYSTEM:  Alert & Oriented X4   CHEST/LUNG: Clear to percussion bilaterally; No rales, rhonchi, wheezing, or rubs  HEART: Regular rate and rhythm; No murmurs, rubs, or gallops  ABDOMEN: Soft, Nontender, Nondistended; Bowel sounds present    Labs                          12.6   13.71 )-----------( 218      ( 26 Mar 2024 11:30 )             38.7     03-26    133<L>  |  97  |  29<H>  ----------------------------<  164<H>  4.2   |  26  |  1.18    Ca    9.4      26 Mar 2024 09:45  Phos  3.8     03-26  Mg     1.5     03-26    TPro  8.6<H>  /  Alb  2.3<L>  /  TBili  0.9  /  DBili  x   /  AST  58<H>  /  ALT  55  /  AlkPhos  108  03-26    PT/INR - ( 26 Mar 2024 09:45 )   PT: 15.3 sec;   INR: 1.30 ratio               Urinalysis Basic - ( 26 Mar 2024 09:45 )    Color: x / Appearance: x / SG: x / pH: x  Gluc: 164 mg/dL / Ketone: x  / Bili: x / Urobili: x   Blood: x / Protein: x / Nitrite: x   Leuk Esterase: x / RBC: x / WBC x   Sq Epi: x / Non Sq Epi: x / Bacteria: x

## 2024-04-02 DIAGNOSIS — Z95.5 PRESENCE OF CORONARY ANGIOPLASTY IMPLANT AND GRAFT: ICD-10-CM

## 2024-04-02 DIAGNOSIS — I11.0 HYPERTENSIVE HEART DISEASE WITH HEART FAILURE: ICD-10-CM

## 2024-04-02 DIAGNOSIS — E43 UNSPECIFIED SEVERE PROTEIN-CALORIE MALNUTRITION: ICD-10-CM

## 2024-04-02 DIAGNOSIS — E11.51 TYPE 2 DIABETES MELLITUS WITH DIABETIC PERIPHERAL ANGIOPATHY WITHOUT GANGRENE: ICD-10-CM

## 2024-04-02 DIAGNOSIS — B34.2 CORONAVIRUS INFECTION, UNSPECIFIED: ICD-10-CM

## 2024-04-02 DIAGNOSIS — E78.5 HYPERLIPIDEMIA, UNSPECIFIED: ICD-10-CM

## 2024-04-02 DIAGNOSIS — N17.9 ACUTE KIDNEY FAILURE, UNSPECIFIED: ICD-10-CM

## 2024-04-02 DIAGNOSIS — J96.01 ACUTE RESPIRATORY FAILURE WITH HYPOXIA: ICD-10-CM

## 2024-04-02 DIAGNOSIS — B96.0 MYCOPLASMA PNEUMONIAE [M. PNEUMONIAE] AS THE CAUSE OF DISEASES CLASSIFIED ELSEWHERE: ICD-10-CM

## 2024-04-02 DIAGNOSIS — Z82.0 FAMILY HISTORY OF EPILEPSY AND OTHER DISEASES OF THE NERVOUS SYSTEM: ICD-10-CM

## 2024-04-02 DIAGNOSIS — Z79.899 OTHER LONG TERM (CURRENT) DRUG THERAPY: ICD-10-CM

## 2024-04-02 DIAGNOSIS — Z79.84 LONG TERM (CURRENT) USE OF ORAL HYPOGLYCEMIC DRUGS: ICD-10-CM

## 2024-04-02 DIAGNOSIS — J44.9 CHRONIC OBSTRUCTIVE PULMONARY DISEASE, UNSPECIFIED: ICD-10-CM

## 2024-04-02 DIAGNOSIS — Z82.49 FAMILY HISTORY OF ISCHEMIC HEART DISEASE AND OTHER DISEASES OF THE CIRCULATORY SYSTEM: ICD-10-CM

## 2024-04-02 DIAGNOSIS — Z71.6 TOBACCO ABUSE COUNSELING: ICD-10-CM

## 2024-04-02 DIAGNOSIS — Z79.02 LONG TERM (CURRENT) USE OF ANTITHROMBOTICS/ANTIPLATELETS: ICD-10-CM

## 2024-04-02 DIAGNOSIS — F17.210 NICOTINE DEPENDENCE, CIGARETTES, UNCOMPLICATED: ICD-10-CM

## 2024-04-02 DIAGNOSIS — I25.10 ATHEROSCLEROTIC HEART DISEASE OF NATIVE CORONARY ARTERY WITHOUT ANGINA PECTORIS: ICD-10-CM

## 2024-04-02 DIAGNOSIS — Z95.2 PRESENCE OF PROSTHETIC HEART VALVE: ICD-10-CM

## 2024-04-02 DIAGNOSIS — Z83.3 FAMILY HISTORY OF DIABETES MELLITUS: ICD-10-CM

## 2024-04-02 DIAGNOSIS — Z95.820 PERIPHERAL VASCULAR ANGIOPLASTY STATUS WITH IMPLANTS AND GRAFTS: ICD-10-CM

## 2024-04-02 DIAGNOSIS — I50.31 ACUTE DIASTOLIC (CONGESTIVE) HEART FAILURE: ICD-10-CM

## 2024-04-04 ENCOUNTER — TRANSCRIPTION ENCOUNTER (OUTPATIENT)
Age: 79
End: 2024-04-04

## 2024-04-09 ENCOUNTER — TRANSCRIPTION ENCOUNTER (OUTPATIENT)
Age: 79
End: 2024-04-09

## 2024-04-16 ENCOUNTER — TRANSCRIPTION ENCOUNTER (OUTPATIENT)
Age: 79
End: 2024-04-16

## 2024-04-19 NOTE — DISCHARGE NOTE PROVIDER - NSDCHHCONTACT_GEN_ALL_CORE_FT
15 As certified below, I, or a nurse practitioner or physician assistant working with me, had a face-to-face encounter that meets the physician face-to-face encounter requirements.

## 2024-04-23 ENCOUNTER — TRANSCRIPTION ENCOUNTER (OUTPATIENT)
Age: 79
End: 2024-04-23

## 2024-06-17 ENCOUNTER — NON-APPOINTMENT (OUTPATIENT)
Age: 79
End: 2024-06-17

## 2024-06-18 DIAGNOSIS — R06.00 DYSPNEA, UNSPECIFIED: ICD-10-CM

## 2024-06-20 ENCOUNTER — APPOINTMENT (OUTPATIENT)
Dept: VASCULAR SURGERY | Facility: CLINIC | Age: 79
End: 2024-06-20
Payer: MEDICARE

## 2024-06-20 PROCEDURE — 93926 LOWER EXTREMITY STUDY: CPT | Mod: LT

## 2024-06-20 PROCEDURE — G0506: CPT

## 2024-06-20 PROCEDURE — 93880 EXTRACRANIAL BILAT STUDY: CPT

## 2024-06-20 PROCEDURE — 99214 OFFICE O/P EST MOD 30 MIN: CPT | Mod: 25

## 2024-06-20 PROCEDURE — 93978 VASCULAR STUDY: CPT

## 2024-06-20 NOTE — HISTORY OF PRESENT ILLNESS
[FreeTextEntry1] : 77 yo male with history of pad s/p fem fem bypass and right yousif and eia stent, carotid stenosis s/p b/l Cea presents for follow up.  pt states that he is able to walk about 2 blocks prior to having to stop and rest.  pt denies any history of wounds or ulcers

## 2024-06-20 NOTE — ASSESSMENT
[FreeTextEntry1] : 79 yo male with history of pad s/p fem fem bypass and right yousif and eia stent, carotid stenosis s/p b/l cea presents for follow up  Duplex shows stable 50% in-stent stenosis of the right proximal common iliac artery with a 50 to 75% stenosis of the right native distal external iliac artery Femorofemoral bypass with 50%  stenosis noted in the distal anastomosis wall unchanged from previous duplex  Patient to continue with Coumadin and Plavix in addition to Zocor we will repeat duplex in 12 months

## 2024-06-20 NOTE — PHYSICAL EXAM
[JVD] : no jugular venous distention  [2+] : left 2+ [Ankle Swelling (On Exam)] : not present [Varicose Veins Of Lower Extremities] : bilaterally [] : present [Ankle Swelling On The Left] : moderate [No Rash or Lesion] : No rash or lesion [Skin Ulcer] : no ulcer [Alert] : alert [Calm] : calm [de-identified] : appears well

## 2024-06-25 ENCOUNTER — APPOINTMENT (OUTPATIENT)
Dept: PULMONOLOGY | Facility: CLINIC | Age: 79
End: 2024-06-25
Payer: MEDICARE

## 2024-06-25 VITALS
HEIGHT: 69.7 IN | SYSTOLIC BLOOD PRESSURE: 159 MMHG | HEART RATE: 78 BPM | DIASTOLIC BLOOD PRESSURE: 76 MMHG | WEIGHT: 152 LBS | OXYGEN SATURATION: 98 % | BODY MASS INDEX: 22.01 KG/M2

## 2024-06-25 DIAGNOSIS — I48.91 UNSPECIFIED ATRIAL FIBRILLATION: ICD-10-CM

## 2024-06-25 DIAGNOSIS — J43.2 CENTRILOBULAR EMPHYSEMA: ICD-10-CM

## 2024-06-25 DIAGNOSIS — Z95.3 PRESENCE OF XENOGENIC HEART VALVE: ICD-10-CM

## 2024-06-25 PROCEDURE — 99203 OFFICE O/P NEW LOW 30 MIN: CPT | Mod: 25

## 2024-06-25 PROCEDURE — 94729 DIFFUSING CAPACITY: CPT

## 2024-06-25 PROCEDURE — 94060 EVALUATION OF WHEEZING: CPT

## 2024-06-25 PROCEDURE — 94726 PLETHYSMOGRAPHY LUNG VOLUMES: CPT

## 2024-06-25 PROCEDURE — ZZZZZ: CPT

## 2024-06-30 NOTE — DISCHARGE NOTE NURSING/CASE MANAGEMENT/SOCIAL WORK - NSDCPETBCESMAN_GEN_ALL_CORE
Lipid abnormalities are  unchanged    Plan:  Continue same medication/s without change.      Discussed medication dosage, use, side effects, and goals of treatment in detail.    Counseled patient on lifestyle modifications to help control hyperlipidemia.     Patient Treatment Goals:   LDL goal is under 130    Followup at the next regular appointment.  
Hypertension is stable and controlled  Continue current treatment regimen.  Dietary sodium restriction.  Blood pressure will be reassessed in 3 months.  
If you are a smoker, it is important for your health to stop smoking. Please be aware that second hand smoke is also harmful.

## 2024-12-02 NOTE — PATIENT PROFILE ADULT - SAFE PLACE TO LIVE - DETAILS
What Type Of Note Output Would You Prefer (Optional)?: Standard Output What Is The Reason For Today's Visit?: Full Body Skin Examination What Is The Reason For Today's Visit? (Being Monitored For X): the development of a new lesion Tenant issues. Not paying rent to the patient

## 2025-01-11 ENCOUNTER — EMERGENCY (EMERGENCY)
Facility: HOSPITAL | Age: 80
LOS: 0 days | Discharge: HOME | End: 2025-01-11
Attending: STUDENT IN AN ORGANIZED HEALTH CARE EDUCATION/TRAINING PROGRAM
Payer: MEDICARE

## 2025-01-11 VITALS
HEART RATE: 82 BPM | SYSTOLIC BLOOD PRESSURE: 130 MMHG | TEMPERATURE: 98 F | RESPIRATION RATE: 19 BRPM | OXYGEN SATURATION: 91 % | DIASTOLIC BLOOD PRESSURE: 69 MMHG

## 2025-01-11 VITALS
OXYGEN SATURATION: 81 % | TEMPERATURE: 98 F | HEIGHT: 71 IN | WEIGHT: 151.02 LBS | HEART RATE: 94 BPM | SYSTOLIC BLOOD PRESSURE: 123 MMHG | DIASTOLIC BLOOD PRESSURE: 62 MMHG | RESPIRATION RATE: 20 BRPM

## 2025-01-11 DIAGNOSIS — I48.91 UNSPECIFIED ATRIAL FIBRILLATION: ICD-10-CM

## 2025-01-11 DIAGNOSIS — Z98.890 OTHER SPECIFIED POSTPROCEDURAL STATES: Chronic | ICD-10-CM

## 2025-01-11 DIAGNOSIS — S32.000A WEDGE COMPRESSION FRACTURE OF UNSPECIFIED LUMBAR VERTEBRA, INITIAL ENCOUNTER FOR CLOSED FRACTURE: ICD-10-CM

## 2025-01-11 DIAGNOSIS — Z95.2 PRESENCE OF PROSTHETIC HEART VALVE: Chronic | ICD-10-CM

## 2025-01-11 DIAGNOSIS — F17.200 NICOTINE DEPENDENCE, UNSPECIFIED, UNCOMPLICATED: ICD-10-CM

## 2025-01-11 DIAGNOSIS — Z79.01 LONG TERM (CURRENT) USE OF ANTICOAGULANTS: ICD-10-CM

## 2025-01-11 DIAGNOSIS — E11.51 TYPE 2 DIABETES MELLITUS WITH DIABETIC PERIPHERAL ANGIOPATHY WITHOUT GANGRENE: ICD-10-CM
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
## 2025-01-11 DIAGNOSIS — Z98.62 PERIPHERAL VASCULAR ANGIOPLASTY STATUS: ICD-10-CM

## 2025-01-11 DIAGNOSIS — Z95.5 PRESENCE OF CORONARY ANGIOPLASTY IMPLANT AND GRAFT: Chronic | ICD-10-CM
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
## 2025-01-11 DIAGNOSIS — I25.10 ATHEROSCLEROTIC HEART DISEASE OF NATIVE CORONARY ARTERY WITHOUT ANGINA PECTORIS: ICD-10-CM

## 2025-01-11 PROCEDURE — 70450 CT HEAD/BRAIN W/O DYE: CPT | Mod: 26

## 2025-01-11 PROCEDURE — 99284 EMERGENCY DEPT VISIT MOD MDM: CPT

## 2025-01-11 PROCEDURE — 72125 CT NECK SPINE W/O DYE: CPT | Mod: 26

## 2025-01-11 PROCEDURE — 72131 CT LUMBAR SPINE W/O DYE: CPT | Mod: 26

## 2025-01-11 RX ORDER — ACETAMINOPHEN 500 MG/5ML
975 LIQUID (ML) ORAL ONCE
Refills: 0 | Status: COMPLETED | OUTPATIENT
Start: 2025-01-11 | End: 2025-01-11

## 2025-01-11 RX ORDER — METHOCARBAMOL 500 MG/1
500 TABLET, FILM COATED ORAL ONCE
Refills: 0 | Status: COMPLETED | OUTPATIENT
Start: 2025-01-11 | End: 2025-01-11

## 2025-01-11 RX ORDER — LIDOCAINE HYDROCHLORIDE 20 MG/ML
1 JELLY TOPICAL ONCE
Refills: 0 | Status: COMPLETED | OUTPATIENT
Start: 2025-01-11 | End: 2025-01-11

## 2025-01-11 RX ORDER — TRAMADOL HYDROCHLORIDE 50 MG/1
50 TABLET, FILM COATED ORAL ONCE
Refills: 0 | Status: DISCONTINUED | OUTPATIENT
Start: 2025-01-11 | End: 2025-01-11

## 2025-01-11 RX ORDER — TRAMADOL HYDROCHLORIDE 50 MG/1
1 TABLET, FILM COATED ORAL
Qty: 12 | Refills: 0
Start: 2025-01-11 | End: 2025-01-13

## 2025-01-11 RX ADMIN — Medication 975 MILLIGRAM(S): at 12:29

## 2025-01-11 RX ADMIN — LIDOCAINE HYDROCHLORIDE 1 PATCH: 20 JELLY TOPICAL at 12:29

## 2025-01-11 RX ADMIN — TRAMADOL HYDROCHLORIDE 50 MILLIGRAM(S): 50 TABLET, FILM COATED ORAL at 14:16

## 2025-01-11 RX ADMIN — METHOCARBAMOL 500 MILLIGRAM(S): 500 TABLET, FILM COATED ORAL at 12:28

## 2025-01-11 RX ADMIN — Medication 975 MILLIGRAM(S): at 14:16

## 2025-01-11 RX ADMIN — TRAMADOL HYDROCHLORIDE 50 MILLIGRAM(S): 50 TABLET, FILM COATED ORAL at 13:42

## 2025-01-12 LAB — GLUCOSE BLDC GLUCOMTR-MCNC: 224 MG/DL — HIGH (ref 70–99)

## 2025-02-25 NOTE — PATIENT PROFILE ADULT - ABILITY TO HEAR (WITH HEARING AID OR HEARING APPLIANCE IF NORMALLY USED):
Alternate tylenol and ibuprofen for fevers/bodyaches    Start tamiflu today and prednisone tomorrow    Continue advair and albuterol as needed    Come to ER if worsening shortness of breath or fevers don't come down with tylenol/ibuprofen      
Adequate: hears normal conversation without difficulty
Breath Sounds equal & clear to percussion & auscultation, no accessory muscle use

## 2025-04-28 NOTE — H&P PST ADULT - NS MD HP PULSE DORSALIS
Patient is asking since Dr. Rodríguez says he can't go under general anesthesia   to remove a mass in his colon wanting to know if he would be okay with and epidural     Herb can be reached [phone]    Talked to me about 15 minutes about his aliments and how much pain he is in and how Dr. Rodriguez wants to do as much walking as possible to help with the right leg pain.     Has appt with Dr. Benjamin Diana on  5/12/255 regarding blockage in right leg.     Herb can be reached at  [phone]    left normal/right normal

## 2025-07-29 NOTE — REVIEW OF SYSTEMS
Should have refills on file. Called to discuss if he has already picked up his refills at pharmacy?    Patient Contact    Attempt #1    Was call answered? No    Left a voicemail asking patient to give us a call back at our main dermatology line at [phone]    Joyce AZAR RN BSN  [facility]  [phone]     [Feeling Tired] : feeling tired [Feeling Poorly] : feeling poorly [As noted in HPI] : as noted in HPI [Lower Ext Edema] : lower extremity edema [As Noted in HPI] : as noted in HPI [Shortness Of Breath] : shortness of breath [SOB on Exertion] : shortness of breath during exertion [Orthopnea] : orthopnea [Easy Bleeding] : a tendency for easy bleeding [Easy Bruising] : a tendency for easy bruising [Negative] : Endocrine [Chest Pain] : no chest pain [Palpitations] : no palpitations [de-identified] : When on Coumadin

## 2025-09-18 ENCOUNTER — APPOINTMENT (OUTPATIENT)
Dept: VASCULAR SURGERY | Facility: CLINIC | Age: 80
End: 2025-09-18
Payer: MEDICARE

## 2025-09-18 PROCEDURE — 93925 LOWER EXTREMITY STUDY: CPT

## 2025-09-18 PROCEDURE — 93880 EXTRACRANIAL BILAT STUDY: CPT

## 2025-09-18 PROCEDURE — 99214 OFFICE O/P EST MOD 30 MIN: CPT
